# Patient Record
Sex: MALE | Race: WHITE | NOT HISPANIC OR LATINO | Employment: OTHER | URBAN - METROPOLITAN AREA
[De-identification: names, ages, dates, MRNs, and addresses within clinical notes are randomized per-mention and may not be internally consistent; named-entity substitution may affect disease eponyms.]

---

## 2017-06-07 ENCOUNTER — OFFICE VISIT (OUTPATIENT)
Dept: INTERNAL MEDICINE | Facility: IMAGING CENTER | Age: 77
End: 2017-06-07
Payer: MEDICARE

## 2017-06-07 ENCOUNTER — HOSPITAL ENCOUNTER (OUTPATIENT)
Dept: RADIOLOGY | Facility: MEDICAL CENTER | Age: 77
End: 2017-06-07
Attending: INTERNAL MEDICINE
Payer: MEDICARE

## 2017-06-07 VITALS
TEMPERATURE: 99.7 F | HEART RATE: 87 BPM | RESPIRATION RATE: 16 BRPM | SYSTOLIC BLOOD PRESSURE: 128 MMHG | WEIGHT: 218 LBS | DIASTOLIC BLOOD PRESSURE: 70 MMHG | HEIGHT: 71 IN | BODY MASS INDEX: 30.52 KG/M2 | OXYGEN SATURATION: 99 %

## 2017-06-07 DIAGNOSIS — Z12.5 SCREENING FOR PROSTATE CANCER: ICD-10-CM

## 2017-06-07 DIAGNOSIS — M79.605 PAIN OF LEFT LOWER EXTREMITY: ICD-10-CM

## 2017-06-07 DIAGNOSIS — N28.9 RENAL INSUFFICIENCY: ICD-10-CM

## 2017-06-07 DIAGNOSIS — R53.83 FATIGUE, UNSPECIFIED TYPE: ICD-10-CM

## 2017-06-07 DIAGNOSIS — G47.33 OBSTRUCTIVE SLEEP APNEA: ICD-10-CM

## 2017-06-07 DIAGNOSIS — E78.00 PURE HYPERCHOLESTEROLEMIA: ICD-10-CM

## 2017-06-07 DIAGNOSIS — I10 ESSENTIAL HYPERTENSION: ICD-10-CM

## 2017-06-07 PROCEDURE — 1101F PT FALLS ASSESS-DOCD LE1/YR: CPT | Mod: 8P | Performed by: INTERNAL MEDICINE

## 2017-06-07 PROCEDURE — 4040F PNEUMOC VAC/ADMIN/RCVD: CPT | Performed by: INTERNAL MEDICINE

## 2017-06-07 PROCEDURE — 1036F TOBACCO NON-USER: CPT | Performed by: INTERNAL MEDICINE

## 2017-06-07 PROCEDURE — 99214 OFFICE O/P EST MOD 30 MIN: CPT | Performed by: INTERNAL MEDICINE

## 2017-06-07 PROCEDURE — G8419 CALC BMI OUT NRM PARAM NOF/U: HCPCS | Performed by: INTERNAL MEDICINE

## 2017-06-07 PROCEDURE — 93971 EXTREMITY STUDY: CPT | Mod: LT

## 2017-06-07 PROCEDURE — G8432 DEP SCR NOT DOC, RNG: HCPCS | Performed by: INTERNAL MEDICINE

## 2017-06-07 NOTE — PROGRESS NOTES
Chief Complaint   Patient presents with   • Leg Pain       HISTORY OF THE PRESENT ILLNESS: Patient is a 77 y.o. male. Patient comes in with complaint of left medial, upper thigh pain. Symptoms for 3 days. Pain is achy and deep. Pain is moderate to severe. Pain is primarily when he sits or lies down. Pain seems better when he walks. No swelling in the legs. No recent immobilization or history of DVT. 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    He also reports fever of 101 yesterday. No cough, chills, dyspnea, dysuria. He did experience mild diarrhea but this resolved.    He complains of decreased stamina. Symptoms for 6 months. He has also noticed that he falls asleep more easily. He has a history of sleep apnea and is on CPAP. His device and saturations have not been checked in many years.    He has a history of hypertension. Blood pressure stable on current medications.    He is a history of renal insufficiency. This was seen on his last labs with a creatinine of 1.2. It was presumably due to decreased volume. There is been no follow-up.       Allergies: Morphine    Current Outpatient Prescriptions Ordered in [facility]   Medication Sig Dispense Refill   • cholestyramine (QUESTRAN) 4 G packet Take 4 g by mouth every day. Dissolve in 8 oz water or juice 7 Each 0   • triamterene/hctz (MAXZIDE-25/DYAZIDE) 37.5-25 MG Cap TAKE 1 CAPSULE BY MOUTH EACH MORNING **GENERIC FOR DYAZIDE 90 Cap 2   • losartan (COZAAR) 100 MG Tab TAKE 1 TABLET BY MOUTH DAILY ** GENERICFOR COZAAR 30 Tab 5   • allopurinol (ZYLOPRIM) 100 MG Tab TAKE ONE TABLET BY MOUTH TWICE DAILY **GENERIC FOR ZYLOPRIM 60 Tab 5   • ASPIRIN 81 MG PO TABS        No current Epic-ordered facility-administered medications on file.       Past medical history, social history and family history were reviewed from chart today    Review of systems: Per HPI.   All others negative.     Exam: Blood pressure 128/70, pulse 87, temperature 37.6 °C (99.7  "°F), resp. rate 16, height 1.803 m (5' 11\"), weight 98.884 kg (218 lb), SpO2 99 %.  General: Well-appearing. Well-developed. No signs of distress.  HEENT: Grossly normal. Oral cavity is pink and moist.  Neck: Supple without JVD or bruit.  Pulmonary: Clear with good breath sounds. Normal effort.  Cardiovascular: Regular. Carotid and radial pulses are intact.  Abdomen: Soft, nontender, nondistended. Spleen and liver are not enlarged.  Neurologic: Cranial nerves II through XII are grossly normal, alert and oriented x3  Extremities: Lower extremities are symmetric. The pain in the left leg is not reproducible with palpation, internal or external rotation. Mild pain with lifting leg.      Assessment/Plan  1. Pain of left lower extremity  US-EXTREMITY VENOUS UNILATERAL-LOWER LEFT    CBC WITH DIFFERENTIAL   2. Fatigue, unspecified type  TSH    COMP METABOLIC PANEL   3. Essential hypertension  COMP METABOLIC PANEL    URINALYSIS,CULTURE IF INDICATED    MICROALBUMIN CREAT RATIO URINE   4. Pure hypercholesterolemia  LIPID PROFILE    COMP METABOLIC PANEL   5. Renal insufficiency  COMP METABOLIC PANEL   6. Obstructive sleep apnea  CBC WITH DIFFERENTIAL   7. Screening for prostate cancer  PROSTATE SPECIFIC AG SCREENING       Patient with pain in the proximal left lower extremity. I suspect this is muscular however cannot exclude thrombophlebitis or other infection. He had recent fever. I also cannot rule out DVT but I think this is less likely with lack of swelling, symptoms only when sitting or lying in no increased risks.    The cause of his fatigue is unclear. I recommended laboratory workup. He has sleep apnea and his device has not been evaluated in multiple years. He feels that he gets 7 or more hours of sleep nightly with good results.    Blood pressure stable on current medications.    History of elevated serum creatinine. No follow-up. This was originally presumably due to decreased volume.    Due for routine " laboratories including screening PSA

## 2017-06-07 NOTE — MR AVS SNAPSHOT
"        Chuck Ortiz   2017 2:30 PM   Office Visit   MRN: [id number]    Department:  [facility]   Dept Phone:  [phone]    Description:  Male : 1940   Provider:  Reilly Jc M.D.           Reason for Visit     Leg Pain           Allergies as of 2017     Allergen Noted Reactions    Morphine 2014       Bradycardia      You were diagnosed with     Pain of left lower extremity   [5053185]       Fatigue, unspecified type   [0882435]       Essential hypertension   [4256611]       Pure hypercholesterolemia   [272.0.ICD-9-CM]       Renal insufficiency   [136034]       Obstructive sleep apnea   [925994]       Screening for prostate cancer   [677110]         Vital Signs     Blood Pressure Pulse Temperature Respirations Height Weight    128/70 mmHg 87 37.6 °C (99.7 °F) 16 1.803 m (5' 11\") 98.884 kg (218 lb)    Body Mass Index Oxygen Saturation Smoking Status             30.42 kg/m2 99% Never Smoker          Basic Information     Date Of Birth Sex Race Ethnicity Preferred Language    1940 Male White Non- English      Your appointments     2017  5:30 PM   US EXTREMITY (30) A with VISTA US 2   IMAGING VISTA ([place])    [address] [id number] [phone]              Problem List              ICD-10-CM Priority Class Noted - Resolved    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   12/10/2009 - Present    ED (erectile dysfunction) N52.9   Unknown - Present    Cardiac murmur R01.1   3/1/2012 - Present    Sleep apnea G47.30   12/10/2012 - Present    Osteoarthrosis, 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   2012 - Present    Renal cysts, acquired, 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   2014 - Present    Diverticulitis K57.92   2014 - Present    Essential hypertension I10   2015 - Present      Health Maintenance        Date Due Completion Dates    IMM DTaP/Tdap/Td Vaccine (1 - Tdap) 3/18/1958 ---    IMM ZOSTER VACCINE 3/18/1999 ---    COLONOSCOPY 10/1/2024 10/1/2014 (Done)    Override on " 10/1/2014: Done            Current Immunizations     13-VALENT PCV PREVNAR 11/12/2014    Influenza Vaccine Adult HD 10/24/2016, 11/11/2015    Pneumococcal polysaccharide vaccine (PPSV-23) 12/2/2009      Below and/or attached are the medications your provider expects you to take. Review all of your home medications and newly ordered medications with your provider and/or pharmacist. Follow medication instructions as directed by your provider and/or pharmacist. Please keep your medication list with you and share with your provider. Update the information when medications are discontinued, doses are changed, or new medications (including over-the-counter products) are added; and carry medication information at all times in the event of emergency situations     Allergies:  MORPHINE - (reactions not documented)               Medications  Valid as of: June 07, 2017 -  4:49 PM    Generic Name Brand Name Tablet Size Instructions for use    Allopurinol (Tab) ZYLOPRIM 100 MG TAKE ONE TABLET BY MOUTH TWICE DAILY **GENERIC FOR ZYLOPRIM        Aspirin (Tab) aspirin 81 MG         Cholestyramine (Pack) QUESTRAN 4 G Take 4 g by mouth every day. Dissolve in 8 oz water or juice        Losartan Potassium (Tab) COZAAR 100 MG TAKE 1 TABLET BY MOUTH DAILY ** GENERICFOR COZAAR        Triamterene-HCTZ (Cap) MAXZIDE-25/DYAZIDE 37.5-25 MG TAKE 1 CAPSULE BY MOUTH EACH MORNING **GENERIC FOR DYAZIDE        .                 Medicines prescribed today were sent to:     [facility] [place] - [address] [place] [address]    Phone: [phone] Fax: [phone]    Open 24 Hours?: No      Medication refill instructions:       If your prescription bottle indicates you have medication refills left, it is not necessary to call your provider’s office. Please contact your pharmacy and they will refill your medication.    If your prescription bottle indicates you do not have any refills left, you may request  refills at any time through one of the following ways: The online [employer] system (except Urgent Care), by calling your provider’s office, or by asking your pharmacy to contact your provider’s office with a refill request. Medication refills are processed only during regular business hours and may not be available until the next business day. Your provider may request additional information or to have a follow-up visit with you prior to refilling your medication.   *Please Note: Medication refills are assigned a new Rx number when refilled electronically. Your pharmacy may indicate that no refills were authorized even though a new prescription for the same medication is available at the pharmacy. Please request the medicine by name with the pharmacy before contacting your provider for a refill.        Your To Do List     Future Labs/Procedures Complete By Expires    CBC WITH DIFFERENTIAL  As directed 12/8/2017    COMP METABOLIC PANEL  As directed 12/8/2017    LIPID PROFILE  As directed 12/7/2017    MICROALBUMIN CREAT RATIO URINE  As directed 12/5/2017    PROSTATE SPECIFIC AG SCREENING  As directed 12/7/2017    TSH  As directed 12/8/2017    URINALYSIS,CULTURE IF INDICATED  As directed 6/7/2018    US-EXTREMITY VENOUS UNILATERAL-LOWER LEFT  As directed 6/7/2018      Other Notes About Your Plan     Colonoscopy 10/1/2014 repeat in 5 yrs Dexa  PSA  6/18/14  1.11   [facility]    No problems identified through [facility]             [employer] Access Code: Activation code not generated  Current [employer] Status: Active

## 2017-06-08 ENCOUNTER — NON-PROVIDER VISIT (OUTPATIENT)
Dept: INTERNAL MEDICINE | Facility: IMAGING CENTER | Age: 77
End: 2017-06-08
Payer: MEDICARE

## 2017-06-08 ENCOUNTER — HOSPITAL ENCOUNTER (OUTPATIENT)
Facility: MEDICAL CENTER | Age: 77
End: 2017-06-08
Attending: INTERNAL MEDICINE
Payer: MEDICARE

## 2017-06-08 DIAGNOSIS — R53.83 FATIGUE, UNSPECIFIED TYPE: ICD-10-CM

## 2017-06-08 DIAGNOSIS — Z12.5 SCREENING FOR PROSTATE CANCER: ICD-10-CM

## 2017-06-08 DIAGNOSIS — E78.00 PURE HYPERCHOLESTEROLEMIA: ICD-10-CM

## 2017-06-08 DIAGNOSIS — R50.9 INTERMITTENT FUO: ICD-10-CM

## 2017-06-08 DIAGNOSIS — M79.605 PAIN OF LEFT LOWER EXTREMITY: ICD-10-CM

## 2017-06-08 DIAGNOSIS — G47.33 OBSTRUCTIVE SLEEP APNEA: ICD-10-CM

## 2017-06-08 DIAGNOSIS — I10 ESSENTIAL HYPERTENSION: ICD-10-CM

## 2017-06-08 DIAGNOSIS — N28.9 RENAL INSUFFICIENCY: ICD-10-CM

## 2017-06-08 LAB
ALBUMIN SERPL BCP-MCNC: 3.3 G/DL (ref 3.2–4.9)
ALBUMIN/GLOB SERPL: 1.2 G/DL
ALP SERPL-CCNC: 65 U/L (ref 30–99)
ALT SERPL-CCNC: 22 U/L (ref 2–50)
ANION GAP SERPL CALC-SCNC: 6 MMOL/L (ref 0–11.9)
APPEARANCE UR: CLEAR
AST SERPL-CCNC: 24 U/L (ref 12–45)
BASOPHILS # BLD AUTO: 0.4 % (ref 0–1.8)
BASOPHILS # BLD: 0.04 K/UL (ref 0–0.12)
BILIRUB SERPL-MCNC: 0.8 MG/DL (ref 0.1–1.5)
BILIRUB UR QL STRIP.AUTO: NEGATIVE
BUN SERPL-MCNC: 20 MG/DL (ref 8–22)
CALCIUM SERPL-MCNC: 8.8 MG/DL (ref 8.5–10.5)
CHLORIDE SERPL-SCNC: 102 MMOL/L (ref 96–112)
CHOLEST SERPL-MCNC: 126 MG/DL (ref 100–199)
CO2 SERPL-SCNC: 27 MMOL/L (ref 20–33)
COLOR UR: YELLOW
CREAT SERPL-MCNC: 1.07 MG/DL (ref 0.5–1.4)
CREAT UR-MCNC: 230.7 MG/DL
CRP SERPL HS-MCNC: 84.7 MG/L (ref 0–7.5)
CULTURE IF INDICATED INDCX: NO UA CULTURE
EOSINOPHIL # BLD AUTO: 0.05 K/UL (ref 0–0.51)
EOSINOPHIL NFR BLD: 0.5 % (ref 0–6.9)
EPI CELLS #/AREA URNS HPF: ABNORMAL /HPF
ERYTHROCYTE [DISTWIDTH] IN BLOOD BY AUTOMATED COUNT: 50.8 FL (ref 35.9–50)
GFR SERPL CREATININE-BSD FRML MDRD: >60 ML/MIN/1.73 M 2
GLOBULIN SER CALC-MCNC: 2.7 G/DL (ref 1.9–3.5)
GLUCOSE SERPL-MCNC: 92 MG/DL (ref 65–99)
GLUCOSE UR STRIP.AUTO-MCNC: NEGATIVE MG/DL
HCT VFR BLD AUTO: 47.9 % (ref 42–52)
HDLC SERPL-MCNC: 44 MG/DL
HGB BLD-MCNC: 15.6 G/DL (ref 14–18)
HYALINE CASTS #/AREA URNS LPF: ABNORMAL /LPF
IMM GRANULOCYTES # BLD AUTO: 0.03 K/UL (ref 0–0.11)
IMM GRANULOCYTES NFR BLD AUTO: 0.3 % (ref 0–0.9)
KETONES UR STRIP.AUTO-MCNC: NEGATIVE MG/DL
LDLC SERPL CALC-MCNC: 72 MG/DL
LEUKOCYTE ESTERASE UR QL STRIP.AUTO: NEGATIVE
LYMPHOCYTES # BLD AUTO: 1.15 K/UL (ref 1–4.8)
LYMPHOCYTES NFR BLD: 11.9 % (ref 22–41)
MCH RBC QN AUTO: 32.2 PG (ref 27–33)
MCHC RBC AUTO-ENTMCNC: 32.6 G/DL (ref 33.7–35.3)
MCV RBC AUTO: 98.8 FL (ref 81.4–97.8)
MICRO URNS: ABNORMAL
MICROALBUMIN UR-MCNC: 3.6 MG/DL
MICROALBUMIN/CREAT UR: 16 MG/G (ref 0–30)
MONOCYTES # BLD AUTO: 1.4 K/UL (ref 0–0.85)
MONOCYTES NFR BLD AUTO: 14.5 % (ref 0–13.4)
MUCOUS THREADS #/AREA URNS HPF: ABNORMAL /HPF
NEUTROPHILS # BLD AUTO: 6.97 K/UL (ref 1.82–7.42)
NEUTROPHILS NFR BLD: 72.4 % (ref 44–72)
NITRITE UR QL STRIP.AUTO: NEGATIVE
NRBC # BLD AUTO: 0 K/UL
NRBC BLD AUTO-RTO: 0 /100 WBC
PH UR STRIP.AUTO: 6 [PH]
PLATELET # BLD AUTO: 134 K/UL (ref 164–446)
PMV BLD AUTO: 11.9 FL (ref 9–12.9)
POTASSIUM SERPL-SCNC: 4 MMOL/L (ref 3.6–5.5)
PROT SERPL-MCNC: 6 G/DL (ref 6–8.2)
PROT UR QL STRIP: 30 MG/DL
PSA SERPL-MCNC: 0.93 NG/ML (ref 0–4)
RBC # BLD AUTO: 4.85 M/UL (ref 4.7–6.1)
RBC # URNS HPF: ABNORMAL /HPF
RBC UR QL AUTO: NEGATIVE
SODIUM SERPL-SCNC: 135 MMOL/L (ref 135–145)
SP GR UR STRIP.AUTO: 1.02
TRIGL SERPL-MCNC: 52 MG/DL (ref 0–149)
TSH SERPL DL<=0.005 MIU/L-ACNC: 3 UIU/ML (ref 0.3–3.7)
WBC # BLD AUTO: 9.6 K/UL (ref 4.8–10.8)
WBC #/AREA URNS HPF: ABNORMAL /HPF

## 2017-06-08 PROCEDURE — 81001 URINALYSIS AUTO W/SCOPE: CPT

## 2017-06-08 PROCEDURE — 80061 LIPID PANEL: CPT

## 2017-06-08 PROCEDURE — 86141 C-REACTIVE PROTEIN HS: CPT

## 2017-06-08 PROCEDURE — 82043 UR ALBUMIN QUANTITATIVE: CPT

## 2017-06-08 PROCEDURE — 80053 COMPREHEN METABOLIC PANEL: CPT

## 2017-06-08 PROCEDURE — 84153 ASSAY OF PSA TOTAL: CPT

## 2017-06-08 PROCEDURE — 82570 ASSAY OF URINE CREATININE: CPT

## 2017-06-08 PROCEDURE — 84443 ASSAY THYROID STIM HORMONE: CPT

## 2017-06-08 PROCEDURE — 87040 BLOOD CULTURE FOR BACTERIA: CPT

## 2017-06-08 PROCEDURE — 85025 COMPLETE CBC W/AUTO DIFF WBC: CPT

## 2017-06-08 RX ORDER — LEVOFLOXACIN 500 MG/1
500 TABLET, FILM COATED ORAL DAILY
Qty: 7 TAB | Refills: 0 | Status: SHIPPED | OUTPATIENT
Start: 2017-06-08 | End: 2017-07-25

## 2017-06-10 ENCOUNTER — TELEPHONE (OUTPATIENT)
Dept: INTERNAL MEDICINE | Facility: IMAGING CENTER | Age: 77
End: 2017-06-10

## 2017-06-13 LAB
BACTERIA BLD CULT: NORMAL
SIGNIFICANT IND 70042: NORMAL
SITE SITE: NORMAL
SOURCE SOURCE: NORMAL

## 2017-06-13 NOTE — TELEPHONE ENCOUNTER
I spoke with patient regarding his laboratory results. His labs were essentially unremarkable. He had a slight left shift but no leukocytosis however his CRP was significantly elevated. He is feeling much better. His leg pain has completely resolved. No further fevers. Encouraged him to finish his antibiotics. He is due for a HRA and we will schedule to have this done within the next month

## 2017-07-17 DIAGNOSIS — I10 ESSENTIAL HYPERTENSION: ICD-10-CM

## 2017-07-17 RX ORDER — TRIAMTERENE AND HYDROCHLOROTHIAZIDE 37.5; 25 MG/1; MG/1
CAPSULE ORAL
Qty: 90 CAP | Refills: 3 | Status: SHIPPED | OUTPATIENT
Start: 2017-07-17 | End: 2018-10-27 | Stop reason: SDUPTHER

## 2017-07-25 ENCOUNTER — OFFICE VISIT (OUTPATIENT)
Dept: INTERNAL MEDICINE | Facility: IMAGING CENTER | Age: 77
End: 2017-07-25
Payer: MEDICARE

## 2017-07-25 VITALS
SYSTOLIC BLOOD PRESSURE: 126 MMHG | BODY MASS INDEX: 30.24 KG/M2 | HEIGHT: 71 IN | OXYGEN SATURATION: 95 % | DIASTOLIC BLOOD PRESSURE: 72 MMHG | WEIGHT: 216 LBS | TEMPERATURE: 98.4 F | HEART RATE: 71 BPM | RESPIRATION RATE: 12 BRPM

## 2017-07-25 DIAGNOSIS — Z00.00 MEDICARE ANNUAL WELLNESS VISIT, SUBSEQUENT: ICD-10-CM

## 2017-07-25 DIAGNOSIS — E66.09 NON MORBID OBESITY DUE TO EXCESS CALORIES: ICD-10-CM

## 2017-07-25 DIAGNOSIS — Z12.11 SCREENING FOR COLON CANCER: ICD-10-CM

## 2017-07-25 DIAGNOSIS — I10 ESSENTIAL HYPERTENSION: ICD-10-CM

## 2017-07-25 DIAGNOSIS — N28.1 RENAL CYSTS, ACQUIRED, BILATERAL: ICD-10-CM

## 2017-07-25 DIAGNOSIS — G47.33 OBSTRUCTIVE SLEEP APNEA SYNDROME: ICD-10-CM

## 2017-07-25 DIAGNOSIS — N20.0 URIC ACID NEPHROLITHIASIS: ICD-10-CM

## 2017-07-25 DIAGNOSIS — M19.042 PRIMARY OSTEOARTHRITIS OF LEFT HAND: ICD-10-CM

## 2017-07-25 PROCEDURE — G0439 PPPS, SUBSEQ VISIT: HCPCS | Performed by: INTERNAL MEDICINE

## 2017-07-25 ASSESSMENT — PATIENT HEALTH QUESTIONNAIRE - PHQ9: CLINICAL INTERPRETATION OF PHQ2 SCORE: 0

## 2017-07-25 NOTE — PROGRESS NOTES
77 y.o. male presents for the followin. Medicare annual wellness visit, subsequent  Patient has been in good health. He is not exercising because of the excess heat but generally he is very active. His diet is good. Minimal red meat. High in fruits and vegetables. He does not smoke. Alcohol moderation. He is up-to-date on colonoscopy. He is due for TDAP and shingles vaccination    2. Essential hypertension  Blood pressure stable. He takes half tablet losartan and Dyazide.    3. Renal cysts, acquired, bilateral  History of bilateral renal cysts. Last evaluated in  however they were both benign, simple cyst.    4. Primary osteoarthritis of left hand  Ongoing, daily symptoms. Symptoms are mild to moderate. He currently takes no medications.    5. Obstructive sleep apnea syndrome  Stable on CPAP. He is compliant with device nightly.    6. Uric acid nephrolithiasis  Stable on allopurinol. No recurrence. 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 at SURGERY SAME DAY [facility] ORS   • Cataract phaco with iol  2014     Performed by Joni Duarte M.D. at SURGERY SURGICAL [facility] ORS       Family history: relating to possible risk factors for your patient  Family History   Problem Relation Age of Onset    • Heart Disease Maternal Grandmother    • Diabetes Paternal Grandfather    • Cancer Neg Hx    • Stroke Sister    • Lung Disease Father      black lung        Current Providers (including home care/DME’s):   Colonoscopy 10/1/2014 repeat in 5 yrs Dexa  PSA  6/8/17   [facility]      Patient Care Team:  Reilly Jc M.D. as PCP - General (Internal Medicine)  Ana Stearns R.N. as Registered Nurse      Medications:   Current Outpatient Prescriptions Ordered in [facility]   Medication Sig Dispense Refill   • triamterene/hctz (MAXZIDE-25/DYAZIDE) 37.5-25 MG Cap TAKE 1 CAPSULE BY MOUTH EACH MORNING **GENERIC FOR DYAZIDE 90 Cap 3   • losartan (COZAAR) 100 MG Tab TAKE 1 TABLET BY MOUTH DAILY ** GENERICFOR COZAAR (Patient taking differently: take 1/2 tab daily) 30 Tab 5   • allopurinol (ZYLOPRIM) 100 MG Tab TAKE ONE TABLET BY MOUTH TWICE DAILY **GENERIC FOR ZYLOPRIM 60 Tab 5   • ASPIRIN 81 MG PO TABS        No current Epic-ordered facility-administered medications on file.       Supplements (calcium/vitamins): if not lisited in medications    Depression Screening    Little interest or pleasure in doing things?  0 - not at all  Feeling down, depressed , or hopeless? 0 - not at all  Trouble falling or staying asleep, or sleeping too much?     Feeling tired or having little energy?     Poor appetite or overeating?     Feeling bad about yourself - or that you are a failure or have let yourself or your family down?    Trouble concentrating on things, such as reading the newspaper or watching television?    Moving or speaking so slowly that other people could have noticed.  Or the opposite - being so fidgety or restless that you have been moving around a lot more than usual?     Thoughts that you would be better off dead, or of hurting yourself?     Patient Health Questionnaire Score:      If depressive symptoms identified deferred to follow up visit unless specifically addressed in assessment and plan.    Interpretation of  PHQ-9 Total Score   Score Severity   1-4 No Depression   5-9 Mild Depression   10-14 Moderate Depression   15-19 Moderately Severe Depression   20-27 Severe Depression      Screening for Cognitive Impairment    Three Minute Recall (apple, watch, jean)  2/3    Draw clock face with all 12 numbers set to the hand to show 10 minutes past 11 o'clock  1    Cognitive concerns identified deferred for follow up unless specifically addressed in assessment and plan.    Fall Risk Assessment    Has the patient had two or more falls in the last year or any fall with injury in the last year?  No    Safety Assessment    Throw rugs on floor.  Yes  Handrails on all stairs.  Yes  Good lighting in all hallways.  Yes  Difficulty hearing.  No  Patient counseled about all safety risks that were identified.    Functional Assessment ADLs    Are there any barriers preventing you from cooking for yourself or meeting nutritional needs?  No.    Are there any barriers preventing you from driving safely or obtaining transportation?  No.    Are there any barriers preventing you from using a telephone or calling for help?  No.    Are there any barriers preventing you from shopping?  No.    Are there any barriers preventing you from taking care of your own finances?  No.    Are there any barriers preventing you from managing your medications?  No.    Are currently engaging any exercise or physical activity?  No.       Health Maintenance Summary                IMM DTaP/Tdap/Td Vaccine Overdue 3/18/1958     IMM ZOSTER VACCINE Overdue 3/18/1999     Annual Wellness Visit Overdue 12/2/2016      Done 12/2/2015 Visit Dx: Medicare annual wellness visit, subsequent     Patient has more history with this topic...    IMM INFLUENZA Next Due 9/1/2017      Done 10/24/2016 Imm Admin: Influenza Vaccine Adult HD     Patient has more history with this topic...    COLONOSCOPY Next Due 10/1/2024      Done 10/1/2014           Patient Care Team:  Reilly Jc M.D. as  PCP - General (Internal Medicine)  Ana Stearns R.N. as Registered Nurse      Risk Factors:  Depression screening: using standardized screening tools: No depression  Depression Screening    Little interest or pleasure in doing things?  0 - not at all  Feeling down, depressed , or hopeless? 0 - not at all  Trouble falling or staying asleep, or sleeping too much?     Feeling tired or having little energy?     Poor appetite or overeating?     Feeling bad about yourself - or that you are a failure or have let yourself or your family down?    Trouble concentrating on things, such as reading the newspaper or watching television?    Moving or speaking so slowly that other people could have noticed.  Or the opposite - being so fidgety or restless that you have been moving around a lot more than usual?     Thoughts that you would be better off dead, or of hurting yourself?     Patient Health Questionnaire Score:        If depressive symptoms identified deferred to follow up visit unless specifically addressed in assesment and plan.    Interpretation of PHQ-9 Total Score   Score Severity   1-4 No Depression   5-9 Mild Depression   10-14 Moderate Depression   15-19 Moderately Severe Depression   20-27 Severe Depression      Ability to perform ADL’s: Able to perform all activities of daily living.  Hearing impairment: No hearing impairment.  Fall risks: No increased risk of falls.  Safety Management: Normal safety precautions including seatbelts and smoke detectors.  Cognitive function: using standardized screening tools: Normal cognitive function.  Urinary and bowel function. No incontinence, nocturia or frequency. Regular bowels. No diarrhea or rectal bleeding/melena.      Written screening schedule 5-10 years out:  Colonoscopy 10/1/2014 repeat in 5 yrs Dexa  PSA  6/8/17   [facility]    Due for fecal immunoassay    Vital measurements:  Blood pressure 126/72, pulse 71, temperature 36.9 °C (98.4 °F), resp. rate 12,  "height 1.803 m (5' 11\"), weight 97.977 kg (216 lb), SpO2 95 %.  Body mass index is 30.14 kg/(m^2). (Goal BM I>18 <25)      Exam: *Note disease specific examination*  General: Mildly overweight.  No distress.  Normal appearing.  HEENT: Pupils are equal.  Conjunctiva is normal.  Head is normal appearing.  Ears, canals and tympanic membranes are normal.  Oral cavity is pink and moist without lesion.  Neck: Supple without JVD or bruit.  Thyroid is not enlarged.  Pulmonary: Clear with good breath sounds.  Cardiovascular regular rate and rhythm.  No murmur auscultated.  Carotid, radial and pedal pulses are intact.  Abdomen: Soft, nontender, nondistended.  Normal bowel sounds.  Organs are not enlarged.  Neurologic: Cranial nerves intact.  Strength and sensation are normal.  Normal patellar reflex.  Skin: No obvious lesions  Lymph: No cervical, supraclavicular, axillary, abdominal or inguinal adenopathy noted.  Genitourinary: Penis, scrotum and testicles are unremarkable.  No hernia.  Rectal tone is normal.  Stool is heme-negative.  Prostate is not enlarged, soft and without nodule.    Assessment/Plan: *Note for HRA please use the highest specificity diagnosis codes to describe patient’s chronic conditions*    1. Medicare annual wellness visit, subsequent     2. Essential hypertension     3. Renal cysts, acquired, bilateral     4. Primary osteoarthritis of left hand     5. Obstructive sleep apnea syndrome     6. Uric acid nephrolithiasis     7. Non morbid obesity due to excess calories         Overall patient is in good health. He would benefit from additional weight loss. I recommended fecal immunoassay, TDAP and shingles vaccination.    Blood pressure stable on current medications. No change in treatment.    Patient history renal cysts which were both identified as simple, benign cyst. No further follow-up at this time.    Patient has osteoarthritis in the left thumb. We discussed topical agents or possible " anti-inflammatories as needed. At this time he feels that he can live with.    Sleep apnea stable on CPAP.    No sign of recurrence of nephrolithiasis.      Tx options for risk factors:    Referrals out: as appropriate   Weight loss: Body mass index is 30.14 kg/(m^2). Discussed weight loss goal. Recommended portion control and exercise. Briefly discussed Weight Watchers and other weight loss programs.     Physical activity: Good physical activity level.   Smoking cessation: Nonsmoker   Fall prevention: No increased his fall   Nutrition: Good overall nutrition. Balanced lean meat, fruits, vegetables and complex carbohydrates.      Annual Wellness Visit/HRA  (initial, one time only)                                                   (subsequent)

## 2017-07-25 NOTE — MR AVS SNAPSHOT
"        Chuck Ortiz   2017 9:00 AM   Office Visit   MRN: [id number]    Department:  [facility]   Dept Phone:  [phone]    Description:  Male : 1940   Provider:  Reilly Jc M.D.           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   12/10/2009 - Present    ED (erectile dysfunction) N52.9   Unknown - Present    Cardiac murmur R01.1   3/1/2012 - Present    Sleep apnea G47.30   12/10/2012 - Present    Osteoarthrosis, 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   2012 - Present    Renal cysts, acquired, 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   2014 - Present    Diverticulitis K57.92   2014 - Present    Essential hypertension I10   2015 - Present      Health Maintenance        Date Due Completion Dates    IMM DTaP/Tdap/Td Vaccine (1 - Tdap) 3/18/1958 ---    IMM ZOSTER VACCINE 3/18/1999 ---    IMM INFLUENZA (1) 2017 10/24/2016, 2015    COLONOSCOPY 10/1/2024 10/1/2014 (Done)    Override on 10/1/2014: Done            Current Immunizations     13-VALENT PCV PREVNAR 2014    Influenza Vaccine Adult HD 10/24/2016, 2015    Pneumococcal polysaccharide vaccine (PPSV-23) 2009      Below and/or attached are the medications your provider expects you to take. Review all of your home medications and newly ordered medications with your provider and/or pharmacist. Follow medication instructions as directed by your provider and/or pharmacist. Please keep your medication list with " you and share with your provider. Update the information when medications are discontinued, doses are changed, or new medications (including over-the-counter products) are added; and carry medication information at all times in the event of emergency situations     Allergies:  MORPHINE - (reactions not documented)               Medications  Valid as of: July 25, 2017 -  9:55 AM    Generic Name Brand Name Tablet Size Instructions for use    Allopurinol (Tab) ZYLOPRIM 100 MG TAKE ONE TABLET BY MOUTH TWICE DAILY **GENERIC FOR ZYLOPRIM        Aspirin (Tab) aspirin 81 MG         Losartan Potassium (Tab) COZAAR 100 MG TAKE 1 TABLET BY MOUTH DAILY ** GENERICFOR COZAAR        Triamterene-HCTZ (Cap) MAXZIDE-25/DYAZIDE 37.5-25 MG TAKE 1 CAPSULE BY MOUTH EACH MORNING **GENERIC FOR DYAZIDE        .                 Medicines prescribed today were sent to:     [id number] - [place], [place] - [id number] [facility] [place] [address]    Phone: [phone] Fax: [phone]    Open 24 Hours?: No      Medication refill instructions:       If your prescription bottle indicates you have medication refills left, it is not necessary to call your provider’s office. Please contact your pharmacy and they will refill your medication.    If your prescription bottle indicates you do not have any refills left, you may request refills at any time through one of the following ways: The online [employer] system (except Urgent Care), by calling your provider’s office, or by asking your pharmacy to contact your provider’s office with a refill request. Medication refills are processed only during regular business hours and may not be available until the next business day. Your provider may request additional information or to have a follow-up visit with you prior to refilling your medication.   *Please Note: Medication refills are assigned a new Rx number when refilled electronically. Your pharmacy may indicate that no refills  were authorized even though a new prescription for the same medication is available at the pharmacy. Please request the medicine by name with the pharmacy before contacting your provider for a refill.        Other Notes About Your Plan     Colonoscopy 10/1/2014 repeat in 5 yrs Dexa  PSA  6/8/17   [facility]           MyChart Access Code: Activation code not generated  Current MyChart Status: Active

## 2017-07-30 ENCOUNTER — HOSPITAL ENCOUNTER (OUTPATIENT)
Facility: MEDICAL CENTER | Age: 77
End: 2017-07-30
Attending: INTERNAL MEDICINE
Payer: MEDICARE

## 2017-07-30 PROCEDURE — 82274 ASSAY TEST FOR BLOOD FECAL: CPT

## 2017-08-01 DIAGNOSIS — Z12.11 SCREENING FOR COLON CANCER: ICD-10-CM

## 2017-08-02 LAB — HEMOCCULT STL QL IA: NEGATIVE

## 2017-08-07 ENCOUNTER — OFFICE VISIT (OUTPATIENT)
Dept: INTERNAL MEDICINE | Facility: IMAGING CENTER | Age: 77
End: 2017-08-07
Payer: MEDICARE

## 2017-08-07 VITALS
SYSTOLIC BLOOD PRESSURE: 130 MMHG | OXYGEN SATURATION: 93 % | HEART RATE: 76 BPM | TEMPERATURE: 98.6 F | WEIGHT: 219 LBS | HEIGHT: 71 IN | DIASTOLIC BLOOD PRESSURE: 82 MMHG | BODY MASS INDEX: 30.66 KG/M2 | RESPIRATION RATE: 16 BRPM

## 2017-08-07 DIAGNOSIS — L03.113 CELLULITIS OF RIGHT UPPER EXTREMITY: ICD-10-CM

## 2017-08-07 PROCEDURE — 99214 OFFICE O/P EST MOD 30 MIN: CPT | Performed by: INTERNAL MEDICINE

## 2017-08-07 RX ORDER — CEPHALEXIN 250 MG/1
500 CAPSULE ORAL 2 TIMES DAILY
Qty: 28 CAP | Refills: 0 | Status: SHIPPED | OUTPATIENT
Start: 2017-08-07 | End: 2017-11-13

## 2017-08-07 NOTE — MR AVS SNAPSHOT
"        Chuck Ortiz   2017 2:30 PM   Office Visit   MRN: [id number]    Department:  [facility]   Dept Phone:  [phone]    Description:  Male : 1940   Provider:  Reilly Jc M.D.           Reason for Visit     Arm Pain right       Allergies as of 2017     Allergen Noted Reactions    Morphine 2014       Bradycardia      You were diagnosed with     Cellulitis of right upper extremity   [[id number]]         Vital Signs     Blood Pressure Pulse Temperature Respirations Height Weight    130/82 mmHg 76 37 °C (98.6 °F) 16 1.803 m (5' 11\") 99.338 kg (219 lb)    Body Mass Index Oxygen Saturation Smoking Status             30.56 kg/m2 93% Never Smoker          Basic Information     Date Of Birth Sex Race Ethnicity Preferred Language    1940 Male White Non- English      Problem List              ICD-10-CM Priority Class Noted - Resolved    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   12/10/2009 - Present    ED (erectile dysfunction) N52.9   Unknown - Present    Cardiac murmur R01.1   3/1/2012 - Present    Osteoarthrosis, 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   2012 - Present    Renal cysts, acquired, 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   2014 - Present    Diverticulitis K57.92   2014 - Present    Essential hypertension I10   2015 - Present    Obstructive sleep apnea syndrome G47.33   2017 - Present      Health Maintenance        Date Due Completion Dates    IMM DTaP/Tdap/Td Vaccine (1 - Tdap) 3/18/1958 ---    IMM ZOSTER VACCINE 3/18/1999 ---    IMM INFLUENZA (1) 2017 10/24/2016, 2015    COLONOSCOPY 10/1/2024 10/1/2014 (Done)    Override on 10/1/2014: Done            Current Immunizations     13-VALENT PCV PREVNAR 2014    Influenza Vaccine Adult HD 10/24/2016, 2015    Pneumococcal polysaccharide vaccine (PPSV-23) 2009      Below and/or attached are the medications your provider expects you to take. Review all of your home medications and newly ordered medications with your " provider and/or pharmacist. Follow medication instructions as directed by your provider and/or pharmacist. Please keep your medication list with you and share with your provider. Update the information when medications are discontinued, doses are changed, or new medications (including over-the-counter products) are added; and carry medication information at all times in the event of emergency situations     Allergies:  MORPHINE - (reactions not documented)               Medications  Valid as of: August 07, 2017 -  4:36 PM    Generic Name Brand Name Tablet Size Instructions for use    Allopurinol (Tab) ZYLOPRIM 100 MG TAKE ONE TABLET BY MOUTH TWICE DAILY **GENERIC FOR ZYLOPRIM        Aspirin (Tab) aspirin 81 MG         Cephalexin (Cap) KEFLEX 250 MG Take 2 Caps by mouth 2 times a day.        Losartan Potassium (Tab) COZAAR 100 MG TAKE 1 TABLET BY MOUTH DAILY ** GENERICFOR COZAAR        Triamterene-HCTZ (Cap) MAXZIDE-25/DYAZIDE 37.5-25 MG TAKE 1 CAPSULE BY MOUTH EACH MORNING **GENERIC FOR DYAZIDE        .                 Medicines prescribed today were sent to:     [facility] [address] [place], [place]  [facility] [address] [address]    Phone: [phone] Fax: [phone]    Open 24 Hours?: No      Medication refill instructions:       If your prescription bottle indicates you have medication refills left, it is not necessary to call your provider’s office. Please contact your pharmacy and they will refill your medication.    If your prescription bottle indicates you do not have any refills left, you may request refills at any time through one of the following ways: The online [employer] system (except Urgent Care), by calling your provider’s office, or by asking your pharmacy to contact your provider’s office with a refill request. Medication refills are processed only during regular business hours and may not be available until the next business day. Your provider may request additional  information or to have a follow-up visit with you prior to refilling your medication.   *Please Note: Medication refills are assigned a new Rx number when refilled electronically. Your pharmacy may indicate that no refills were authorized even though a new prescription for the same medication is available at the pharmacy. Please request the medicine by name with the pharmacy before contacting your provider for a refill.        Other Notes About Your Plan     Colonoscopy 10/1/2014 repeat in 5 yrs, 7/30/17 FIT NEG Dexa  PSA  6/8/17   [facility]           MyChart Access Code: Activation code not generated  Current MyChart Status: Active

## 2017-08-08 NOTE — PROGRESS NOTES
"Chief Complaint   Patient presents with   • Arm Pain     right        HISTORY OF THE PRESENT ILLNESS: Patient is a 77 y.o. male. Patient comes in with complaint of arm swelling, pain and redness. The pain in the arm is mild. She describes it more of a discomfort. He is also noticed redness on the forearm. No trauma. No fever or chills. He has noticed a swelling in the mid forearm.       Allergies: Morphine    Current Outpatient Prescriptions Ordered in [facility]   Medication Sig Dispense Refill   • cephALEXin (KEFLEX) 250 MG Cap Take 2 Caps by mouth 2 times a day. 28 Cap 0   • triamterene/hctz (MAXZIDE-25/DYAZIDE) 37.5-25 MG Cap TAKE 1 CAPSULE BY MOUTH EACH MORNING **GENERIC FOR DYAZIDE 90 Cap 3   • losartan (COZAAR) 100 MG Tab TAKE 1 TABLET BY MOUTH DAILY ** GENERICFOR COZAAR (Patient taking differently: take 1/2 tab daily) 30 Tab 5   • allopurinol (ZYLOPRIM) 100 MG Tab TAKE ONE TABLET BY MOUTH TWICE DAILY **GENERIC FOR ZYLOPRIM 60 Tab 5   • ASPIRIN 81 MG PO TABS        No current Epic-ordered facility-administered medications on file.       Past medical history, social history and family history were reviewed from chart today    Review of systems: Per HPI. All others negative.     Exam: Blood pressure 130/82, pulse 76, temperature 37 °C (98.6 °F), resp. rate 16, height 1.803 m (5' 11\"), weight 99.338 kg (219 lb), SpO2 93 %.  General: Well-nourished, well-developed. No change in appearance. No distress.  HEENT: Normocephalic.  Pulmonary: Clear. Normal effort.  Cardiovascular: Regular   Abdomen: Normal appearing. Soft, nontender, nondistended.   Neurologic: Cranial nerves II through XII are grossly intact, alert and oriented x3  Extremities: The right forearm on the dorsal side has been extended area of erythema and warmth. No fluctuance. No significant tenderness with palpation. Radial pulses are intact.  is normal.      Assessment/Plan  1. Cellulitis of right upper extremity  cephALEXin (KEFLEX) 250 MG Cap "       Patient appears to have some inflammatory condition in the forearm. I suspect a cellulitis from the elbow but cannot exclude thrombophlebitis. It could be an atypical rash but it is not pruritic. I recommended antibiotics and warm compresses as above. We discussed worrisome signs of progression including red streaks, spiking fevers or increased pain in the forearm. He should call or present to the emergency department symptoms should change.

## 2017-10-14 ENCOUNTER — NON-PROVIDER VISIT (OUTPATIENT)
Dept: INTERNAL MEDICINE | Facility: IMAGING CENTER | Age: 77
End: 2017-10-14
Payer: MEDICARE

## 2017-10-14 DIAGNOSIS — Z23 NEED FOR INFLUENZA VACCINATION: ICD-10-CM

## 2017-10-14 PROCEDURE — 90662 IIV NO PRSV INCREASED AG IM: CPT | Performed by: INTERNAL MEDICINE

## 2017-10-14 PROCEDURE — 90471 IMMUNIZATION ADMIN: CPT | Performed by: INTERNAL MEDICINE

## 2017-11-13 ENCOUNTER — OFFICE VISIT (OUTPATIENT)
Dept: INTERNAL MEDICINE | Facility: IMAGING CENTER | Age: 77
End: 2017-11-13
Payer: MEDICARE

## 2017-11-13 VITALS
TEMPERATURE: 97.8 F | SYSTOLIC BLOOD PRESSURE: 138 MMHG | HEIGHT: 71 IN | RESPIRATION RATE: 16 BRPM | DIASTOLIC BLOOD PRESSURE: 80 MMHG | HEART RATE: 83 BPM | OXYGEN SATURATION: 93 % | BODY MASS INDEX: 30.8 KG/M2 | WEIGHT: 220 LBS

## 2017-11-13 DIAGNOSIS — R13.12 OROPHARYNGEAL DYSPHAGIA: ICD-10-CM

## 2017-11-13 PROCEDURE — 99214 OFFICE O/P EST MOD 30 MIN: CPT | Performed by: INTERNAL MEDICINE

## 2017-11-13 NOTE — PROGRESS NOTES
"Chief Complaint   Patient presents with   • Other     Problems swallowing        HISTORY OF THE PRESENT ILLNESS: Patient is a 77 y.o. male.Patient comes in with complaint of problems swallowing. He has the sensation of food getting stuck in his lower throat/neck. Symptoms for one month. Symptoms seem to be more frequent. Symptoms are not constant but he does experience them daily. No issues with liquids. Denies reflux. No pulmonary symptoms including cough, wheezing or dyspnea. No change in medications. No change in diet. Minimal alcohol use. No anti-inflammatories.       Allergies: Morphine    Current Outpatient Prescriptions Ordered in [facility]   Medication Sig Dispense Refill   • triamterene/hctz (MAXZIDE-25/DYAZIDE) 37.5-25 MG Cap TAKE 1 CAPSULE BY MOUTH EACH MORNING **GENERIC FOR DYAZIDE 90 Cap 3   • losartan (COZAAR) 100 MG Tab TAKE 1 TABLET BY MOUTH DAILY ** GENERICFOR COZAAR (Patient taking differently: take 1/2 tab daily) 30 Tab 5   • allopurinol (ZYLOPRIM) 100 MG Tab TAKE ONE TABLET BY MOUTH TWICE DAILY **GENERIC FOR ZYLOPRIM 60 Tab 5   • ASPIRIN 81 MG PO TABS        No current Epic-ordered facility-administered medications on file.        Past medical history, social history and family history were reviewed from chart today    Review of systems: Per HPI.   All others negative.     Exam: Blood pressure 138/80, pulse 83, temperature 36.6 °C (97.8 °F), resp. rate 16, height 1.803 m (5' 11\"), weight 99.8 kg (220 lb), SpO2 93 %.  General: Well-appearing. Well-developed. No signs of distress.  HEENT: Grossly normal. Oral cavity is pink and moist.  Neck: Supple without JVD or bruit.No adenopathy. Thyroid is not enlarged.  Pulmonary: Clear with good breath sounds. Normal effort.  Cardiovascular: Regular. Carotid and radial pulses are intact.  Abdomen: Soft, nontender, nondistended. Spleen and liver are not enlarged.  Neurologic: Cranial nerves II through XII are grossly normal, alert and oriented " x3      Assessment/Plan  1. Oropharyngeal dysphagia         Trial of Nexium. Samples given  If symptoms do not improve over the next week then I will order upper GI. Follow-up with ENT versus GI based on results.

## 2017-11-29 ENCOUNTER — OFFICE VISIT (OUTPATIENT)
Dept: INTERNAL MEDICINE | Facility: IMAGING CENTER | Age: 77
End: 2017-11-29
Payer: MEDICARE

## 2017-11-29 VITALS
HEART RATE: 81 BPM | OXYGEN SATURATION: 90 % | HEIGHT: 71 IN | DIASTOLIC BLOOD PRESSURE: 80 MMHG | RESPIRATION RATE: 12 BRPM | SYSTOLIC BLOOD PRESSURE: 128 MMHG | BODY MASS INDEX: 30.66 KG/M2 | WEIGHT: 219 LBS | TEMPERATURE: 98.4 F

## 2017-11-29 DIAGNOSIS — R68.89 FLU-LIKE SYMPTOMS: ICD-10-CM

## 2017-11-29 DIAGNOSIS — R55 SYNCOPE, UNSPECIFIED SYNCOPE TYPE: ICD-10-CM

## 2017-11-29 DIAGNOSIS — J10.1 INFLUENZA A: ICD-10-CM

## 2017-11-29 LAB
FLUAV+FLUBV AG SPEC QL IA: POSITIVE
INT CON NEG: NEGATIVE
INT CON POS: POSITIVE

## 2017-11-29 PROCEDURE — 99214 OFFICE O/P EST MOD 30 MIN: CPT | Performed by: INTERNAL MEDICINE

## 2017-11-29 PROCEDURE — 93000 ELECTROCARDIOGRAM COMPLETE: CPT | Performed by: INTERNAL MEDICINE

## 2017-11-29 PROCEDURE — 87804 INFLUENZA ASSAY W/OPTIC: CPT | Performed by: INTERNAL MEDICINE

## 2017-11-29 RX ORDER — OSELTAMIVIR PHOSPHATE 75 MG/1
75 CAPSULE ORAL 2 TIMES DAILY
Qty: 10 CAP | Refills: 0 | Status: SHIPPED | OUTPATIENT
Start: 2017-11-29 | End: 2018-02-24

## 2017-11-29 RX ORDER — OMEPRAZOLE 40 MG/1
40 CAPSULE, DELAYED RELEASE ORAL DAILY
Qty: 30 CAP | Refills: 3 | Status: SHIPPED | OUTPATIENT
Start: 2017-11-29 | End: 2018-08-24

## 2017-11-29 RX ORDER — PROMETHAZINE HYDROCHLORIDE AND CODEINE PHOSPHATE 6.25; 1 MG/5ML; MG/5ML
5 SYRUP ORAL 4 TIMES DAILY PRN
Qty: 120 ML | Refills: 0 | Status: SHIPPED
Start: 2017-11-29 | End: 2018-02-24

## 2017-11-29 RX ORDER — ALBUTEROL SULFATE 90 UG/1
2 AEROSOL, METERED RESPIRATORY (INHALATION) EVERY 6 HOURS PRN
Qty: 8.5 G | Refills: 0 | Status: SHIPPED | OUTPATIENT
Start: 2017-11-29 | End: 2018-02-24

## 2017-11-29 ASSESSMENT — PATIENT HEALTH QUESTIONNAIRE - PHQ9: CLINICAL INTERPRETATION OF PHQ2 SCORE: 0

## 2017-11-29 NOTE — PROGRESS NOTES
Chief complaint: Upper respiratory infection    HISTORY OF THE PRESENT ILLNESS: Patient is a 77 y.o. male. Patient comes in for evaluation of upper respiratory symptoms. Starting 4 days ago he developed chest congestion, rhinitis, fever and chills. The following day he began to cough up sputum. He describes it as all of colored. He had continued fever and chills through yesterday. No myalgia. No headache. The sputum was also tinged with blood. He has noticed some wheezing with expiration.    Last night. He had a coughing spell. When he stood up he had a syncopal episode. It was unwitnessed but he fell across the couch and ended up on the floor. No trauma to the body or head. He is uncertain of how long he was unconscious.    We also discussed his dysphagia and reflux symptoms from last visit. He feels that the dysphagia has mostly resolved. He still experiences some reflux. There was issues with filling his prescription so he switched to over-the-counter Nexium which she has found less effective than samples.    EKG:  Interpreted by myself.  Normal sinus rhythm. First-degree AV block. No acute or chronic ischemic changes.    Influenza testing  Influenza a-positive  Influenza B-negative         Allergies: Morphine    Current Outpatient Prescriptions Ordered in [facility]   Medication Sig Dispense Refill   • oseltamivir (TAMIFLU) 75 MG Cap Take 1 Cap by mouth 2 times a day. 10 Cap 0   • albuterol 108 (90 Base) MCG/ACT Aero Soln inhalation aerosol Inhale 2 Puffs by mouth every 6 hours as needed for Shortness of Breath. 8.5 g 0   • promethazine-codeine (PHENERGAN-CODEINE) 6.25-10 MG/5ML Syrup Take 5 mL by mouth 4 times a day as needed for Cough. 120 mL 0   • esomeprazole (NEXIUM 24HR) 20 MG capsule Take 1 Cap by mouth every morning before breakfast. 90 Cap 0   • triamterene/hctz (MAXZIDE-25/DYAZIDE) 37.5-25 MG Cap TAKE 1 CAPSULE BY MOUTH EACH MORNING **GENERIC FOR DYAZIDE 90 Cap 3   • losartan (COZAAR) 100 MG Tab TAKE 1  "TABLET BY MOUTH DAILY ** GENERICFOR COZAAR (Patient taking differently: take 1/2 tab daily) 30 Tab 5   • allopurinol (ZYLOPRIM) 100 MG Tab TAKE ONE TABLET BY MOUTH TWICE DAILY **GENERIC FOR ZYLOPRIM 60 Tab 5   • ASPIRIN 81 MG PO TABS        No current Epic-ordered facility-administered medications on file.        Past medical history, social history and family history were reviewed from chart today    Review of systems: Per HPI.   All others negative.     Exam: Blood pressure 128/80, pulse 81, temperature 36.9 °C (98.4 °F), resp. rate 12, height 1.803 m (5' 11\"), weight 99.3 kg (219 lb), SpO2 90 %.  General:Ill appearing but no distress.  HEENT: Grossly normal. Oral cavity is pink and moist.  Neck: Supple without JVD or bruit.  Pulmonary: Patient has difficulty taking deep breath because of cough. He has rhonchi in the right base. He has wheezing in the left lung field..  Cardiovascular: Regular. Carotid and radial pulses are intact.  Abdomen: Soft, nontender, nondistended. Spleen and liver are not enlarged.  Neurologic: Cranial nerves II through XII are grossly normal, alert and oriented x3      Assessment/Plan  1. Influenza A     2. Flu-like symptoms  POCT Influenza A/B   3. Syncope, unspecified syncope type           Patient has findings consistent with pneumonia. He is positive for influenza A.  Recommended:  Tamiflu  Albuterol as needed for wheezing  Phenergan with codeine as needed for cough  Ibuprofen or Tylenol as needed for fever, aches and pains.    Suspect syncope was vagal.  Further workup if he has any further symptoms.    Discussed that if his condition should worsen he should go to the emergency room    "

## 2017-12-08 ENCOUNTER — HOSPITAL ENCOUNTER (OUTPATIENT)
Dept: RADIOLOGY | Facility: MEDICAL CENTER | Age: 77
End: 2017-12-08
Attending: INTERNAL MEDICINE
Payer: MEDICARE

## 2017-12-08 ENCOUNTER — OFFICE VISIT (OUTPATIENT)
Dept: INTERNAL MEDICINE | Facility: IMAGING CENTER | Age: 77
End: 2017-12-08
Payer: MEDICARE

## 2017-12-08 VITALS
OXYGEN SATURATION: 93 % | BODY MASS INDEX: 30.1 KG/M2 | WEIGHT: 215 LBS | HEART RATE: 72 BPM | DIASTOLIC BLOOD PRESSURE: 84 MMHG | SYSTOLIC BLOOD PRESSURE: 138 MMHG | HEIGHT: 71 IN | RESPIRATION RATE: 16 BRPM | TEMPERATURE: 97.6 F

## 2017-12-08 DIAGNOSIS — R13.10 DYSPHAGIA, UNSPECIFIED TYPE: ICD-10-CM

## 2017-12-08 DIAGNOSIS — J11.1 INFLUENZA: ICD-10-CM

## 2017-12-08 PROCEDURE — 71020 DX-CHEST-2 VIEWS: CPT

## 2017-12-08 PROCEDURE — 99214 OFFICE O/P EST MOD 30 MIN: CPT | Performed by: INTERNAL MEDICINE

## 2017-12-08 RX ORDER — SUCRALFATE 1 G/1
1 TABLET ORAL
Qty: 120 TAB | Refills: 3 | Status: SHIPPED | OUTPATIENT
Start: 2017-12-08 | End: 2018-02-24

## 2017-12-08 NOTE — PROGRESS NOTES
Chief complaint: Abdominal pain and dysphagia.    HISTORY OF THE PRESENT ILLNESS: Patient is a 77 y.o. male. Patient comes in with complaint of chest pain. Symptoms are associated when he eats. He has a sensation of his food getting stuck. We recently tried him on Nexium. His benefit was minimal. He was also recently treated for influenza. He states that he is feeling better but his cough has been ongoing. He feels that his abdominal symptoms are related to the cough. No further fever or chills. No emesis.       Allergies: Morphine    Current Outpatient Prescriptions Ordered in [facility]   Medication Sig Dispense Refill   • sucralfate (CARAFATE) 1 GM Tab Take 1 Tab by mouth 4 Times a Day,Before Meals and at Bedtime. 120 Tab 3   • oseltamivir (TAMIFLU) 75 MG Cap Take 1 Cap by mouth 2 times a day. 10 Cap 0   • albuterol 108 (90 Base) MCG/ACT Aero Soln inhalation aerosol Inhale 2 Puffs by mouth every 6 hours as needed for Shortness of Breath. 8.5 g 0   • promethazine-codeine (PHENERGAN-CODEINE) 6.25-10 MG/5ML Syrup Take 5 mL by mouth 4 times a day as needed for Cough. 120 mL 0   • omeprazole (PRILOSEC) 40 MG delayed-release capsule Take 1 Cap by mouth every day. 30 Cap 3   • esomeprazole (NEXIUM 24HR) 20 MG capsule Take 1 Cap by mouth every morning before breakfast. 90 Cap 0   • triamterene/hctz (MAXZIDE-25/DYAZIDE) 37.5-25 MG Cap TAKE 1 CAPSULE BY MOUTH EACH MORNING **GENERIC FOR DYAZIDE 90 Cap 3   • losartan (COZAAR) 100 MG Tab TAKE 1 TABLET BY MOUTH DAILY ** GENERICFOR COZAAR (Patient taking differently: take 1/2 tab daily) 30 Tab 5   • allopurinol (ZYLOPRIM) 100 MG Tab TAKE ONE TABLET BY MOUTH TWICE DAILY **GENERIC FOR ZYLOPRIM 60 Tab 5   • ASPIRIN 81 MG PO TABS        No current Epic-ordered facility-administered medications on file.        Past medical history, social history and family history were reviewed from chart today    Review of systems: Per HPI.   All others negative.     Exam: Blood pressure 138/84, pulse  "72, temperature 36.4 °C (97.6 °F), resp. rate 16, height 1.803 m (5' 11\"), weight 97.5 kg (215 lb), SpO2 93 %.  General: Well-appearing. Well-developed. No signs of distress.  HEENT: Grossly normal. Oral cavity is pink and moist.  Neck: Supple without JVD or bruit.  Pulmonary: Clear with good breath sounds. Normal effort.  Cardiovascular: Regular. Carotid and radial pulses are intact.  Abdomen: Soft, nontender, nondistended. Spleen and liver are not enlarged.  Neurologic: Cranial nerves II through XII are grossly normal, alert and oriented x3      Assessment/Plan  1. Dysphagia, unspecified type  sucralfate (CARAFATE) 1 GM Tab    DX-UPPER GI-AIR WITH SBFT    DX-UPPER GI-AIR CONTRAST       Patient with esophageal dysphagia. Failed proton pump inhibitor.   Trial of Carafate.  Upper GI.  Refer to GI.  "

## 2017-12-19 ENCOUNTER — HOSPITAL ENCOUNTER (OUTPATIENT)
Dept: RADIOLOGY | Facility: MEDICAL CENTER | Age: 77
End: 2017-12-19
Attending: INTERNAL MEDICINE
Payer: MEDICARE

## 2017-12-19 DIAGNOSIS — R13.10 DYSPHAGIA, UNSPECIFIED TYPE: ICD-10-CM

## 2017-12-19 PROCEDURE — 700112 HCHG RX REV CODE 229: Performed by: INTERNAL MEDICINE

## 2017-12-19 PROCEDURE — A9270 NON-COVERED ITEM OR SERVICE: HCPCS | Performed by: INTERNAL MEDICINE

## 2017-12-19 PROCEDURE — 74247 DX-UPPER GI-AIR CONTRAST: CPT

## 2017-12-19 RX ADMIN — ANTACID/ANTIFLATULENT 1 PACKET: 380; 550; 10; 10 GRANULE, EFFERVESCENT ORAL at 09:12

## 2017-12-21 DIAGNOSIS — K22.5 ZENKER DIVERTICULUM: ICD-10-CM

## 2018-01-22 DIAGNOSIS — I10 ESSENTIAL HYPERTENSION: ICD-10-CM

## 2018-01-22 DIAGNOSIS — M1A.00X0 IDIOPATHIC CHRONIC GOUT WITHOUT TOPHUS, UNSPECIFIED SITE: ICD-10-CM

## 2018-01-22 RX ORDER — LOSARTAN POTASSIUM 100 MG/1
TABLET ORAL
Qty: 90 TAB | Refills: 3 | Status: SHIPPED | OUTPATIENT
Start: 2018-01-22 | End: 2018-08-24

## 2018-01-22 RX ORDER — ALLOPURINOL 100 MG/1
TABLET ORAL
Qty: 180 TAB | Refills: 3 | Status: SHIPPED | OUTPATIENT
Start: 2018-01-22 | End: 2018-02-24

## 2018-02-20 ENCOUNTER — OFFICE VISIT (OUTPATIENT)
Dept: INTERNAL MEDICINE | Facility: IMAGING CENTER | Age: 78
End: 2018-02-20
Payer: MEDICARE

## 2018-02-20 VITALS
WEIGHT: 221 LBS | DIASTOLIC BLOOD PRESSURE: 80 MMHG | SYSTOLIC BLOOD PRESSURE: 138 MMHG | OXYGEN SATURATION: 94 % | RESPIRATION RATE: 12 BRPM | TEMPERATURE: 98.4 F | HEIGHT: 71 IN | BODY MASS INDEX: 30.94 KG/M2 | HEART RATE: 74 BPM

## 2018-02-20 DIAGNOSIS — R31.0 GROSS HEMATURIA: ICD-10-CM

## 2018-02-20 DIAGNOSIS — N23 KIDNEY PAIN: ICD-10-CM

## 2018-02-20 LAB
APPEARANCE UR: CLEAR
BILIRUB UR STRIP-MCNC: NORMAL MG/DL
COLOR UR AUTO: NORMAL
GLUCOSE UR STRIP.AUTO-MCNC: NORMAL MG/DL
KETONES UR STRIP.AUTO-MCNC: NORMAL MG/DL
LEUKOCYTE ESTERASE UR QL STRIP.AUTO: NORMAL
NITRITE UR QL STRIP.AUTO: NORMAL
PH UR STRIP.AUTO: 6 [PH] (ref 5–8)
PROT UR QL STRIP: NORMAL MG/DL
RBC UR QL AUTO: NORMAL
SP GR UR STRIP.AUTO: 1
UROBILINOGEN UR STRIP-MCNC: NORMAL MG/DL

## 2018-02-20 PROCEDURE — 99214 OFFICE O/P EST MOD 30 MIN: CPT | Performed by: INTERNAL MEDICINE

## 2018-02-20 PROCEDURE — 81002 URINALYSIS NONAUTO W/O SCOPE: CPT | Performed by: INTERNAL MEDICINE

## 2018-02-20 RX ORDER — OXYCODONE HYDROCHLORIDE AND ACETAMINOPHEN 5; 325 MG/1; MG/1
1-2 TABLET ORAL EVERY 4 HOURS PRN
Qty: 20 TAB | Refills: 0 | Status: SHIPPED | OUTPATIENT
Start: 2018-02-20 | End: 2018-02-24

## 2018-02-20 NOTE — PROGRESS NOTES
Chief Complaint   Patient presents with   • Back Pain       HISTORY OF THE PRESENT ILLNESS: Patient is a 77 y.o. male. Patient comes in for evaluation of left flank pain. Symptoms for 3 days. Symptoms began on Saturday with pain rated as a 5-7. Pain was sharp and colicky. Pain lasted for 3 hours. Its intensity dropped to 1 but has waxed and waned over the last 2 days. He has had at least one episode of gross hematuria. He has a history of kidney stones. He believes his previous stones were on the right and were uric acid. He is on chronic allopurinol. His water intake is poor. He took Aleve with some improvement in the pain.       Allergies: Morphine    Current Outpatient Prescriptions Ordered in [facility]   Medication Sig Dispense Refill   • oxyCODONE-acetaminophen (PERCOCET) 5-325 MG Tab Take 1-2 Tabs by mouth every four hours as needed for up to 5 days. 20 Tab 0   • allopurinol (ZYLOPRIM) 100 MG Tab TAKE ONE TABLET BY MOUTH TWICE DAILY **GENERIC FOR ZYLOPRIM 180 Tab 3   • losartan (COZAAR) 100 MG Tab TAKE 1 TABLET BY MOUTH DAILY ** GENERICFOR COZAAR 90 Tab 3   • sucralfate (CARAFATE) 1 GM Tab Take 1 Tab by mouth 4 Times a Day,Before Meals and at Bedtime. 120 Tab 3   • oseltamivir (TAMIFLU) 75 MG Cap Take 1 Cap by mouth 2 times a day. 10 Cap 0   • albuterol 108 (90 Base) MCG/ACT Aero Soln inhalation aerosol Inhale 2 Puffs by mouth every 6 hours as needed for Shortness of Breath. 8.5 g 0   • promethazine-codeine (PHENERGAN-CODEINE) 6.25-10 MG/5ML Syrup Take 5 mL by mouth 4 times a day as needed for Cough. 120 mL 0   • omeprazole (PRILOSEC) 40 MG delayed-release capsule Take 1 Cap by mouth every day. 30 Cap 3   • esomeprazole (NEXIUM 24HR) 20 MG capsule Take 1 Cap by mouth every morning before breakfast. 90 Cap 0   • triamterene/hctz (MAXZIDE-25/DYAZIDE) 37.5-25 MG Cap TAKE 1 CAPSULE BY MOUTH EACH MORNING **GENERIC FOR DYAZIDE 90 Cap 3   • ASPIRIN 81 MG PO TABS        No current Epic-ordered facility-administered  "medications on file.        Past medical history, social history and family history were reviewed from chart today    Review of systems: Per HPI.   All others negative.     Exam: Blood pressure 138/80, pulse 74, temperature 36.9 °C (98.4 °F), resp. rate 12, height 1.803 m (5' 11\"), weight 100.2 kg (221 lb), SpO2 94 %.      General: Well-appearing. Well-developed. No signs of distress.  HEENT: Grossly normal. Oral cavity is pink and moist.  Neck: Supple without JVD or bruit.  Pulmonary: Clear with good breath sounds. Normal effort.  Cardiovascular: Regular. Carotid and radial pulses are intact.  Abdomen: Soft, nontender, nondistended. Spleen and liver are not enlarged. Mild left flank tenderness.  Neurologic: Cranial nerves II through XII are grossly normal, alert and oriented x3      Assessment/Plan  1. Kidney pain  POCT Urinalysis    oxyCODONE-acetaminophen (PERCOCET) 5-325 MG Tab    CONSENT FOR OPIATE PRESCRIPTION    CT-RENAL COLIC EVALUATION(A/P W/O)   2. Gross hematuria  POCT Urinalysis    oxyCODONE-acetaminophen (PERCOCET) 5-325 MG Tab    CONSENT FOR OPIATE PRESCRIPTION    CT-RENAL COLIC EVALUATION(A/P W/O)         Patient with left flank pain, hematuria and history of kidney stones. I suspect he has another stone. He was given oxycodone that he can take as needed for pain control. He should also continue to use Advil or Aleve. Encouraged him to increase his fluid intake to 64 ounces or more daily.     Recommended renal CT to assess size of stone    ER if symptoms should become acutely worse  "

## 2018-02-21 ENCOUNTER — HOSPITAL ENCOUNTER (OUTPATIENT)
Dept: RADIOLOGY | Facility: MEDICAL CENTER | Age: 78
End: 2018-02-21
Attending: INTERNAL MEDICINE
Payer: MEDICARE

## 2018-02-21 DIAGNOSIS — R31.0 GROSS HEMATURIA: ICD-10-CM

## 2018-02-21 DIAGNOSIS — N23 KIDNEY PAIN: ICD-10-CM

## 2018-02-21 PROCEDURE — 74176 CT ABD & PELVIS W/O CONTRAST: CPT

## 2018-02-24 ENCOUNTER — HOSPITAL ENCOUNTER (EMERGENCY)
Facility: MEDICAL CENTER | Age: 78
End: 2018-02-24
Attending: EMERGENCY MEDICINE
Payer: MEDICARE

## 2018-02-24 ENCOUNTER — APPOINTMENT (OUTPATIENT)
Dept: RADIOLOGY | Facility: MEDICAL CENTER | Age: 78
End: 2018-02-24
Attending: EMERGENCY MEDICINE
Payer: MEDICARE

## 2018-02-24 VITALS
OXYGEN SATURATION: 96 % | DIASTOLIC BLOOD PRESSURE: 89 MMHG | HEART RATE: 64 BPM | SYSTOLIC BLOOD PRESSURE: 146 MMHG | RESPIRATION RATE: 16 BRPM | BODY MASS INDEX: 27.98 KG/M2 | WEIGHT: 200.62 LBS | TEMPERATURE: 96.9 F

## 2018-02-24 DIAGNOSIS — R11.2 NON-INTRACTABLE VOMITING WITH NAUSEA, UNSPECIFIED VOMITING TYPE: ICD-10-CM

## 2018-02-24 DIAGNOSIS — E87.6 HYPOKALEMIA: ICD-10-CM

## 2018-02-24 DIAGNOSIS — E83.51 HYPOCALCEMIA: ICD-10-CM

## 2018-02-24 DIAGNOSIS — N23 RENAL COLIC: ICD-10-CM

## 2018-02-24 LAB
ALBUMIN SERPL BCP-MCNC: 2.5 G/DL (ref 3.2–4.9)
ALBUMIN/GLOB SERPL: 1.2 G/DL
ALP SERPL-CCNC: 50 U/L (ref 30–99)
ALT SERPL-CCNC: 57 U/L (ref 2–50)
ANION GAP SERPL CALC-SCNC: 7 MMOL/L (ref 0–11.9)
AST SERPL-CCNC: 57 U/L (ref 12–45)
BASOPHILS # BLD AUTO: 0.3 % (ref 0–1.8)
BASOPHILS # BLD: 0.03 K/UL (ref 0–0.12)
BILIRUB SERPL-MCNC: 0.7 MG/DL (ref 0.1–1.5)
BUN SERPL-MCNC: 27 MG/DL (ref 8–22)
CA-I SERPL-SCNC: 1.05 MMOL/L (ref 1.1–1.3)
CALCIUM SERPL-MCNC: 6.9 MG/DL (ref 8.4–10.2)
CHLORIDE SERPL-SCNC: 106 MMOL/L (ref 96–112)
CO2 SERPL-SCNC: 22 MMOL/L (ref 20–33)
CREAT SERPL-MCNC: 1.26 MG/DL (ref 0.5–1.4)
EOSINOPHIL # BLD AUTO: 0.02 K/UL (ref 0–0.51)
EOSINOPHIL NFR BLD: 0.2 % (ref 0–6.9)
ERYTHROCYTE [DISTWIDTH] IN BLOOD BY AUTOMATED COUNT: 48.6 FL (ref 35.9–50)
GLOBULIN SER CALC-MCNC: 2.1 G/DL (ref 1.9–3.5)
GLUCOSE SERPL-MCNC: 108 MG/DL (ref 65–99)
HCT VFR BLD AUTO: 48.8 % (ref 42–52)
HGB BLD-MCNC: 16.2 G/DL (ref 14–18)
IMM GRANULOCYTES # BLD AUTO: 0.03 K/UL (ref 0–0.11)
IMM GRANULOCYTES NFR BLD AUTO: 0.3 % (ref 0–0.9)
LIPASE SERPL-CCNC: <10 U/L (ref 7–58)
LYMPHOCYTES # BLD AUTO: 1.09 K/UL (ref 1–4.8)
LYMPHOCYTES NFR BLD: 11.3 % (ref 22–41)
MCH RBC QN AUTO: 31.6 PG (ref 27–33)
MCHC RBC AUTO-ENTMCNC: 33.2 G/DL (ref 33.7–35.3)
MCV RBC AUTO: 95.3 FL (ref 81.4–97.8)
MONOCYTES # BLD AUTO: 1.51 K/UL (ref 0–0.85)
MONOCYTES NFR BLD AUTO: 15.7 % (ref 0–13.4)
NEUTROPHILS # BLD AUTO: 6.95 K/UL (ref 1.82–7.42)
NEUTROPHILS NFR BLD: 72.2 % (ref 44–72)
NRBC # BLD AUTO: 0 K/UL
NRBC BLD-RTO: 0 /100 WBC
PLATELET # BLD AUTO: 103 K/UL (ref 164–446)
PMV BLD AUTO: 11.5 FL (ref 9–12.9)
POTASSIUM SERPL-SCNC: 3.1 MMOL/L (ref 3.6–5.5)
PROT SERPL-MCNC: 4.6 G/DL (ref 6–8.2)
RBC # BLD AUTO: 5.12 M/UL (ref 4.7–6.1)
SODIUM SERPL-SCNC: 135 MMOL/L (ref 135–145)
WBC # BLD AUTO: 9.6 K/UL (ref 4.8–10.8)

## 2018-02-24 PROCEDURE — 94760 N-INVAS EAR/PLS OXIMETRY 1: CPT

## 2018-02-24 PROCEDURE — 700101 HCHG RX REV CODE 250: Performed by: EMERGENCY MEDICINE

## 2018-02-24 PROCEDURE — 700111 HCHG RX REV CODE 636 W/ 250 OVERRIDE (IP): Performed by: EMERGENCY MEDICINE

## 2018-02-24 PROCEDURE — A9270 NON-COVERED ITEM OR SERVICE: HCPCS | Performed by: EMERGENCY MEDICINE

## 2018-02-24 PROCEDURE — 700102 HCHG RX REV CODE 250 W/ 637 OVERRIDE(OP): Performed by: EMERGENCY MEDICINE

## 2018-02-24 PROCEDURE — 96367 TX/PROPH/DG ADDL SEQ IV INF: CPT

## 2018-02-24 PROCEDURE — 36415 COLL VENOUS BLD VENIPUNCTURE: CPT

## 2018-02-24 PROCEDURE — 85025 COMPLETE CBC W/AUTO DIFF WBC: CPT

## 2018-02-24 PROCEDURE — 82330 ASSAY OF CALCIUM: CPT

## 2018-02-24 PROCEDURE — 99285 EMERGENCY DEPT VISIT HI MDM: CPT

## 2018-02-24 PROCEDURE — 304561 HCHG STAT O2

## 2018-02-24 PROCEDURE — 96375 TX/PRO/DX INJ NEW DRUG ADDON: CPT

## 2018-02-24 PROCEDURE — 700105 HCHG RX REV CODE 258: Performed by: EMERGENCY MEDICINE

## 2018-02-24 PROCEDURE — 80053 COMPREHEN METABOLIC PANEL: CPT

## 2018-02-24 PROCEDURE — 83690 ASSAY OF LIPASE: CPT

## 2018-02-24 PROCEDURE — 74018 RADEX ABDOMEN 1 VIEW: CPT

## 2018-02-24 PROCEDURE — 96365 THER/PROPH/DIAG IV INF INIT: CPT

## 2018-02-24 PROCEDURE — 96361 HYDRATE IV INFUSION ADD-ON: CPT

## 2018-02-24 RX ORDER — KETOROLAC TROMETHAMINE 30 MG/ML
15 INJECTION, SOLUTION INTRAMUSCULAR; INTRAVENOUS ONCE
Status: COMPLETED | OUTPATIENT
Start: 2018-02-24 | End: 2018-02-24

## 2018-02-24 RX ORDER — TAMSULOSIN HYDROCHLORIDE 0.4 MG/1
0.4 CAPSULE ORAL DAILY
Qty: 10 CAP | Refills: 0 | Status: SHIPPED | OUTPATIENT
Start: 2018-02-24 | End: 2018-08-24

## 2018-02-24 RX ORDER — POTASSIUM CHLORIDE 7.45 MG/ML
10 INJECTION INTRAVENOUS ONCE
Status: DISCONTINUED | OUTPATIENT
Start: 2018-02-24 | End: 2018-02-24

## 2018-02-24 RX ORDER — POTASSIUM CHLORIDE 20 MEQ/1
40 TABLET, EXTENDED RELEASE ORAL ONCE
Status: COMPLETED | OUTPATIENT
Start: 2018-02-24 | End: 2018-02-24

## 2018-02-24 RX ORDER — ONDANSETRON 2 MG/ML
4 INJECTION INTRAMUSCULAR; INTRAVENOUS ONCE
Status: COMPLETED | OUTPATIENT
Start: 2018-02-24 | End: 2018-02-24

## 2018-02-24 RX ORDER — SODIUM CHLORIDE 9 MG/ML
INJECTION, SOLUTION INTRAVENOUS ONCE
Status: COMPLETED | OUTPATIENT
Start: 2018-02-24 | End: 2018-02-24

## 2018-02-24 RX ORDER — OXYCODONE HYDROCHLORIDE AND ACETAMINOPHEN 5; 325 MG/1; MG/1
1-2 TABLET ORAL EVERY 6 HOURS PRN
Qty: 20 TAB | Refills: 0 | Status: SHIPPED | OUTPATIENT
Start: 2018-02-24 | End: 2018-02-27

## 2018-02-24 RX ORDER — SODIUM CHLORIDE 9 MG/ML
1000 INJECTION, SOLUTION INTRAVENOUS ONCE
Status: COMPLETED | OUTPATIENT
Start: 2018-02-24 | End: 2018-02-24

## 2018-02-24 RX ORDER — ONDANSETRON 4 MG/1
4 TABLET, FILM COATED ORAL EVERY 8 HOURS PRN
Qty: 10 EACH | Refills: 1 | Status: SHIPPED | OUTPATIENT
Start: 2018-02-24 | End: 2018-08-24

## 2018-02-24 RX ADMIN — ONDANSETRON 4 MG: 2 INJECTION INTRAMUSCULAR; INTRAVENOUS at 14:31

## 2018-02-24 RX ADMIN — POTASSIUM CHLORIDE 40 MEQ: 1500 TABLET, EXTENDED RELEASE ORAL at 16:54

## 2018-02-24 RX ADMIN — SODIUM CHLORIDE 1000 ML: 9 INJECTION, SOLUTION INTRAVENOUS at 14:30

## 2018-02-24 RX ADMIN — LIDOCAINE HYDROCHLORIDE 136 MG: 20 INJECTION, SOLUTION INFILTRATION; PERINEURAL at 14:55

## 2018-02-24 RX ADMIN — SODIUM CHLORIDE: 9 INJECTION, SOLUTION INTRAVENOUS at 15:52

## 2018-02-24 RX ADMIN — CALCIUM GLUCONATE 1000 MG: 94 INJECTION, SOLUTION INTRAVENOUS at 16:55

## 2018-02-24 RX ADMIN — KETOROLAC TROMETHAMINE 15 MG: 30 INJECTION, SOLUTION INTRAMUSCULAR at 14:30

## 2018-02-24 ASSESSMENT — PAIN SCALES - GENERAL
PAINLEVEL_OUTOF10: 1
PAINLEVEL_OUTOF10: 0

## 2018-02-24 NOTE — ED NOTES
Med Rec completed per patient  Allergies reviewed  No ORAL antibiotics in last 30 days    Patient hasn't had his medications since 2-20-18

## 2018-02-24 NOTE — ED PROVIDER NOTES
ED Provider Note    CHIEF COMPLAINT  Chief Complaint   Patient presents with   • Nausea/Vomiting/Diarrhea       HPI  Chuck Bassam Ortiz is a 77 y.o. male with a history of hypertension, kidney stones, sleep apnea who presents with complaints of nausea, vomiting, left lower abdominal pain, and inability to keep any food or fluids down. The patient has had kidney stones 4 times in the past, says he developed pain to his left flank and back about 4 days ago. He saw his primary care physician who ordered a CAT scan which showed a 3.7 mm stone just above the left UVJ. The patient was told today over-the-counter anti-inflammatories along with a prescription for Percocet, but says over the last 3 days he has been vomiting and unable to keep any of the medication down. He says he is feeling lightheaded, dizzy, and dehydrated. He says the pain is now down into his left lower quadrant. He is uncertain whether there was any blood in his emesis. He has had no fever, chills, sore throat, cough and congestion, any difficulty breathing.    REVIEW OF SYSTEMS  See HPI for further details. All other systems are negative.     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   • Hypertension    • Kidney stones     mult uric acid kidney stones   • Sleep apnea     uses CPAP       FAMILY HISTORY  Family History   Problem Relation Age of Onset   • Heart Disease Maternal Grandmother    • Diabetes Paternal Grandfather    • Cancer Neg Hx    • Stroke Sister    • Lung Disease Father      black lung        SOCIAL HISTORY  Social History     Social History   • Marital status:      Spouse name: N/A   • Number of children: N/A   • Years of education: N/A     Social History Main Topics   • Smoking status: Never Smoker   • Smokeless tobacco: Never  Used   • Alcohol use Yes      Comment: 4-5 glasses of wine per week   • Drug use: No   • Sexual activity: Not on file      Comment: , retired [employer]      Other Topics Concern   • Not on file     Social History Narrative   • No narrative on file       SURGICAL HISTORY  Past Surgical History:   Procedure Laterality Date   • CATARACT PHACO WITH IOL  1/21/2014    Performed by Joni Duarte M.D. at SURGERY SURGICAL [facility] ORS   • FINGER ARTHROPLASTY  12/17/2012    Performed by Adam Christopher M.D. at SURGERY SAME DAY [facility] ORS   • INGUINAL HERNIA REPAIR  2/19/2009    Performed by ALEX ALATORRE at SURGERY SAME DAY [facility] ORS   • COLONOSCOPY  2004    neg   • LAMINOTOMY  1996    lumbar laminectomy, cyst x3       CURRENT MEDICATIONS  Home Medications    **Home medications have not yet been reviewed for this encounter**         ALLERGIES  Allergies   Allergen Reactions   • Morphine      Bradycardia       PHYSICAL EXAM  VITAL SIGNS: BP (!) 170/87   Pulse 62   Temp 36.1 °C (96.9 °F)   Resp 18   Wt 91 kg (200 lb 9.9 oz)   SpO2 93%   BMI 27.98 kg/m²   Constitutional: Awake, alert, in no acute distress, Non-toxic appearance.   HENT: Atraumatic. Bilateral external ears normal, mucous membranes very dry, throat nonerythematous without exudates, nose is normal.  Eyes: PERRL, EOMI, conjunctiva moist, noninjected.  Neck: Nontender, Normal range of motion, No nuchal rigidity, No stridor.   Lymphatic: No lymphadenopathy noted.   Cardiovascular: Regular rate and rhythm, no murmurs, rubs, gallops.  Thorax & Lungs:  Good breath sounds bilaterally, no wheezes, rales, or retractions.  No chest tenderness.  Abdomen: Bowel sounds normal, Soft, nontender, nondistended, no rebound, guarding, masses.  Back: No CVA or spinal tenderness.  Extremities: Intact distal pulses, No edema, No tenderness.   Skin: Warm, Dry, No rashes.   Musculoskeletal: No joint swelling or tenderness.  Neurologic: Alert & oriented x 3,  sensory and motor function normal. No focal deficits.   Psychiatric: Affect normal, Judgment normal, Mood normal.         RADIOLOGY/PROCEDURES  [id number] VIEW   Final Result      No evidence of bowel obstruction.            COURSE & MEDICAL DECISION MAKING  Pertinent Labs & Imaging studies reviewed. (See chart for details)  The patient presents with above complaints. His old chart and CT scans were reviewed which does show a 3.7 mm stone just above the left UVJ. I suspect the stone has now migrated as he no longer has any flank pain, and his pain is down in his left lower quadrant near his groin. IV was placed, he is given a bolus of normal saline, Zofran, Toradol, and IV lidocaine.  CBC shows a white count 9600, hemogl 16.2, 72% polys, chemistries shows a potassium 3.1, CO2 22, anion gap 7, BUN 27, creatinine 1.26, glucose 108, calcium 6.9, albumin 2.5, total protein 4.6, lipase less than 10, ionized calcium 1.05, urine dip shows trace protein, large blood, otherwise negative. On reexamination, the patient's pain was completely resolved. He was able tolerate oral fluids. Abdominal x-ray shows phleboliths, but the stone observed on CT scan was not visualized. The patient was given 40 mEq of potassium chloride orally for his hypokalemia. He was given 1 g of calcium gluconate for his hypocalcemia. An ionized calcium  was just borderline low.  Findings were discussed with the patient. At this time he feels much improved. He will be placed on Flomax, Zofran, and Percocet for pain.  He is low risk on the opiate risk stool. [place] shows only 1 previous narcotic prescription. The patient is referred to Dr. Mora of urology and is to call the office on Monday. He is to strain all urine, collect any stones and take to the urologist. He is to return to the ER for any worsening pain, fever, vomiting, or any other problems.    In prescribing controlled substances to this patient, I certify that I have obtained and reviewed  the medical history of Chuck Ortiz. I have also made a good florentino effort to obtain applicable records from other providers who have treated the patient and records did not demonstrate any increased risk of substance abuse that would prevent me from prescribing controlled substances.     I have conducted a physical exam and documented it. I have reviewed Mr. Ortiz’s prescription history as maintained by the [place] Prescription Monitoring Program.     I have assessed the patient’s risk for abuse, dependency, and addiction using the validated Opioid Risk Tool available at https://www.mdcalc.com/dfsmbs-fbsb-xzip-ort-narcotic-abuse.     Given the above, I believe the benefits of controlled substance therapy outweigh the risks. The reasons for prescribing controlled substances include non-narcotic, oral analgesic alternatives have been inadequate for pain control. Accordingly, I have discussed the risk and benefits, treatment plan, and alternative therapies with the patient.       FINAL IMPRESSION  1. Renal colic  2. Nausea, vomiting  3. Dehydration  4. Hypokalemia  5. Hypocalcemia      Electronically signed by: Magnus Paez, 2/24/2018 2:28 PM

## 2018-02-24 NOTE — ED NOTES
1512 Pt PO dropped to 84 %  Face dusky Pt reports he has a growth in his throat and requires CPAP at rest or while sleeping  O 2 applied

## 2018-02-24 NOTE — ED NOTES
1355 Medic NSL patent Blood to lab POC discussed Awaiting ERP eval Pt pain free Pt relates S/S to pain meds he is taking

## 2018-02-25 NOTE — ED NOTES
D/C instn reviewed Pt pain and nausea free Instn given to 24 hr pharmacy To F/U urology and  PMD  Medications given with instn Copy of narcotics precautions D/C ambul Stable improved condn

## 2018-02-25 NOTE — DISCHARGE INSTRUCTIONS
Kidney Stones    Strain all urine, collect any stones, and take to the urologist.  Kidney stones (urolithiasis) are deposits that form inside your kidneys. The intense pain is caused by the stone moving through the urinary tract. When the stone moves, the ureter goes into spasm around the stone. The stone is usually passed in the urine.   CAUSES   · A disorder that makes certain neck glands produce too much parathyroid hormone (primary hyperparathyroidism).  · A buildup of uric acid crystals, similar to gout in your joints.  · Narrowing (stricture) of the ureter.  · A kidney obstruction present at birth (congenital obstruction).  · Previous surgery on the kidney or ureters.  · Numerous kidney infections.  SYMPTOMS   · Feeling sick to your stomach (nauseous).  · Throwing up (vomiting).  · Blood in the urine (hematuria).  · Pain that usually spreads (radiates) to the groin.  · Frequency or urgency of urination.  DIAGNOSIS   · Taking a history and physical exam.  · Blood or urine tests.  · CT scan.  · Occasionally, an examination of the inside of the urinary bladder (cystoscopy) is performed.  TREATMENT   · Observation.  · Increasing your fluid intake.  · Extracorporeal shock wave lithotripsy--This is a noninvasive procedure that uses shock waves to break up kidney stones.  · Surgery may be needed if you have severe pain or persistent obstruction. There are various surgical procedures. Most of the procedures are performed with the use of small instruments. Only small incisions are needed to accommodate these instruments, so recovery time is minimized.  The size, location, and chemical composition are all important variables that will determine the proper choice of action for you. Talk to your health care provider to better understand your situation so that you will minimize the risk of injury to yourself and your kidney.   HOME CARE INSTRUCTIONS   · Drink enough water and fluids to keep your urine clear or pale yellow.  This will help you to pass the stone or stone fragments.  · Strain all urine through the provided strainer. Keep all particulate matter and stones for your health care provider to see. The stone causing the pain may be as small as a grain of salt. It is very important to use the strainer each and every time you pass your urine. The collection of your stone will allow your health care provider to analyze it and verify that a stone has actually passed. The stone analysis will often identify what you can do to reduce the incidence of recurrences.  · Only take over-the-counter or prescription medicines for pain, discomfort, or fever as directed by your health care provider.  · Make a follow-up appointment with your health care provider as directed.  · Get follow-up X-rays if required. The absence of pain does not always mean that the stone has passed. It may have only stopped moving. If the urine remains completely obstructed, it can cause loss of kidney function or even complete destruction of the kidney. It is your responsibility to make sure X-rays and follow-ups are completed. Ultrasounds of the kidney can show blockages and the status of the kidney. Ultrasounds are not associated with any radiation and can be performed easily in a matter of minutes.  SEEK MEDICAL CARE IF:  · You experience pain that is progressive and unresponsive to any pain medicine you have been prescribed.  SEEK IMMEDIATE MEDICAL CARE IF:   · Pain cannot be controlled with the prescribed medicine.  · You have a fever or shaking chills.  · The severity or intensity of pain increases over 18 hours and is not relieved by pain medicine.  · You develop a new onset of abdominal pain.  · You feel faint or pass out.  · You are unable to urinate.  MAKE SURE YOU:   · Understand these instructions.  · Will watch your condition.  · Will get help right away if you are not doing well or get worse.     This information is not intended to replace advice given  to you by your health care provider. Make sure you discuss any questions you have with your health care provider.     Document Released: 12/18/2006 Document Revised: 01/08/2016 Document Reviewed: 05/21/2014  Elsevier Interactive Patient Education ©2016 [employer]

## 2018-03-07 ENCOUNTER — HOSPITAL ENCOUNTER (OUTPATIENT)
Facility: MEDICAL CENTER | Age: 78
End: 2018-03-07
Attending: INTERNAL MEDICINE
Payer: MEDICARE

## 2018-03-07 ENCOUNTER — OFFICE VISIT (OUTPATIENT)
Dept: INTERNAL MEDICINE | Facility: IMAGING CENTER | Age: 78
End: 2018-03-07
Payer: MEDICARE

## 2018-03-07 VITALS
SYSTOLIC BLOOD PRESSURE: 124 MMHG | TEMPERATURE: 97.8 F | RESPIRATION RATE: 12 BRPM | BODY MASS INDEX: 30.1 KG/M2 | HEART RATE: 75 BPM | HEIGHT: 71 IN | DIASTOLIC BLOOD PRESSURE: 80 MMHG | OXYGEN SATURATION: 95 % | WEIGHT: 215 LBS

## 2018-03-07 DIAGNOSIS — K22.5 ZENKER DIVERTICULUM: ICD-10-CM

## 2018-03-07 DIAGNOSIS — N20.0 NEPHROLITHIASIS: ICD-10-CM

## 2018-03-07 DIAGNOSIS — M1A.00X0 IDIOPATHIC CHRONIC GOUT WITHOUT TOPHUS, UNSPECIFIED SITE: ICD-10-CM

## 2018-03-07 DIAGNOSIS — E83.51 HYPOCALCEMIA: ICD-10-CM

## 2018-03-07 DIAGNOSIS — I10 ESSENTIAL HYPERTENSION: ICD-10-CM

## 2018-03-07 DIAGNOSIS — N20.0 URIC ACID NEPHROLITHIASIS: ICD-10-CM

## 2018-03-07 DIAGNOSIS — E87.6 HYPOKALEMIA: ICD-10-CM

## 2018-03-07 LAB
CA-I SERPL-SCNC: 1.2 MMOL/L (ref 1.1–1.3)
PTH-INTACT SERPL-MCNC: 47.9 PG/ML (ref 14–72)

## 2018-03-07 PROCEDURE — 82365 CALCULUS SPECTROSCOPY: CPT

## 2018-03-07 PROCEDURE — 82330 ASSAY OF CALCIUM: CPT

## 2018-03-07 PROCEDURE — 99214 OFFICE O/P EST MOD 30 MIN: CPT | Performed by: INTERNAL MEDICINE

## 2018-03-07 PROCEDURE — 83970 ASSAY OF PARATHORMONE: CPT

## 2018-03-07 RX ORDER — ALLOPURINOL 100 MG/1
TABLET ORAL
Qty: 180 TAB | Refills: 3
Start: 2018-03-07 | End: 2018-03-10

## 2018-03-07 NOTE — PROGRESS NOTES
Chief Complaint   Patient presents with   • Other     Kidney stone        HISTORY OF THE PRESENT ILLNESS: Patient is a 77 y.o. male. Patient comes in for follow-up after recent kidney stone. He experienced flank pain with nausea and vomiting. We reviewed the CAT scan from February 21 from the emergency department:    Impression         1.  Minor left hydronephrosis and left ureteral dilatation secondary to single distal left ureteral stone measuring 3.7 mm located just above the left UVJ.    2.  Single residual stone in the lower pole the left kidney measuring 2.9 mm.    3.  No right hydronephrosis or nephrolithiasis. No right ureterolithiasis.    4.  No evidence of bowel obstruction or inflammation.     He was also noted to have 2 kidney cysts which he reports they have been present previously.    She passed the stone spontaneously. He was on oxycodone for pain control.    In emergency department he had abnormal labs including hypokalemia, mild elevation in creatinine and hypocalcemia.      he is back to his baseline. No further hematuria. Flank pain has resolved. He was able to collect the stone but forgot to bring it in so that we can send it for analysis.           Allergies: Morphine    Current Outpatient Prescriptions Ordered in [facility]   Medication Sig Dispense Refill   • allopurinol (ZYLOPRIM) 100 MG Tab TAKE ONE TABLET BY MOUTH TWICE DAILY **GENERIC FOR ZYLOPRIM 180 Tab 3   • ondansetron (ZOFRAN) 4 MG Tab tablet Take 1 Tab by mouth every 8 hours as needed for Nausea/Vomiting. 10 Each 1   • tamsulosin (FLOMAX) 0.4 MG capsule Take 1 Cap by mouth every day. 10 Cap 0   • losartan (COZAAR) 100 MG Tab TAKE 1 TABLET BY MOUTH DAILY ** GENERICFOR COZAAR 90 Tab 3   • omeprazole (PRILOSEC) 40 MG delayed-release capsule Take 1 Cap by mouth every day. 30 Cap 3   • triamterene/hctz (MAXZIDE-25/DYAZIDE) 37.5-25 MG Cap TAKE 1 CAPSULE BY MOUTH EACH MORNING **GENERIC FOR DYAZIDE 90 Cap 3   • ASPIRIN 81 MG PO TABS Take 81 mg by  "mouth every day.       No current [facility]-ordered facility-administered medications on file.        Past medical history, social history and family history were reviewed from chart today    Review of systems: Per HPI. No fever, chills, chest pain, dyspnea, dyspepsia, abdominal pain, flank pain, urinary frequency or urgency. All others negative.     Exam: Blood pressure 124/80, pulse 75, temperature 36.6 °C (97.8 °F), resp. rate 12, height 1.803 m (5' 11\"), weight 97.5 kg (215 lb), SpO2 95 %.  General: Well-appearing. Well-developed. No signs of distress.  HEENT: Grossly normal. Oral cavity is pink and moist.  Neck: Supple without JVD or bruit.  Pulmonary: Clear with good breath sounds. Normal effort.  Cardiovascular: Regular. Carotid and radial pulses are intact.  Abdomen: Soft, nontender, nondistended. Spleen and liver are not enlarged. No flank pain.  Neurologic: Cranial nerves II through XII are grossly normal, alert and oriented x3      Assessment/Plan  1. 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 Hypocalcemia  CALCIUM IONIZED    BASIC METABOLIC PANEL   4. Hypokalemia     5. Zenker diverticulum           Discussed importance of drinking fluids to reduce risk of recurrence of kidney stone.  He has ongoing residual stone in the left kidney. I recommended no further workup.    Labs as above for assessment of abnormality seen in the emergency department as well as further workup regarding the kidney stones.    We also discussed his Zenker's diverticulum. He is scheduled to see ENT.  "

## 2018-03-09 ENCOUNTER — NON-PROVIDER VISIT (OUTPATIENT)
Dept: INTERNAL MEDICINE | Facility: IMAGING CENTER | Age: 78
End: 2018-03-09
Payer: MEDICARE

## 2018-03-09 ENCOUNTER — HOSPITAL ENCOUNTER (OUTPATIENT)
Facility: MEDICAL CENTER | Age: 78
End: 2018-03-09
Attending: INTERNAL MEDICINE
Payer: MEDICARE

## 2018-03-09 DIAGNOSIS — N20.0 URIC ACID NEPHROLITHIASIS: ICD-10-CM

## 2018-03-09 DIAGNOSIS — I10 ESSENTIAL HYPERTENSION: ICD-10-CM

## 2018-03-09 LAB
ANION GAP SERPL CALC-SCNC: 8 MMOL/L (ref 0–11.9)
BUN SERPL-MCNC: 25 MG/DL (ref 8–22)
CALCIUM SERPL-MCNC: 9.2 MG/DL (ref 8.5–10.5)
CHLORIDE SERPL-SCNC: 105 MMOL/L (ref 96–112)
CO2 SERPL-SCNC: 27 MMOL/L (ref 20–33)
CREAT SERPL-MCNC: 1.1 MG/DL (ref 0.5–1.4)
GLUCOSE SERPL-MCNC: 110 MG/DL (ref 65–99)
POTASSIUM SERPL-SCNC: 3.9 MMOL/L (ref 3.6–5.5)
SODIUM SERPL-SCNC: 140 MMOL/L (ref 135–145)
URATE SERPL-MCNC: 6.9 MG/DL (ref 2.5–8.3)

## 2018-03-09 PROCEDURE — 84550 ASSAY OF BLOOD/URIC ACID: CPT

## 2018-03-09 PROCEDURE — 80048 BASIC METABOLIC PNL TOTAL CA: CPT

## 2018-03-10 DIAGNOSIS — E79.0 HYPERURICEMIA: ICD-10-CM

## 2018-03-10 RX ORDER — ALLOPURINOL 300 MG/1
300 TABLET ORAL DAILY
Qty: 90 TAB | Refills: 3 | Status: SHIPPED | OUTPATIENT
Start: 2018-03-10 | End: 2018-08-24

## 2018-03-11 LAB
APPEARANCE STONE: NORMAL
COMPN STONE: NORMAL
NUMBER STONE: 1
SIZE STONE: NORMAL MM
SPECIMEN WT: 68 MG

## 2018-08-08 ENCOUNTER — OFFICE VISIT (OUTPATIENT)
Dept: INTERNAL MEDICINE | Facility: IMAGING CENTER | Age: 78
End: 2018-08-08
Payer: MEDICARE

## 2018-08-08 VITALS
HEIGHT: 71 IN | SYSTOLIC BLOOD PRESSURE: 130 MMHG | WEIGHT: 209 LBS | HEART RATE: 75 BPM | DIASTOLIC BLOOD PRESSURE: 78 MMHG | TEMPERATURE: 98.5 F | OXYGEN SATURATION: 93 % | BODY MASS INDEX: 29.26 KG/M2 | RESPIRATION RATE: 16 BRPM

## 2018-08-08 DIAGNOSIS — I49.3 PVC (PREMATURE VENTRICULAR CONTRACTION): ICD-10-CM

## 2018-08-08 DIAGNOSIS — G47.33 OBSTRUCTIVE SLEEP APNEA SYNDROME: ICD-10-CM

## 2018-08-08 DIAGNOSIS — R07.89 CHEST DISCOMFORT: ICD-10-CM

## 2018-08-08 DIAGNOSIS — G45.4 TRANSIENT GLOBAL AMNESIA: ICD-10-CM

## 2018-08-08 DIAGNOSIS — R25.1 TREMOR: ICD-10-CM

## 2018-08-08 DIAGNOSIS — I10 ESSENTIAL HYPERTENSION: ICD-10-CM

## 2018-08-08 PROCEDURE — 99214 OFFICE O/P EST MOD 30 MIN: CPT | Performed by: INTERNAL MEDICINE

## 2018-08-08 NOTE — PROGRESS NOTES
Chief Complaint   Patient presents with   • Memory Loss   • Hospital Follow-up       HISTORY OF THE PRESENT ILLNESS: Patient is a 78 y.o. male.     Patient comes in for evaluation of neurologic change.  Yesterday he and his friends traveled approximately 4 hours from [place] to [facility] to [facility].  While they were sitting up He had a 15 minute episode of complete loss of memory.  He was there with his friends.  They eventually asked if he was okay.  He reports that they noticed his right arm was tremulous.  No other neurologic changes.  He states that he felt abnormal for approximately 5 or 10 minutes then return to his normal baseline.  He was seen in the emergency department in [place].  He had a noncontrast CT of the head that was unremarkable and labs that showed minor renal insufficiency with a creatinine of 1.2 (baseline 1.1) but otherwise his labs were unremarkable.  He had an EKG that showed sinus rhythm with multiple PVCs.  He was discharged from the hospital without further observation or workup.      Allergies: Morphine    Current Outpatient Prescriptions Ordered in [facility]   Medication Sig Dispense Refill   • allopurinol (ZYLOPRIM) 300 MG Tab Take 1 Tab by mouth every day. 90 Tab 3   • ondansetron (ZOFRAN) 4 MG Tab tablet Take 1 Tab by mouth every 8 hours as needed for Nausea/Vomiting. 10 Each 1   • tamsulosin (FLOMAX) 0.4 MG capsule Take 1 Cap by mouth every day. 10 Cap 0   • losartan (COZAAR) 100 MG Tab TAKE 1 TABLET BY MOUTH DAILY ** GENERICFOR COZAAR 90 Tab 3   • omeprazole (PRILOSEC) 40 MG delayed-release capsule Take 1 Cap by mouth every day. 30 Cap 3   • triamterene/hctz (MAXZIDE-25/DYAZIDE) 37.5-25 MG Cap TAKE 1 CAPSULE BY MOUTH EACH MORNING **GENERIC FOR DYAZIDE 90 Cap 3   • ASPIRIN 81 MG PO TABS Take 81 mg by mouth every day.       No current [facility]-ordered facility-administered medications on file.        Past medical history, social history and family history were reviewed from chart  "today    Review of systems: Per HPI.    Denies headache, chest pain, fever, chills, diarrhea, constipation, abdominal pain, palpitations, depression   All others negative.     Exam: Blood pressure 130/78, pulse 75, temperature 36.9 °C (98.5 °F), resp. rate 16, height 1.803 m (5' 11\"), weight 94.8 kg (209 lb), SpO2 93 %.  General: Well-appearing. Well-developed. No signs of distress.  HEENT: Grossly normal. Oral cavity is pink and moist.  Neck: Supple without JVD or bruit.  Pulmonary: Clear with good breath sounds. Normal effort.  Cardiovascular: Regular. Carotid and radial pulses are intact.  Abdomen: Soft, nontender, nondistended. Spleen and liver are not enlarged.  Neurologic: Cranial nerves II through XII are grossly normal, alert and oriented x3.  Gait is normal.  Rapid alternating movements are normal.  Chronic left facial droop.  Strength is normal in upper and lower extremities.  Negative Romberg.      Assessment/Plan  1. Transient global amnesia  MR-BRAIN-WITH & W/O    US-CAROTID DOPPLER    REFERRAL TO NEUROLOGY    REFERRAL TO CARDIOLOGY   2. Tremor  REFERRAL TO NEUROLOGY    REFERRAL TO CARDIOLOGY   3. Essential hypertension     4. Obstructive sleep apnea syndrome     5. PVC (premature ventricular contraction)  REFERRAL TO CARDIOLOGY   6. Chest discomfort  REFERRAL TO CARDIOLOGY         In the office today he is back to his usual state of health.  He still has no memory of the event.  He did not discuss the episode in much detail with his friends, especially when he was physically there but mentally absent.  No further tremor.  No history of neurologic issues other than chronic left facial palsy.  He had an MRI of the brain in 2012 which was essentially unremarkable.  This was done due to occipital headache after spinal surgery.      Patient with episode of complete memory loss consistent with transient global amnesia.  He also had neurologic findings including right arm tremor and possible postictal period. "  We discussed the possibilities including TIA versus seizure versus medication or other.  I recommended the following:    MRI brain with and without contrast  Carotid ultrasound  Evaluation with cardiology.  He may need a Holter or other monitor due to PVCs and neurologic change.  Rule out A. fib or other arrhythmia  Evaluation with neurology.  He may have had a seizure and EEG may be appropriate.  Increase aspirin to 325 mg daily  No change in medications otherwise.

## 2018-08-14 ENCOUNTER — HOSPITAL ENCOUNTER (OUTPATIENT)
Dept: RADIOLOGY | Facility: MEDICAL CENTER | Age: 78
End: 2018-08-14
Attending: INTERNAL MEDICINE
Payer: MEDICARE

## 2018-08-14 ENCOUNTER — HOSPITAL ENCOUNTER (OUTPATIENT)
Dept: RADIOLOGY | Facility: MEDICAL CENTER | Age: 78
End: 2018-08-14

## 2018-08-14 DIAGNOSIS — G45.4 TRANSIENT GLOBAL AMNESIA: ICD-10-CM

## 2018-08-14 PROCEDURE — 700117 HCHG RX CONTRAST REV CODE 255: Performed by: INTERNAL MEDICINE

## 2018-08-14 PROCEDURE — 93880 EXTRACRANIAL BILAT STUDY: CPT

## 2018-08-14 PROCEDURE — A9585 GADOBUTROL INJECTION: HCPCS | Performed by: INTERNAL MEDICINE

## 2018-08-14 PROCEDURE — 70553 MRI BRAIN STEM W/O & W/DYE: CPT

## 2018-08-14 RX ORDER — GADOBUTROL 604.72 MG/ML
10 INJECTION INTRAVENOUS ONCE
Status: COMPLETED | OUTPATIENT
Start: 2018-08-14 | End: 2018-08-14

## 2018-08-14 RX ADMIN — GADOBUTROL 10 ML: 604.72 INJECTION INTRAVENOUS at 15:23

## 2018-08-17 ENCOUNTER — NON-PROVIDER VISIT (OUTPATIENT)
Dept: INTERNAL MEDICINE | Facility: IMAGING CENTER | Age: 78
End: 2018-08-17
Payer: MEDICARE

## 2018-08-17 ENCOUNTER — HOSPITAL ENCOUNTER (OUTPATIENT)
Dept: LAB | Facility: MEDICAL CENTER | Age: 78
End: 2018-08-17
Attending: SPECIALIST
Payer: MEDICARE

## 2018-08-17 LAB
CHOLEST SERPL-MCNC: 153 MG/DL (ref 100–199)
ERYTHROCYTE [SEDIMENTATION RATE] IN BLOOD BY WESTERGREN METHOD: 1 MM/HOUR (ref 0–20)
HDLC SERPL-MCNC: 42 MG/DL
LDLC SERPL CALC-MCNC: 95 MG/DL
TRIGL SERPL-MCNC: 78 MG/DL (ref 0–149)

## 2018-08-17 PROCEDURE — 80061 LIPID PANEL: CPT

## 2018-08-17 PROCEDURE — 85652 RBC SED RATE AUTOMATED: CPT

## 2018-08-23 ENCOUNTER — OFFICE VISIT (OUTPATIENT)
Dept: CARDIOLOGY | Facility: MEDICAL CENTER | Age: 78
End: 2018-08-23
Payer: MEDICARE

## 2018-08-23 ENCOUNTER — TELEPHONE (OUTPATIENT)
Dept: CARDIOLOGY | Facility: MEDICAL CENTER | Age: 78
End: 2018-08-23

## 2018-08-23 VITALS
BODY MASS INDEX: 29.82 KG/M2 | OXYGEN SATURATION: 91 % | HEART RATE: 66 BPM | HEIGHT: 71 IN | RESPIRATION RATE: 14 BRPM | WEIGHT: 213 LBS | SYSTOLIC BLOOD PRESSURE: 136 MMHG | DIASTOLIC BLOOD PRESSURE: 72 MMHG

## 2018-08-23 DIAGNOSIS — I44.4 LEFT ANTERIOR FASCICULAR HEMIBLOCK: ICD-10-CM

## 2018-08-23 DIAGNOSIS — I10 ESSENTIAL HYPERTENSION: ICD-10-CM

## 2018-08-23 DIAGNOSIS — R01.1 MURMUR: ICD-10-CM

## 2018-08-23 DIAGNOSIS — Z86.73 HISTORY OF ARTERIAL ISCHEMIC STROKE: ICD-10-CM

## 2018-08-23 DIAGNOSIS — I49.3 VENTRICULAR ECTOPY: ICD-10-CM

## 2018-08-23 DIAGNOSIS — R94.31 ABNORMAL EKG: ICD-10-CM

## 2018-08-23 LAB — EKG IMPRESSION: NORMAL

## 2018-08-23 PROCEDURE — 99204 OFFICE O/P NEW MOD 45 MIN: CPT | Mod: 25 | Performed by: INTERNAL MEDICINE

## 2018-08-23 PROCEDURE — 93000 ELECTROCARDIOGRAM COMPLETE: CPT | Performed by: INTERNAL MEDICINE

## 2018-08-23 RX ORDER — CLOPIDOGREL BISULFATE 75 MG/1
75 TABLET ORAL DAILY
COMMUNITY
Start: 2018-08-16 | End: 2018-11-15 | Stop reason: SDUPTHER

## 2018-08-23 RX ORDER — ATORVASTATIN CALCIUM 40 MG/1
40 TABLET, FILM COATED ORAL
COMMUNITY
Start: 2018-08-16 | End: 2018-11-15 | Stop reason: SDUPTHER

## 2018-08-23 RX ORDER — ALLOPURINOL 100 MG/1
100 TABLET ORAL DAILY
COMMUNITY
Start: 2018-07-10 | End: 2019-02-25 | Stop reason: SDUPTHER

## 2018-08-23 RX ORDER — HYDROCHLOROTHIAZIDE 25 MG/1
25 TABLET ORAL DAILY
COMMUNITY
End: 2018-08-24

## 2018-08-23 RX ORDER — LEVETIRACETAM 500 MG/1
500 TABLET ORAL 2 TIMES DAILY
COMMUNITY
Start: 2018-08-16 | End: 2018-09-25

## 2018-08-23 RX ORDER — LOSARTAN POTASSIUM 50 MG/1
50 TABLET ORAL DAILY
COMMUNITY
End: 2018-11-15 | Stop reason: SDUPTHER

## 2018-08-23 RX ORDER — LEVETIRACETAM 100 MG/ML
SOLUTION ORAL
COMMUNITY
Start: 2018-08-20 | End: 2018-08-24

## 2018-08-23 ASSESSMENT — ENCOUNTER SYMPTOMS
PND: 0
SHORTNESS OF BREATH: 0
BLURRED VISION: 0
FEVER: 0
LOSS OF CONSCIOUSNESS: 0
PALPITATIONS: 0
CHILLS: 0
ORTHOPNEA: 0
MYALGIAS: 0
DIZZINESS: 0
ABDOMINAL PAIN: 0
INSOMNIA: 0

## 2018-08-23 NOTE — TELEPHONE ENCOUNTER
RUSS on patient's answering machine to call back to schedule a loop insert with Dr. Gonzalez. 8-28-18 @ 1:00

## 2018-08-23 NOTE — PROGRESS NOTES
Chief Complaint   Patient presents with   •  Stroke       Subjective:   Chuck Ortiz is a 78 y.o. male who presents today referred by Dr. Mane Jc for a cardiology consultation for evaluation of possible cardiac embolic stroke.    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    On 8/7/2018 while on a fishing trip at [place] the patient had a sudden onset of loss of short-term memory for about 10 minutes.  The patient was seen in [place] ER and was diagnosed with a TIA and discharged to follow-up with his physicians in [place].    Patient subsequently saw Dr. Hannah Nova, neurologist.  Brain MRI on 8/14/2018 showed 2 small acute and subacute infarcts in the left posterior frontal lobe.  Carotid ultrasounds were negative for any obstructive disease  The patient was switched from aspirin which he had been on empirically for 15 years to Plavix.    The patient denies any prior history of cardiac arrhythmia including atrial fibrillation.  The patient denies any symptoms of palpitations, chest discomfort or exertional shortness of breath.    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 at SURGERY SURGICAL [facility] ORS   • FINGER ARTHROPLASTY  12/17/2012    Performed by Adam Christopher M.D. at SURGERY SAME DAY [facility] ORS   • INGUINAL HERNIA REPAIR  2/19/2009    Performed by ALEX ALATORRE at SURGERY SAME DAY [facility] ORS   • COLONOSCOPY  2004     neg   • LAMINOTOMY  1996    lumbar laminectomy, cyst x3     Family History   Problem Relation Age of Onset   • Heart Disease Maternal Grandmother    • Diabetes Paternal Grandfather    • Stroke Sister    • Lung Disease Father         black lung    • Cancer Neg Hx      Social History     Social History   • Marital status:      Spouse name: N/A   • Number of children: N/A   • Years of education: N/A     Occupational History   • Not on file.     Social History Main Topics   • Smoking status: Never Smoker   • Smokeless tobacco: Never Used   • Alcohol use Yes      Comment: 4-5 glasses of wine per week   • Drug use: No   • Sexual activity: Not on file      Comment: , retired [employer]      Other Topics Concern   • Not on file     Social History Narrative   • No narrative on file     Allergies   Allergen Reactions   • Morphine      Bradycardia     Outpatient Encounter Prescriptions as of 8/23/2018   Medication Sig Dispense Refill   • atorvastatin (LIPITOR) 40 MG Tab Take 40 mg by mouth every bedtime.     • clopidogrel (PLAVIX) 75 MG Tab Take 75 mg by mouth every day.     • levETIRAcetam (KEPPRA) 500 MG Tab Take 500 mg by mouth 2 Times a Day.     • losartan (COZAAR) 50 MG Tab Take 50 mg by mouth every day.     • hydroCHLOROthiazide (HYDRODIURIL) 25 MG Tab Take 25 mg by mouth every day.     • allopurinol (ZYLOPRIM) 100 MG Tab Take 100 mg by mouth every day.     • levETIRAcetam (KEPPRA) 100 MG/ML Solution      • allopurinol (ZYLOPRIM) 300 MG Tab Take 1 Tab by mouth every day. (Patient not taking: Reported on 8/23/2018) 90 Tab 3   • ondansetron (ZOFRAN) 4 MG Tab tablet Take 1 Tab by mouth every 8 hours as needed for Nausea/Vomiting. (Patient not taking: Reported on 8/23/2018) 10 Each 1   • tamsulosin (FLOMAX) 0.4 MG capsule Take 1 Cap by mouth every day. (Patient not taking: Reported on 8/23/2018) 10 Cap 0   • losartan (COZAAR) 100 MG Tab TAKE 1 TABLET BY MOUTH DAILY ** GENERICFOR COZAAR (Patient not taking:  "Reported on 8/23/2018) 90 Tab 3   • omeprazole (PRILOSEC) 40 MG delayed-release capsule Take 1 Cap by mouth every day. (Patient not taking: Reported on 8/23/2018) 30 Cap 3   • triamterene/hctz (MAXZIDE-25/DYAZIDE) 37.5-25 MG Cap TAKE 1 CAPSULE BY MOUTH EACH MORNING **GENERIC FOR DYAZIDE (Patient not taking: Reported on 8/23/2018) 90 Cap 3   • ASPIRIN 81 MG PO TABS Take 81 mg by mouth every day.       No facility-administered encounter medications on file as of 8/23/2018.      Review of Systems   Constitutional: Negative for chills and fever.   HENT: Negative for congestion.    Eyes: Negative for blurred vision.   Respiratory: Negative for shortness of breath.    Cardiovascular: Negative for chest pain, palpitations, orthopnea, leg swelling and PND.   Gastrointestinal: Negative for abdominal pain.   Genitourinary: Negative for dysuria.   Musculoskeletal: Negative for joint pain and myalgias.   Skin: Negative for rash.   Neurological: Negative for dizziness and loss of consciousness.   Psychiatric/Behavioral: The patient does not have insomnia.         Objective:   /72   Pulse 66   Resp 14   Ht 1.803 m (5' 11\")   Wt 96.6 kg (213 lb)   SpO2 91%   BMI 29.71 kg/m²     Physical Exam   Constitutional: He is oriented to person, place, and time. He appears well-developed and well-nourished.   HENT:   Head: Normocephalic and atraumatic.   Eyes: Pupils are equal, round, and reactive to light. EOM are normal. No scleral icterus.   Neck: Neck supple. No JVD present. No thyromegaly present.   Cardiovascular: Normal rate, regular rhythm and intact distal pulses.  Exam reveals no gallop and no friction rub.    Murmur heard.  Pulmonary/Chest: Effort normal and breath sounds normal. No respiratory distress. He has no wheezes. He has no rales.   Abdominal: Soft. Bowel sounds are normal. He exhibits no mass. There is no tenderness.   Musculoskeletal: Normal range of motion. He exhibits no edema or deformity. "   Lymphadenopathy:     He has no cervical adenopathy.   Neurological: He is alert and oriented to person, place, and time. No cranial nerve deficit. He exhibits normal muscle tone.   Left peripheral facial palsy.   Skin: Skin is warm and dry.   Psychiatric: He has a normal mood and affect. His behavior is normal.     08/14/2018 BRAIN MRI  1.  Acute to subacute small sized infarcts in the left posterior frontal lobe.  2.  Mild diffuse cerebral substance loss.  3.  Mild microangiopathic ischemic change versus demyelination or gliosis.  4.  Right maxillary sinus mucous retention cyst or polyp.    08/23/2018 EKG: Normal sinus rhythm, rate 68.  First-degree AV block.  Ventricular ectopy.  Reviewed by myself.    Recent 2018 laboratory tests reviewed.    Assessment:     1. History of arterial ischemic stroke     2. Essential hypertension  EKG   3. Murmur  Echocardiogram Comp w/o Cont   4. Abnormal EKG  NM-CARDIAC STRESS TEST    Echocardiogram Comp w/o Cont   5. Ventricular ectopy  NM-CARDIAC STRESS TEST    Echocardiogram Comp w/o Cont   6. Left anterior fascicular hemiblock  NM-CARDIAC STRESS TEST       Medical Decision Making:  Today's Assessment / Status / Plan:     Recommendation Discussion:  1.  To evaluate and rule out cardioembolic etiology for his CVA related to paroxysmal atrial fibrillation will proceed with a implantable loop recorder.  2.  To evaluate the patient's heart murmur will obtain an echocardiogram.  3.  For further evaluation of his abnormal EKG and ventricular ectopy will proceed with myocardial perfusion stress test.  4.  I will make further recommendations based on the above evaluation.    Thank you for allow me see this gentleman in consultation.

## 2018-08-23 NOTE — TELEPHONE ENCOUNTER
Patient scheduled for loop insert on 8-28-18 at [facility] with Dr. Gonzalez at 1:00. FYI To Dr. Gonzalez.

## 2018-08-23 NOTE — LETTER
[facility]   [address] [place] [address] [address]  [place] [address]  Phone: [phone]  Fax: [phone]              Chuck Ortiz  1940    Encounter Date: 8/23/2018    Yair Gonzalez M.D.          PROGRESS NOTE:  Chief Complaint   Patient presents with   •  Low blood pressure       Subjective:   Chuck Ortiz is a 78 y.o. 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 at SURGERY SURGICAL [facility] ORS   • FINGER ARTHROPLASTY  12/17/2012    Performed by Adam Christopher M.D. at SURGERY SAME DAY [facility] ORS   • INGUINAL HERNIA REPAIR  2/19/2009    Performed by ALEX ALATORRE at SURGERY SAME DAY [facility] ORS   • COLONOSCOPY  2004    neg   • LAMINOTOMY  1996    lumbar laminectomy, cyst x3     Family History   Problem Relation Age of Onset   • Heart Disease Maternal Grandmother    • Diabetes Paternal Grandfather    • Stroke Sister    • Lung Disease Father         black lung    • Cancer Neg Hx      Social History     Social History   • Marital status:      Spouse name: N/A   • Number of children: N/A   • Years of education: N/A     Occupational History   • Not on file.     Social History Main Topics   • Smoking status: Never Smoker   • Smokeless tobacco: Never Used   • Alcohol use Yes      Comment: 4-5 glasses of wine per week   • Drug use: No   • Sexual activity: Not on file      Comment: , retired [employer]      Other Topics Concern   • Not on file     Social History Narrative   • No narrative on file      "    Allergies   Allergen Reactions   • Morphine      Bradycardia     Outpatient Encounter Prescriptions as of 8/23/2018   Medication Sig Dispense Refill   • atorvastatin (LIPITOR) 40 MG Tab Take 40 mg by mouth every bedtime.     • clopidogrel (PLAVIX) 75 MG Tab Take 75 mg by mouth every day.     • levETIRAcetam (KEPPRA) 500 MG Tab Take 500 mg by mouth 2 Times a Day.     • losartan (COZAAR) 50 MG Tab Take 50 mg by mouth every day.     • hydroCHLOROthiazide (HYDRODIURIL) 25 MG Tab Take 25 mg by mouth every day.     • allopurinol (ZYLOPRIM) 100 MG Tab Take 100 mg by mouth every day.     • levETIRAcetam (KEPPRA) 100 MG/ML Solution      • allopurinol (ZYLOPRIM) 300 MG Tab Take 1 Tab by mouth every day. (Patient not taking: Reported on 8/23/2018) 90 Tab 3   • ondansetron (ZOFRAN) 4 MG Tab tablet Take 1 Tab by mouth every 8 hours as needed for Nausea/Vomiting. (Patient not taking: Reported on 8/23/2018) 10 Each 1   • tamsulosin (FLOMAX) 0.4 MG capsule Take 1 Cap by mouth every day. (Patient not taking: Reported on 8/23/2018) 10 Cap 0   • losartan (COZAAR) 100 MG Tab TAKE 1 TABLET BY MOUTH DAILY ** GENERICFOR COZAAR (Patient not taking: Reported on 8/23/2018) 90 Tab 3   • omeprazole (PRILOSEC) 40 MG delayed-release capsule Take 1 Cap by mouth every day. (Patient not taking: Reported on 8/23/2018) 30 Cap 3   • triamterene/hctz (MAXZIDE-25/DYAZIDE) 37.5-25 MG Cap TAKE 1 CAPSULE BY MOUTH EACH MORNING **GENERIC FOR DYAZIDE (Patient not taking: Reported on 8/23/2018) 90 Cap 3   • ASPIRIN 81 MG PO TABS Take 81 mg by mouth every day.       No facility-administered encounter medications on file as of 8/23/2018.      ROS     Objective:   /72   Pulse 66   Resp 14   Ht 1.803 m (5' 11\")   Wt 96.6 kg (213 lb)   SpO2 91%   BMI 29.71 kg/m²      Physical Exam      Assessment:     1. Essential hypertension  EKG   2. Murmur  Echocardiogram Comp w/o Cont   3. History of arterial ischemic stroke     4. Abnormal EKG  NM-CARDIAC " STRESS TEST    Echocardiogram Comp w/o Cont   5. Ventricular ectopy  NM-CARDIAC STRESS TEST    Echocardiogram Comp w/o Cont   6. Left anterior fascicular hemiblock  NM-CARDIAC STRESS TEST       Medical Decision Making:  Today's Assessment / Status / Plan:            Reilly Jc M.D.  [address] [facility]  [address]  [place] [address]  VIA [place]     G Hannah Henderson M.D.  [address] [facility] [address]  [place] [address]  VIA Facsimile: [phone]

## 2018-08-24 ENCOUNTER — APPOINTMENT (OUTPATIENT)
Dept: ADMISSIONS | Facility: MEDICAL CENTER | Age: 78
End: 2018-08-24
Attending: INTERNAL MEDICINE
Payer: MEDICARE

## 2018-08-28 ENCOUNTER — HOSPITAL ENCOUNTER (OUTPATIENT)
Facility: MEDICAL CENTER | Age: 78
End: 2018-08-28
Attending: INTERNAL MEDICINE | Admitting: INTERNAL MEDICINE
Payer: MEDICARE

## 2018-08-28 VITALS
RESPIRATION RATE: 17 BRPM | HEART RATE: 62 BPM | HEIGHT: 72 IN | OXYGEN SATURATION: 94 % | TEMPERATURE: 98 F | SYSTOLIC BLOOD PRESSURE: 124 MMHG | WEIGHT: 212.96 LBS | BODY MASS INDEX: 28.85 KG/M2 | DIASTOLIC BLOOD PRESSURE: 70 MMHG

## 2018-08-28 PROCEDURE — 302736 HCHG ADHESIVE DERMABOND

## 2018-08-28 PROCEDURE — 33282 HCHG CARDIAC LR INSERTION: CPT

## 2018-08-28 PROCEDURE — C1764 EVENT RECORDER, CARDIAC: HCPCS

## 2018-08-28 PROCEDURE — 700101 HCHG RX REV CODE 250

## 2018-08-28 PROCEDURE — 700101 HCHG RX REV CODE 250: Performed by: INTERNAL MEDICINE

## 2018-08-28 PROCEDURE — 160002 HCHG RECOVERY MINUTES (STAT)

## 2018-08-28 RX ORDER — LIDOCAINE HYDROCHLORIDE 20 MG/ML
20 INJECTION, SOLUTION INFILTRATION; PERINEURAL ONCE
Status: COMPLETED | OUTPATIENT
Start: 2018-08-28 | End: 2018-08-28

## 2018-08-28 RX ORDER — LIDOCAINE HYDROCHLORIDE AND EPINEPHRINE BITARTRATE 20; .01 MG/ML; MG/ML
INJECTION, SOLUTION SUBCUTANEOUS
Status: COMPLETED
Start: 2018-08-28 | End: 2018-08-28

## 2018-08-28 RX ADMIN — LIDOCAINE HYDROCHLORIDE 20 ML: 20 INJECTION, SOLUTION INFILTRATION; PERINEURAL at 12:15

## 2018-08-28 ASSESSMENT — PAIN SCALES - GENERAL
PAINLEVEL_OUTOF10: 0

## 2018-08-28 NOTE — OR NURSING
1242  PATIENT RECEIVED FROM CATH LAB,  S/P LOOP RECORDER INSERTION WITHOUT SEDATION.  PATIENT IS A/A/OX4.  LOOP RECORDER BOX GIVEN TO PATIENT.    1300   DC INSTRUCTIONS GIVEN TO PATIENT AND WIFE.  VERBALIZED UNDERSTANDING OF ALL.  NO HL.  PATIENT DC TO HOME,  AMBULATORY,  ESCORTED OUT BY FAMILY,  REFUSED STAFF ESCORT.

## 2018-08-28 NOTE — PROCEDURES
PROCEDURE: Insertion of implantable loop recorder.    INDICATION: Cryptogenic stroke.    PROCEDURE: After informed consent was obtained the patient was prepped and draped in a usual manner.  Using lidocaine local anesthesia was administered at the left parasternal fourth intercostal space.  The loop recorder device Model # [id number] [facility] [id number] was inserted subcutaneously without difficulty.  Dermabond and Steri-Strips were placed for hemostasis.  The patient tolerated procedure well.

## 2018-08-28 NOTE — DISCHARGE INSTRUCTIONS
ACTIVITY: Rest and take it easy for the first 24 hours.  A responsible adult is recommended to remain with you during that time.  It is normal to feel sleepy.  We encourage you to not do anything that requires balance, judgment or coordination.    MILD FLU-LIKE SYMPTOMS ARE NORMAL. YOU MAY EXPERIENCE GENERALIZED MUSCLE ACHES, THROAT IRRITATION, HEADACHE AND/OR SOME NAUSEA.    FOR 24 HOURS DO NOT:  Drive, operate machinery or run household appliances.  Drink beer or alcoholic beverages.   Make important decisions or sign legal documents.    SPECIAL INSTRUCTIONS: *KEEP INCISION DRY FOR 5 DAYS**    DIET: To avoid nausea, slowly advance diet as tolerated, avoiding spicy or greasy foods for the first day.  Add more substantial food to your diet according to your physician's instructions.  Babies can be fed formula or breast milk as soon as they are hungry.  INCREASE FLUIDS AND FIBER TO AVOID CONSTIPATION.    SURGICAL DRESSING/BATHING: *MAY SHOWER BUT NEED TO COVER INCISION WITH SARAN WRAP.**    FOLLOW-UP APPOINTMENT:  A follow-up appointment should be arranged with your doctor in **DR K[facility]F[place]    [phone]*; call to schedule.    You should CALL YOUR PHYSICIAN if you develop:  Fever greater than 101 degrees F.  Pain not relieved by medication, or persistent nausea or vomiting.  Excessive bleeding (blood soaking through dressing) or unexpected drainage from the wound.  Extreme redness or swelling around the incision site, drainage of pus or foul smelling drainage.  Inability to urinate or empty your bladder within 8 hours.  Problems with breathing or chest pain.    You should call 911 if you develop problems with breathing or chest pain.  If you are unable to contact your doctor or surgical center, you should go to the nearest emergency room or urgent care center.  Physician's telephone #: **[phone]*    If any questions arise, call your doctor.  If your doctor is not available, please feel free to call the Surgical  Center at [phone].  The Center is open Monday through Friday from 7AM to 7PM.  You can also call the HEALTH HOTLINE open 24 hours/day, 7 days/week and speak to a nurse at [phone], or toll free at [phone].    A registered nurse may call you a few days after your surgery to see how you are doing after your procedure.    MEDICATIONS: Resume taking daily medication.  Take prescribed pain medication with food.  If no medication is prescribed, you may take non-aspirin pain medication if needed.  PAIN MEDICATION CAN BE VERY CONSTIPATING.  Take a stool softener or laxative such as senokot, pericolace, or milk of magnesia if needed.      If your physician has prescribed pain medication that includes Acetaminophen (Tylenol), do not take additional Acetaminophen (Tylenol) while taking the prescribed medication.    Depression / Suicide Risk    As you are discharged from this [facility] facility, it is important to learn how to keep safe from harming yourself.    Recognize the warning signs:  · Abrupt changes in personality, positive or negative- including increase in energy   · Giving away possessions  · Change in eating patterns- significant weight changes-  positive or negative  · Change in sleeping patterns- unable to sleep or sleeping all the time   · Unwillingness or inability to communicate  · Depression  · Unusual sadness, discouragement and loneliness  · Talk of wanting to die  · Neglect of personal appearance   · Rebelliousness- reckless behavior  · Withdrawal from people/activities they love  · Confusion- inability to concentrate     If you or a loved one observes any of these behaviors or has concerns about self-harm, here's what you can do:  · Talk about it- your feelings and reasons for harming yourself  · Remove any means that you might use to hurt yourself (examples: pills, rope, extension cords, firearm)  · Get professional help from the community (Mental Health, Substance Abuse,  psychological counseling)  · Do not be alone:Call your Safe Contact- someone whom you trust who will be there for you.  · Call your local CRISIS HOTLINE 371-7627 or 977-561-7610  · Call your local Children's Mobile Crisis Response Team [place] (469) 131-0121 or www.Refer.com  · Call the toll free National Suicide Prevention Hotlines   · National Suicide Prevention Lifeline 061-843-YAPS (6865)  · National Hope Line Network 800-SUICIDE (261-0841)

## 2018-09-06 ENCOUNTER — HOSPITAL ENCOUNTER (OUTPATIENT)
Dept: CARDIOLOGY | Facility: MEDICAL CENTER | Age: 78
End: 2018-09-06
Attending: INTERNAL MEDICINE
Payer: MEDICARE

## 2018-09-06 ENCOUNTER — HOSPITAL ENCOUNTER (OUTPATIENT)
Dept: RADIOLOGY | Facility: MEDICAL CENTER | Age: 78
End: 2018-09-06
Attending: INTERNAL MEDICINE
Payer: MEDICARE

## 2018-09-06 DIAGNOSIS — I49.3 VENTRICULAR ECTOPY: ICD-10-CM

## 2018-09-06 DIAGNOSIS — I44.4 LEFT ANTERIOR FASCICULAR HEMIBLOCK: ICD-10-CM

## 2018-09-06 DIAGNOSIS — R01.1 MURMUR: ICD-10-CM

## 2018-09-06 DIAGNOSIS — R94.31 ABNORMAL EKG: ICD-10-CM

## 2018-09-06 PROCEDURE — 93306 TTE W/DOPPLER COMPLETE: CPT | Mod: 26 | Performed by: INTERNAL MEDICINE

## 2018-09-06 PROCEDURE — 93306 TTE W/DOPPLER COMPLETE: CPT

## 2018-09-06 PROCEDURE — A9502 TC99M TETROFOSMIN: HCPCS

## 2018-09-07 ENCOUNTER — TELEPHONE (OUTPATIENT)
Dept: CARDIOLOGY | Facility: MEDICAL CENTER | Age: 78
End: 2018-09-07

## 2018-09-07 LAB
LV EJECT FRACT  99904: 60
LV EJECT FRACT MOD 2C 99903: 58.23
LV EJECT FRACT MOD 4C 99902: 51.29
LV EJECT FRACT MOD BP 99901: 54.72

## 2018-09-07 NOTE — TELEPHONE ENCOUNTER
To SW to advise on what to report to patient for patient's Echo, no FV scheduled at this time.    CONCLUSIONS  Normal left ventricular systolic function.  Left ventricular ejection fraction is visually estimated to be 60%.  Mild aortic stenosis.  Mild posterior mitral valve leaflet prolapse.  Mild mitral regurgitation.  Mitral annular calcification.  Unable to estimate pulmonary artery pressure due to an inadequate   tricuspid regurgitant jet.  No prior study is available for comparison.

## 2018-09-08 NOTE — TELEPHONE ENCOUNTER
Myocardial Perfusion   Report   NUCLEAR IMAGING INTERPRETATION   Abnormal nuclear stress test.   There is a predominently fixed basal to mid inferior septal perfusion defect.   There is a distal inferior wall defect, predominantly reversible, moderate    size, mild intensity.   Sum difference score is low, 3.   Findings could possible be artifact.   Normal left ventricular wall motion.  LV ejection fraction = 71%.   TID absent.   Low risk scan.    _____________    To SW to review/advise    (Had TF review report in absence of SW - ok to wait for SW to review)

## 2018-09-18 ENCOUNTER — OFFICE VISIT (OUTPATIENT)
Dept: CARDIOLOGY | Facility: MEDICAL CENTER | Age: 78
End: 2018-09-18
Payer: MEDICARE

## 2018-09-18 ENCOUNTER — TELEPHONE (OUTPATIENT)
Dept: CARDIOLOGY | Facility: MEDICAL CENTER | Age: 78
End: 2018-09-18

## 2018-09-18 VITALS
HEART RATE: 68 BPM | BODY MASS INDEX: 30.24 KG/M2 | OXYGEN SATURATION: 91 % | SYSTOLIC BLOOD PRESSURE: 132 MMHG | HEIGHT: 71 IN | WEIGHT: 216 LBS | RESPIRATION RATE: 14 BRPM | DIASTOLIC BLOOD PRESSURE: 72 MMHG

## 2018-09-18 DIAGNOSIS — R93.1 ABNORMAL NUCLEAR CARDIAC IMAGING TEST: ICD-10-CM

## 2018-09-18 DIAGNOSIS — I49.3 VENTRICULAR ECTOPY: ICD-10-CM

## 2018-09-18 PROCEDURE — 99215 OFFICE O/P EST HI 40 MIN: CPT | Mod: 24 | Performed by: INTERNAL MEDICINE

## 2018-09-18 RX ORDER — LEVETIRACETAM 100 MG/ML
SOLUTION ORAL
COMMUNITY
Start: 2018-09-13 | End: 2019-01-03

## 2018-09-18 ASSESSMENT — ENCOUNTER SYMPTOMS
DIZZINESS: 0
ABDOMINAL PAIN: 0
SHORTNESS OF BREATH: 0
FEVER: 0
LOSS OF CONSCIOUSNESS: 0
INSOMNIA: 0
CHILLS: 0
ORTHOPNEA: 0
BLURRED VISION: 0
PALPITATIONS: 0
PND: 0
MYALGIAS: 0
COUGH: 0

## 2018-09-18 NOTE — PROGRESS NOTES
"Chief Complaint   Patient presents with   • Results       Subjective:   Chuck Ortiz is a 78 y.o. male who presents today for follow-up evaluation of rhythm monitoring from implantable loop recorder for evaluation of cryptogenic stroke.    Last seen on 8/28/2018 at the time of his implantable loop recorder implantation procedure.  In the interim the patient had an MPI which was abnormal showing inferior perfusion abnormalities.  He has had no symptoms of palpitations, angina pectoris or heart failure symptoms.  He has been compliant with his medications.     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     On 8/7/2018 while on a fishing trip at [place] the patient had a sudden onset of loss of short-term memory for about 10 minutes.  The patient was seen in [place] ER and was diagnosed with a TIA and discharged to follow-up with his physicians in [place].     Patient subsequently saw Dr. Hannah Henderson, neurologist.  Brain MRI on 8/14/2018 showed 2 small acute and subacute infarcts in the left posterior frontal lobe.  Carotid ultrasounds were negative for any obstructive disease  The patient was switched from aspirin which he had been on empirically for 15 years to Plavix.     The patient denies any prior history of cardiac arrhythmia including atrial fibrillation.  The patient denies any symptoms of palpitations, chest discomfort or exertional shortness of breath.    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 at SURGERY [facility] ORS   • FINGER ARTHROPLASTY  12/17/2012    Performed by Adam Christopher M.D. at SURGERY SAME DAY [facility] ORS   • INGUINAL HERNIA REPAIR  2/19/2009    Performed by ALEX ALATORRE at SURGERY SAME DAY [facility] ORS   • COLONOSCOPY  2004    neg   • LAMINOTOMY  1996    lumbar laminectomy, cyst x3   • UVULOPHARYNGOPALATOPLASTY  1985     Family History   Problem Relation Age of Onset   • Heart Disease Maternal Grandmother    • Diabetes Paternal Grandfather    • Stroke Sister    • Lung Disease Father         black lung    • Cancer Neg Hx      Social History     Social History   • Marital status:      Spouse name: N/A   • Number of children: N/A   • Years of education: N/A     Occupational History   • Not on file.     Social History Main Topics   • Smoking status: Never Smoker   • Smokeless tobacco: Never Used   • Alcohol use Yes      Comment: 2 drinks per week    • Drug use: No   • Sexual activity: Not on file      Comment: , retired [employer]      Other Topics Concern   • Not on file     Social History Narrative   • No narrative on file     Allergies   Allergen Reactions   • Morphine      Bradycardia     Outpatient Encounter Prescriptions as of 9/18/2018   Medication Sig Dispense Refill   • levETIRAcetam (KEPPRA) 100 MG/ML Solution Take 5ml by mouth twice a day     • atorvastatin (LIPITOR) 40 MG Tab Take 40 mg by mouth every bedtime.     • clopidogrel (PLAVIX) 75 MG Tab Take 75 mg by mouth every day.     • losartan (COZAAR) 50 MG Tab Take 50 mg by mouth every day.     • allopurinol (ZYLOPRIM) 100 MG  "Tab Take 100 mg by mouth every day.     • triamterene/hctz (MAXZIDE-25/DYAZIDE) 37.5-25 MG Cap TAKE 1 CAPSULE BY MOUTH EACH MORNING **GENERIC FOR DYAZIDE 90 Cap 3   • levETIRAcetam (KEPPRA) 500 MG Tab Take 500 mg by mouth 2 Times a Day.       No facility-administered encounter medications on file as of 9/18/2018.      Review of Systems   Constitutional: Negative for chills and fever.   HENT: Negative for congestion.    Eyes: Negative for blurred vision.   Respiratory: Negative for cough and shortness of breath.    Cardiovascular: Negative for chest pain, palpitations, orthopnea, leg swelling and PND.   Gastrointestinal: Negative for abdominal pain.   Genitourinary: Negative for dysuria.   Musculoskeletal: Negative for joint pain and myalgias.   Skin: Negative for rash.   Neurological: Negative for dizziness and loss of consciousness.   Psychiatric/Behavioral: The patient does not have insomnia.         Objective:   /72   Pulse 68   Resp 14   Ht 1.803 m (5' 11\")   Wt 98 kg (216 lb)   SpO2 91%   BMI 30.13 kg/m²     Physical Exam   Constitutional: He is oriented to person, place, and time. He appears well-developed and well-nourished.   Neck: No JVD present.   Cardiovascular: Normal rate, regular rhythm, normal heart sounds and intact distal pulses.    Pulmonary/Chest: Effort normal and breath sounds normal. No respiratory distress. He has no wheezes. He has no rales.   Musculoskeletal: He exhibits no edema.   Neurological: He is alert and oriented to person, place, and time.   Skin: Skin is warm and dry.   Psychiatric: He has a normal mood and affect. His behavior is normal.     08/14/2018 BRAIN MRI  1.  Acute to subacute small sized infarcts in the left posterior frontal lobe.  2.  Mild diffuse cerebral substance loss.  3.  Mild microangiopathic ischemic change versus demyelination or gliosis.  4.  Right maxillary sinus mucous retention cyst or polyp.     08/23/2018 EKG: Normal sinus rhythm, rate 68.  " First-degree AV block.  Ventricular ectopy.  Reviewed by myself.     Recent 2018 laboratory tests reviewed.    ECHOCARDIOGRAM 09/06/2018.  Normal left ventricular systolic function.  Left ventricular ejection fraction is visually estimated to be 60%.  Mild aortic stenosis.  Mild posterior mitral valve leaflet prolapse.  Mild mitral regurgitation.  Mitral annular calcification.  Unable to estimate pulmonary artery pressure due to an inadequate   tricuspid regurgitant jet.  No prior study is available for comparison.      MPI 09/06/2018.  There is a predominently fixed basal to mid inferior septal perfusion defect.   There is a distal inferior wall defect, predominantly reversible, moderate    size, mild intensity.   Stress Image LV EF:        71     %      Assessment:     1. Abnormal nuclear cardiac imaging test     2. Ventricular ectopy         Medical Decision Making:  Today's Assessment / Status / Plan:     Recommendation Discussion:  1.    First I reviewed the results of his implantable loop recorder which is not shown no evidence of atrial fibrillation.  2.  I reviewed in detail the results of his recent MPI with the patient and his wife explaining the concern regarding the presence of significant coronary artery disease.  3.  After further detailed discussion was like to proceed with a diagnostic cardiac catheterization.  I have discussed the risks and benefits of cardiac catheterization as well as the procedure itself, rationale and appropriateness in detail with the patient today. Complications including but not limited to death, stroke, MI, urgent bypass surgery, contrast nephropathy, vascular complications, bleeding and infection were explained to the patient. The potential outcomes associated with the procedure (possible PCI, possible CABG, possible medical Rx only) were also discussed at length. The patient agrees to proceed.

## 2018-09-18 NOTE — TELEPHONE ENCOUNTER
Patient is scheduled on 9-28-18 for a C w/poss with Dr. Gonzalez. No meds to stop. Patient was told to check in at 7:00am for a 9:00 procedure. H&P was done on 9-18-18 by Dr. Gonzalez. Pre admit is scheduled on 9-25-18 at 12:00.

## 2018-09-25 ENCOUNTER — HOSPITAL ENCOUNTER (OUTPATIENT)
Dept: RADIOLOGY | Facility: MEDICAL CENTER | Age: 78
End: 2018-09-25
Attending: INTERNAL MEDICINE | Admitting: INTERNAL MEDICINE
Payer: MEDICARE

## 2018-09-25 DIAGNOSIS — Z01.811 PRE-OPERATIVE RESPIRATORY EXAMINATION: ICD-10-CM

## 2018-09-25 DIAGNOSIS — Z01.810 PRE-OPERATIVE CARDIOVASCULAR EXAMINATION: ICD-10-CM

## 2018-09-25 DIAGNOSIS — Z01.812 PRE-PROCEDURAL LABORATORY EXAMINATION: ICD-10-CM

## 2018-09-25 LAB
ALBUMIN SERPL BCP-MCNC: 4 G/DL (ref 3.2–4.9)
ALBUMIN/GLOB SERPL: 1.3 G/DL
ALP SERPL-CCNC: 99 U/L (ref 30–99)
ALT SERPL-CCNC: 24 U/L (ref 2–50)
ANION GAP SERPL CALC-SCNC: 7 MMOL/L (ref 0–11.9)
APTT PPP: 28.1 SEC (ref 24.7–36)
AST SERPL-CCNC: 20 U/L (ref 12–45)
BILIRUB SERPL-MCNC: 0.6 MG/DL (ref 0.1–1.5)
BUN SERPL-MCNC: 22 MG/DL (ref 8–22)
CALCIUM SERPL-MCNC: 10 MG/DL (ref 8.5–10.5)
CHLORIDE SERPL-SCNC: 105 MMOL/L (ref 96–112)
CO2 SERPL-SCNC: 27 MMOL/L (ref 20–33)
CREAT SERPL-MCNC: 1.21 MG/DL (ref 0.5–1.4)
EKG IMPRESSION: NORMAL
ERYTHROCYTE [DISTWIDTH] IN BLOOD BY AUTOMATED COUNT: 49.1 FL (ref 35.9–50)
GLOBULIN SER CALC-MCNC: 3 G/DL (ref 1.9–3.5)
GLUCOSE SERPL-MCNC: 95 MG/DL (ref 65–99)
HCT VFR BLD AUTO: 53.5 % (ref 42–52)
HGB BLD-MCNC: 17.7 G/DL (ref 14–18)
INR PPP: 1.03 (ref 0.87–1.13)
MCH RBC QN AUTO: 31.6 PG (ref 27–33)
MCHC RBC AUTO-ENTMCNC: 33.1 G/DL (ref 33.7–35.3)
MCV RBC AUTO: 95.4 FL (ref 81.4–97.8)
PLATELET # BLD AUTO: 143 K/UL (ref 164–446)
PMV BLD AUTO: 11.4 FL (ref 9–12.9)
POTASSIUM SERPL-SCNC: 4.1 MMOL/L (ref 3.6–5.5)
PROT SERPL-MCNC: 7 G/DL (ref 6–8.2)
PROTHROMBIN TIME: 13.6 SEC (ref 12–14.6)
RBC # BLD AUTO: 5.61 M/UL (ref 4.7–6.1)
SODIUM SERPL-SCNC: 139 MMOL/L (ref 135–145)
WBC # BLD AUTO: 7.3 K/UL (ref 4.8–10.8)

## 2018-09-25 PROCEDURE — 93005 ELECTROCARDIOGRAM TRACING: CPT

## 2018-09-25 PROCEDURE — 85730 THROMBOPLASTIN TIME PARTIAL: CPT

## 2018-09-25 PROCEDURE — 80053 COMPREHEN METABOLIC PANEL: CPT

## 2018-09-25 PROCEDURE — 85610 PROTHROMBIN TIME: CPT

## 2018-09-25 PROCEDURE — 36415 COLL VENOUS BLD VENIPUNCTURE: CPT

## 2018-09-25 PROCEDURE — 85027 COMPLETE CBC AUTOMATED: CPT

## 2018-09-25 PROCEDURE — 71046 X-RAY EXAM CHEST 2 VIEWS: CPT

## 2018-09-25 PROCEDURE — 93010 ELECTROCARDIOGRAM REPORT: CPT | Performed by: INTERNAL MEDICINE

## 2018-09-28 ENCOUNTER — HOSPITAL ENCOUNTER (OUTPATIENT)
Facility: MEDICAL CENTER | Age: 78
End: 2018-09-28
Attending: INTERNAL MEDICINE | Admitting: INTERNAL MEDICINE
Payer: MEDICARE

## 2018-09-28 VITALS
HEIGHT: 72 IN | HEART RATE: 58 BPM | OXYGEN SATURATION: 93 % | RESPIRATION RATE: 16 BRPM | BODY MASS INDEX: 28.85 KG/M2 | TEMPERATURE: 98 F | WEIGHT: 212.96 LBS | SYSTOLIC BLOOD PRESSURE: 144 MMHG | DIASTOLIC BLOOD PRESSURE: 75 MMHG

## 2018-09-28 PROCEDURE — 307093 HCHG TR BAND RADIAL

## 2018-09-28 PROCEDURE — 700101 HCHG RX REV CODE 250

## 2018-09-28 PROCEDURE — 93458 L HRT ARTERY/VENTRICLE ANGIO: CPT

## 2018-09-28 PROCEDURE — 360979 HCHG DIAGNOSTIC CATH

## 2018-09-28 PROCEDURE — 93458 L HRT ARTERY/VENTRICLE ANGIO: CPT | Mod: 26 | Performed by: INTERNAL MEDICINE

## 2018-09-28 PROCEDURE — C1894 INTRO/SHEATH, NON-LASER: HCPCS

## 2018-09-28 PROCEDURE — 700111 HCHG RX REV CODE 636 W/ 250 OVERRIDE (IP)

## 2018-09-28 PROCEDURE — 76937 US GUIDE VASCULAR ACCESS: CPT | Mod: 26 | Performed by: INTERNAL MEDICINE

## 2018-09-28 PROCEDURE — 99152 MOD SED SAME PHYS/QHP 5/>YRS: CPT | Performed by: INTERNAL MEDICINE

## 2018-09-28 PROCEDURE — 700117 HCHG RX CONTRAST REV CODE 255: Performed by: INTERNAL MEDICINE

## 2018-09-28 PROCEDURE — C1769 GUIDE WIRE: HCPCS

## 2018-09-28 PROCEDURE — 99153 MOD SED SAME PHYS/QHP EA: CPT

## 2018-09-28 PROCEDURE — 99152 MOD SED SAME PHYS/QHP 5/>YRS: CPT

## 2018-09-28 PROCEDURE — 160002 HCHG RECOVERY MINUTES (STAT)

## 2018-09-28 RX ORDER — SODIUM CHLORIDE 9 MG/ML
INJECTION, SOLUTION INTRAVENOUS CONTINUOUS
Status: DISCONTINUED | OUTPATIENT
Start: 2018-09-28 | End: 2018-09-28 | Stop reason: HOSPADM

## 2018-09-28 RX ORDER — HEPARIN SODIUM,PORCINE 1000/ML
VIAL (ML) INJECTION
Status: COMPLETED
Start: 2018-09-28 | End: 2018-09-28

## 2018-09-28 RX ORDER — LIDOCAINE HYDROCHLORIDE 10 MG/ML
INJECTION, SOLUTION INFILTRATION; PERINEURAL
Status: COMPLETED
Start: 2018-09-28 | End: 2018-09-28

## 2018-09-28 RX ORDER — SODIUM CHLORIDE 9 MG/ML
1000 INJECTION, SOLUTION INTRAVENOUS
Status: ACTIVE | OUTPATIENT
Start: 2018-09-28 | End: 2018-09-28

## 2018-09-28 RX ORDER — MIDAZOLAM HYDROCHLORIDE 1 MG/ML
INJECTION INTRAMUSCULAR; INTRAVENOUS
Status: COMPLETED
Start: 2018-09-28 | End: 2018-09-28

## 2018-09-28 RX ORDER — VERAPAMIL HYDROCHLORIDE 2.5 MG/ML
INJECTION, SOLUTION INTRAVENOUS
Status: COMPLETED
Start: 2018-09-28 | End: 2018-09-28

## 2018-09-28 RX ADMIN — MIDAZOLAM HYDROCHLORIDE 2 MG: 1 INJECTION, SOLUTION INTRAMUSCULAR; INTRAVENOUS at 08:58

## 2018-09-28 RX ADMIN — MIDAZOLAM HYDROCHLORIDE 1 MG: 1 INJECTION, SOLUTION INTRAMUSCULAR; INTRAVENOUS at 08:58

## 2018-09-28 RX ADMIN — HEPARIN SODIUM: 1000 INJECTION, SOLUTION INTRAVENOUS; SUBCUTANEOUS at 08:57

## 2018-09-28 RX ADMIN — LIDOCAINE HYDROCHLORIDE: 10 INJECTION, SOLUTION INFILTRATION; PERINEURAL at 08:57

## 2018-09-28 RX ADMIN — VERAPAMIL HYDROCHLORIDE 2.5 MG: 2.5 INJECTION, SOLUTION INTRAVENOUS at 08:57

## 2018-09-28 RX ADMIN — FENTANYL CITRATE 75 MCG: 50 INJECTION, SOLUTION INTRAMUSCULAR; INTRAVENOUS at 08:58

## 2018-09-28 RX ADMIN — IOHEXOL 78 ML: 350 INJECTION, SOLUTION INTRAVENOUS at 08:59

## 2018-09-28 RX ADMIN — NITROGLYCERIN 10 ML: 20 INJECTION INTRAVENOUS at 08:57

## 2018-09-28 RX ADMIN — HEPARIN SODIUM 2000 UNITS: 200 INJECTION, SOLUTION INTRAVENOUS at 08:57

## 2018-09-28 RX ADMIN — SODIUM CHLORIDE 1000 ML: 9 INJECTION, SOLUTION INTRAVENOUS at 08:08

## 2018-09-28 ASSESSMENT — PAIN SCALES - GENERAL
PAINLEVEL_OUTOF10: 0

## 2018-09-28 NOTE — PROCEDURES
DATE OF SERVICE:  09/28/2018    PROCEDURES PERFORMED:  Cardiac catheterization.  A. Left heart catheterization.  B. Left ventriculography.  C. Selective coronary angiography.  D. Right radial artery approach.    PREPROCEDURE DIAGNOSES:  1.  Abnormal myocardial perfusion scan.  2.  Mild aortic stenosis.  3.  Ventricular ectopy.  4.  Cryptogenic stroke.  5.  Hyperlipidemia.    POSTPROCEDURE DIAGNOSES:  1.  Coronary artery disease, moderate predominantly involving the mid left   anterior descending artery.  2.  Left ventricular ejection fraction 77% post PVC with basal inferior wall   akinesis.    PHYSICIAN:  Yair Gonzalez MD    COMPLICATIONS:  None.    MEDICATIONS:  1.  Versed IV.  2.  Fentanyl IV.  3.  Heparin 3000 units IA.  4.  Nitroglycerin 100 mcg IA.  5.  Verapamil 2.5 mg IA.    INDICATIONS:  The patient is a 78-year-old male with recent diagnosis of   cryptogenic stroke, status post implantable loop recorder for evaluation of   paroxysmal atrial fibrillation and recent diagnosis of hyperlipidemia,   currently on Plavix and atorvastatin, who had undergone a myocardial perfusion   stress test on 09/06/2018, which showed predominantly a fixed basal mid   inferior septal perfusion defect with a distal inferior wall defect   predominantly reversible moderate in size and mild in intensity with a left ventricular   ejection fraction of 71%.  An echocardiogram on 09/06/2018 with a left   ventricular ejection fraction of 60%, mild aortic stenosis, mild posterior   mitral valve leaflet prolapse with mild mitral regurgitation, and an abnormal   EKG on 08/23/2018 showed normal sinus rhythm, rate 68 , first-degree AV block   with ventricular ectopy.  The patient is undergoing a diagnostic cardiac   catheterization to include significant obstructive coronary artery disease.    Notably, the patient is asymptomatic for any angina or anginal equivalent   symptoms.    PROCEDURE:  After an informed consent was obtained,  the patient brought to   cardiac catheterization laboratory where he was prepped, draped and   anesthetized in the usual manner.    After establishing adequate collateral circulation to the right hand with a   pressure and oximetry-guided Jos's test, the volar surface of the right   wrist was prepped, draped and anesthetized in the usual manner.    Using ultrasound guidance and modified Seldinger technique, a 6-Citizen of [place] x10 cm   introducer sheath was inserted in the right radial artery.  Heparin,   verapamil, and nitroglycerin were given via the side port.    Next, a 4-Citizen of [place] JL 3.5 left coronary catheter was inserted in the ostium of   the left coronary artery and left coronary angiograms were obtained in various   projections.    Next, a 4-Citizen [place] JR 4.0 right coronary catheter was inserted in the ostium of the   right coronary and right coronary angiograms were obtained in various   projections.    Next, a 4-Citizen of [place] pigtail catheter was advanced in the left ventricle under   fluoroscopic guidance.  A single plane PAULSON left ventricular angiogram was   performed.  Pre and post angiogram LVEDP, LV and aortic pressures were   obtained.    At the end of procedure, catheters were removed.  Hemostasis was achieved with   a wrist band device.  Patient tolerated the procedure well.    CONSCIOUS SEDATION: I supervised and monitored the administration of   intravenous conscious sedation from 8:38 AM until 9:00 AM.    FINDINGS:  HEMODYNAMICS:  Left heart pressures:  1.  LVEDP.  2.  Left ventricular systolic pressure.  3.  Central aortic pressure.    LEFT VENTRICULOGRAPHY:  Left ventricular chamber size is normal with basal   inferior wall diverticulum with hyperdynamic global contractility.  Calculated   ejection fraction 77% post-PVC.    CORONARY ARTERIOGRAPHY:  1.  Left main artery:  Left main vessel is moderate size angiographically   appears normal and trifurcates into a left anterior descending artery, ramus   intermedius  vessel, and circumflex artery.  2.  Left anterior descending artery:  The left anterior descending artery   gives rise to 2 major diagonal branches, normal complement of septal branches   and extends to supply the apex.  The proximal left anterior descending artery   has diffuse, smooth, luminal atheromatous disease with mid vessel 40% focal   stenosis at the level of first diagonal branch.  The remainder of the mid and distal   vessel has diffuse smooth, non-critical intimal atheroma.  3.  Ramus intermedius:  The ramus is a moderate size vessel, long and   bifurcates into 2 major branches.  The ramus intermedius is widely patent with   mild diffuse luminal irregularities.  4.  Circumflex artery:  The circumflex artery gives rise to 3 small caliber   marginal branches.  The circumflex artery is patent with diffuse smooth   atheromatous disease.  5.  Right coronary artery:  The right coronary artery is a moderate size,   dominant vessel, gives rise to a right ventricular branch, posterior   descending artery and bifurcating posterolateral branch.  The right coronary   has diffuse focal, smooth, nonobstructive atheromatous disease.    PLAN:  1.  Maximize optimal medical therapy to the patient with coronary artery   disease.  2.  Continue evaluation for occult paroxysmal atrial fibrillation.  3.  Continue antiplatelet therapy, currently on Plavix and lipid-lowering therapy.       ____________________________________     MD MOHAN SHARIF / LAKHWINDER    DD:  09/28/2018 09:19:59  DT:  09/28/2018 10:24:17    D#:  [id number]  Job#:  [id number]

## 2018-09-28 NOTE — DISCHARGE INSTRUCTIONS
ACTIVITY: Rest and take it easy for the first 24 hours.  A responsible adult is recommended to remain with you during that time.  It is normal to feel sleepy.  We encourage you to not do anything that requires balance, judgment or coordination.    MILD FLU-LIKE SYMPTOMS ARE NORMAL. YOU MAY EXPERIENCE GENERALIZED MUSCLE ACHES, THROAT IRRITATION, HEADACHE AND/OR SOME NAUSEA.    FOR 24 HOURS DO NOT:  Drive, operate machinery or run household appliances.  Drink beer or alcoholic beverages.   Make important decisions or sign legal documents.      DIET: To avoid nausea, slowly advance diet as tolerated, avoiding spicy or greasy foods for the first day.  Add more substantial food to your diet according to your physician's instructions.  Babies can be fed formula or breast milk as soon as they are hungry.  INCREASE FLUIDS AND FIBER TO AVOID CONSTIPATION.    SURGICAL DRESSING/BATHING:    Keep the dressing clean and dry for 24 hours.  May remove dressing tomorrow  and can shower.  Do not submerge site under water for 1 week.  No heavy lifting and limit movement for 2 days.      FOLLOW-UP APPOINTMENT:  A follow-up appointment should be arranged with your doctor  call to schedule.    You should CALL YOUR PHYSICIAN if you develop:  Fever greater than 101 degrees F.  Pain not relieved by medication, or persistent nausea or vomiting.  Excessive bleeding (blood soaking through dressing) or unexpected drainage from the wound.  Extreme redness or swelling around the incision site, drainage of pus or foul smelling drainage.  Inability to urinate or empty your bladder within 8 hours.  Problems with breathing or chest pain.    You should call 911 if you develop problems with breathing or chest pain.  If you are unable to contact your doctor or surgical center, you should go to the nearest emergency room or urgent care center.      Physician's telephone #: [phone]    If any questions arise, call your doctor.  If your doctor is not  available, please feel free to call the Surgical Center at [phone].  The Center is open Monday through Friday from 7AM to 7PM.  You can also call the HEALTH HOTLINE open 24 hours/day, 7 days/week and speak to a nurse at [phone], or toll free at [phone].    A registered nurse may call you a few days after your surgery to see how you are doing after your procedure.    MEDICATIONS: Resume taking daily medication.  Take prescribed pain medication with food.  If no medication is prescribed, you may take non-aspirin pain medication if needed.  PAIN MEDICATION CAN BE VERY CONSTIPATING.  Take a stool softener or laxative such as senokot, pericolace, or milk of magnesia if needed.      If your physician has prescribed pain medication that includes Acetaminophen (Tylenol), do not take additional Acetaminophen (Tylenol) while taking the prescribed medication.    Depression / Suicide Risk    As you are discharged from this [facility] facility, it is important to learn how to keep safe from harming yourself.    Recognize the warning signs:  · Abrupt changes in personality, positive or negative- including increase in energy   · Giving away possessions  · Change in eating patterns- significant weight changes-  positive or negative  · Change in sleeping patterns- unable to sleep or sleeping all the time   · Unwillingness or inability to communicate  · Depression  · Unusual sadness, discouragement and loneliness  · Talk of wanting to die  · Neglect of personal appearance   · Rebelliousness- reckless behavior  · Withdrawal from people/activities they love  · Confusion- inability to concentrate     If you or a loved one observes any of these behaviors or has concerns about self-harm, here's what you can do:  · Talk about it- your feelings and reasons for harming yourself  · Remove any means that you might use to hurt yourself (examples: pills, rope, extension cords, firearm)  · Get professional help from the  community (Mental Health, Substance Abuse, psychological counseling)  · Do not be alone:Call your Safe Contact- someone whom you trust who will be there for you.  · Call your local CRISIS HOTLINE 655-2990 or 198-121-4467  · Call your local Children's Mobile Crisis Response Team [place] (624) 460-7416 or www.[employer]  · Call the toll free National Suicide Prevention Hotlines   · National Suicide Prevention Lifeline 285-963-COAU (0213)  · [facility] 800-SUICIDE (330-1036)                  Radial Catherization Discharge Instructions      · Do not subject hand/arm to any forceful movements for 24 hours    i.e. supporting weight when rising from the chair or bed.   · Do not drive a car for 24 hours  · You may remove the dressing tomorrow  · You may shower on the day following your procedure.  Do not take a tub bath or submerge the puncture site in water for 3 days following the procedure.  · No Lifting more than 3-5 pounds with affected wrist for 5 days  · Follow up with in 2-4 weeks.  · Increase fluids for 2 days post procedure.  · Continue all previous medications unless otherwise instructed.    If bleeding should occur following discharge:  · Sit down and apply firm pressure to site with your fingers for 10 minutes  · If the bleeding stops, continue to sit quietly, keeping your wrist straight for 2 hours.  Notify physician as soon as possible ( [phone])  · If bleeding does not stop after 10 minutes, or if there is a large amount of bleeding or spurting, call 911 immediately.  Do not drive yourself to the hospital.

## 2018-09-28 NOTE — OR NURSING
0922: [facility]. Patient is awake and on bedrest. VSS. RUE CMS intact. R radial TR band intact. Vascular access care management per MD order (see assessment). No c/o pain or nausea at this time. Will monitor closely. Vital signs per MD order.   0940: VSS. Family at bedside.   1030: Air completely off hemoband per MD order. Gauze and tegaderm dressing to R radial site.  RUE circulation, movement and sensation intact.   1045: DC instructions given. Questions answered. Family at bedside. R radial site soft,CDI. No c/o pain or nausea. Patient wide awake. VSS. Patient met criteria for discharge.

## 2018-10-09 ENCOUNTER — TELEPHONE (OUTPATIENT)
Dept: CARDIOLOGY | Facility: MEDICAL CENTER | Age: 78
End: 2018-10-09

## 2018-10-09 NOTE — TELEPHONE ENCOUNTER
"Attempted to call pt to follow up. No answer, l/m for him to please call back.     Per BORIS progress note from Angio:  \"PLAN:  1.  Maximize optimal medical therapy to the patient with coronary artery   disease.  2.  Continue evaluation for occult paroxysmal atrial fibrillation.  3.  Continue antiplatelet therapy, currently on Plavix and lipid-lowering therapy.\"    To BORIS, recommendations for pt based on cath results? Thx    ----- Message from Irene Trotter sent at 10/8/2018  4:20 PM PDT -----  Regarding: calling for results of angio  BORIS/Marisa  Patient is calling for results of angio he had done on 09/28. He can be reached at [phone].    "

## 2018-10-11 NOTE — TELEPHONE ENCOUNTER
Patient called, transferred by .  Discussed angio results/plan.  Patient agreeable.  Verbalizes understanding.  Confirmed 11/15 FV.  Denies any questions.

## 2018-10-22 DIAGNOSIS — G47.30 SLEEP APNEA, UNSPECIFIED TYPE: ICD-10-CM

## 2018-10-24 ENCOUNTER — NON-PROVIDER VISIT (OUTPATIENT)
Dept: INTERNAL MEDICINE | Facility: IMAGING CENTER | Age: 78
End: 2018-10-24
Payer: MEDICARE

## 2018-10-24 DIAGNOSIS — Z23 NEED FOR INFLUENZA VACCINATION: ICD-10-CM

## 2018-10-24 PROCEDURE — 90662 IIV NO PRSV INCREASED AG IM: CPT | Performed by: INTERNAL MEDICINE

## 2018-10-24 PROCEDURE — G0008 ADMIN INFLUENZA VIRUS VAC: HCPCS | Performed by: INTERNAL MEDICINE

## 2018-10-25 ENCOUNTER — SLEEP CENTER VISIT (OUTPATIENT)
Dept: SLEEP MEDICINE | Facility: MEDICAL CENTER | Age: 78
End: 2018-10-25
Payer: MEDICARE

## 2018-10-25 VITALS
HEIGHT: 72 IN | HEART RATE: 66 BPM | BODY MASS INDEX: 28.85 KG/M2 | SYSTOLIC BLOOD PRESSURE: 118 MMHG | RESPIRATION RATE: 15 BRPM | WEIGHT: 213 LBS | DIASTOLIC BLOOD PRESSURE: 72 MMHG | OXYGEN SATURATION: 92 %

## 2018-10-25 DIAGNOSIS — I10 ESSENTIAL HYPERTENSION: ICD-10-CM

## 2018-10-25 DIAGNOSIS — G47.33 OBSTRUCTIVE SLEEP APNEA SYNDROME: ICD-10-CM

## 2018-10-25 PROCEDURE — 99203 OFFICE O/P NEW LOW 30 MIN: CPT | Performed by: FAMILY MEDICINE

## 2018-10-25 NOTE — PROGRESS NOTES
"     [facility]  Consult Note     Date: 10/25/2018 / Time: 10:58 AM    Patient ID:   Name:             Chuck Ortiz   YOB: 1940  Age:                 78 y.o.  male   MRN:               [id number]      Thank you for requesting a sleep medicine consultation on Chuck Ortiz at the sleep center. She presents today with the chief complaints of LOVE and to establish as a new pt. He was previously seen by PMA. Pt was diagnosed with LOVE on 3/28/11 with split night study. He had sever LOVE with AHI of 42/hr and best tolerated pressure was CPAP 11 cm. He has been on CPAP 11 cm since then. However his CPAP broke don a week ago. The initial presenting symptoms were snoring and witnessed apnea. He is referred by Dr. Jc for evaluation and treatment of sleep disorder breathing.   He is S/P UPPP in 1980's.    HISTORY OF PRESENT ILLNESS:       At night,  Chuck Ortiz goes to bed around 9:30-10 pm on weekdays and  on weekends. He gets out of bed at 6:30 am on weekdays and  on weekends.  He  averages 7 hrs of sleep on a good night. Pt has bad are rare, He falls asleep in less than 5 minutes.     He is not aware of snoring/witnessed apneas/gasping or choking in sleep since he has been on CPAP.  He  denies any symptoms of restless legs syndrome such as an \"urge to move\"  He  legs in the evening or bedtime. He  denies any symptoms of narcolepsy such as sleep paralysis or cataplexy, or any symptoms to suggest parasomnias such as sleep walking or acting out of dreams. He  has not used any medications for her sleep problem.    Overall,  He does  finds his sleep refreshing. When He  wakes up in the morning, He  Does not feel tired. He does take regular naps. The naps are usually few min long.      He had a recent TIA on 08/07/18. The work up has been negative   REVIEW OF SYSTEMS:       Constitutional: Denies fevers, Denies weight changes  Eyes: Denies changes in vision, no eye " pain  Ears/Nose/Throat/Mouth: Denies nasal congestion or sore throat   Cardiovascular: Denies chest pain or palpitations   Respiratory: Denies shortness of breath , Denies cough  Gastrointestinal/Hepatic: Denies abdominal pain, nausea, vomiting, diarrhea, constipation or GI bleeding   Genitourinary: Denies bladder dysfunction, dysuria or frequency  Musculoskeletal/Rheum: + back pain   Skin/Breast: Denies rash, denies breast lumps or discharge  Neurological: Denies headache, confusion, memory loss or focal weakness/parasthesias  Psychiatric: denies mood disorder     Comprehensive review of systems form is reviewed with the patient and is attached in the EMR.     PMH:  has a past medical history of Arrhythmia; Arthritis; Cardiac murmur; CATARACT; Dental disorder; ED (erectile dysfunction); Essential hypertension (11/12/2015); Facial droop; Hemorrhagic disorder (HCC); High cholesterol; Hypertension; Kidney stones; Seizure (HCC); Sleep apnea; Snoring; Stroke (HCC) (08/07/2018); and Zenker diverticula.  MEDS:   Current Outpatient Prescriptions:   •  levETIRAcetam (KEPPRA) 100 MG/ML Solution, Take 5ml by mouth twice a day, Disp: , Rfl:   •  atorvastatin (LIPITOR) 40 MG Tab, Take 40 mg by mouth every bedtime., Disp: , Rfl:   •  clopidogrel (PLAVIX) 75 MG Tab, Take 75 mg by mouth every day., Disp: , Rfl:   •  losartan (COZAAR) 50 MG Tab, Take 50 mg by mouth every day., Disp: , Rfl:   •  allopurinol (ZYLOPRIM) 100 MG Tab, Take 100 mg by mouth every day., Disp: , Rfl:   •  triamterene/hctz (MAXZIDE-25/DYAZIDE) 37.5-25 MG Cap, TAKE 1 CAPSULE BY MOUTH EACH MORNING **GENERIC FOR DYAZIDE, Disp: 90 Cap, Rfl: 3  ALLERGIES:   Allergies   Allergen Reactions   • Morphine      Bradycardia     SURGHX:   Past Surgical History:   Procedure Laterality Date   • CATARACT PHACO WITH IOL  1/21/2014    Performed by Joni Duarte M.D. at [facility]   • FINGER ARTHROPLASTY  12/17/2012    Performed by Adam Christopher M.D. at  [facility]ERY [facility]S   • INGUINAL HERNIA REPAIR  2/19/2009    Performed by ALEX ALATORRE at SURGERY SAME DAY [facility] ORS   • COLONOSCOPY  2004    neg   • LAMINOTOMY  1996    lumbar laminectomy, cyst x3   • UVULOPHARYNGOPALATOPLASTY  1985     SOCHX:  reports that he has never smoked. He has never used smokeless tobacco. He reports that he drinks alcohol. He reports that he does not use drugs..   FH:   Family History   Problem Relation Age of Onset   • Heart Disease Maternal Grandmother    • Diabetes Paternal Grandfather    • Stroke Sister    • Lung Disease Father         black lung    • Cancer Neg Hx            Physical Exam:  Vitals/ General Appearance:   Weight/BMI: Body mass index is 28.89 kg/m².  Blood pressure 118/72, pulse 66, resp. rate 15, height 1.829 m (6'), weight 96.6 kg (213 lb), SpO2 92 %.  Vitals:    10/25/18 1051   BP: 118/72   BP Location: Right arm   Patient Position: Sitting   BP Cuff Size: Adult   Pulse: 66   Resp: 15   SpO2: 92%   Weight: 96.6 kg (213 lb)   Height: 1.829 m (6')       Pt. is alert and oriented to time, place and person. Cooperative and in no apparent distress.       1. Head: Atraumatic, normocephalic.   2. Ears: Normal tympanic membrane and no discharge  3. Nose: No inferior turbinate hypertophy, no septal deviation, no polyp.   4. Throat: Oropharynx appears crowde in that the palate is overhanging 5. Neck: Supple. No thyromegaly  6. Chest: Trachea central  7. Lungs auscultation: B/L good air entry, vesicular breath sounds, no adventitious sounds  8. Heart auscultation: 1st and 2nd heart sounds normal, regular rhythm. No appreciable murmur.  9. Abdomen: Soft, non tender, no organomegaly. Bowel sounds present  10. Extremities:  no clubbing, no pedal edema.   Peripheral pulses felt.  11. Skin: No rash  12. NEUROLOGICAL EXAMINATION: On neurological exam, the patient was alert and oriented x3. speech was clear and fluent without dysarthria.    INVESTIGATIONS:        ASSESSMENT AND PLAN     1. He  has symptoms of Obstructive Sleep Apnea (LOVE). He  has excessive daytime sleepiness that interferes with activites of daily living. He  risk factors for LOVE include crowded oropharynx.     The pathophysiology of LOVE and the increased risk of cardiovascular morbidity from untreated LOVE is discussed in detail with the patient. He  also has HTN, TIA which can be worsened by her LOVE.     We have discussed diagnostic options including in-laboratory, attended polysomnography and home sleep testing. We have also discussed treatment options including airway pressurization, reconstructive otolaryngologic surgery, dental appliances and weight management.       Plan   - Continue CPAP at 11 cm with nasal mask    - New CPAP machine is ordered today with same pressure    - F/u in 8 weeks to assess the efficiacy of recommended pressure    - compliance was reinforced

## 2018-10-27 DIAGNOSIS — I10 ESSENTIAL HYPERTENSION: ICD-10-CM

## 2018-10-29 RX ORDER — TRIAMTERENE AND HYDROCHLOROTHIAZIDE 37.5; 25 MG/1; MG/1
CAPSULE ORAL
Qty: 90 CAP | Refills: 3 | Status: SHIPPED | OUTPATIENT
Start: 2018-10-29 | End: 2019-10-30 | Stop reason: SDUPTHER

## 2018-11-08 ENCOUNTER — TELEPHONE (OUTPATIENT)
Dept: CARDIOLOGY | Facility: MEDICAL CENTER | Age: 78
End: 2018-11-08

## 2018-11-08 NOTE — TELEPHONE ENCOUNTER
Contacted patient, he states he feels fine today.  Explained the data received.     He did feel at some point his BP increasing/increase HR.  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  Patient would like to know the time stamp of this trasmission.     He feels fine today.      He states he can always resolve high HR if he lays down.     Patient has FV on 11/15, FV confirmed with patient.     Lonnie Jorge - can you give me the timestamp of the data

## 2018-11-08 NOTE — TELEPHONE ENCOUNTER
Patient's loop recorder transmitted via home monitor-- had tachy episode lasting 7 seconds with rate of 158 bpm--to be scanned for review.

## 2018-11-09 NOTE — TELEPHONE ENCOUNTER
Contacted patient, informed patient of the date/time. He is unable to confirm correlation of data with his neurological issue.  He denies any issues to report today.  Advised to contact clinic with any concerns or questions. Patient verbalizes understanding.

## 2018-11-13 ENCOUNTER — APPOINTMENT (RX ONLY)
Dept: URBAN - METROPOLITAN AREA CLINIC 35 | Facility: CLINIC | Age: 78
Setting detail: DERMATOLOGY
End: 2018-11-13

## 2018-11-13 DIAGNOSIS — L82.1 OTHER SEBORRHEIC KERATOSIS: ICD-10-CM

## 2018-11-13 DIAGNOSIS — D18.0 HEMANGIOMA: ICD-10-CM

## 2018-11-13 DIAGNOSIS — L57.0 ACTINIC KERATOSIS: ICD-10-CM

## 2018-11-13 DIAGNOSIS — Z85.828 PERSONAL HISTORY OF OTHER MALIGNANT NEOPLASM OF SKIN: ICD-10-CM

## 2018-11-13 DIAGNOSIS — B35.1 TINEA UNGUIUM: ICD-10-CM

## 2018-11-13 DIAGNOSIS — D22 MELANOCYTIC NEVI: ICD-10-CM

## 2018-11-13 DIAGNOSIS — L81.4 OTHER MELANIN HYPERPIGMENTATION: ICD-10-CM

## 2018-11-13 PROBLEM — D18.01 HEMANGIOMA OF SKIN AND SUBCUTANEOUS TISSUE: Status: ACTIVE | Noted: 2018-11-13

## 2018-11-13 PROBLEM — D22.5 MELANOCYTIC NEVI OF TRUNK: Status: ACTIVE | Noted: 2018-11-13

## 2018-11-13 PROCEDURE — ? PRESCRIPTION

## 2018-11-13 PROCEDURE — ? COUNSELING

## 2018-11-13 PROCEDURE — ? LIQUID NITROGEN

## 2018-11-13 PROCEDURE — 99213 OFFICE O/P EST LOW 20 MIN: CPT | Mod: 25

## 2018-11-13 PROCEDURE — 17000 DESTRUCT PREMALG LESION: CPT

## 2018-11-13 PROCEDURE — 17003 DESTRUCT PREMALG LES 2-14: CPT

## 2018-11-13 RX ORDER — KETOCONAZOLE 20 MG/G
THIN LAYER CREAM TOPICAL TWICE DAILY
Qty: 1 | Refills: 11 | Status: ERX

## 2018-11-13 ASSESSMENT — LOCATION DETAILED DESCRIPTION DERM
LOCATION DETAILED: LEFT SUPERIOR HELIX
LOCATION DETAILED: RIGHT POSTERIOR SHOULDER
LOCATION DETAILED: RIGHT DISTAL PLANTAR GREAT TOE
LOCATION DETAILED: LEFT MEDIAL INFERIOR CHEST
LOCATION DETAILED: RIGHT SUPERIOR MEDIAL MIDBACK
LOCATION DETAILED: RIGHT NASAL SIDEWALL
LOCATION DETAILED: RIGHT SUPERIOR HELIX
LOCATION DETAILED: LEFT GREAT TOENAIL
LOCATION DETAILED: LEFT MEDIAL ZYGOMA
LOCATION DETAILED: RIGHT MEDIAL ZYGOMA
LOCATION DETAILED: LEFT INFERIOR CENTRAL MALAR CHEEK
LOCATION DETAILED: RIGHT 3RD TOENAIL
LOCATION DETAILED: INFERIOR MID FOREHEAD
LOCATION DETAILED: RIGHT MEDIAL UPPER BACK
LOCATION DETAILED: LEFT 3RD TOENAIL

## 2018-11-13 ASSESSMENT — LOCATION SIMPLE DESCRIPTION DERM
LOCATION SIMPLE: RIGHT UPPER BACK
LOCATION SIMPLE: RIGHT EAR
LOCATION SIMPLE: INFERIOR FOREHEAD
LOCATION SIMPLE: RIGHT LOWER BACK
LOCATION SIMPLE: LEFT ZYGOMA
LOCATION SIMPLE: LEFT 3RD TOE
LOCATION SIMPLE: LEFT EAR
LOCATION SIMPLE: RIGHT 3RD TOE
LOCATION SIMPLE: RIGHT GREAT TOE
LOCATION SIMPLE: CHEST
LOCATION SIMPLE: RIGHT ZYGOMA
LOCATION SIMPLE: LEFT GREAT TOE
LOCATION SIMPLE: RIGHT SHOULDER
LOCATION SIMPLE: RIGHT NOSE
LOCATION SIMPLE: LEFT CHEEK

## 2018-11-13 ASSESSMENT — LOCATION ZONE DERM
LOCATION ZONE: ARM
LOCATION ZONE: TOENAIL
LOCATION ZONE: TRUNK
LOCATION ZONE: TOE
LOCATION ZONE: FACE
LOCATION ZONE: NOSE
LOCATION ZONE: EAR

## 2018-11-13 NOTE — PROCEDURE: COUNSELING
Detail Level: Generalized
Detail Level: Zone
Detail Level: Simple
Patient Specific Counseling (Will Not Stick From Patient To Patient): Will use topical ketoconazole cream to treat red scaly spots he gets on the feet.  These are likely to be tinea pedis given his onychomycosis.  We discussed that the cream will not likely clear his toenails but get red  of the red itchy spots he gets on his skin.

## 2018-11-13 NOTE — PROCEDURE: LIQUID NITROGEN
Consent: The patient's consent was obtained including but not limited to risks of crusting, scabbing, blistering, scarring, darker or lighter pigmentary change, recurrence, incomplete removal and infection.
Duration Of Freeze Thaw-Cycle (Seconds): 10
Render Post-Care Instructions In Note?: no
Post-Care Instructions: I reviewed with the patient in detail post-care instructions. Patient is to wear sunprotection, and avoid picking at any of the treated lesions. Pt may apply Vaseline to crusted or scabbing areas.
Detail Level: Detailed

## 2018-11-15 ENCOUNTER — OFFICE VISIT (OUTPATIENT)
Dept: CARDIOLOGY | Facility: MEDICAL CENTER | Age: 78
End: 2018-11-15
Payer: MEDICARE

## 2018-11-15 VITALS
HEIGHT: 72 IN | OXYGEN SATURATION: 94 % | DIASTOLIC BLOOD PRESSURE: 68 MMHG | SYSTOLIC BLOOD PRESSURE: 122 MMHG | WEIGHT: 219 LBS | HEART RATE: 68 BPM | BODY MASS INDEX: 29.66 KG/M2

## 2018-11-15 DIAGNOSIS — I25.10 CORONARY ARTERY DISEASE INVOLVING NATIVE CORONARY ARTERY OF NATIVE HEART WITHOUT ANGINA PECTORIS: ICD-10-CM

## 2018-11-15 DIAGNOSIS — E78.2 HYPERLIPEMIA, MIXED: ICD-10-CM

## 2018-11-15 DIAGNOSIS — I10 ESSENTIAL HYPERTENSION: ICD-10-CM

## 2018-11-15 DIAGNOSIS — Z95.818 STATUS POST PLACEMENT OF IMPLANTABLE LOOP RECORDER: ICD-10-CM

## 2018-11-15 DIAGNOSIS — Z86.73 HISTORY OF ARTERIAL ISCHEMIC STROKE: Primary | ICD-10-CM

## 2018-11-15 PROBLEM — K22.5 ZENKER DIVERTICULUM: Status: ACTIVE | Noted: 2018-11-15

## 2018-11-15 PROCEDURE — 99214 OFFICE O/P EST MOD 30 MIN: CPT | Performed by: NURSE PRACTITIONER

## 2018-11-15 RX ORDER — ATORVASTATIN CALCIUM 40 MG/1
40 TABLET, FILM COATED ORAL EVERY EVENING
Qty: 90 TAB | Refills: 3 | Status: SHIPPED | OUTPATIENT
Start: 2018-11-15 | End: 2019-03-19 | Stop reason: SDUPTHER

## 2018-11-15 RX ORDER — CLOPIDOGREL BISULFATE 75 MG/1
75 TABLET ORAL DAILY
Qty: 90 TAB | Refills: 3 | Status: SHIPPED | OUTPATIENT
Start: 2018-11-15 | End: 2020-07-09

## 2018-11-15 RX ORDER — LOSARTAN POTASSIUM 50 MG/1
50 TABLET ORAL DAILY
Qty: 90 TAB | Refills: 3 | Status: SHIPPED | OUTPATIENT
Start: 2018-11-15 | End: 2019-05-09 | Stop reason: SDUPTHER

## 2018-11-15 ASSESSMENT — ENCOUNTER SYMPTOMS
MYALGIAS: 0
ABDOMINAL PAIN: 0
CLAUDICATION: 0
BACK PAIN: 1
PALPITATIONS: 0
ORTHOPNEA: 0
COUGH: 0
PND: 0
SHORTNESS OF BREATH: 0
DIZZINESS: 0
WEAKNESS: 0

## 2018-11-15 NOTE — PROGRESS NOTES
"Chief Complaint   Patient presents with   • HTN (Controlled)   • Evaluation Of Medication Change   • Results     angiogram-outpatient procedure       Subjective:   Chuck \"Curtis\"  Bassam Ortiz is a 78 y.o. male who presents todaywith his wife, Kezia, to follow-up on stroke and loop recorder.    The patient had a cryptogenic stroke back in August.  It was felt that possibly this was due to paroxysmal atrial fibrillation therefore a loop recorder was implanted.  Fortunately the patient fully recovered from his stroke.    He states he feels generally well and denies any chest discomfort shortness of breath or palpitations.  He underwent cardiac catheterization to rule out coronary artery disease.  He had a 40% LAD lesion and otherwise he had normal coronaries.    He has questions about the loop recorder as he feels that he has not received information regarding any arrhythmias.  He also wants his primary care, Dr. Mane Jc to have information regarding the recorder.    Patient has a Zenker diverticulum and has difficulty swallowing pills as they get caught.  He is crushing his pills and taking them.  Therefore he wonders how long he needs to be on pills as it is troublesome for him to take them.    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   • CATARACT PHACO WITH IOL  1/21/2014    Performed by Joni Duarte M.D. at [facility]   • FINGER ARTHROPLASTY  12/17/2012    Performed by Adam Christopher M.D. at [facility]   • INGUINAL HERNIA REPAIR  2/19/2009    Performed by ALEX ALATORRE at [facility]ERY [facility] DAY [facility]S   • COLONOSCOPY  2004    neg   • LAMINOTOMY  1996    lumbar laminectomy, cyst x3   • UVULOPHARYNGOPALATOPLASTY  1985     Family History   Problem Relation Age of Onset   • Heart Disease Maternal Grandmother    • Diabetes Paternal Grandfather    • Stroke Sister    • Lung Disease Father         black lung    • Cancer Neg Hx      Social History     Social History   • Marital status:      Spouse name: N/A   • Number of children: N/A   • Years of education: N/A     Occupational History   • Not on file.     Social History Main Topics   • Smoking status: Never Smoker   • Smokeless tobacco: Never Used   • Alcohol use Yes      Comment: 2 drinks per week    • Drug use: No   • Sexual activity: Not on file      Comment: , retired [employer]      Other Topics Concern   • Not on file     Social History Narrative   • No narrative on file     Allergies   Allergen Reactions   • Morphine      Bradycardia     Outpatient Encounter Prescriptions as of 11/15/2018   Medication Sig Dispense Refill   • atorvastatin (LIPITOR) 40 MG Tab Take 1 Tab by mouth every evening. 90 Tab 3   • clopidogrel (PLAVIX) 75 MG Tab Take 1 Tab by mouth every day. 90 Tab 3   • losartan (COZAAR) 50 MG Tab Take 1 Tab by mouth every day. 90 Tab 3   • triamterene/hctz (MAXZIDE-25/DYAZIDE) 37.5-25 MG Cap TAKE ONE CAPSULE BY MOUTH EVERY MORNING 90 Cap 3   • levETIRAcetam (KEPPRA) 100 MG/ML Solution Take 5ml by mouth twice a day     • allopurinol (ZYLOPRIM) 100 MG Tab Take 100 mg by mouth every day.     • [DISCONTINUED] atorvastatin (LIPITOR) 40 MG Tab Take 40 mg by mouth every bedtime.     • [DISCONTINUED]  clopidogrel (PLAVIX) 75 MG Tab Take 75 mg by mouth every day.     • [DISCONTINUED] losartan (COZAAR) 50 MG Tab Take 50 mg by mouth every day.       No facility-administered encounter medications on file as of 11/15/2018.      Review of Systems   Constitutional: Negative for malaise/fatigue.   Respiratory: Negative for cough and shortness of breath.    Cardiovascular: Negative for chest pain, palpitations, orthopnea, claudication, leg swelling and PND.   Gastrointestinal: Negative for abdominal pain.   Musculoskeletal: Positive for back pain (chronic). Negative for joint pain and myalgias.   Neurological: Negative for dizziness and weakness.        Objective:   /68 (BP Location: Left arm, Patient Position: Sitting, BP Cuff Size: Adult)   Pulse 68   Ht 1.829 m (6')   Wt 99.3 kg (219 lb)   SpO2 94%   BMI 29.70 kg/m²     Physical Exam   Constitutional: He is oriented to person, place, and time. He appears well-developed and well-nourished.   HENT:   Head: Normocephalic.   Eyes: EOM are normal.   Neck: Normal range of motion. No JVD present.   Cardiovascular: Normal rate and regular rhythm.  Exam reveals no friction rub.    No murmur heard.  Pulmonary/Chest: Effort normal.   Abdominal: Soft. Bowel sounds are normal.   Musculoskeletal: He exhibits no edema.   Neurological: He is alert and oriented to person, place, and time.   Skin: Skin is warm and dry.   Psychiatric: He has a normal mood and affect.     September 6, 2018:Transthoracic Echo Report  Normal left ventricular systolic function.  Left ventricular ejection fraction is visually estimated to be 60%.  Mild aortic stenosis.  Mild posterior mitral valve leaflet prolapse.  Mild mitral regurgitation.  Mitral annular calcification.  Unable to estimate pulmonary artery pressure due to an inadequate   tricuspid regurgitant jet.  No prior study is available for comparison.         Assessment:     1. History of arterial ischemic stroke  clopidogrel (PLAVIX) 75  MG Tab   2. Status post placement of implantable loop recorder     3. Coronary artery disease involving native coronary artery of native heart without angina pectoris     4. Essential hypertension  losartan (COZAAR) 50 MG Tab   5. Hyperlipemia, mixed  LIPID PANEL    atorvastatin (LIPITOR) 40 MG Tab       Medical Decision Making:  Today's Assessment / Status / Plan:   History ischemic stroke: Cryptogenic.  Patient has a loop recorder but no atrial fibrillation discovered.  We will continue to monitor his recorder for arrhythmias.    Coronary artery disease: Mild as he only has a 40% LAD lesion.  We will treat him as though he has coronary artery disease with Plavix and atorvastatin.    Hypertension: Blood pressure is good in the office today.  Continue on losartan.    Hyperlipidemia: Since he has mild coronary artery disease his LDL goal is 70.  We will check lipids prior to his follow-up appointment.    He will follow-up as scheduled with Dr Gonzalez on March 20, 2019.  If he has problems he will contact us sooner.    Collaborating Provider: Dr. Arnold.

## 2018-11-15 NOTE — LETTER
"     [facility]   [address], [address]  [place] [address]  Phone: [phone]  Fax: [phone]              Chuck Ortiz  1940    Encounter Date: 11/15/2018    JANNET Brandt          PROGRESS NOTE:  Chief Complaint   Patient presents with   • HTN (Controlled)   • Evaluation Of Medication Change   • Results     angiogram-outpatient procedure       Subjective:   Chuck \"Curtis\"  Bassam Ortiz is a 78 y.o. male who presents todaywith his wife, Kezia, to follow-up on stroke and loop recorder.    The patient had a cryptogenic stroke back in August.  It was felt that possibly this was due to paroxysmal atrial fibrillation therefore a loop recorder was implanted.  Fortunately the patient fully recovered from his stroke.    He states he feels generally well and denies any chest discomfort shortness of breath or palpitations.  He underwent cardiac catheterization to rule out coronary artery disease.  He had a 40% LAD lesion and otherwise he had normal coronaries.    He has questions about the loop recorder as he feels that he has not received information regarding any arrhythmias.  He also wants his primary care, Dr. Mane Jc to have information regarding the recorder.    Patient has a Zenker diverticulum and has difficulty swallowing pills as they get caught.  He is crushing his pills and taking them.  Therefore he wonders how long he needs to be on pills as it is troublesome for him to take them.    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   • CATARACT PHACO WITH IOL  1/21/2014    Performed by Joni Duarte M.D. at [facility]   • FINGER ARTHROPLASTY  12/17/2012    Performed by Adam Christopher M.D. at [facility]ERY [facility] DAY [facility]S   • INGUINAL HERNIA REPAIR  2/19/2009    Performed by ALEX ALATORRE at SURGERY SAME DAY [facility]S   • COLONOSCOPY  2004    neg   • LAMINOTOMY  1996    lumbar laminectomy, cyst x3   • UVULOPHARYNGOPALATOPLASTY  1985     Family History   Problem Relation Age of Onset   • Heart Disease Maternal Grandmother    • Diabetes Paternal Grandfather    • Stroke Sister    • Lung Disease Father         black lung    • Cancer Neg Hx      Social History     Social History   • Marital status:      Spouse name: N/A   • Number of children: N/A   • Years of education: N/A     Occupational History   • Not on file.     Social History Main Topics   • Smoking status: Never Smoker   • Smokeless tobacco: Never Used   • Alcohol use Yes      Comment: 2 drinks per week    • Drug use: No   • Sexual activity: Not on file      Comment: , retired [employer]      Other Topics Concern   • Not on file     Social History Narrative   • No narrative on file     Allergies   Allergen Reactions   • Morphine      Bradycardia     Outpatient Encounter Prescriptions as of 11/15/2018   Medication Sig Dispense Refill   • atorvastatin (LIPITOR) 40 MG Tab Take 1 Tab by mouth every evening. 90 Tab 3   • clopidogrel (PLAVIX) 75 MG Tab Take 1 Tab by mouth every day. 90 Tab 3   • losartan (COZAAR) 50 MG Tab Take 1 Tab by mouth every day. 90 Tab 3   • triamterene/hctz (MAXZIDE-25/DYAZIDE) 37.5-25 MG Cap TAKE ONE CAPSULE BY MOUTH EVERY MORNING 90 Cap 3      • levETIRAcetam (KEPPRA) 100 MG/ML Solution Take 5ml by mouth twice a day     • allopurinol (ZYLOPRIM) 100 MG Tab Take 100 mg by mouth every day.     • [DISCONTINUED] atorvastatin (LIPITOR) 40 MG Tab Take 40 mg by mouth every bedtime.     • [DISCONTINUED] clopidogrel (PLAVIX) 75 MG Tab Take 75 mg by mouth every day.     • [DISCONTINUED] losartan (COZAAR) 50 MG Tab Take 50 mg by mouth every day.       No facility-administered encounter medications on file as of 11/15/2018.      Review of Systems   Constitutional: Negative for malaise/fatigue.   Respiratory: Negative for cough and shortness of breath.    Cardiovascular: Negative for chest pain, palpitations, orthopnea, claudication, leg swelling and PND.   Gastrointestinal: Negative for abdominal pain.   Musculoskeletal: Positive for back pain (chronic). Negative for joint pain and myalgias.   Neurological: Negative for dizziness and weakness.        Objective:   /68 (BP Location: Left arm, Patient Position: Sitting, BP Cuff Size: Adult)   Pulse 68   Ht 1.829 m (6')   Wt 99.3 kg (219 lb)   SpO2 94%   BMI 29.70 kg/m²      Physical Exam   Constitutional: He is oriented to person, place, and time. He appears well-developed and well-nourished.   HENT:   Head: Normocephalic.   Eyes: EOM are normal.   Neck: Normal range of motion. No JVD present.   Cardiovascular: Normal rate and regular rhythm.  Exam reveals no friction rub.    No murmur heard.  Pulmonary/Chest: Effort normal.   Abdominal: Soft. Bowel sounds are normal.   Musculoskeletal: He exhibits no edema.   Neurological: He is alert and oriented to person, place, and time.   Skin: Skin is warm and dry.   Psychiatric: He has a normal mood and affect.     September 6, 2018:Transthoracic Echo Report  Normal left ventricular systolic function.  Left ventricular ejection fraction is visually estimated to be 60%.  Mild aortic stenosis.  Mild posterior mitral valve leaflet prolapse.  Mild mitral  regurgitation.  Mitral annular calcification.  Unable to estimate pulmonary artery pressure due to an inadequate   tricuspid regurgitant jet.  No prior study is available for comparison.         Assessment:     1. History of arterial ischemic stroke  clopidogrel (PLAVIX) 75 MG Tab   2. Status post placement of implantable loop recorder     3. Coronary artery disease involving native coronary artery of native heart without angina pectoris     4. Essential hypertension  losartan (COZAAR) 50 MG Tab   5. Hyperlipemia, mixed  LIPID PANEL    atorvastatin (LIPITOR) 40 MG Tab       Medical Decision Making:  Today's Assessment / Status / Plan:   History ischemic stroke: Cryptogenic.  Patient has a loop recorder but no atrial fibrillation discovered.  We will continue to monitor his recorder for arrhythmias.    Coronary artery disease: Mild as he only has a 40% LAD lesion.  We will treat him as though he has coronary artery disease with Plavix and atorvastatin.    Hypertension: Blood pressure is good in the office today.  Continue on losartan.    Hyperlipidemia: Since he has mild coronary artery disease his LDL goal is 70.  We will check lipids prior to his follow-up appointment.    He will follow-up as scheduled with Dr Gonzalez on March 20, 2019.  If he has problems he will contact us sooner.    Collaborating Provider: Dr. Arnold.        No Recipients

## 2018-11-28 ENCOUNTER — TELEPHONE (OUTPATIENT)
Dept: CARDIOLOGY | Facility: MEDICAL CENTER | Age: 78
End: 2018-11-28

## 2018-11-29 ENCOUNTER — NON-PROVIDER VISIT (OUTPATIENT)
Dept: CARDIOLOGY | Facility: MEDICAL CENTER | Age: 78
End: 2018-11-29
Payer: MEDICARE

## 2018-11-29 DIAGNOSIS — Z95.818 STATUS POST PLACEMENT OF IMPLANTABLE LOOP RECORDER: ICD-10-CM

## 2018-11-29 PROCEDURE — 93298 REM INTERROG DEV EVAL SCRMS: CPT | Performed by: INTERNAL MEDICINE

## 2018-11-29 NOTE — NON-PROVIDER
30 Day Billing Report   Type of monitoring: Remote/ Carelink     : [employer]     Date of Transmission: 11/28/2018     Type of device: Loop Recorder     Episodes: 0.0 % lifetime  Summary Observations:  27-Oct-2018 00:05 to 26-Nov-2018 00:05  - Maximum Symptom Count Met, most recent: 26-Nov-2018    Counters Since 27-Oct-2018 00:05       Lifetime  Symptom    5  10  Tachy     1  1  Pause     0  0  [place]     0  0  AT     0  0  AF     0  0  % of Time in AT/AF   0.0%  0.0%

## 2018-12-14 ENCOUNTER — HOSPITAL ENCOUNTER (OUTPATIENT)
Dept: RADIOLOGY | Facility: MEDICAL CENTER | Age: 78
End: 2018-12-14
Attending: NEUROLOGICAL SURGERY
Payer: MEDICARE

## 2018-12-14 DIAGNOSIS — G96.198: ICD-10-CM

## 2018-12-14 PROCEDURE — 72110 X-RAY EXAM L-2 SPINE 4/>VWS: CPT

## 2018-12-21 ENCOUNTER — NON-PROVIDER VISIT (OUTPATIENT)
Dept: NEUROLOGY | Facility: MEDICAL CENTER | Age: 78
End: 2018-12-21
Payer: MEDICARE

## 2018-12-21 DIAGNOSIS — G40.901 STATUS EPILEPTICUS (HCC): ICD-10-CM

## 2018-12-21 DIAGNOSIS — G40.909 SEIZURE CEREBRAL (HCC): ICD-10-CM

## 2018-12-21 PROCEDURE — 95951 EEG: CPT | Mod: 52 | Performed by: PSYCHIATRY & NEUROLOGY

## 2018-12-21 NOTE — PROCEDURES
VIDEO ELECTROENCEPHALOGRAM REPORT      Referring provider: Dr. Henderson.     DOS: 12/21/2018 (total recording of 28 minutes).     INDICATION:  Chuck Ortiz 78 y.o. male presenting with episode of confusion. History of stroke.     CURRENT ANTIEPILEPTIC REGIMEN: Levetiracetam 500 mg bid.     TECHNIQUE: 30 channel video electroencephalogram (EEG) was performed in accordance with the international 10-20 system. The study was reviewed in bipolar and referential montages. The recording examined the patient during wakeful and drowsy/sleep state(s).     DESCRIPTION OF THE RECORD:  During the wakefulness, the background showed a symmetrical 12 Hz alpha activity posteriorly with amplitude of 70 mV.  There was reactivity to eye closure/opening.  A normal anterior-posterior gradient was noted with faster beta frequencies seen anteriorly.  During drowsiness, theta/delta frequencies were seen.    During the sleep state, background shows diffuse high-amplitude 4-5 Hz delta activity.  Symmetrical high-amplitude sleep spindles and vertex sharps were seen in the leads over the central regions.     ACTIVATION PROCEDURES:   Intermittent Photic stimulation was performed in a stepwise fashion from 1 to 30 Hz and elicited a normal response (photic driving), most noticeable in the posterior leads.      ICTAL AND/OR INTERICTAL FINDINGS:   No focal or generalized epileptiform activity noted. No regional slowing was seen during this routine study.  No clinical events or seizures were reported or recorded during the study.     EKG: sampling of the EKG recording demonstrated sinus rhythm with rare PVCs.     EVENTS:  None.     INTERPRETATION:  This is a normal video EEG recording in the awake, drowsy, and sleep state(s).  Clinical correlation is recommended.    Note: A normal EEG does not rule out epilepsy.  If the clinical suspicion remains high for seizures, a prolonged recording to capture clinical or subclinical events may be  helpful.    Rare PVCs noted on EKG lead.     Elton Daily MD   Epilepsy and Neurodiagnostics.   Clinical  of Neurology [facility].   Diplomate in Neurology, Epilepsy, and Electrodiagnostic Medicine.   Office: [phone]  Fax: [phone]

## 2018-12-28 ENCOUNTER — HOSPITAL ENCOUNTER (OUTPATIENT)
Facility: MEDICAL CENTER | Age: 78
End: 2018-12-28
Attending: INTERNAL MEDICINE
Payer: MEDICARE

## 2018-12-28 ENCOUNTER — NON-PROVIDER VISIT (OUTPATIENT)
Dept: INTERNAL MEDICINE | Facility: IMAGING CENTER | Age: 78
End: 2018-12-28
Payer: MEDICARE

## 2018-12-28 DIAGNOSIS — I10 ESSENTIAL HYPERTENSION: ICD-10-CM

## 2018-12-28 PROCEDURE — 82565 ASSAY OF CREATININE: CPT

## 2018-12-28 PROCEDURE — 84520 ASSAY OF UREA NITROGEN: CPT

## 2018-12-29 LAB
BUN SERPL-MCNC: 22 MG/DL (ref 8–22)
CREAT SERPL-MCNC: 1.34 MG/DL (ref 0.5–1.4)

## 2019-01-02 ENCOUNTER — NON-PROVIDER VISIT (OUTPATIENT)
Dept: CARDIOLOGY | Facility: MEDICAL CENTER | Age: 79
End: 2019-01-02
Payer: MEDICARE

## 2019-01-02 DIAGNOSIS — Z95.818 STATUS POST PLACEMENT OF IMPLANTABLE LOOP RECORDER: ICD-10-CM

## 2019-01-02 PROCEDURE — 93298 REM INTERROG DEV EVAL SCRMS: CPT | Performed by: INTERNAL MEDICINE

## 2019-01-03 ENCOUNTER — HOSPITAL ENCOUNTER (OUTPATIENT)
Dept: RADIOLOGY | Facility: MEDICAL CENTER | Age: 79
End: 2019-01-03
Attending: NEUROLOGICAL SURGERY
Payer: MEDICARE

## 2019-01-03 DIAGNOSIS — G96.198: ICD-10-CM

## 2019-01-03 PROCEDURE — A9585 GADOBUTROL INJECTION: HCPCS | Performed by: NEUROLOGICAL SURGERY

## 2019-01-03 PROCEDURE — 72158 MRI LUMBAR SPINE W/O & W/DYE: CPT

## 2019-01-03 PROCEDURE — 700117 HCHG RX CONTRAST REV CODE 255: Performed by: NEUROLOGICAL SURGERY

## 2019-01-03 RX ORDER — GADOBUTROL 604.72 MG/ML
9 INJECTION INTRAVENOUS ONCE
Status: COMPLETED | OUTPATIENT
Start: 2019-01-03 | End: 2019-01-03

## 2019-01-03 RX ADMIN — GADOBUTROL 9 ML: 604.72 INJECTION INTRAVENOUS at 14:50

## 2019-01-09 ENCOUNTER — OFFICE VISIT (OUTPATIENT)
Dept: INTERNAL MEDICINE | Facility: IMAGING CENTER | Age: 79
End: 2019-01-09
Payer: MEDICARE

## 2019-01-09 ENCOUNTER — HOSPITAL ENCOUNTER (OUTPATIENT)
Dept: RADIOLOGY | Facility: MEDICAL CENTER | Age: 79
End: 2019-01-09
Attending: FAMILY MEDICINE
Payer: MEDICARE

## 2019-01-09 VITALS
WEIGHT: 219 LBS | OXYGEN SATURATION: 95 % | BODY MASS INDEX: 30.66 KG/M2 | DIASTOLIC BLOOD PRESSURE: 74 MMHG | TEMPERATURE: 98.1 F | HEIGHT: 71 IN | RESPIRATION RATE: 14 BRPM | HEART RATE: 90 BPM | SYSTOLIC BLOOD PRESSURE: 132 MMHG

## 2019-01-09 DIAGNOSIS — M79.671 RIGHT FOOT PAIN: ICD-10-CM

## 2019-01-09 PROCEDURE — 73630 X-RAY EXAM OF FOOT: CPT | Mod: RT

## 2019-01-09 PROCEDURE — 99213 OFFICE O/P EST LOW 20 MIN: CPT | Performed by: FAMILY MEDICINE

## 2019-01-09 ASSESSMENT — PATIENT HEALTH QUESTIONNAIRE - PHQ9: CLINICAL INTERPRETATION OF PHQ2 SCORE: 0

## 2019-01-09 NOTE — PROGRESS NOTES
Chief Complaint   Patient presents with   • Foot Pain     right ---across the top       HISTORY OF PRESENT ILLNESS: Patient is a 78 y.o. male established patient who presents today complaining of pain across the dorsum of his right foot that started 3 days ago.  He states he wore a different pair of dress shoes on Sunday.  Later that day he started having pain redness and swelling across the dorsum of his right foot.  Mostly in the area of metatarsals 2 and 3.  He denies any other trauma.  No history of osteoporosis that he knows of.  Pain seems to be little worse with weightbearing.  He is back in his tennis shoes which has helped.  He has been icing it and that seems to help.  It is less painful today.  He denies any history of gout although he is on allopurinol for uric acid nephrolithiasis.      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   • Obstructive sleep apnea syndrome 07/25/2017   • Essential hypertension 11/12/2015   • Renal cysts, acquired, bilateral 09/19/2014   • Diverticulitis 09/19/2014   • Osteoarthrosis, hand 12/17/2012   • ED (erectile dysfunction)    • Uric acid nephrolithiasis 12/10/2009       Current Outpatient Prescriptions:   •  LevETIRAcetam (KEPPRA PO), Take  by mouth., Disp: , Rfl:   •  atorvastatin (LIPITOR) 40 MG Tab, Take 1 Tab by mouth every evening., Disp: 90 Tab, Rfl: 3  •  clopidogrel (PLAVIX) 75 MG Tab, Take 1 Tab by mouth every day., Disp: 90 Tab, Rfl: 3  •  losartan (COZAAR) 50 MG Tab, Take 1 Tab by mouth every day., Disp: 90 Tab, Rfl: 3  •  triamterene/hctz (MAXZIDE-25/DYAZIDE) 37.5-25 MG Cap,  "TAKE ONE CAPSULE BY MOUTH EVERY MORNING, Disp: 90 Cap, Rfl: 3  •  allopurinol (ZYLOPRIM) 100 MG Tab, Take 100 mg by mouth every day., Disp: , Rfl:       Past medical, surgical, family, and social history is reviewed and updated in Epic chart by me today.   Medications and allergies reviewed and updated in Epic chart by me today.     REVIEW OF SYSTEMS:  GENERAL: No fatigue, no weight loss.  CV:  No chest pain,dyspnea,palpitations or edema.  RESP:  No sob,cough,wheezing or hemoptysis.  GI: No dysphagia, heartburn,abdominal pain, nausea, vomiting, diarrhea or constipation.       No melena, jaundice, bleeding, incontinence or change in bowel habits.  :  No dysuria, polyuria, hematuria, incontinence, or nocturia.  MS: As above  NEURO:  No seizures, syncope, paralysis, tremor, or weakness.  SKIN: No new or concerning skin lesions or changes.   PSYCH: Mood fine.    Vitals:    01/09/19 1400   BP: 132/74   Pulse: 90   Resp: 14   Temp: 36.7 °C (98.1 °F)   TempSrc: Temporal   SpO2: 95%   Weight: 99.3 kg (219 lb)   Height: 1.803 m (5' 10.98\")     Physical Exam:  Gen: Well developed, well nourished. No acute distress.  Neck:  Supple, no adenopathy or thyromegaly.  Heart:  Regular rate and rhythm.  Normal S1, S2. No murmur, gallop or rub.  Lungs:  Clear, No wheezes,rales or rhonchi.  Extremities:  No edema.  Right foot with no swelling.  There is mild erythema across the dorsum of his foot, primarily across his second metatarsal.  There is tenderness there.  No crepitus.  He has decreased extension of his great toe which he says is chronic and from his back.  Pulses are 1+.  Skin is intact.  There is no warmth.  Psych: Mood and affect are appropriate.        Assessment/Plan:  1. Right foot pain.  I am concerned about the possibility of the stress fracture.  We will send him for an x-ray.  He is to wear his supportive tennis shoes.  He may continue icing and may use Advil sparingly at 400 mg 2-3 times daily with food.  He is on " Plavix.  If not improving within 1 week or if his symptoms change encouraged him to call. DX-FOOT-COMPLETE 3+ RIGHT

## 2019-01-14 ENCOUNTER — TELEPHONE (OUTPATIENT)
Dept: CARDIOLOGY | Facility: MEDICAL CENTER | Age: 79
End: 2019-01-14

## 2019-01-14 NOTE — TELEPHONE ENCOUNTER
FYI: remote transmission today:   Question of AF ? on 1/13/19 at 8:36 pm  >2 min  Hx of Cryptogenic Stroke  Most recent report to be scanned for review.

## 2019-01-16 ENCOUNTER — HOSPITAL ENCOUNTER (EMERGENCY)
Facility: MEDICAL CENTER | Age: 79
End: 2019-01-16
Attending: EMERGENCY MEDICINE
Payer: MEDICARE

## 2019-01-16 VITALS
HEIGHT: 72 IN | RESPIRATION RATE: 18 BRPM | TEMPERATURE: 97.8 F | HEART RATE: 64 BPM | SYSTOLIC BLOOD PRESSURE: 160 MMHG | OXYGEN SATURATION: 94 % | DIASTOLIC BLOOD PRESSURE: 93 MMHG | BODY MASS INDEX: 29.7 KG/M2

## 2019-01-16 DIAGNOSIS — S39.012A STRAIN OF LUMBAR REGION, INITIAL ENCOUNTER: ICD-10-CM

## 2019-01-16 PROCEDURE — 99284 EMERGENCY DEPT VISIT MOD MDM: CPT

## 2019-01-16 PROCEDURE — A9270 NON-COVERED ITEM OR SERVICE: HCPCS | Performed by: EMERGENCY MEDICINE

## 2019-01-16 PROCEDURE — 700102 HCHG RX REV CODE 250 W/ 637 OVERRIDE(OP): Performed by: EMERGENCY MEDICINE

## 2019-01-16 RX ORDER — PREDNISONE 20 MG/1
60 TABLET ORAL DAILY
Status: DISCONTINUED | OUTPATIENT
Start: 2019-01-17 | End: 2019-01-17 | Stop reason: HOSPADM

## 2019-01-16 RX ORDER — PREDNISONE 20 MG/1
60 TABLET ORAL DAILY
Qty: 9 TAB | Refills: 0 | Status: SHIPPED | OUTPATIENT
Start: 2019-01-16 | End: 2019-01-19

## 2019-01-16 RX ORDER — OXYCODONE HYDROCHLORIDE AND ACETAMINOPHEN 5; 325 MG/1; MG/1
1 TABLET ORAL ONCE
Status: COMPLETED | OUTPATIENT
Start: 2019-01-16 | End: 2019-01-16

## 2019-01-16 RX ADMIN — OXYCODONE AND ACETAMINOPHEN 1 TABLET: 5; 325 TABLET ORAL at 22:38

## 2019-01-16 ASSESSMENT — PAIN SCALES - GENERAL
PAINLEVEL_OUTOF10: 8
PAINLEVEL_OUTOF10: 8

## 2019-01-16 ASSESSMENT — LIFESTYLE VARIABLES: DO YOU DRINK ALCOHOL: NO

## 2019-01-17 NOTE — ED NOTES
Chief Complaint   Patient presents with   • Back Pain     Pt states he had a laminectomy in august, and has had worsening back pain ever since.  Pain has been worsening X 2-3 days, and today the pain became intolerable.  Pt states he has spasms of pain that go from his lower back to his head.     • Head Pain     Pt states 8/10 head pain.  Pt denies a traumatic injury     Agree with triage note pt ambulatory to yellow 57 with a standby assist.

## 2019-01-17 NOTE — ED NOTES
Patient discharged with instructions for lumbar strain with prescriptions x1 signs and symptoms explained to patient for reasons to return to emergency department, Patient is understanding and has no further questions. Pt in wheelchair to lobby, wife providing ride for pt home.

## 2019-01-17 NOTE — ED PROVIDER NOTES
"ED Provider Note    CHIEF COMPLAINT  Chief Complaint   Patient presents with   • Back Pain     Pt states he had a laminectomy in august, and has had worsening back pain ever since.  Pain has been worsening X 2-3 days, and today the pain became intolerable.  Pt states he has spasms of pain that go from his lower back to his head.     • Head Pain     Pt states 8/10 head pain.  Pt denies a traumatic injury       HPI  Chuck Ortiz is a 78 y.o. male who presents with back pain.  The patient states he had a laminectomy in August.  He has a long-standing history of chronic back pain after several surgeries in the 90s.  The patient recently had an MRI that did not show any obvious herniation with impingement on the nerve and as recommended by his neurosurgeon for physical therapy.  He states he started physical therapy however he states his pain is worse.  He states with any flexion at the spine is severe midline as well as paraspinal muscle discomfort in the lumbar region.  The pain is now progressed to go up the paraspinal muscles of the thoracic and cervical spine and into the occipital region.  The patient does not have any pain medication at home.  He has not had any associated fevers.  He has not had any acute trauma.  The patient does not take any anti-inflammatories.  He states the pain is definitely worse with flexion at the back.  He is unaware of any relieving factors.    REVIEW OF SYSTEMS  See HPI for further details. All other systems are negative.     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   • Hemorrhagic disorder (HCC)     on Plavix    • High " cholesterol    • Hypertension    • Kidney stones     mult uric acid kidney stones   • Seizure (HCC)     seizure x1 8/7/18   • Sleep apnea     uses CPAP   • Snoring    • Zenker diverticula        FAMILY HISTORY  [unfilled]    SOCIAL HISTORY  Social History     Social History   • Marital status:      Spouse name: N/A   • Number of children: N/A   • Years of education: N/A     Social History Main Topics   • Smoking status: Never Smoker   • Smokeless tobacco: Never Used   • Alcohol use Yes      Comment: 2 drinks per week    • Drug use: No   • Sexual activity: Not on file      Comment: , retired [employer]      Other Topics Concern   • Not on file     Social History Narrative   • No narrative on file       SURGICAL HISTORY  Past Surgical History:   Procedure Laterality Date   • CARDIAC CATH  09/28/2018    40% LAD other arteries no disease.   • CATARACT PHACO WITH IOL  1/21/2014    Performed by Joni Duarte M.D. at [facility]   • FINGER ARTHROPLASTY  12/17/2012    Performed by Adam Christopher M.D. at [facility]   • INGUINAL HERNIA REPAIR  2/19/2009    Performed by ALEX ALATORRE at [facility]   • COLONOSCOPY  2004    neg   • LAMINOTOMY  1996    lumbar laminectomy, cyst x3   • UVULOPHARYNGOPALATOPLASTY  1985       CURRENT MEDICATIONS  Home Medications     Reviewed by Christophe Moreno RANGIE (Registered Nurse) on 01/16/19 at 2027  Med List Status: <None>   Medication Last Dose Status   allopurinol (ZYLOPRIM) 100 MG Tab  Active   atorvastatin (LIPITOR) 40 MG Tab  Active   clopidogrel (PLAVIX) 75 MG Tab  Active   LevETIRAcetam (KEPPRA PO)  Active   losartan (COZAAR) 50 MG Tab  Active   triamterene/hctz (MAXZIDE-25/DYAZIDE) 37.5-25 MG Cap  Active                ALLERGIES  Allergies   Allergen Reactions   • Morphine      Bradycardia       PHYSICAL EXAM  VITAL SIGNS: /93   Pulse 74   Temp 36.6 °C (97.9 °F) (Temporal)   Resp 18   Ht 1.829 m (6')    SpO2 96%   BMI 29.70 kg/m²  Room air O2: 96    Constitutional: Well developed, Well nourished, No acute distress, Non-toxic appearance.   HENT: Reproducible occipital tenderness, Bilateral external ears normal, Oropharynx moist, No oral exudates, Nose normal.   Eyes: PERRLA, EOMI, Conjunctiva normal, No discharge.   Neck: Normal range of motion, No tenderness, Supple, No stridor.   Lymphatic: No lymphadenopathy noted.   Cardiovascular: Normal heart rate, Normal rhythm, No murmurs, No rubs, No gallops.   Thorax & Lungs: Normal breath sounds, No respiratory distress, No wheezing, No chest tenderness.   Abdomen: Bowel sounds normal, Soft, No tenderness, No masses, No pulsatile masses.   Skin: Warm, Dry, No erythema, No rash.   Back: Paraspinal muscle discomfort in the thoracic and lumbar spine.   Extremities: Intact distal pulses, No edema, No tenderness, No cyanosis, No clubbing.   Musculoskeletal: Good range of motion in all major joints. No tenderness to palpation or major deformities noted.   Neurologic: Alert & oriented x 3, Normal motor function, Normal sensory function, No focal deficits noted.   Psychiatric: Affect normal, Judgment normal, Mood normal.     COURSE & MEDICAL DECISION MAKING  Pertinent Labs & Imaging studies reviewed. (See chart for details)  This a 78-year-old male who presents with back pain.  My suspicion is the patient has muscular spasm he does have reproducible pain.  He also sounds like he is developed a tension headache.  I do not see any evidence of an infectious process.  I did review his recent MRI there is no obvious large disc herniation with pressure on the nerve.  Therefore the patient will receive a one-time dose of Percocet for pain control tonight and will place the patient on 3-day course of steroids for inflammation.  He will follow-up with Dr. Arambula and is also instructed to ask for pain  to help control his pain as a seem to be more of a chronic issue.   The patient will return to emerge department for increased pain, fever, or weakness to his lower extremities.    FINAL IMPRESSION  1.  Low back pain  2.  Tension headache    Disposition  The patient will be discharged in stable condition         Electronically signed by: Homero Alcaraz, 1/16/2019 10:22 PM

## 2019-01-17 NOTE — ED TRIAGE NOTES
Chuck Ortiz  78 y.o. male  Chief Complaint   Patient presents with   • Back Pain     Pt states he had a laminectomy in august, and has had worsening back pain ever since.  Pain has been worsening X 2-3 days, and today the pain became intolerable.  Pt states he has spasms of pain that go from his lower back to his head.     • Head Pain     Pt states 8/10 head pain.  Pt denies a traumatic injury       Pt to triage via wc.  Pt state he is unable to sit in a wc, and needs to lay flat to be comfortable.  He also states he is unable to swallow effectively, and must have all oral medications crushed.  Pt appears very uncomfortable  Pt is alert and oriented, speaking in full sentences, follows commands and responds appropriately to questions.  Resp are even and unlabored.  Pt placed in lobby. Pt educated on triage process. Pt encouraged to alert staff for any changes.  /93   Pulse 74   Temp 36.6 °C (97.9 °F) (Temporal)   Resp 18   Ht 1.829 m (6')   SpO2 96%   BMI 29.70 kg/m²

## 2019-01-18 ENCOUNTER — HOSPITAL ENCOUNTER (OUTPATIENT)
Dept: RADIOLOGY | Facility: MEDICAL CENTER | Age: 79
End: 2019-01-18
Attending: SPECIALIST
Payer: MEDICARE

## 2019-01-18 DIAGNOSIS — G40.909 CEREBRAL SEIZURE (HCC): ICD-10-CM

## 2019-01-18 PROCEDURE — 70551 MRI BRAIN STEM W/O DYE: CPT

## 2019-02-04 ENCOUNTER — NON-PROVIDER VISIT (OUTPATIENT)
Dept: CARDIOLOGY | Facility: MEDICAL CENTER | Age: 79
End: 2019-02-04
Payer: MEDICARE

## 2019-02-04 ENCOUNTER — TELEPHONE (OUTPATIENT)
Dept: CARDIOLOGY | Facility: MEDICAL CENTER | Age: 79
End: 2019-02-04

## 2019-02-04 DIAGNOSIS — Z95.818 STATUS POST PLACEMENT OF IMPLANTABLE LOOP RECORDER: ICD-10-CM

## 2019-02-04 PROCEDURE — 93298 REM INTERROG DEV EVAL SCRMS: CPT | Performed by: INTERNAL MEDICINE

## 2019-02-25 RX ORDER — ALLOPURINOL 100 MG/1
100 TABLET ORAL 2 TIMES DAILY
Qty: 180 TAB | Refills: 0 | Status: SHIPPED | OUTPATIENT
Start: 2019-02-25 | End: 2019-05-08 | Stop reason: SDUPTHER

## 2019-03-01 ENCOUNTER — TELEPHONE (OUTPATIENT)
Dept: CARDIOLOGY | Facility: MEDICAL CENTER | Age: 79
End: 2019-03-01

## 2019-03-01 NOTE — TELEPHONE ENCOUNTER
FYI: remote transmission today:    ? of AF episode on 2/28/2019 at 8:14pm lasting 2 min.   No OAC on chart / upcoming appt with Dr. Gonzalez on 3/20     Most recent report to be scanned for review.

## 2019-03-02 NOTE — TELEPHONE ENCOUNTER
"Contacted patient, patient states he was just getting home from an event.  He \"might have been even getting ready for bed at that time\".  He states he slept well. He denies feeling any symptoms.    Confirmed 3/20 appointment with BORIS    Patient is not on an OAC - hx of cryptogenic stroke - switched from asa to plavix last year    See media for transmission report    To SW  To   "

## 2019-03-04 ENCOUNTER — NON-PROVIDER VISIT (OUTPATIENT)
Dept: CARDIOLOGY | Facility: MEDICAL CENTER | Age: 79
End: 2019-03-04
Payer: MEDICARE

## 2019-03-04 DIAGNOSIS — Z95.818 STATUS POST PLACEMENT OF IMPLANTABLE LOOP RECORDER: ICD-10-CM

## 2019-03-04 PROCEDURE — 93298 REM INTERROG DEV EVAL SCRMS: CPT | Performed by: INTERNAL MEDICINE

## 2019-03-05 NOTE — TELEPHONE ENCOUNTER
BUNNY Brandt.   You 20 hours ago (4:59 PM)     Marisa, the rhythm strip shows sinus rhythm with PVCs.  He does not appear to be atrial fibrillation.  Patient can follow-up as scheduled without any change in medications. (Routing comment)

## 2019-03-11 ENCOUNTER — OFFICE VISIT (OUTPATIENT)
Dept: INTERNAL MEDICINE | Facility: IMAGING CENTER | Age: 79
End: 2019-03-11
Payer: MEDICARE

## 2019-03-11 VITALS
WEIGHT: 219 LBS | RESPIRATION RATE: 16 BRPM | HEIGHT: 72 IN | DIASTOLIC BLOOD PRESSURE: 84 MMHG | HEART RATE: 80 BPM | BODY MASS INDEX: 29.66 KG/M2 | SYSTOLIC BLOOD PRESSURE: 122 MMHG | OXYGEN SATURATION: 92 % | TEMPERATURE: 98 F

## 2019-03-11 DIAGNOSIS — K22.5 ZENKER DIVERTICULUM: ICD-10-CM

## 2019-03-11 DIAGNOSIS — J40 BRONCHITIS: ICD-10-CM

## 2019-03-11 DIAGNOSIS — R05.9 COUGH: ICD-10-CM

## 2019-03-11 PROCEDURE — 99214 OFFICE O/P EST MOD 30 MIN: CPT | Performed by: INTERNAL MEDICINE

## 2019-03-11 RX ORDER — PROMETHAZINE HYDROCHLORIDE AND CODEINE PHOSPHATE 6.25; 1 MG/5ML; MG/5ML
5 SYRUP ORAL 4 TIMES DAILY PRN
Qty: 240 ML | Refills: 0 | Status: SHIPPED
Start: 2019-03-11 | End: 2019-03-23

## 2019-03-11 RX ORDER — DOXYCYCLINE HYCLATE 100 MG
100 TABLET ORAL 2 TIMES DAILY
Qty: 20 TAB | Refills: 0 | Status: SHIPPED | OUTPATIENT
Start: 2019-03-11 | End: 2019-09-17

## 2019-03-11 NOTE — PROGRESS NOTES
Chief Complaint   Patient presents with   • URI   • Cough       HISTORY OF THE PRESENT ILLNESS: Patient is a 78 y.o. male.     Patient comes in complaining of upper respiratory symptoms.  Symptoms began 3 days prior.  His wife was recently diagnosed with bronchitis.  His symptoms include cough with green sputum.  Nasal congestion with clear or yellow discharge.  Subjective fever but no chills.  No wheezing or dyspnea.  He also complains with a cough that he is bringing up more food particles.  He has a history of Zenker diverticulum.  He was told that his oral cavity was too small for standard repair.    Allergies: Morphine    Current Outpatient Prescriptions Ordered in [facility]   Medication Sig Dispense Refill   • doxycycline (VIBRAMYCIN) 100 MG Tab Take 1 Tab by mouth 2 times a day. 20 Tab 0   • promethazine-codeine (PHENERGAN-CODEINE) 6.25-10 MG/5ML Syrup Take 5 mL by mouth 4 times a day as needed for Cough for up to 12 days. 240 mL 0   • allopurinol (ZYLOPRIM) 100 MG Tab Take 1 Tab by mouth 2 times a day. 180 Tab 0   • LevETIRAcetam (KEPPRA PO) Take  by mouth.     • atorvastatin (LIPITOR) 40 MG Tab Take 1 Tab by mouth every evening. 90 Tab 3   • clopidogrel (PLAVIX) 75 MG Tab Take 1 Tab by mouth every day. 90 Tab 3   • losartan (COZAAR) 50 MG Tab Take 1 Tab by mouth every day. 90 Tab 3   • triamterene/hctz (MAXZIDE-25/DYAZIDE) 37.5-25 MG Cap TAKE ONE CAPSULE BY MOUTH EVERY MORNING 90 Cap 3     No current Epic-ordered facility-administered medications on file.        Past medical history, social history and family history were reviewed from chart today    Review of systems: Per HPI.    Denies headache, chest pain, fever, chills, diarrhea, constipation, abdominal pain, palpitations, depression   All others negative.     Exam: Blood pressure 122/84, pulse 80, temperature 36.7 °C (98 °F), temperature source Temporal, resp. rate 16, height 1.829 m (6'), weight 99.3 kg (219 lb), SpO2 92 %.  General: Mildly ill but no  distress.  HEENT: Grossly normal. Oral cavity is pink and moist.  Nasal cavities are edematous.  There is near obstruction of the right nasal cavity with thick, yellow discharge.  Neck: Supple without JVD or bruit.  Pulmonary: Diffuse bronchial breath sounds.  No rhonchi or wheezing.  No consolidation.  Cardiovascular: Regular. Carotid and radial pulses are intact.  Abdomen: Soft, nontender, nondistended. Spleen and liver are not enlarged.  Neurologic: Cranial nerves II through XII are grossly normal, alert and oriented x3      Diagnosis:  1. Bronchitis  doxycycline (VIBRAMYCIN) 100 MG Tab    promethazine-codeine (PHENERGAN-CODEINE) 6.25-10 MG/5ML Syrup   2. Cough  doxycycline (VIBRAMYCIN) 100 MG Tab    promethazine-codeine (PHENERGAN-CODEINE) 6.25-10 MG/5ML Syrup   3. Zenker diverticulum         Orders as above.  Refer to tertiary facility for consideration of surgery.  He was told that there is no surgeon locally who can complete the necessary procedure.

## 2019-03-14 ENCOUNTER — TELEPHONE (OUTPATIENT)
Dept: CARDIOLOGY | Facility: MEDICAL CENTER | Age: 79
End: 2019-03-14

## 2019-03-19 DIAGNOSIS — E78.2 HYPERLIPEMIA, MIXED: ICD-10-CM

## 2019-03-19 RX ORDER — ATORVASTATIN CALCIUM 40 MG/1
40 TABLET, FILM COATED ORAL EVERY EVENING
Qty: 100 TAB | Refills: 3 | Status: SHIPPED | OUTPATIENT
Start: 2019-03-19 | End: 2020-01-20

## 2019-03-20 ENCOUNTER — OFFICE VISIT (OUTPATIENT)
Dept: CARDIOLOGY | Facility: MEDICAL CENTER | Age: 79
End: 2019-03-20
Payer: MEDICARE

## 2019-03-20 VITALS
DIASTOLIC BLOOD PRESSURE: 80 MMHG | HEIGHT: 72 IN | WEIGHT: 216.2 LBS | OXYGEN SATURATION: 92 % | HEART RATE: 70 BPM | SYSTOLIC BLOOD PRESSURE: 126 MMHG | BODY MASS INDEX: 29.28 KG/M2

## 2019-03-20 DIAGNOSIS — Z95.818 STATUS POST PLACEMENT OF IMPLANTABLE LOOP RECORDER: ICD-10-CM

## 2019-03-20 DIAGNOSIS — I25.10 CORONARY ARTERY DISEASE INVOLVING NATIVE CORONARY ARTERY OF NATIVE HEART WITHOUT ANGINA PECTORIS: ICD-10-CM

## 2019-03-20 DIAGNOSIS — Z86.73 HISTORY OF ARTERIAL ISCHEMIC STROKE: ICD-10-CM

## 2019-03-20 DIAGNOSIS — I10 ESSENTIAL HYPERTENSION: ICD-10-CM

## 2019-03-20 DIAGNOSIS — E78.2 HYPERLIPEMIA, MIXED: ICD-10-CM

## 2019-03-20 PROCEDURE — 99214 OFFICE O/P EST MOD 30 MIN: CPT | Performed by: INTERNAL MEDICINE

## 2019-03-20 ASSESSMENT — ENCOUNTER SYMPTOMS
BLURRED VISION: 0
LOSS OF CONSCIOUSNESS: 0
PALPITATIONS: 0
COUGH: 0
SHORTNESS OF BREATH: 0
CHILLS: 0
ORTHOPNEA: 0
MYALGIAS: 0
INSOMNIA: 0
DIZZINESS: 0
PND: 0
FEVER: 0

## 2019-03-20 NOTE — PROGRESS NOTES
"Chief Complaint   Patient presents with   • Coronary Artery Disease   • HTN (Controlled)   • Hyperlipidemia       Subjective:   Chuck Ortiz is a 79 y.o. male who presents today for CAD, hypertension, hyperlipidemia, cryptogenic stroke and implantable loop recorder monitoring.      Last seen on 11/15/2018 by APN.    Since 11/15/2018 patient said no cardiac symptoms.  No palpitations.  No neurological events.    Last seen on 8/28/2018 at the time of his implantable loop recorder implantation procedure.  In the interim the patient had an MPI which was abnormal showing inferior perfusion abnormalities.  He has had no symptoms of palpitations, angina pectoris or heart failure symptoms.  He has been compliant with his medications.     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     On 8/7/2018 while on a fishing trip at [place] the patient had a sudden onset of loss of short-term memory for about 10 minutes.  The patient was seen in [place] ER and was diagnosed with a TIA and discharged to follow-up with his physicians in [place].     Patient subsequently saw Dr. Hannah Henderson, neurologist.  Brain MRI on 8/14/2018 showed 2 small acute and subacute infarcts in the left posterior frontal lobe.  Carotid ultrasounds were negative for any obstructive disease  The patient was switched from aspirin which he had been on empirically for 15 years to Plavix.     The patient denies any prior history of cardiac arrhythmia including atrial fibrillation.  The patient denies any symptoms of palpitations, chest discomfort or exertional shortness of breath.    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   • CATARACT PHACO WITH IOL  1/21/2014    Performed by Joni Duarte M.D. [facility]   • FINGER ARTHROPLASTY  12/17/2012    Performed by Adam Christopher M.D. at [facility]   • INGUINAL HERNIA REPAIR  2/19/2009    Performed by ALEX ALATORRE at [facility]ERY [facility] DAY [facility]   • COLONOSCOPY  2004    neg   • LAMINOTOMY  1996    lumbar laminectomy, cyst x3   • UVULOPHARYNGOPALATOPLASTY  1985     Family History   Problem Relation Age of Onset   • Heart Disease Maternal Grandmother    • Diabetes Paternal Grandfather    • Stroke Sister    • Lung Disease Father         black lung    • Cancer Neg Hx      Social History     Social History   • Marital status:      Spouse name: N/A   • Number of children: N/A   • Years of education: N/A     Occupational History   • Not on file.     Social History Main Topics   • Smoking status: Never Smoker   • Smokeless tobacco: Never Used   • Alcohol use Yes      Comment: 2 drinks per week    • Drug use: No   • Sexual activity: Not on file      Comment: , retired [employer]      Other Topics Concern   • Not on file     Social History Narrative   • No narrative on file     Allergies   Allergen Reactions   • Morphine      Bradycardia     Outpatient Encounter Prescriptions as of 3/20/2019   Medication Sig Dispense Refill   • atorvastatin (LIPITOR) 40 MG Tab Take 1 Tab by mouth every evening.  100 Tab 3   • promethazine-codeine (PHENERGAN-CODEINE) 6.25-10 MG/5ML Syrup Take 5 mL by mouth 4 times a day as needed for Cough for up to 12 days. 240 mL 0   • allopurinol (ZYLOPRIM) 100 MG Tab Take 1 Tab by mouth 2 times a day. 180 Tab 0   • clopidogrel (PLAVIX) 75 MG Tab Take 1 Tab by mouth every day. 90 Tab 3   • losartan (COZAAR) 50 MG Tab Take 1 Tab by mouth every day. 90 Tab 3   • triamterene/hctz (MAXZIDE-25/DYAZIDE) 37.5-25 MG Cap TAKE ONE CAPSULE BY MOUTH EVERY MORNING 90 Cap 3   • doxycycline (VIBRAMYCIN) 100 MG Tab Take 1 Tab by mouth 2 times a day. 20 Tab 0   • LevETIRAcetam (KEPPRA PO) Take  by mouth.       No facility-administered encounter medications on file as of 3/20/2019.      Review of Systems   Constitutional: Negative for chills and fever.   HENT: Negative for congestion.    Eyes: Negative for blurred vision.   Respiratory: Negative for cough and shortness of breath.    Cardiovascular: Negative for chest pain, palpitations, orthopnea, leg swelling and PND.   Musculoskeletal: Negative for myalgias.   Skin: Negative for rash.   Neurological: Negative for dizziness and loss of consciousness.   Psychiatric/Behavioral: The patient does not have insomnia.         Objective:   /80 (BP Location: Left arm, Patient Position: Sitting, BP Cuff Size: Adult)   Pulse 70   Ht 1.829 m (6')   Wt 98.1 kg (216 lb 3.2 oz)   SpO2 92%   BMI 29.32 kg/m²     Physical Exam   Constitutional: He is oriented to person, place, and time. He appears well-developed and well-nourished.   Eyes: Pupils are equal, round, and reactive to light. Conjunctivae are normal.   Neck: Normal range of motion. Neck supple. No JVD present.   Cardiovascular: Normal rate, regular rhythm, normal heart sounds and intact distal pulses.    Pulmonary/Chest: Effort normal and breath sounds normal. No accessory muscle usage. No respiratory distress. He has no wheezes. He has no rales.   Musculoskeletal: He exhibits no edema.    Neurological: He is alert and oriented to person, place, and time.   Skin: Skin is warm, dry and intact. No rash noted. No cyanosis. Nails show no clubbing.   Psychiatric: He has a normal mood and affect. His behavior is normal.     08/14/2018 BRAIN MRI  1.  Acute to subacute small sized infarcts in the left posterior frontal lobe.  2.  Mild diffuse cerebral substance loss.  3.  Mild microangiopathic ischemic change versus demyelination or gliosis.  4.  Right maxillary sinus mucous retention cyst or polyp.     08/23/2018 EKG: Normal sinus rhythm, rate 68.  First-degree AV block.  Ventricular ectopy.  Reviewed by myself.     Recent 2018 laboratory tests reviewed.    ECHOCARDIOGRAM 09/06/2018.  Normal left ventricular systolic function.  Left ventricular ejection fraction is visually estimated to be 60%.  Mild aortic stenosis.  Mild posterior mitral valve leaflet prolapse.  Mild mitral regurgitation.  Mitral annular calcification.  Unable to estimate pulmonary artery pressure due to an inadequate   tricuspid regurgitant jet.  No prior study is available for comparison.      MPI 09/06/2018.  There is a predominently fixed basal to mid inferior septal perfusion defect.   There is a distal inferior wall defect, predominantly reversible, moderate    size, mild intensity.   Stress Image LV EF: 71     %    CARDIAC CATHETERIZATION  09/28/2018     PROCEDURES PERFORMED:  Cardiac catheterization.  A. Left heart catheterization.  B. Left ventriculography.  C. Selective coronary angiography.  D. Right radial artery approach.  PREPROCEDURE DIAGNOSES:  1.  Abnormal myocardial perfusion scan.  2.  Mild aortic stenosis.  3.  Ventricular ectopy.  4.  Cryptogenic stroke.  5.  Hyperlipidemia.   POSTPROCEDURE DIAGNOSES:  1.  Coronary artery disease, moderate predominantly involving the mid left   anterior descending artery.  2.  Left ventricular ejection fraction 77% post PVC with basal inferior wall   akinesis.    Assessment:     1.  Coronary artery disease involving native coronary artery of native heart without angina pectoris     2. Status post placement of implantable loop recorder     3. History of arterial ischemic stroke     4. Hyperlipemia, mixed     5. Essential hypertension         Medical Decision Making:  Today's Assessment / Status / Plan:     1.  CAD, noncritical.  2.  Hypertension, controlled.  3.  Hyperlipidemia, on statin therapy.  4.  CVA, left posterior frontal.  08/14/2018  5.  Implantable loop recorder 8/28/2018.  6.  Nonsustained ventricular tachycardia 8/31/2018.    Recommendation Discussion  1.  Reviewed loop recorder rhythm strips which show isolated PVCs which reports atrial fibrillation however I am not convinced of that.  2.  We will review with EP.  3.  Otherwise the patient is stable from a cardiovascular standpoint.  4.  Continue current cardiac therapy.

## 2019-04-08 ENCOUNTER — NON-PROVIDER VISIT (OUTPATIENT)
Dept: CARDIOLOGY | Facility: MEDICAL CENTER | Age: 79
End: 2019-04-08
Payer: MEDICARE

## 2019-04-08 DIAGNOSIS — Z95.818 STATUS POST PLACEMENT OF IMPLANTABLE LOOP RECORDER: ICD-10-CM

## 2019-04-08 PROCEDURE — 93298 REM INTERROG DEV EVAL SCRMS: CPT | Performed by: INTERNAL MEDICINE

## 2019-05-08 RX ORDER — ALLOPURINOL 100 MG/1
100 TABLET ORAL 2 TIMES DAILY
Qty: 180 TAB | Refills: 1 | Status: SHIPPED | OUTPATIENT
Start: 2019-05-08 | End: 2019-12-30

## 2019-05-09 ENCOUNTER — NON-PROVIDER VISIT (OUTPATIENT)
Dept: CARDIOLOGY | Facility: MEDICAL CENTER | Age: 79
End: 2019-05-09
Payer: MEDICARE

## 2019-05-09 DIAGNOSIS — Z95.818 STATUS POST PLACEMENT OF IMPLANTABLE LOOP RECORDER: ICD-10-CM

## 2019-05-09 DIAGNOSIS — I10 ESSENTIAL HYPERTENSION: ICD-10-CM

## 2019-05-09 PROCEDURE — 93298 REM INTERROG DEV EVAL SCRMS: CPT | Performed by: INTERNAL MEDICINE

## 2019-05-14 RX ORDER — LOSARTAN POTASSIUM 50 MG/1
50 TABLET ORAL DAILY
Qty: 100 TAB | Refills: 2 | Status: SHIPPED | OUTPATIENT
Start: 2019-05-14 | End: 2020-06-15

## 2019-05-28 ENCOUNTER — TELEPHONE (OUTPATIENT)
Dept: CARDIOLOGY | Facility: MEDICAL CENTER | Age: 79
End: 2019-05-28

## 2019-05-28 NOTE — TELEPHONE ENCOUNTER
FYI: remote transmission today:   ILR : ? Of AF event on 5/25/2019 at 5:44 am >2 min  No OAC noted on chart. thx     Most recent report scanned for review.

## 2019-06-10 ENCOUNTER — NON-PROVIDER VISIT (OUTPATIENT)
Dept: CARDIOLOGY | Facility: MEDICAL CENTER | Age: 79
End: 2019-06-10
Payer: MEDICARE

## 2019-06-10 DIAGNOSIS — Z95.818 STATUS POST PLACEMENT OF IMPLANTABLE LOOP RECORDER: ICD-10-CM

## 2019-06-10 DIAGNOSIS — I63.9 CRYPTOGENIC STROKE (HCC): ICD-10-CM

## 2019-06-10 PROCEDURE — 93298 REM INTERROG DEV EVAL SCRMS: CPT | Performed by: INTERNAL MEDICINE

## 2019-07-08 ENCOUNTER — OFFICE VISIT (OUTPATIENT)
Dept: INTERNAL MEDICINE | Facility: IMAGING CENTER | Age: 79
End: 2019-07-08
Payer: MEDICARE

## 2019-07-08 VITALS
DIASTOLIC BLOOD PRESSURE: 70 MMHG | BODY MASS INDEX: 29.12 KG/M2 | HEIGHT: 72 IN | WEIGHT: 215 LBS | RESPIRATION RATE: 16 BRPM | SYSTOLIC BLOOD PRESSURE: 128 MMHG | HEART RATE: 86 BPM | TEMPERATURE: 98.5 F | OXYGEN SATURATION: 92 %

## 2019-07-08 DIAGNOSIS — H10.33 ACUTE CONJUNCTIVITIS OF BOTH EYES, UNSPECIFIED ACUTE CONJUNCTIVITIS TYPE: ICD-10-CM

## 2019-07-08 PROCEDURE — 99213 OFFICE O/P EST LOW 20 MIN: CPT | Performed by: INTERNAL MEDICINE

## 2019-07-08 RX ORDER — CIPROFLOXACIN HYDROCHLORIDE 3.5 MG/ML
2 SOLUTION/ DROPS TOPICAL 4 TIMES DAILY
Qty: 1 BOTTLE | Refills: 0 | Status: SHIPPED | OUTPATIENT
Start: 2019-07-08 | End: 2019-07-15

## 2019-07-08 NOTE — PROGRESS NOTES
Chief Complaint   Patient presents with   • Eye Drainage       HISTORY OF THE PRESENT ILLNESS: Patient is a 79 y.o. male.     Patient comes in with complaint of irritation in the right eye with clear discharge.  Symptoms for 5 days.  No visual change.  The discharge is clear.  No eye pain.  He also has some symptoms in the left eye.  He has a chronic drooping left eye from facial palsy    Allergies: Morphine    Current Outpatient Prescriptions Ordered in [facility]   Medication Sig Dispense Refill   • ciprofloxacin (CILOXIN) 0.3 % Solution Place 2 Drops in both eyes 4 times a day for 7 days. 1 Bottle 0   • losartan (COZAAR) 50 MG Tab Take 1 Tab by mouth every day. 100 Tab 2   • allopurinol (ZYLOPRIM) 100 MG Tab Take 1 Tab by mouth 2 times a day. 180 Tab 1   • atorvastatin (LIPITOR) 40 MG Tab Take 1 Tab by mouth every evening. 100 Tab 3   • doxycycline (VIBRAMYCIN) 100 MG Tab Take 1 Tab by mouth 2 times a day. 20 Tab 0   • LevETIRAcetam (KEPPRA PO) Take  by mouth.     • clopidogrel (PLAVIX) 75 MG Tab Take 1 Tab by mouth every day. 90 Tab 3   • triamterene/hctz (MAXZIDE-25/DYAZIDE) 37.5-25 MG Cap TAKE ONE CAPSULE BY MOUTH EVERY MORNING 90 Cap 3     No current Epic-ordered facility-administered medications on file.        Past medical history, social history and family history were reviewed from chart today    Review of systems: Per HPI.    Denies headache, chest pain, fever, chills, diarrhea, constipation, abdominal pain, palpitations, depression   All others negative.     Exam: /70 (BP Location: Left arm, Patient Position: Sitting, BP Cuff Size: Adult)   Pulse 86   Temp 36.9 °C (98.5 °F) (Temporal)   Resp 16   Ht 1.829 m (6')   Wt 97.5 kg (215 lb)   SpO2 92%   General: Well-appearing. Well-developed. No signs of distress.  HEENT: Grossly normal. Oral cavity is pink and moist.  Conjunctiva in the left eye pink/red.  Sclerae normal.  The right eye has red conjunctiva with normal-appearing sclera.  PERRLA  Neck:  Supple without JVD or bruit.  Pulmonary: Clear with good breath sounds. Normal effort.  Cardiovascular: Regular. Carotid and radial pulses are intact.  Abdomen: Soft, nontender, nondistended. Spleen and liver are not enlarged.  Neurologic: Cranial nerves II through XII are grossly normal, alert and oriented x3      Diagnosis:  1. Acute conjunctivitis of both eyes, unspecified acute conjunctivitis type  ciprofloxacin (CILOXIN) 0.3 % Solution       Suspect acute conjunctivitis.  Cannot exclude bacterial or possibly atopic disease but I think this is less likely.  If he does not improve consider adding steroid or refer to ophthalmology

## 2019-09-03 ENCOUNTER — OFFICE VISIT (OUTPATIENT)
Dept: CARDIOLOGY | Facility: MEDICAL CENTER | Age: 79
End: 2019-09-03
Payer: MEDICARE

## 2019-09-03 VITALS
HEIGHT: 72 IN | BODY MASS INDEX: 29.39 KG/M2 | OXYGEN SATURATION: 92 % | HEART RATE: 74 BPM | DIASTOLIC BLOOD PRESSURE: 66 MMHG | WEIGHT: 217 LBS | SYSTOLIC BLOOD PRESSURE: 124 MMHG

## 2019-09-03 DIAGNOSIS — I10 ESSENTIAL HYPERTENSION: ICD-10-CM

## 2019-09-03 DIAGNOSIS — I49.3 VENTRICULAR ECTOPY: ICD-10-CM

## 2019-09-03 DIAGNOSIS — Z95.818 STATUS POST PLACEMENT OF IMPLANTABLE LOOP RECORDER: ICD-10-CM

## 2019-09-03 DIAGNOSIS — E78.2 HYPERLIPEMIA, MIXED: ICD-10-CM

## 2019-09-03 DIAGNOSIS — Z86.73 HISTORY OF ARTERIAL ISCHEMIC STROKE: ICD-10-CM

## 2019-09-03 DIAGNOSIS — I25.10 CORONARY ARTERY DISEASE INVOLVING NATIVE CORONARY ARTERY OF NATIVE HEART WITHOUT ANGINA PECTORIS: ICD-10-CM

## 2019-09-03 PROCEDURE — 99214 OFFICE O/P EST MOD 30 MIN: CPT | Performed by: INTERNAL MEDICINE

## 2019-09-03 RX ORDER — LOTEPREDNOL ETABONATE AND TOBRAMYCIN 5; 3 MG/ML; MG/ML
SUSPENSION/ DROPS OPHTHALMIC
Refills: 3 | COMMUNITY
Start: 2019-07-18 | End: 2020-07-12

## 2019-09-03 ASSESSMENT — ENCOUNTER SYMPTOMS
COUGH: 0
PALPITATIONS: 0
MYALGIAS: 0
LOSS OF CONSCIOUSNESS: 0
SHORTNESS OF BREATH: 0
DIZZINESS: 0

## 2019-09-03 NOTE — PROGRESS NOTES
Chief Complaint   Patient presents with   • Coronary Artery Disease       Subjective:   Chuck Ortiz is a 79 y.o. male who presents today for CAD, hypertension, hyperlipidemia, cryptogenic stroke and implantable loop recorder monitoring.      Last seen on 3/20/2019.    Since 3/20/2019 appointment the patient said no cardiac problems or symptoms.  No palpitations.  Remains active gardening and fishing.  Continues to have problems with a Zenker's diverticulum with anatomical limitations at surgical correction could not be done locally by Dr. Uriostegui and [place].    Since 11/15/2018 patient said no cardiac symptoms.  No palpitations.  No neurological events.    Last seen on 8/28/2018 at the time of his implantable loop recorder implantation procedure.  In the interim the patient had an MPI which was abnormal showing inferior perfusion abnormalities.  He has had no symptoms of palpitations, angina pectoris or heart failure symptoms.  He has been compliant with his medications.     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     On 8/7/2018 while on a fishing trip at [place] the patient had a sudden onset of loss of short-term memory for about 10 minutes.  The patient was seen in [place] ER and was diagnosed with a TIA and discharged to follow-up with his physicians in [place].     Patient subsequently saw Dr. Hannah Henderson, neurologist.  Brain MRI on 8/14/2018 showed 2 small acute and subacute infarcts in the left posterior frontal lobe.  Carotid ultrasounds were negative for any obstructive disease  The patient was switched from aspirin which he had been on empirically for 15 years to Plavix.     The patient denies any prior history of cardiac arrhythmia including atrial fibrillation.  The patient denies  "any symptoms of palpitations, chest discomfort or exertional shortness of breath.    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   • CATARACT PHACO WITH IOL  1/21/2014    Performed by Joni Duarte M.D. at [facility]   • FINGER ARTHROPLASTY  12/17/2012    Performed by Adam Christopher M.D. at [facility]   • INGUINAL HERNIA REPAIR  2/19/2009    Performed by ALEX ALATORRE at [facility]   • COLONOSCOPY  2004    neg   • LAMINOTOMY  1996    lumbar laminectomy, cyst x3   • UVULOPHARYNGOPALATOPLASTY  1985     Family History   Problem Relation Age of Onset   • Heart Disease Maternal Grandmother    • Diabetes Paternal Grandfather    • Stroke Sister    • Lung Disease Father         black lung    • Cancer Neg Hx      Social History     Socioeconomic History   • Marital status:      Spouse name: Not on file   • Number of children: Not on file   • Years of education: Not on file   • Highest education level: Not on file   Occupational History   • Not on file   Social Needs   • Financial resource strain: Not on file   • Food insecurity:     Worry: Not on file     Inability: Not on file   • Transportation needs:     Medical: Not on file     Non-medical: Not on file "   Tobacco Use   • Smoking status: Never Smoker   • Smokeless tobacco: Never Used   Substance and Sexual Activity   • Alcohol use: Yes     Comment: 2 drinks per week    • Drug use: No   • Sexual activity: Not on file     Comment: , retired [employer]    Lifestyle   • Physical activity:     Days per week: Not on file     Minutes per session: Not on file   • Stress: Not on file   Relationships   • Social connections:     Talks on phone: Not on file     Gets together: Not on file     Attends Hindu service: Not on file     Active member of club or organization: Not on file     Attends meetings of clubs or organizations: Not on file     Relationship status: Not on file   • Intimate partner violence:     Fear of current or ex partner: Not on file     Emotionally abused: Not on file     Physically abused: Not on file     Forced sexual activity: Not on file   Other Topics Concern   • Not on file   Social History Narrative   • Not on file     Allergies   Allergen Reactions   • Morphine      Bradycardia     Outpatient Encounter Medications as of 9/3/2019   Medication Sig Dispense Refill   • ZYLET 0.5-0.3 % Suspension INSTILL 1 DROP INTO LEFT EYE 4 TIMES A DAY AS DIRECTED  3   • losartan (COZAAR) 50 MG Tab Take 1 Tab by mouth every day. 100 Tab 2   • allopurinol (ZYLOPRIM) 100 MG Tab Take 1 Tab by mouth 2 times a day. 180 Tab 1   • atorvastatin (LIPITOR) 40 MG Tab Take 1 Tab by mouth every evening. 100 Tab 3   • clopidogrel (PLAVIX) 75 MG Tab Take 1 Tab by mouth every day. 90 Tab 3   • triamterene/hctz (MAXZIDE-25/DYAZIDE) 37.5-25 MG Cap TAKE ONE CAPSULE BY MOUTH EVERY MORNING 90 Cap 3   • doxycycline (VIBRAMYCIN) 100 MG Tab Take 1 Tab by mouth 2 times a day. (Patient not taking: Reported on 9/3/2019) 20 Tab 0   • LevETIRAcetam (KEPPRA PO) Take  by mouth.       No facility-administered encounter medications on file as of 9/3/2019.      Review of Systems   Respiratory: Negative for cough and shortness of breath.     Cardiovascular: Negative for chest pain and palpitations.   Musculoskeletal: Negative for myalgias.   Neurological: Negative for dizziness and loss of consciousness.        Objective:   /66 (BP Location: Left arm, Patient Position: Sitting, BP Cuff Size: Adult)   Pulse 74   Ht 1.829 m (6')   Wt 98.4 kg (217 lb)   SpO2 92%   BMI 29.43 kg/m²     Physical Exam   Constitutional: He is oriented to person, place, and time. He appears well-developed and well-nourished.   Eyes: Pupils are equal, round, and reactive to light. Conjunctivae are normal.   Neck: Normal range of motion. Neck supple. No JVD present.   Cardiovascular: Normal rate, normal heart sounds and intact distal pulses. A regularly irregular rhythm present.   Pulmonary/Chest: Effort normal and breath sounds normal. No accessory muscle usage. No respiratory distress. He has no wheezes. He has no rales.   Musculoskeletal: He exhibits no edema.   Neurological: He is alert and oriented to person, place, and time.   Skin: Skin is warm, dry and intact. No rash noted. No cyanosis. Nails show no clubbing.   Psychiatric: He has a normal mood and affect. His behavior is normal.     08/14/2018 BRAIN MRI  1.  Acute to subacute small sized infarcts in the left posterior frontal lobe.  2.  Mild diffuse cerebral substance loss.  3.  Mild microangiopathic ischemic change versus demyelination or gliosis.  4.  Right maxillary sinus mucous retention cyst or polyp.     08/23/2018 EKG: Normal sinus rhythm, rate 68.  First-degree AV block.  Ventricular ectopy.  Reviewed by myself.     Recent 2018 laboratory tests reviewed.    ECHOCARDIOGRAM 09/06/2018.  Normal left ventricular systolic function.  Left ventricular ejection fraction is visually estimated to be 60%.  Mild aortic stenosis.  Mild posterior mitral valve leaflet prolapse.  Mild mitral regurgitation.  Mitral annular calcification.  Unable to estimate pulmonary artery pressure due to an inadequate   tricuspid  regurgitant jet.  No prior study is available for comparison.      MPI 09/06/2018.  There is a predominently fixed basal to mid inferior septal perfusion defect.   There is a distal inferior wall defect, predominantly reversible, moderate    size, mild intensity.   Stress Image LV EF: 71     %    CARDIAC CATHETERIZATION  09/28/2018     PROCEDURES PERFORMED:  Cardiac catheterization.  A. Left heart catheterization.  B. Left ventriculography.  C. Selective coronary angiography.  D. Right radial artery approach.  PREPROCEDURE DIAGNOSES:  1.  Abnormal myocardial perfusion scan.  2.  Mild aortic stenosis.  3.  Ventricular ectopy.  4.  Cryptogenic stroke.  5.  Hyperlipidemia.   POSTPROCEDURE DIAGNOSES:  1.  Coronary artery disease, moderate predominantly involving the mid left   anterior descending artery.  2.  Left ventricular ejection fraction 77% post PVC with basal inferior wall   akinesis.    Assessment:     1. Coronary artery disease involving native coronary artery of native heart without angina pectoris     2. Essential hypertension     3. Hyperlipemia, mixed  LIPID PANEL    Comp Metabolic Panel   4. History of arterial ischemic stroke     5. Status post placement of implantable loop recorder     6. Ventricular ectopy         Medical Decision Making:  Today's Assessment / Status / Plan:     1.  CAD, noncritical.  2.  Hypertension, controlled.  3.  Hyperlipidemia, on statin therapy.  4.  CVA, left posterior frontal.  08/14/2018  5.  Implantable loop recorder 8/28/2018.  6.  Nonsustained ventricular tachycardia 8/31/2018.  7.  Ventricular ectopy.  8.  Zenker's diverticulum.    Recommendation Discussion  1.  Reviewed loop recorder rhythm strips which show isolated PVCs but no atrial fibrillation.  2.  Recheck lipid panel and explained to patient about achieving an LDL of less than 70.  3.  Otherwise the patient is stable from a cardiovascular standpoint.  4.  Continue current cardiac therapy.  5.  Follow-up 6  months.

## 2019-09-11 ENCOUNTER — HOSPITAL ENCOUNTER (OUTPATIENT)
Facility: MEDICAL CENTER | Age: 79
End: 2019-09-11
Attending: INTERNAL MEDICINE
Payer: MEDICARE

## 2019-09-11 ENCOUNTER — NON-PROVIDER VISIT (OUTPATIENT)
Dept: INTERNAL MEDICINE | Facility: IMAGING CENTER | Age: 79
End: 2019-09-11
Payer: MEDICARE

## 2019-09-11 DIAGNOSIS — I10 ESSENTIAL HYPERTENSION: ICD-10-CM

## 2019-09-11 DIAGNOSIS — Z12.5 SCREENING PSA (PROSTATE SPECIFIC ANTIGEN): ICD-10-CM

## 2019-09-11 DIAGNOSIS — E79.0 HYPERURICEMIA: ICD-10-CM

## 2019-09-11 DIAGNOSIS — G47.30 SLEEP APNEA, UNSPECIFIED TYPE: ICD-10-CM

## 2019-09-11 DIAGNOSIS — E78.2 HYPERLIPEMIA, MIXED: ICD-10-CM

## 2019-09-11 PROCEDURE — 80053 COMPREHEN METABOLIC PANEL: CPT

## 2019-09-11 PROCEDURE — 84153 ASSAY OF PSA TOTAL: CPT

## 2019-09-11 PROCEDURE — 80061 LIPID PANEL: CPT

## 2019-09-11 PROCEDURE — 84550 ASSAY OF BLOOD/URIC ACID: CPT

## 2019-09-11 PROCEDURE — 85025 COMPLETE CBC W/AUTO DIFF WBC: CPT

## 2019-09-12 LAB
ALBUMIN SERPL BCP-MCNC: 3.9 G/DL (ref 3.2–4.9)
ALBUMIN/GLOB SERPL: 1.6 G/DL
ALP SERPL-CCNC: 76 U/L (ref 30–99)
ALT SERPL-CCNC: 26 U/L (ref 2–50)
ANION GAP SERPL CALC-SCNC: 8 MMOL/L (ref 0–11.9)
AST SERPL-CCNC: 26 U/L (ref 12–45)
BASOPHILS # BLD AUTO: 0.6 % (ref 0–1.8)
BASOPHILS # BLD: 0.04 K/UL (ref 0–0.12)
BILIRUB SERPL-MCNC: 0.8 MG/DL (ref 0.1–1.5)
BUN SERPL-MCNC: 22 MG/DL (ref 8–22)
CALCIUM SERPL-MCNC: 9.3 MG/DL (ref 8.5–10.5)
CHLORIDE SERPL-SCNC: 105 MMOL/L (ref 96–112)
CHOLEST SERPL-MCNC: 102 MG/DL (ref 100–199)
CO2 SERPL-SCNC: 28 MMOL/L (ref 20–33)
CREAT SERPL-MCNC: 1.04 MG/DL (ref 0.5–1.4)
EOSINOPHIL # BLD AUTO: 0.16 K/UL (ref 0–0.51)
EOSINOPHIL NFR BLD: 2.5 % (ref 0–6.9)
ERYTHROCYTE [DISTWIDTH] IN BLOOD BY AUTOMATED COUNT: 52.3 FL (ref 35.9–50)
GLOBULIN SER CALC-MCNC: 2.5 G/DL (ref 1.9–3.5)
GLUCOSE SERPL-MCNC: 89 MG/DL (ref 65–99)
HCT VFR BLD AUTO: 55.1 % (ref 42–52)
HDLC SERPL-MCNC: 43 MG/DL
HGB BLD-MCNC: 17 G/DL (ref 14–18)
IMM GRANULOCYTES # BLD AUTO: 0.02 K/UL (ref 0–0.11)
IMM GRANULOCYTES NFR BLD AUTO: 0.3 % (ref 0–0.9)
LDLC SERPL CALC-MCNC: 48 MG/DL
LYMPHOCYTES # BLD AUTO: 1.73 K/UL (ref 1–4.8)
LYMPHOCYTES NFR BLD: 26.6 % (ref 22–41)
MCH RBC QN AUTO: 31.5 PG (ref 27–33)
MCHC RBC AUTO-ENTMCNC: 30.9 G/DL (ref 33.7–35.3)
MCV RBC AUTO: 102 FL (ref 81.4–97.8)
MONOCYTES # BLD AUTO: 0.63 K/UL (ref 0–0.85)
MONOCYTES NFR BLD AUTO: 9.7 % (ref 0–13.4)
NEUTROPHILS # BLD AUTO: 3.93 K/UL (ref 1.82–7.42)
NEUTROPHILS NFR BLD: 60.3 % (ref 44–72)
NRBC # BLD AUTO: 0 K/UL
NRBC BLD-RTO: 0 /100 WBC
PLATELET # BLD AUTO: 147 K/UL (ref 164–446)
PMV BLD AUTO: 12.7 FL (ref 9–12.9)
POTASSIUM SERPL-SCNC: 4.4 MMOL/L (ref 3.6–5.5)
PROT SERPL-MCNC: 6.4 G/DL (ref 6–8.2)
PSA SERPL-MCNC: 1.65 NG/ML (ref 0–4)
RBC # BLD AUTO: 5.4 M/UL (ref 4.7–6.1)
SODIUM SERPL-SCNC: 141 MMOL/L (ref 135–145)
TRIGL SERPL-MCNC: 56 MG/DL (ref 0–149)
URATE SERPL-MCNC: 5 MG/DL (ref 2.5–8.3)
WBC # BLD AUTO: 6.5 K/UL (ref 4.8–10.8)

## 2019-09-17 ENCOUNTER — OFFICE VISIT (OUTPATIENT)
Dept: INTERNAL MEDICINE | Facility: IMAGING CENTER | Age: 79
End: 2019-09-17
Payer: MEDICARE

## 2019-09-17 ENCOUNTER — HOSPITAL ENCOUNTER (OUTPATIENT)
Facility: MEDICAL CENTER | Age: 79
End: 2019-09-17
Attending: INTERNAL MEDICINE
Payer: MEDICARE

## 2019-09-17 VITALS
HEIGHT: 72 IN | HEART RATE: 74 BPM | DIASTOLIC BLOOD PRESSURE: 80 MMHG | OXYGEN SATURATION: 96 % | BODY MASS INDEX: 29.53 KG/M2 | WEIGHT: 218 LBS | TEMPERATURE: 98.4 F | SYSTOLIC BLOOD PRESSURE: 128 MMHG | RESPIRATION RATE: 12 BRPM

## 2019-09-17 DIAGNOSIS — N28.1 RENAL CYSTS, ACQUIRED, BILATERAL: ICD-10-CM

## 2019-09-17 DIAGNOSIS — G47.33 OBSTRUCTIVE SLEEP APNEA SYNDROME: ICD-10-CM

## 2019-09-17 DIAGNOSIS — I25.10 CORONARY ARTERY DISEASE INVOLVING NATIVE CORONARY ARTERY OF NATIVE HEART WITHOUT ANGINA PECTORIS: ICD-10-CM

## 2019-09-17 DIAGNOSIS — I10 ESSENTIAL HYPERTENSION: ICD-10-CM

## 2019-09-17 DIAGNOSIS — Z00.00 MEDICARE ANNUAL WELLNESS VISIT, SUBSEQUENT: ICD-10-CM

## 2019-09-17 DIAGNOSIS — E79.0 HYPERURICEMIA: ICD-10-CM

## 2019-09-17 DIAGNOSIS — G47.30 SLEEP APNEA, UNSPECIFIED TYPE: ICD-10-CM

## 2019-09-17 DIAGNOSIS — I63.9 CRYPTOGENIC STROKE (HCC): ICD-10-CM

## 2019-09-17 DIAGNOSIS — K22.5 ZENKER DIVERTICULUM: ICD-10-CM

## 2019-09-17 DIAGNOSIS — R13.12 OROPHARYNGEAL DYSPHAGIA: ICD-10-CM

## 2019-09-17 DIAGNOSIS — Z12.11 SCREEN FOR COLON CANCER: ICD-10-CM

## 2019-09-17 DIAGNOSIS — E78.5 HYPERLIPIDEMIA LDL GOAL <70: ICD-10-CM

## 2019-09-17 LAB
APPEARANCE UR: CLEAR
BILIRUB UR QL STRIP.AUTO: NEGATIVE
COLOR UR: YELLOW
CREAT UR-MCNC: 159.6 MG/DL
GLUCOSE UR STRIP.AUTO-MCNC: NEGATIVE MG/DL
KETONES UR STRIP.AUTO-MCNC: NEGATIVE MG/DL
LEUKOCYTE ESTERASE UR QL STRIP.AUTO: NEGATIVE
MICRO URNS: NORMAL
MICROALBUMIN UR-MCNC: 0.9 MG/DL
MICROALBUMIN/CREAT UR: 6 MG/G (ref 0–30)
NITRITE UR QL STRIP.AUTO: NEGATIVE
PH UR STRIP.AUTO: 5.5 [PH] (ref 5–8)
PROT UR QL STRIP: NEGATIVE MG/DL
RBC UR QL AUTO: NEGATIVE
SP GR UR STRIP.AUTO: 1.02
UROBILINOGEN UR STRIP.AUTO-MCNC: 0.2 MG/DL

## 2019-09-17 PROCEDURE — 81003 URINALYSIS AUTO W/O SCOPE: CPT

## 2019-09-17 PROCEDURE — 82043 UR ALBUMIN QUANTITATIVE: CPT

## 2019-09-17 PROCEDURE — G0439 PPPS, SUBSEQ VISIT: HCPCS | Performed by: INTERNAL MEDICINE

## 2019-09-17 PROCEDURE — 82570 ASSAY OF URINE CREATININE: CPT

## 2019-09-17 ASSESSMENT — PATIENT HEALTH QUESTIONNAIRE - PHQ9: CLINICAL INTERPRETATION OF PHQ2 SCORE: 0

## 2019-09-17 ASSESSMENT — ENCOUNTER SYMPTOMS: GENERAL WELL-BEING: GOOD

## 2019-09-17 ASSESSMENT — ACTIVITIES OF DAILY LIVING (ADL): BATHING_REQUIRES_ASSISTANCE: 0

## 2019-09-17 NOTE — PROGRESS NOTES
"79 y.o. male presents for the following:    Patient comes in for annual health risk assessment:    Coronary artery disease-patient is followed by Dr. Gonzalez. He was last seen in September 2019. Consider to have noncritical disease. Cholesterol is at goal, LDL is 48. Blood pressure stable on losartan and diuretic. He remains on atorvastatin 40 mg. Recorder was negative for atrial fibrillation or other arrhythmia. Go to    Essential hypertension-stable on losartan and diuretic as above.    Cryptogenic stroke-MRI August 2018 showed two small acute and subacute infarcts in the left posterior frontal below. Carotid ultrasound were negative for significant stenosis. He remains on statin as above.    Chronic left facial nerve palsy-stable.    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 Uriostegui.  Patient is interested in second opinion.    Esophageal dysphasia-new onset symptoms not related to Zinkers. Scheduled for EGD with gastroenterology.  Patient has noticed more problems.  He can cough and expel food from the mouth or occasionally nose.    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 Uric acid level is 5.0 mg/deciliter    Obstructive sleep apnea-followed by pulmonary. He is currently on CPAP at 11 cm. Initial AHI was 42. He is scheduled for repeat sleep study in November.    Renal cyst-last imaging was February 2018. This was done due to kidney stone. Two simple cysts in the right kidney measuring 6.4 and 1.9 cm were reported. This is slightly larger than 2014 where the maximal diameter was measured at 5 cm.    Colonoscopy-October 2014 five-year repeat recommended.  Due for influenza and she works. Patient is due for Tdap.    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   • CATARACT PHACO WITH IOL  1/21/2014    Performed by Joni Duarte M.D. at SURGERY SURGICAL [facility] ORS   • FINGER ARTHROPLASTY  12/17/2012    Performed by Adam Christopher M.D. at SURGERY SAME DAY [facility] ORS   • INGUINAL HERNIA REPAIR  2/19/2009    Performed by ALEX ALATORRE at SURGERY SAME DAY [facility] ORS   • COLONOSCOPY  2004    neg   • LAMINOTOMY  1996    lumbar laminectomy, cyst x3   • UVULOPHARYNGOPALATOPLASTY  1985       Family history: relating to possible risk factors for your patient  Family History   Problem Relation Age of Onset   • Heart Disease Maternal Grandmother    • Diabetes Paternal Grandfather    • Stroke Sister    • Lung Disease Father         black lung    • Cancer Neg Hx        Current Providers (including home care/DME’s):   Colonoscopy 10/1/2014 repeat in 5 yrs, 7/30/17 FIT NEG Dexa  PSA  6/8/17   [facility]      Patient Care Team:  Reilly Jc M.D. as PCP - General (Internal Medicine)  Ana Stearns R.N. as Registered Nurse      Medications:   Current Outpatient Medications Ordered in Epic   Medication Sig Dispense Refill   • losartan (COZAAR) 50 MG Tab Take 1 Tab by mouth every day. 100 Tab 2   • allopurinol (ZYLOPRIM) 100 MG Tab Take 1 Tab by mouth 2 times a day. 180 Tab 1   • atorvastatin (LIPITOR) 40 MG Tab Take 1 Tab by mouth every evening. 100 Tab 3   • clopidogrel (PLAVIX) 75  MG Tab Take 1 Tab by mouth every day. 90 Tab 3   • triamterene/hctz (MAXZIDE-25/DYAZIDE) 37.5-25 MG Cap TAKE ONE CAPSULE BY MOUTH EVERY MORNING 90 Cap 3   • ZYLET 0.5-0.3 % Suspension INSTILL 1 DROP INTO LEFT EYE 4 TIMES A DAY AS DIRECTED  3     No current Epic-ordered facility-administered medications on file.        Supplements (calcium/vitamins): if not lisited in medications    Chief Complaint   Patient presents with   • Annual Exam         HPI:  Chuck Ortiz is a 79 y.o. 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 100 Tab 2   • allopurinol (ZYLOPRIM) 100 MG Tab Take 1 Tab by mouth 2 times a day. 180 Tab 1   • atorvastatin (LIPITOR) 40 MG Tab Take 1 Tab by mouth every evening. 100 Tab 3   • clopidogrel (PLAVIX) 75 MG Tab Take 1 Tab by mouth every day. 90 Tab 3   • triamterene/hctz (MAXZIDE-25/DYAZIDE) 37.5-25 MG Cap TAKE ONE CAPSULE BY MOUTH EVERY MORNING 90 Cap 3   • ZYLET 0.5-0.3 % Suspension INSTILL 1 DROP INTO LEFT EYE 4 TIMES A DAY AS DIRECTED  3     No current facility-administered medications for  this visit.             Current supplements as per medication list.       Allergies: Morphine    Current social contact/activities:  Social with friends and family.    He  reports that he has never smoked. He has never used smokeless tobacco. He reports that he drinks alcohol. He reports that he does not use drugs.  Counseling given: Not Answered        DPA/Advanced Directive: On file      ROS:    Gait: Uses : None  Ostomy: No  Other tubes: no   Amputations: no   Chronic oxygen use: no   Last eye exam: 2 weeks ago  : Denies any urinary leakage during the last 6 months incontinence.       Screening:  Colonoscopy 10/1/2014 repeat in 5 yrs, 7/30/17 FIT NEG Dexa  PSA  6/8/17   [facility]      Depression Screening    Little interest or pleasure in doing things?  0 - not at all  Feeling down, depressed , or hopeless? 0 - not at all  Patient Health Questionnaire Score: 0     If depressive symptoms identified deferred to follow up visit unless specifically addressed in assessment and plan.    Interpretation of PHQ-9 Total Score   Score Severity   1-4 No Depression   5-9 Mild Depression   10-14 Moderate Depression   15-19 Moderately Severe Depression   20-27 Severe Depression    Screening for Cognitive Impairment    Three Minute Recall (village, kitchen, baby) 2/3    Wil clock face with all 12 numbers and set the hands to show 10 past 10.  Yes    Cognitive concerns identified deferred for follow up unless specifically addressed in assessment and plan.    Fall Risk Assessment    Has the patient had two or more falls in the last year or any fall with injury in the last year?  No    Safety Assessment    Throw rugs on floor.  Yes  Handrails on all stairs.  Yes  Good lighting in all hallways.  Yes  Difficulty hearing.  No  Patient counseled about all safety risks that were identified.    Functional Assessment ADLs    Are there any barriers preventing you from cooking for yourself or meeting nutritional needs?  No.     Are there any barriers preventing you from driving safely or obtaining transportation?  No.    Are there any barriers preventing you from using a telephone or calling for help?  No.    Are there any barriers preventing you from shopping?  No.    Are there any barriers preventing you from taking care of your own finances?  No.    Are there any barriers preventing you from managing your medications?  No.    Are there any barriers preventing you from showering, bathing or dressing yourself?  No.    Are you currently engaging in any exercise or physical activity?  No.     What is your perception of your health?  Good.      Health Maintenance Summary                IMM DTaP/Tdap/Td Vaccine Overdue 3/18/1958     IMM ZOSTER VACCINES Overdue 3/18/1989     Annual Wellness Visit Overdue 7/26/2018      Done 7/25/2017 Visit Dx: Medicare annual wellness visit, subsequent     Patient has more history with this topic...    IMM INFLUENZA Overdue 9/1/2019      Done 10/24/2018 Imm Admin: Influenza Vaccine Adult HD     Patient has more history with this topic...    COLONOSCOPY Next Due 10/1/2024      Done 10/1/2014           Patient Care Team:  Reilly Jc M.D. as PCP - General (Internal Medicine)  Ana Stearns R.N. as Registered Nurse        Social History     Tobacco Use   • Smoking status: Never Smoker   • Smokeless tobacco: Never Used   Substance Use Topics   • Alcohol use: Yes     Comment: 2 drinks per week    • Drug use: No     Family History   Problem Relation Age of Onset   • Heart Disease Maternal Grandmother    • Diabetes Paternal Grandfather    • Stroke Sister    • Lung Disease Father         black lung    • Cancer Neg Hx      He  has a past medical history of Arrhythmia, Arthritis, Cardiac murmur, CATARACT, Dental disorder, ED (erectile dysfunction), Essential hypertension (11/12/2015), Facial droop, Hemorrhagic disorder (HCC), High cholesterol, Hypertension, Kidney stones, Seizure (HCC), Sleep apnea, Snoring, Stroke  (HCC) (08/07/2018), and Zenker diverticula.   Past Surgical History:   Procedure Laterality Date   • [facility] CARDIAC CATH  09/28/2018    40% LAD other arteries no disease.   • CATARACT PHACO WITH IOL  1/21/2014    Performed by Joni Duarte M.D. at SURGERY SURGICAL [facility] ORS   • FINGER ARTHROPLASTY  12/17/2012    Performed by Adam Christopher M.D. at SURGERY SAME DAY [facility] ORS   • INGUINAL HERNIA REPAIR  2/19/2009    Performed by ALEX ALATORRE at SURGERY SAME DAY [facility] ORS   • COLONOSCOPY  2004    neg   • LAMINOTOMY  1996    lumbar laminectomy, cyst x3   • UVULOPHARYNGOPALATOPLASTY  1985       Exam:     /80 (BP Location: Left arm, Patient Position: Sitting, BP Cuff Size: Adult)   Pulse 74   Temp 36.9 °C (98.4 °F) (Temporal)   Resp 12   Ht 1.829 m (6')   Wt 98.9 kg (218 lb)   SpO2 96%  Body mass index is 29.57 kg/m².    Hearing good.    Dentition good  Alert, oriented in no acute distress.  Eye contact is good, speech goal directed, affect calm  General: Mildly overweight.  No distress.  Normal appearing.  HEENT: Pupils are equal.  Conjunctiva is normal.  Head is normal appearing.  Ears, canals and tympanic membranes are normal.  Oral cavity is pink and moist without lesion.  Neck: Supple without JVD or bruit.  Thyroid is not enlarged.  Pulmonary: Clear with good breath sounds.  Cardiovascular regular rate and rhythm.  No murmur auscultated.  Carotid, radial and pedal pulses are intact.  Abdomen: Soft, nontender, nondistended.  Normal bowel sounds.  Organs are not enlarged.  Neurologic: Chronic left facial nerve palsy.  Otherwise cranial nerves are grossly intact.  Strength and sensation are normal.  Normal patellar reflex.  Skin: No obvious lesions  Lymph: No cervical, supraclavicular, axillary, abdominal or inguinal adenopathy noted.  Genitourinary: Penis, scrotum and testicles are unremarkable.  No hernia.  Rectal tone is normal.    Prostate is not enlarged, soft and without  nodule.    Assessment and Plan. The following treatment and monitoring plan is recommended:    1. Medicare annual wellness visit, subsequent     2. Oropharyngeal dysphagia  DX-ESOPH. FLUORO (BARIUM SWALLOW)   3. Zenker diverticulum  DX-ESOPH. FLUORO (BARIUM SWALLOW)    REFERRAL TO ENT   4. Essential hypertension     5. Sleep apnea, unspecified type     6. Hyperuricemia     7. Cryptogenic stroke (HCC)     8. Screen for colon cancer  REFERRAL TO GASTROENTEROLOGY   9. Hyperlipidemia LDL goal <70     10. Obstructive sleep apnea syndrome     11. Renal cysts, acquired, bilateral     12. Coronary artery disease involving native coronary artery of native heart without angina pectoris         Overall patient is in good health.  Refer for barium swallow with speech.  Refer to [facility] for second opinion regarding zinger diverticulum.  Blood pressure is stable.  No change in treatment.  Cardiac issues are stable.  Asymptomatic.  Cholesterol is at goal.  Continue Plavix in place of aspirin due to recent stroke.  Uric acid is at goal.  No change in treatment.  Due for colon cancer screening.  He is also scheduled for EGD and we will see if we can have both scheduled at the same time.  Sleep apnea stable on CPAP.  Notes from pulmonary suggest repeat overnight oximetry or sleep study but patient is currently unaware.    Services suggested: No services required at this time  Health Care Screening: Age-appropriate preventive services Medicare covers discussed today and ordered if indicated.  Referrals offered: Community-based lifestyle interventions to reduce health risks and promote self-management and wellness, fall prevention, nutrition, physical activity, tobacco-use cessation, weight loss, and mental health services as per orders if indicated.    Discussion today about general wellness and lifestyle habits:    · Prevent falls and reduce trip hazards; Cautioned about securing or removing rugs.  · Have a working fire alarm and carbon  monoxide detector;   · Engage in regular physical activity and social activities       Follow-up: 6 months

## 2019-10-02 ENCOUNTER — TELEPHONE (OUTPATIENT)
Dept: CARDIOLOGY | Facility: MEDICAL CENTER | Age: 79
End: 2019-10-02

## 2019-10-02 ENCOUNTER — NON-PROVIDER VISIT (OUTPATIENT)
Dept: CARDIOLOGY | Facility: MEDICAL CENTER | Age: 79
End: 2019-10-02
Payer: MEDICARE

## 2019-10-02 DIAGNOSIS — I63.9 CRYPTOGENIC STROKE (HCC): ICD-10-CM

## 2019-10-02 DIAGNOSIS — Z95.818 STATUS POST PLACEMENT OF IMPLANTABLE LOOP RECORDER: ICD-10-CM

## 2019-10-02 PROCEDURE — 93298 REM INTERROG DEV EVAL SCRMS: CPT | Performed by: INTERNAL MEDICINE

## 2019-10-02 NOTE — TELEPHONE ENCOUNTER
Patient transmitted via Loop recorder symptom episode. Appears to be Sinus with PVC's. Please review to confirm.

## 2019-10-07 ENCOUNTER — HOSPITAL ENCOUNTER (OUTPATIENT)
Dept: RADIOLOGY | Facility: MEDICAL CENTER | Age: 79
End: 2019-10-07
Attending: INTERNAL MEDICINE
Payer: MEDICARE

## 2019-10-07 DIAGNOSIS — R13.12 OROPHARYNGEAL DYSPHAGIA: ICD-10-CM

## 2019-10-07 DIAGNOSIS — K22.5 ZENKER DIVERTICULUM: ICD-10-CM

## 2019-10-07 PROCEDURE — 74220 X-RAY XM ESOPHAGUS 1CNTRST: CPT

## 2019-10-07 PROCEDURE — A9270 NON-COVERED ITEM OR SERVICE: HCPCS | Performed by: INTERNAL MEDICINE

## 2019-10-07 PROCEDURE — 700112 HCHG RX REV CODE 229: Performed by: INTERNAL MEDICINE

## 2019-10-07 PROCEDURE — 700101 HCHG RX REV CODE 250: Performed by: INTERNAL MEDICINE

## 2019-10-07 RX ADMIN — ANTACID/ANTIFLATULENT 1 PACKET: 380; 550; 10; 10 GRANULE, EFFERVESCENT ORAL at 13:30

## 2019-10-07 RX ADMIN — BARIUM SULFATE 700 MG: 700 TABLET ORAL at 13:30

## 2019-10-07 NOTE — TELEPHONE ENCOUNTER
Attempted to contact patient on home number, no answer. LVM to call back.     Attempted to contact patient on mobile number, no answer. LVM to call back.

## 2019-10-07 NOTE — TELEPHONE ENCOUNTER
Contacted patient. He only recalls last Tuesday Oct 1st having elevated blood pressure.      Denies CP, SOB, edema, jaw/arm pain, feeling palpitations.

## 2019-10-11 ENCOUNTER — OFFICE VISIT (OUTPATIENT)
Dept: INTERNAL MEDICINE | Facility: IMAGING CENTER | Age: 79
End: 2019-10-11
Payer: MEDICARE

## 2019-10-11 VITALS
OXYGEN SATURATION: 96 % | RESPIRATION RATE: 12 BRPM | DIASTOLIC BLOOD PRESSURE: 78 MMHG | SYSTOLIC BLOOD PRESSURE: 122 MMHG | HEART RATE: 68 BPM | TEMPERATURE: 97.8 F

## 2019-10-11 DIAGNOSIS — Z23 NEED FOR INFLUENZA VACCINATION: ICD-10-CM

## 2019-10-11 DIAGNOSIS — K22.2 SCHATZKI'S RING: ICD-10-CM

## 2019-10-11 DIAGNOSIS — K22.5 ZENKER DIVERTICULUM: ICD-10-CM

## 2019-10-11 DIAGNOSIS — K21.9 GASTROESOPHAGEAL REFLUX DISEASE WITHOUT ESOPHAGITIS: ICD-10-CM

## 2019-10-11 DIAGNOSIS — K44.9 HIATAL HERNIA: ICD-10-CM

## 2019-10-11 PROCEDURE — 90662 IIV NO PRSV INCREASED AG IM: CPT | Performed by: INTERNAL MEDICINE

## 2019-10-11 PROCEDURE — 99213 OFFICE O/P EST LOW 20 MIN: CPT | Mod: 25 | Performed by: INTERNAL MEDICINE

## 2019-10-11 PROCEDURE — G0008 ADMIN INFLUENZA VIRUS VAC: HCPCS | Performed by: INTERNAL MEDICINE

## 2019-10-11 NOTE — PROGRESS NOTES
Chief Complaint   Patient presents with   • Results     Upper GI       HISTORY OF THE PRESENT ILLNESS: Patient is a 79 y.o. male.     Patient comes in to review recent upper GI.  It showed ongoing zinger diverticulum with retained food.  He had been previously diagnosed with a Schatzki ring and underwent dilation.  He reports that his dysphagia has essentially resolved since that time.  He has been contacted by [facility] for evaluation of the Zenker.  At this time he would like to defer since he is feeling better    Allergies: Morphine    Current Outpatient Medications Ordered in Epic   Medication Sig Dispense Refill   • ZYLET 0.5-0.3 % Suspension INSTILL 1 DROP INTO LEFT EYE 4 TIMES A DAY AS DIRECTED  3   • losartan (COZAAR) 50 MG Tab Take 1 Tab by mouth every day. 100 Tab 2   • allopurinol (ZYLOPRIM) 100 MG Tab Take 1 Tab by mouth 2 times a day. 180 Tab 1   • atorvastatin (LIPITOR) 40 MG Tab Take 1 Tab by mouth every evening. 100 Tab 3   • clopidogrel (PLAVIX) 75 MG Tab Take 1 Tab by mouth every day. 90 Tab 3   • triamterene/hctz (MAXZIDE-25/DYAZIDE) 37.5-25 MG Cap TAKE ONE CAPSULE BY MOUTH EVERY MORNING 90 Cap 3     No current Epic-ordered facility-administered medications on file.        Past medical history, social history and family history were reviewed from chart today    Review of systems: Per HPI.    Denies headache, chest pain, fever, chills, diarrhea, constipation, abdominal pain, palpitations, depression   All others negative.     Exam: /78 (BP Location: Left arm, Patient Position: Sitting, BP Cuff Size: Adult)   Pulse 68   Temp 36.6 °C (97.8 °F) (Temporal)   Resp 12   SpO2 96%   General: Well-appearing. Well-developed. No signs of distress.  HEENT: Grossly normal. Oral cavity is pink and moist.  Neck: Supple without JVD or bruit.  Pulmonary: Clear with good breath sounds. Normal effort.  Cardiovascular: Regular. Carotid and radial pulses are intact.  Abdomen: Soft, nontender, nondistended. Spleen  and liver are not enlarged.  Neurologic: Cranial nerves II through XII are grossly normal, alert and oriented x3      Diagnosis:  1. Need for influenza vaccination  INFLUENZA VACCINE, HIGH DOSE (65+ ONLY)   2. Zenker diverticulum     3. Schatzki's ring     4. Hiatal hernia     5. Gastroesophageal reflux disease without esophagitis         Continue proton pump inhibitor.  Follow-up as needed regarding the GI symptoms  Influenza vaccination given today

## 2019-10-28 ENCOUNTER — APPOINTMENT (OUTPATIENT)
Dept: RADIOLOGY | Facility: MEDICAL CENTER | Age: 79
End: 2019-10-28
Attending: EMERGENCY MEDICINE
Payer: MEDICARE

## 2019-10-28 ENCOUNTER — HOSPITAL ENCOUNTER (EMERGENCY)
Facility: MEDICAL CENTER | Age: 79
End: 2019-10-28
Attending: EMERGENCY MEDICINE
Payer: MEDICARE

## 2019-10-28 VITALS
OXYGEN SATURATION: 95 % | RESPIRATION RATE: 9 BRPM | HEART RATE: 46 BPM | TEMPERATURE: 96.7 F | HEIGHT: 72 IN | DIASTOLIC BLOOD PRESSURE: 85 MMHG | BODY MASS INDEX: 29.56 KG/M2 | SYSTOLIC BLOOD PRESSURE: 163 MMHG | WEIGHT: 218.26 LBS

## 2019-10-28 DIAGNOSIS — R10.9 FLANK PAIN: ICD-10-CM

## 2019-10-28 DIAGNOSIS — N20.1 URETERAL STONE: ICD-10-CM

## 2019-10-28 LAB
ALBUMIN SERPL BCP-MCNC: 4.1 G/DL (ref 3.2–4.9)
ALBUMIN/GLOB SERPL: 1.5 G/DL
ALP SERPL-CCNC: 101 U/L (ref 30–99)
ALT SERPL-CCNC: 21 U/L (ref 2–50)
ANION GAP SERPL CALC-SCNC: 16 MMOL/L (ref 0–11.9)
APPEARANCE UR: CLEAR
AST SERPL-CCNC: 15 U/L (ref 12–45)
BASOPHILS # BLD AUTO: 0.5 % (ref 0–1.8)
BASOPHILS # BLD: 0.05 K/UL (ref 0–0.12)
BILIRUB SERPL-MCNC: 0.8 MG/DL (ref 0.1–1.5)
BILIRUB UR QL STRIP.AUTO: NEGATIVE
BUN SERPL-MCNC: 24 MG/DL (ref 8–22)
CALCIUM SERPL-MCNC: 9.5 MG/DL (ref 8.4–10.2)
CHLORIDE SERPL-SCNC: 98 MMOL/L (ref 96–112)
CO2 SERPL-SCNC: 26 MMOL/L (ref 20–33)
COLOR UR: YELLOW
CREAT SERPL-MCNC: 1.27 MG/DL (ref 0.5–1.4)
EKG IMPRESSION: NORMAL
EOSINOPHIL # BLD AUTO: 0.14 K/UL (ref 0–0.51)
EOSINOPHIL NFR BLD: 1.5 % (ref 0–6.9)
ERYTHROCYTE [DISTWIDTH] IN BLOOD BY AUTOMATED COUNT: 49 FL (ref 35.9–50)
GLOBULIN SER CALC-MCNC: 2.8 G/DL (ref 1.9–3.5)
GLUCOSE SERPL-MCNC: 120 MG/DL (ref 65–99)
GLUCOSE UR STRIP.AUTO-MCNC: NEGATIVE MG/DL
HCT VFR BLD AUTO: 55.5 % (ref 42–52)
HGB BLD-MCNC: 18.2 G/DL (ref 14–18)
IMM GRANULOCYTES # BLD AUTO: 0.04 K/UL (ref 0–0.11)
IMM GRANULOCYTES NFR BLD AUTO: 0.4 % (ref 0–0.9)
KETONES UR STRIP.AUTO-MCNC: NEGATIVE MG/DL
LEUKOCYTE ESTERASE UR QL STRIP.AUTO: NEGATIVE
LYMPHOCYTES # BLD AUTO: 2.98 K/UL (ref 1–4.8)
LYMPHOCYTES NFR BLD: 31.5 % (ref 22–41)
MCH RBC QN AUTO: 31.6 PG (ref 27–33)
MCHC RBC AUTO-ENTMCNC: 32.8 G/DL (ref 33.7–35.3)
MCV RBC AUTO: 96.4 FL (ref 81.4–97.8)
MICRO URNS: ABNORMAL
MONOCYTES # BLD AUTO: 0.88 K/UL (ref 0–0.85)
MONOCYTES NFR BLD AUTO: 9.3 % (ref 0–13.4)
NEUTROPHILS # BLD AUTO: 5.36 K/UL (ref 1.82–7.42)
NEUTROPHILS NFR BLD: 56.8 % (ref 44–72)
NITRITE UR QL STRIP.AUTO: NEGATIVE
NRBC # BLD AUTO: 0 K/UL
NRBC BLD-RTO: 0 /100 WBC
PH UR STRIP.AUTO: 6.5 [PH] (ref 5–8)
PLATELET # BLD AUTO: 162 K/UL (ref 164–446)
PMV BLD AUTO: 11.6 FL (ref 9–12.9)
POTASSIUM SERPL-SCNC: 4.3 MMOL/L (ref 3.6–5.5)
PROT SERPL-MCNC: 6.9 G/DL (ref 6–8.2)
PROT UR QL STRIP: NEGATIVE MG/DL
RBC # BLD AUTO: 5.76 M/UL (ref 4.7–6.1)
RBC # URNS HPF: ABNORMAL /HPF
RBC UR QL AUTO: ABNORMAL
SODIUM SERPL-SCNC: 140 MMOL/L (ref 135–145)
SP GR UR STRIP.AUTO: 1.01
WBC # BLD AUTO: 9.5 K/UL (ref 4.8–10.8)
WBC #/AREA URNS HPF: ABNORMAL /HPF

## 2019-10-28 PROCEDURE — 85025 COMPLETE CBC W/AUTO DIFF WBC: CPT

## 2019-10-28 PROCEDURE — 700102 HCHG RX REV CODE 250 W/ 637 OVERRIDE(OP): Performed by: EMERGENCY MEDICINE

## 2019-10-28 PROCEDURE — 94760 N-INVAS EAR/PLS OXIMETRY 1: CPT

## 2019-10-28 PROCEDURE — 74176 CT ABD & PELVIS W/O CONTRAST: CPT

## 2019-10-28 PROCEDURE — 36415 COLL VENOUS BLD VENIPUNCTURE: CPT

## 2019-10-28 PROCEDURE — 80053 COMPREHEN METABOLIC PANEL: CPT

## 2019-10-28 PROCEDURE — 81001 URINALYSIS AUTO W/SCOPE: CPT

## 2019-10-28 PROCEDURE — 700111 HCHG RX REV CODE 636 W/ 250 OVERRIDE (IP)

## 2019-10-28 PROCEDURE — A9270 NON-COVERED ITEM OR SERVICE: HCPCS | Performed by: EMERGENCY MEDICINE

## 2019-10-28 PROCEDURE — 700105 HCHG RX REV CODE 258: Performed by: EMERGENCY MEDICINE

## 2019-10-28 PROCEDURE — 96374 THER/PROPH/DIAG INJ IV PUSH: CPT

## 2019-10-28 PROCEDURE — 93005 ELECTROCARDIOGRAM TRACING: CPT

## 2019-10-28 PROCEDURE — 96375 TX/PRO/DX INJ NEW DRUG ADDON: CPT

## 2019-10-28 PROCEDURE — 99285 EMERGENCY DEPT VISIT HI MDM: CPT

## 2019-10-28 RX ORDER — SODIUM CHLORIDE 9 MG/ML
1000 INJECTION, SOLUTION INTRAVENOUS ONCE
Status: COMPLETED | OUTPATIENT
Start: 2019-10-28 | End: 2019-10-28

## 2019-10-28 RX ORDER — OXYCODONE HYDROCHLORIDE AND ACETAMINOPHEN 5; 325 MG/1; MG/1
1 TABLET ORAL ONCE
Status: COMPLETED | OUTPATIENT
Start: 2019-10-28 | End: 2019-10-28

## 2019-10-28 RX ORDER — ONDANSETRON 2 MG/ML
INJECTION INTRAMUSCULAR; INTRAVENOUS
Status: COMPLETED
Start: 2019-10-28 | End: 2019-10-28

## 2019-10-28 RX ORDER — TAMSULOSIN HYDROCHLORIDE 0.4 MG/1
0.4 CAPSULE ORAL DAILY
Qty: 7 CAP | Refills: 0 | Status: SHIPPED | OUTPATIENT
Start: 2019-10-28 | End: 2022-02-14

## 2019-10-28 RX ORDER — ONDANSETRON 2 MG/ML
4 INJECTION INTRAMUSCULAR; INTRAVENOUS ONCE
Status: COMPLETED | OUTPATIENT
Start: 2019-10-28 | End: 2019-10-28

## 2019-10-28 RX ORDER — OXYCODONE HYDROCHLORIDE AND ACETAMINOPHEN 5; 325 MG/1; MG/1
1-2 TABLET ORAL EVERY 8 HOURS PRN
Qty: 15 TAB | Refills: 0 | Status: SHIPPED | OUTPATIENT
Start: 2019-10-28 | End: 2019-10-31

## 2019-10-28 RX ADMIN — FENTANYL CITRATE 100 MCG: 0.05 INJECTION, SOLUTION INTRAMUSCULAR; INTRAVENOUS at 06:15

## 2019-10-28 RX ADMIN — SODIUM CHLORIDE 1000 ML: 9 INJECTION, SOLUTION INTRAVENOUS at 06:21

## 2019-10-28 RX ADMIN — OXYCODONE HYDROCHLORIDE AND ACETAMINOPHEN 1 TABLET: 5; 325 TABLET ORAL at 09:24

## 2019-10-28 RX ADMIN — ONDANSETRON 4 MG: 2 INJECTION INTRAMUSCULAR; INTRAVENOUS at 06:15

## 2019-10-28 ASSESSMENT — ENCOUNTER SYMPTOMS
HEADACHES: 0
FALLS: 0
BACK PAIN: 1
FLANK PAIN: 1
FEVER: 0
CHILLS: 0
NAUSEA: 0
VOMITING: 0
COUGH: 0
SHORTNESS OF BREATH: 0
DIARRHEA: 0
CONSTIPATION: 0

## 2019-10-28 ASSESSMENT — PAIN DESCRIPTION - DESCRIPTORS
DESCRIPTORS: ACHING;DULL
DESCRIPTORS: DULL

## 2019-10-28 ASSESSMENT — PAIN SCALES - WONG BAKER: WONGBAKER_NUMERICALRESPONSE: HURTS A WHOLE LOT

## 2019-10-28 NOTE — ED PROVIDER NOTES
ED Provider Note    ED Provider Note    Primary care provider: Reilly Jc M.D.  Means of arrival: POV  History obtained from: patient  History limited by: NOne    CHIEF COMPLAINT  Chief Complaint   Patient presents with   • Abdominal Pain   • Low Back Pain   • Testicular Pain       HPI  Chuck Ortiz is a 79 y.o. male who presents to the Emergency Department with his wife, with a chief complaint of right-sided low back pain that radiates to his right lower quadrant.  No fever.  Patient does have a history of kidney stones as well as chronic back pain.  He states he has had multiple back surgeries.  He reports this feels more like prior kidney stones and is quite different from his chronic low back pain.  He was in his normal state of health upon going to sleep last night.  He woke up about 2 AM with pain, went to the bathroom and though he does not report his pain increased, he he began to have worsening, intolerable pain at that time, prompting him to ask his wife to bring him to the ED.  Riding in the car made it worse.  He denies any hematuria.  No fever.  No chest pain or shortness of breath.  Patient denies any recent trauma or falls.  No vomiting or diarrhea.  He does complain of nausea.    REVIEW OF SYSTEMS  Review of Systems   Constitutional: Negative for chills and fever.   HENT: Negative for congestion.    Respiratory: Negative for cough and shortness of breath.    Cardiovascular: Negative for chest pain.   Gastrointestinal: Negative for constipation, diarrhea, nausea and vomiting.   Genitourinary: Positive for flank pain. Negative for hematuria.   Musculoskeletal: Positive for back pain. Negative for falls.   Neurological: Negative for headaches.   All other systems reviewed and are negative.      PAST MEDICAL HISTORY   has a past medical history of Arrhythmia, Arthritis, Cardiac murmur, CATARACT, Dental disorder, ED (erectile dysfunction), Essential hypertension (11/12/2015), Facial droop,  Hemorrhagic disorder (HCC), High cholesterol, Hypertension, Kidney stones, Seizure (HCC), Sleep apnea, Snoring, Stroke (HCC) (08/07/2018), and Zenker diverticula.    SURGICAL HISTORY   has a past surgical history that includes colonoscopy (2004); inguinal hernia repair (2/19/2009); laminotomy (1996); finger arthroplasty (12/17/2012); cataract phaco with iol (1/21/2014); uvulopharyngopalatoplasty (1985); and zzz cardiac cath (09/28/2018).    SOCIAL HISTORY  Social History     Tobacco Use   • Smoking status: Never Smoker   • Smokeless tobacco: Never Used   Substance Use Topics   • Alcohol use: Yes     Comment: 2 drinks per week    • Drug use: No      Social History     Substance and Sexual Activity   Drug Use No       FAMILY HISTORY  Family History   Problem Relation Age of Onset   • Heart Disease Maternal Grandmother    • Diabetes Paternal Grandfather    • Stroke Sister    • Lung Disease Father         black lung    • Cancer Neg Hx        CURRENT MEDICATIONS  Home Medications    **Home medications have not yet been reviewed for this encounter**         ALLERGIES  Allergies   Allergen Reactions   • Morphine      Bradycardia       PHYSICAL EXAM  VITAL SIGNS: BP (!) 163/85   Pulse (!) 46   Temp 35.9 °C (96.7 °F) (Temporal)   Resp (!) 9   Ht 1.829 m (6')   Wt 99 kg (218 lb 4.1 oz)   SpO2 95%   BMI 29.60 kg/m²   Vitals reviewed.  Constitutional: Patient is oriented to person, place, and time. Appears well-developed and well-nourished. Moderate distress.    Head: Normocephalic and atraumatic.   Ears: Normal external ears bilaterally.   Mouth/Throat: Oropharynx is clear and dry, no exudates.   Eyes: Conjunctivae are normal. Pupils are equal, round, and reactive to light.   Neck: Normal range of motion. Neck supple.  Cardiovascular: Normal rate, regular rhythm and normal heart sounds. Normal peripheral pulses.  Pulmonary/Chest: Effort normal and breath sounds normal. No respiratory distress, no wheezes, rhonchi, or  rales.   Abdominal: Soft. Bowel sounds are normal. There is no tenderness. No rebound or guarding, or peritoneal signs.  Right CVA tenderness.  : Circumcised male.  No testicular pain.  No swelling or masses to the scrotum.  Musculoskeletal: No edema and no tenderness.   Neurological: No focal deficits, except left facial weakness, chronic.  Skin: Skin is warm and dry. No erythema. No pallor.   Psychiatric: Patient has a normal mood and affect.     LABS  Results for orders placed or performed during the hospital encounter of 10/28/19   URINALYSIS,CULTURE IF INDICATED   Result Value Ref Range    Color Yellow     Character Clear     Specific Gravity 1.015 <1.035    Ph 6.5 5.0 - 8.0    Glucose Negative Negative mg/dL    Ketones Negative Negative mg/dL    Protein Negative Negative mg/dL    Bilirubin Negative Negative    Nitrite Negative Negative    Leukocyte Esterase Negative Negative    Occult Blood Large (A) Negative    Micro Urine Req Microscopic    CBC WITH DIFFERENTIAL   Result Value Ref Range    WBC 9.5 4.8 - 10.8 K/uL    RBC 5.76 4.70 - 6.10 M/uL    Hemoglobin 18.2 (H) 14.0 - 18.0 g/dL    Hematocrit 55.5 (H) 42.0 - 52.0 %    MCV 96.4 81.4 - 97.8 fL    MCH 31.6 27.0 - 33.0 pg    MCHC 32.8 (L) 33.7 - 35.3 g/dL    RDW 49.0 35.9 - 50.0 fL    Platelet Count 162 (L) 164 - 446 K/uL    MPV 11.6 9.0 - 12.9 fL    Neutrophils-Polys 56.80 44.00 - 72.00 %    Lymphocytes 31.50 22.00 - 41.00 %    Monocytes 9.30 0.00 - 13.40 %    Eosinophils 1.50 0.00 - 6.90 %    Basophils 0.50 0.00 - 1.80 %    Immature Granulocytes 0.40 0.00 - 0.90 %    Nucleated RBC 0.00 /100 WBC    Neutrophils (Absolute) 5.36 1.82 - 7.42 K/uL    Lymphs (Absolute) 2.98 1.00 - 4.80 K/uL    Monos (Absolute) 0.88 (H) 0.00 - 0.85 K/uL    Eos (Absolute) 0.14 0.00 - 0.51 K/uL    Baso (Absolute) 0.05 0.00 - 0.12 K/uL    Immature Granulocytes (abs) 0.04 0.00 - 0.11 K/uL    NRBC (Absolute) 0.00 K/uL   COMP METABOLIC PANEL   Result Value Ref Range    Sodium 140 135 -  145 mmol/L    Potassium 4.3 3.6 - 5.5 mmol/L    Chloride 98 96 - 112 mmol/L    Co2 26 20 - 33 mmol/L    Anion Gap 16.0 (H) 0.0 - 11.9    Glucose 120 (H) 65 - 99 mg/dL    Bun 24 (H) 8 - 22 mg/dL    Creatinine 1.27 0.50 - 1.40 mg/dL    Calcium 9.5 8.4 - 10.2 mg/dL    AST(SGOT) 15 12 - 45 U/L    ALT(SGPT) 21 2 - 50 U/L    Alkaline Phosphatase 101 (H) 30 - 99 U/L    Total Bilirubin 0.8 0.1 - 1.5 mg/dL    Albumin 4.1 3.2 - 4.9 g/dL    Total Protein 6.9 6.0 - 8.2 g/dL    Globulin 2.8 1.9 - 3.5 g/dL    A-G Ratio 1.5 g/dL   ESTIMATED GFR   Result Value Ref Range    GFR If African American >60 >60 mL/min/1.73 m 2    GFR If Non African American 54 (A) >60 mL/min/1.73 m 2   URINE MICROSCOPIC (W/UA)   Result Value Ref Range    WBC 0-2 (A) /hpf    RBC 20-50 (A) /hpf   EKG   Result Value Ref Range    Report       [facility] Emergency Dept.    Test Date:  2019-10-28  Pt Name:    HALI TORRE                 Department: [facility]  MRN:        [id number]                      [facility]   Gender:     Male                         Technician: L  :        1940                   Requested By:ER TRIAGE PROTOCOL  Order #:    [id number]                    Reading MD:    Measurements  Intervals                                Axis  Rate:       68                           P:  TN:                                      QRS:        -35  QRSD:       108                          T:          21  QT:         460  QTc:        490    Interpretive Statements  ATRIAL FIBRILLATION  BORDERLINE IVCD WITH LAD  BORDERLINE PROLONGED QT INTERVAL  Compared to ECG 2018 12:43:22  Sinus bradycardia no longer present  First degree AV block no longer present  T-wave abnormality no longer present         All labs reviewed by me.    EKG Interpretation  Interpreted by me    Rhythm: normal sinus   Rate: 68  Axis: normal  Ectopy: none  Conduction: normal  ST Segments: no acute change  T Waves: no acute change  Q Waves:  none    Clinical Impression: No change when compared to prior EKG from September 25, 2018.  Sinus rhythm.  Normal rate.    RADIOLOGY  CT-RENAL COLIC EVALUATION(A/P W/O)   Final Result      1.  6 mm right UVJ stone, with moderate resultant hydronephrosis.   2.  No other acute findings.        The radiologist's interpretation of all radiological studies have been reviewed by me.    COURSE & MEDICAL DECISION MAKING  Pertinent Labs & Imaging studies reviewed. (See chart for details)    Obtained and reviewed past medical records.  Patient's last encounter was earlier this month with his primary care physician to review results of her recent upper GI.  Last ED visit was for back pain in January of this year.    5:52 AM - Patient seen and examined at bedside.  This is a previously healthy 7-year-old male who presents with rather sudden in onset, right low back pain that radiates to his right lower quadrant and groin.  On exam, it is more radicular pain and it is pain with palpation to the abdomen.  And IVs been started, labs drawn per nursing protocols.  Patient will be treated with pain medication and antiemetics.  I have additionally added CT scan to further evaluate his symptoms.      Differential diagnosis includes but not limited to: Kidney stone, appendicitis, aortic pathology, UTI.    7:03 AM patient's reevaluated at the bedside.  He is feeling better after fentanyl.  Vital signs are normal.  We discussed CT findings which indicate a right sided UVJ stone that is approximately 6 mm, with moderate hydronephrosis on the right.  Patient just gave a urine sample await those results.  Other labs include, a normal white blood cell count of 9.5.  H&H normal 18 and 55.  Normal platelet count no neutrophilic shift.  Chemistries significant for an elevated anion gap of 16, elevated glucose, 120.  LFTs are normal other than a slightly elevated alkaline phosphatase.    0802am patient re-evaluate at the bedside, pain is  controlled at this time.  Urine shows no evidence of infection.  He is afebrile.  We discussed size of the stone and positioning.  Will contact urology however, I anticipate, that patient will likely be discharged home with oral pain medication, Flomax with urology follow-up.  He reports this is how he is managed stones in the past.  Is been many years since he seen urology and his last stone, last year, he passed on his own although he thinks it was more like 3 or 4 mm.    9:02 AM discussed with Dr. De La Torre, urology on-call.  Regarding patient's presenting symptoms, CT findings, blood work.  Patient is afebrile.  Pains pretty well controlled here with fentanyl.  At this time, will plan for discharge to home with oral pain medication, Flomax.  Dr. De La Torre will get a hold of the patient for follow-up as an outpatient.    Patient is updated on plan of care.  Again he will be discharged home with oral pain medication, Flomax.  He is advised to my conversation with urology.  He is given Dr. De La Torre's information if he does not hear from him in the next day or 2 he should call to make a follow-up appointment.   checked.  No prescription filling pattern concerns.    Patient will be discharged home in stable condition.    FINAL IMPRESSION  1. Ureteral stone    2. Flank pain

## 2019-10-28 NOTE — ED TRIAGE NOTES
Ambulatory in ER for R lower abd pain that radiates to back and R testicle. Denies fall or injury. VSS RR even and nonlabored. Skin w/d. Heart attack last year. Can not swallow pills.

## 2019-10-28 NOTE — ED NOTES
All discharge instructions given to pt as well as Rx for percocet and narcotic consent obtained.  Pt advised not to drink or drive after taking narcotic medications.  Pt verbalized understanding of all discharge instructions. All lines removed prior to discharge. All questions answered.

## 2019-10-29 ENCOUNTER — TELEPHONE (OUTPATIENT)
Dept: INTERNAL MEDICINE | Facility: IMAGING CENTER | Age: 79
End: 2019-10-29

## 2019-10-29 ENCOUNTER — TELEPHONE (OUTPATIENT)
Dept: CARDIOLOGY | Facility: MEDICAL CENTER | Age: 79
End: 2019-10-29

## 2019-10-29 NOTE — TELEPHONE ENCOUNTER
Patient transmitted via loop recorder question of AF episode today for 10/28/19 lasting 2 minutes at 12am, appears to be noise with PVC's. However Patient was in ER yesterday morning. Please review to confirm, to be scanned into media.

## 2019-10-29 NOTE — TELEPHONE ENCOUNTER
Informed patient that Urology appointment made for 11/7/19 at 2:45pm with Dr Winn at [facility].  He states that he will need to reschedule as he is out of town.  He will contact [facility] [place] office

## 2019-10-30 DIAGNOSIS — I10 ESSENTIAL HYPERTENSION: ICD-10-CM

## 2019-10-30 RX ORDER — TRIAMTERENE AND HYDROCHLOROTHIAZIDE 37.5; 25 MG/1; MG/1
CAPSULE ORAL
Qty: 90 CAP | Refills: 3 | Status: SHIPPED | OUTPATIENT
Start: 2019-10-30 | End: 2020-07-12

## 2019-10-31 ENCOUNTER — TELEPHONE (OUTPATIENT)
Dept: INTERNAL MEDICINE | Facility: IMAGING CENTER | Age: 79
End: 2019-10-31

## 2019-10-31 ENCOUNTER — HOSPITAL ENCOUNTER (OUTPATIENT)
Facility: MEDICAL CENTER | Age: 79
End: 2019-10-31
Attending: INTERNAL MEDICINE
Payer: MEDICARE

## 2019-10-31 DIAGNOSIS — N20.0 RENAL STONE: ICD-10-CM

## 2019-10-31 PROCEDURE — 82365 CALCULUS SPECTROSCOPY: CPT

## 2019-11-04 ENCOUNTER — NON-PROVIDER VISIT (OUTPATIENT)
Dept: CARDIOLOGY | Facility: MEDICAL CENTER | Age: 79
End: 2019-11-04
Payer: MEDICARE

## 2019-11-04 DIAGNOSIS — Z95.818 STATUS POST PLACEMENT OF IMPLANTABLE LOOP RECORDER: ICD-10-CM

## 2019-11-04 DIAGNOSIS — I63.9 CRYPTOGENIC STROKE (HCC): ICD-10-CM

## 2019-11-04 LAB
APPEARANCE STONE: NORMAL
COMPN STONE: NORMAL
NUMBER STONE: 1
SIZE STONE: NORMAL MM
SPECIMEN WT: 59 MG

## 2019-11-04 PROCEDURE — 93298 REM INTERROG DEV EVAL SCRMS: CPT | Performed by: INTERNAL MEDICINE

## 2019-11-11 ENCOUNTER — TELEPHONE (OUTPATIENT)
Dept: CARDIOLOGY | Facility: MEDICAL CENTER | Age: 79
End: 2019-11-11

## 2019-11-11 NOTE — TELEPHONE ENCOUNTER
?? AF episode transmitted today from 11/7/19. Patient not OAC, ILR for Cryptogenic stroke. Please review to confirm. To be scanned into media for review.

## 2019-11-11 NOTE — TELEPHONE ENCOUNTER
Gave print out to SW to review.  SW doesn't feel this is AF but would like EP to review.     To EP MD

## 2019-11-14 ENCOUNTER — APPOINTMENT (RX ONLY)
Dept: URBAN - METROPOLITAN AREA CLINIC 35 | Facility: CLINIC | Age: 79
Setting detail: DERMATOLOGY
End: 2019-11-14

## 2019-11-14 DIAGNOSIS — D22 MELANOCYTIC NEVI: ICD-10-CM

## 2019-11-14 DIAGNOSIS — L91.8 OTHER HYPERTROPHIC DISORDERS OF THE SKIN: ICD-10-CM

## 2019-11-14 DIAGNOSIS — L82.1 OTHER SEBORRHEIC KERATOSIS: ICD-10-CM

## 2019-11-14 DIAGNOSIS — L57.0 ACTINIC KERATOSIS: ICD-10-CM

## 2019-11-14 DIAGNOSIS — Z71.89 OTHER SPECIFIED COUNSELING: ICD-10-CM

## 2019-11-14 DIAGNOSIS — Z85.828 PERSONAL HISTORY OF OTHER MALIGNANT NEOPLASM OF SKIN: ICD-10-CM

## 2019-11-14 DIAGNOSIS — L85.3 XEROSIS CUTIS: ICD-10-CM

## 2019-11-14 DIAGNOSIS — L81.4 OTHER MELANIN HYPERPIGMENTATION: ICD-10-CM

## 2019-11-14 PROBLEM — D22.5 MELANOCYTIC NEVI OF TRUNK: Status: ACTIVE | Noted: 2019-11-14

## 2019-11-14 PROCEDURE — ? ADDITIONAL NOTES

## 2019-11-14 PROCEDURE — ? LIQUID NITROGEN

## 2019-11-14 PROCEDURE — 99213 OFFICE O/P EST LOW 20 MIN: CPT | Mod: 25

## 2019-11-14 PROCEDURE — ? COUNSELING

## 2019-11-14 PROCEDURE — 17000 DESTRUCT PREMALG LESION: CPT

## 2019-11-14 ASSESSMENT — LOCATION DETAILED DESCRIPTION DERM
LOCATION DETAILED: LEFT PROXIMAL PRETIBIAL REGION
LOCATION DETAILED: SUPERIOR MID FOREHEAD
LOCATION DETAILED: RIGHT SUPERIOR LATERAL UPPER BACK
LOCATION DETAILED: INFERIOR THORACIC SPINE
LOCATION DETAILED: LEFT SUPERIOR MEDIAL MALAR CHEEK
LOCATION DETAILED: EPIGASTRIC SKIN
LOCATION DETAILED: RIGHT AXILLARY VAULT
LOCATION DETAILED: LEFT POSTERIOR SHOULDER
LOCATION DETAILED: RIGHT PROXIMAL PRETIBIAL REGION
LOCATION DETAILED: RIGHT SUBMANDIBULAR AREA

## 2019-11-14 ASSESSMENT — LOCATION SIMPLE DESCRIPTION DERM
LOCATION SIMPLE: RIGHT UPPER BACK
LOCATION SIMPLE: RIGHT AXILLARY VAULT
LOCATION SIMPLE: SUPERIOR FOREHEAD
LOCATION SIMPLE: LEFT SHOULDER
LOCATION SIMPLE: UPPER BACK
LOCATION SIMPLE: RIGHT PRETIBIAL REGION
LOCATION SIMPLE: LEFT PRETIBIAL REGION
LOCATION SIMPLE: ABDOMEN
LOCATION SIMPLE: LEFT CHEEK
LOCATION SIMPLE: RIGHT SUBMANDIBULAR AREA

## 2019-11-14 ASSESSMENT — LOCATION ZONE DERM
LOCATION ZONE: AXILLAE
LOCATION ZONE: FACE
LOCATION ZONE: ARM
LOCATION ZONE: LEG
LOCATION ZONE: TRUNK

## 2019-11-24 DIAGNOSIS — I49.3 VENTRICULAR ECTOPY: ICD-10-CM

## 2019-11-24 DIAGNOSIS — I63.9 CRYPTOGENIC STROKE (HCC): ICD-10-CM

## 2019-11-25 ENCOUNTER — PATIENT MESSAGE (OUTPATIENT)
Dept: CARDIOLOGY | Facility: MEDICAL CENTER | Age: 79
End: 2019-11-25

## 2019-11-26 ENCOUNTER — RX ONLY (OUTPATIENT)
Age: 79
Setting detail: RX ONLY
End: 2019-11-26

## 2019-12-12 ENCOUNTER — NON-PROVIDER VISIT (OUTPATIENT)
Dept: CARDIOLOGY | Facility: MEDICAL CENTER | Age: 79
End: 2019-12-12
Payer: MEDICARE

## 2019-12-12 DIAGNOSIS — Z95.818 STATUS POST PLACEMENT OF IMPLANTABLE LOOP RECORDER: ICD-10-CM

## 2019-12-12 DIAGNOSIS — I63.9 CRYPTOGENIC STROKE (HCC): ICD-10-CM

## 2019-12-12 PROCEDURE — 93298 REM INTERROG DEV EVAL SCRMS: CPT | Performed by: INTERNAL MEDICINE

## 2019-12-20 ENCOUNTER — OFFICE VISIT (OUTPATIENT)
Dept: INTERNAL MEDICINE | Facility: IMAGING CENTER | Age: 79
End: 2019-12-20
Payer: MEDICARE

## 2019-12-20 VITALS
HEIGHT: 72 IN | SYSTOLIC BLOOD PRESSURE: 121 MMHG | OXYGEN SATURATION: 92 % | BODY MASS INDEX: 29.56 KG/M2 | TEMPERATURE: 97.9 F | RESPIRATION RATE: 17 BRPM | WEIGHT: 218.26 LBS | HEART RATE: 62 BPM | DIASTOLIC BLOOD PRESSURE: 80 MMHG

## 2019-12-20 DIAGNOSIS — H61.22 IMPACTED CERUMEN OF LEFT EAR: ICD-10-CM

## 2019-12-20 DIAGNOSIS — R42 VERTIGO: ICD-10-CM

## 2019-12-20 PROBLEM — M54.16 LUMBAR RADICULOPATHY: Status: ACTIVE | Noted: 2019-03-26

## 2019-12-20 PROCEDURE — 99213 OFFICE O/P EST LOW 20 MIN: CPT | Mod: 25 | Performed by: INTERNAL MEDICINE

## 2019-12-20 PROCEDURE — 69210 REMOVE IMPACTED EAR WAX UNI: CPT | Performed by: INTERNAL MEDICINE

## 2019-12-20 RX ORDER — MECLIZINE HYDROCHLORIDE 25 MG/1
25 TABLET ORAL 3 TIMES DAILY PRN
Qty: 30 TAB | Refills: 0 | Status: SHIPPED | OUTPATIENT
Start: 2019-12-20 | End: 2020-07-12

## 2019-12-20 NOTE — PROGRESS NOTES
Chief Complaint   Patient presents with   • Dizziness       HISTORY OF THE PRESENT ILLNESS: Patient is a 79 y.o. male.     Patient comes in complaining of dizziness.  Symptoms started this morning.  Symptoms are worse with head movement or positional change.  He was fishing yesterday and had no issues.  He is also noticed muffled hearing and congestion/fullness in the left ear.  No discharge.  No fever chills.    Allergies: Morphine    Current Outpatient Medications Ordered in Epic   Medication Sig Dispense Refill   • triamterene/hctz (MAXZIDE-25/DYAZIDE) 37.5-25 MG Cap TAKE 1 CAPSULE ORALLY EVERY MORNING 90 Cap 3   • tamsulosin (FLOMAX) 0.4 MG capsule Take 1 Cap by mouth every day. 7 Cap 0   • ZYLET 0.5-0.3 % Suspension INSTILL 1 DROP INTO LEFT EYE 4 TIMES A DAY AS DIRECTED  3   • losartan (COZAAR) 50 MG Tab Take 1 Tab by mouth every day. 100 Tab 2   • allopurinol (ZYLOPRIM) 100 MG Tab Take 1 Tab by mouth 2 times a day. 180 Tab 1   • atorvastatin (LIPITOR) 40 MG Tab Take 1 Tab by mouth every evening. 100 Tab 3   • clopidogrel (PLAVIX) 75 MG Tab Take 1 Tab by mouth every day. 90 Tab 3     No current Epic-ordered facility-administered medications on file.        Past medical history, social history and family history were reviewed from chart today    Review of systems: Per HPI.    Denies headache, chest pain, fever, chills, diarrhea, constipation, abdominal pain, palpitations, depression   All others negative.     Exam: /80 (BP Location: Left arm, Patient Position: Sitting, BP Cuff Size: Adult)   Pulse 62   Temp 36.6 °C (97.9 °F) (Temporal)   Resp 17   Ht 1.829 m (6')   Wt 99 kg (218 lb 4.1 oz)   SpO2 92%   General: Well-appearing. Well-developed. No signs of distress.  HEENT: Grossly normal. Oral cavity is pink and moist.  The left ear canal is obstructed with yellow, soft cerumen.  After lavage the tympanic membrane was well visualized.  The canal was normal in appearance.  Neck: Supple without JVD or  bruit.  Pulmonary: Clear with good breath sounds. Normal effort.  Cardiovascular: Regular. Carotid and radial pulses are intact.  Abdomen: Soft, nontender, nondistended. Spleen and liver are not enlarged.  Neurologic: Cranial nerves II through XII are grossly normal, alert and oriented x3.  No nystagmus with head movement.  Testing for peripheral and central vertigo was negative.  His symptoms had improved after lavage.    It was discussed with the patient that they have a cerumen impaction. The risks and benefits of removal were discussed. Specifically we discussed the benefits of reduced risk of infection, improved visualization of the landmarks and to improve discomfort due to  impaction. Risk were discussed and included but not limited to infection, bleeding, pain and possibly tympanic membrane perforation. A large amount of yellow, waxy, soft cerumen was removed from the left ear canal.. The ear was lavaged with water and forceps were used to remove the plug. No complications from some impaction removal. Post cerumen impaction care was discussed. Encouraged regular cleaning but to avoid deep penetration with foreign objects such as a Q-tip.        Diagnosis:  1. Vertigo     2. Impacted cerumen of left ear         Vertigo, presumably due to impaction of ear.  Symptoms improved after removal of the wax however did not completely resolve.  I cannot exclude some component of benign positional vertigo however his testing was negative.    Recommended monitoring for resolution post cerumen extraction.  Meclizine if needed.

## 2019-12-30 RX ORDER — ALLOPURINOL 100 MG/1
TABLET ORAL
Qty: 180 TAB | Refills: 2 | Status: SHIPPED | OUTPATIENT
Start: 2019-12-30 | End: 2020-07-12

## 2020-01-08 ENCOUNTER — HOSPITAL ENCOUNTER (OUTPATIENT)
Dept: LAB | Facility: MEDICAL CENTER | Age: 80
End: 2020-01-08
Attending: UROLOGY
Payer: MEDICARE

## 2020-01-08 PROCEDURE — 82507 ASSAY OF CITRATE: CPT

## 2020-01-08 PROCEDURE — 82340 ASSAY OF CALCIUM IN URINE: CPT

## 2020-01-08 PROCEDURE — 83735 ASSAY OF MAGNESIUM: CPT

## 2020-01-08 PROCEDURE — 84560 ASSAY OF URINE/URIC ACID: CPT

## 2020-01-08 PROCEDURE — 84105 ASSAY OF URINE PHOSPHORUS: CPT

## 2020-01-08 PROCEDURE — 84133 ASSAY OF URINE POTASSIUM: CPT

## 2020-01-08 PROCEDURE — 82131 AMINO ACIDS SINGLE QUANT: CPT

## 2020-01-08 PROCEDURE — 83986 ASSAY PH BODY FLUID NOS: CPT

## 2020-01-08 PROCEDURE — 84392 ASSAY OF URINE SULFATE: CPT

## 2020-01-08 PROCEDURE — 82570 ASSAY OF URINE CREATININE: CPT

## 2020-01-08 PROCEDURE — 82436 ASSAY OF URINE CHLORIDE: CPT

## 2020-01-08 PROCEDURE — 83945 ASSAY OF OXALATE: CPT

## 2020-01-08 PROCEDURE — 84300 ASSAY OF URINE SODIUM: CPT

## 2020-01-11 LAB
COLLECT DURATION TIME SPEC: 24 HRS
CREAT 24H UR-MCNC: 127 MG/DL
CREAT 24H UR-MRATE: 1143 MG/D (ref 800–2100)
SODIUM 24H UR-SCNC: 119 MMOL/L
SODIUM 24H UR-SRATE: 107 MMOL/D (ref 51–286)
SPECIMEN VOL ?TM UR: 900 ML

## 2020-01-13 ENCOUNTER — NON-PROVIDER VISIT (OUTPATIENT)
Dept: CARDIOLOGY | Facility: MEDICAL CENTER | Age: 80
End: 2020-01-13
Payer: MEDICARE

## 2020-01-13 DIAGNOSIS — Z95.818 STATUS POST PLACEMENT OF IMPLANTABLE LOOP RECORDER: ICD-10-CM

## 2020-01-13 DIAGNOSIS — I63.9 CRYPTOGENIC STROKE (HCC): ICD-10-CM

## 2020-01-13 LAB
COLLECT DURATION TIME SPEC: 24 HRS
CREAT UR-MCNC: 135 MG/DL
CYSTINE 24H UR-MCNC: 1.04 MG/DL
CYSTINE 24H UR-MRATE: 9 MG/D
CYSTINE/CREAT 24H UR-RTO: 32 UMOL/G CRT
TOTAL VOLUME 1105: 900 ML

## 2020-01-13 PROCEDURE — 93298 REM INTERROG DEV EVAL SCRMS: CPT | Performed by: INTERNAL MEDICINE

## 2020-01-14 LAB
CA H2 PHOS DIHYD 24H UR-MRATE: 0.25
CALCIUM 24H UR-MCNC: 6.5 MG/DL
CALCIUM 24H UR-MRATE: 1.81 MG/(24.H)
CALCIUM 24H UR-MRATE: 58 MG/D
CAOX 24H UR-MRATE: 3.17
CHLORIDE 24H UR-SCNC: 115 MMOL/L
CHLORIDE 24H UR-SRATE: 104 MMOL/D (ref 140–250)
CITRATE 24H UR-MCNC: 450 MG/L
CITRATE 24H UR-MRATE: 405 MG/D (ref 320–1240)
EER SUPERSAT PROFILE UR Q2098: ABNORMAL
MAGNESIUM 24H UR-MCNC: 5.7 MG/DL
MAGNESIUM 24H UR-MRATE: 51 MG/D (ref 12–199)
OXALATE 24H UR-MCNC: 25 MG/L
OXALATE 24H UR-MRATE: 22 MG/D (ref 16–49)
PH UR: 5.27 [PH] (ref 5–7.5)
PHOSPHATE 24H UR-MCNC: 82 MG/DL
PHOSPHATE 24H UR-MRATE: 738 MG/D (ref 400–1300)
POTASSIUM 24H UR-SCNC: 65 MMOL/L
POTASSIUM 24H UR-SRATE: 58 MMOL/D (ref 25–125)
REF LAB TEST RESULTS: ABNORMAL
SULFATE 24H UR-SRATE: 22 MMOL/D (ref 6–30)
SULFATE UR-SCNC: 24 MMOL/L
URATE 24H UR-MCNC: 37.7 MG/DL
URATE 24H UR-MRATE: 339 MG/D (ref 250–750)

## 2020-01-17 ENCOUNTER — TELEPHONE (OUTPATIENT)
Dept: CARDIOLOGY | Facility: MEDICAL CENTER | Age: 80
End: 2020-01-17

## 2020-01-17 NOTE — TELEPHONE ENCOUNTER
Patient only on antiplatelet.     Will discuss further with SW after review in media.  Previous remote checks suspicious for afib reviewed by BORIS and EP and were found to not be afib.     Patient with hx of cryptogenic stroke      To BORIS

## 2020-01-17 NOTE — TELEPHONE ENCOUNTER
Remote Transmission 1/17/2020  ? AF episode on 1/16/2020 @ 10:46pm lasting 2 minutes ~ 80 bpm   Appears to be Sinus with PVC's  To be scanned into media for review.

## 2020-01-19 DIAGNOSIS — E78.2 HYPERLIPEMIA, MIXED: ICD-10-CM

## 2020-01-23 RX ORDER — ATORVASTATIN CALCIUM 40 MG/1
40 TABLET, FILM COATED ORAL DAILY
Qty: 100 TAB | Refills: 1 | Status: SHIPPED | OUTPATIENT
Start: 2020-01-23 | End: 2020-08-21

## 2020-02-03 ENCOUNTER — TELEPHONE (OUTPATIENT)
Dept: CARDIOLOGY | Facility: MEDICAL CENTER | Age: 80
End: 2020-02-03

## 2020-02-03 NOTE — TELEPHONE ENCOUNTER
Remote Transmission 2/3/2020  Symptom episode 1/31/2020 @ 10:18pm  Appears to be SR with Trigeminal PVC's   To be scanned into media for review.

## 2020-02-04 ENCOUNTER — OFFICE VISIT (OUTPATIENT)
Dept: INTERNAL MEDICINE | Facility: IMAGING CENTER | Age: 80
End: 2020-02-04
Payer: MEDICARE

## 2020-02-04 VITALS
RESPIRATION RATE: 12 BRPM | HEART RATE: 60 BPM | WEIGHT: 222 LBS | HEIGHT: 72 IN | BODY MASS INDEX: 30.07 KG/M2 | SYSTOLIC BLOOD PRESSURE: 138 MMHG | OXYGEN SATURATION: 96 % | DIASTOLIC BLOOD PRESSURE: 78 MMHG | TEMPERATURE: 98.3 F

## 2020-02-04 DIAGNOSIS — R29.898 WEAKNESS OF LEFT LOWER EXTREMITY: ICD-10-CM

## 2020-02-04 DIAGNOSIS — M25.552 LEFT HIP PAIN: ICD-10-CM

## 2020-02-04 PROCEDURE — 99213 OFFICE O/P EST LOW 20 MIN: CPT | Performed by: INTERNAL MEDICINE

## 2020-02-04 NOTE — PROGRESS NOTES
Chief Complaint   Patient presents with   • Hip Pain       HISTORY OF THE PRESENT ILLNESS: Patient is a 79 y.o. male.     Patient comes in with complaint of left hip pain.  Symptoms began approximately 3 weeks ago.  Symptoms are on the left lateral hip.  He has noticed discomfort when he first stands.  No symptoms when he is sitting or lying down.  He is also noticed weakness when trying to do leg lifts.  No previous imaging of the hip. He had a lumbar spine x-ray in December 2018 with diffuse changes, bilateral laminectomy at L4 and L5. Grade one spondylolisthesis at L5-S1     Laboratory-last laboratory was October 2019. His hemoglobin was elevated at 18.2 at that time. His creatinine was 1.27. These labs are done at the time of kidney stone.     Hypertension-stable on triamterene/hydrochlorothiazide and losartan.                                                                                                    Hyperlipidemia-stable on Lipitor. LDL cholesterol was 48 in September 2019.       Allergies: Morphine    Current Outpatient Medications Ordered in Epic   Medication Sig Dispense Refill   • atorvastatin (LIPITOR) 40 MG Tab Take 1 Tab by mouth every day. 100 Tab 1   • allopurinol (ZYLOPRIM) 100 MG Tab TAKE 1 TABLET BY MOUTH TWICE A  Tab 2   • meclizine (ANTIVERT) 25 MG Tab Take 1 Tab by mouth 3 times a day as needed for Vertigo. 30 Tab 0   • triamterene/hctz (MAXZIDE-25/DYAZIDE) 37.5-25 MG Cap TAKE 1 CAPSULE ORALLY EVERY MORNING 90 Cap 3   • tamsulosin (FLOMAX) 0.4 MG capsule Take 1 Cap by mouth every day. 7 Cap 0   • ZYLET 0.5-0.3 % Suspension INSTILL 1 DROP INTO LEFT EYE 4 TIMES A DAY AS DIRECTED  3   • losartan (COZAAR) 50 MG Tab Take 1 Tab by mouth every day. 100 Tab 2   • clopidogrel (PLAVIX) 75 MG Tab Take 1 Tab by mouth every day. 90 Tab 3     No current Epic-ordered facility-administered medications on file.        Past medical history, social history and family history were reviewed from chart  today    Review of systems: Per HPI.    All others negative.     Exam: /78 (BP Location: Left arm, Patient Position: Sitting, BP Cuff Size: Adult)   Pulse 60   Temp 36.8 °C (98.3 °F) (Temporal)   Resp 12   Ht 1.829 m (6')   Wt 100.7 kg (222 lb)   SpO2 96%   General: Well-appearing. Well-developed. No signs of distress.  HEENT: Grossly normal. Oral cavity is pink and moist.  Neck: Supple without JVD or bruit.  Pulmonary: Clear with good breath sounds. Normal effort.  Cardiovascular: Regular. Carotid and radial pulses are intact.  Abdomen: Soft, nontender, nondistended. Spleen and liver are not enlarged.  Neurologic: Cranial nerves II through XII are grossly normal, alert and oriented x3  Extremities: Weakness with leg lift to approximately 20 degrees.  Symptoms resolved when he is lifted above 45 degrees.  Normal internal and external rotation of the hip.  Tenderness over the left trochanteric bursa.      Diagnosis:  1. Left hip pain     2. Weakness of left lower extremity         Left leg/hip pain and weakness.  I suspect either spinal etiology or possible trochanteric bursitis.  I think bursitis is more likely based on exam.  I will refer him to orthopedics for evaluation and possible injection.  We discussed he may need physical therapy.  If the evaluation suggest spinal etiology refer to physiatry

## 2020-02-04 NOTE — TELEPHONE ENCOUNTER
History  Chief Complaint   Patient presents with    Chest Pain     Chest pain started yesterday  Pt states that it happens when she breaths in and feels like alot of pressure on the center chest       79year-old female, history of paroxysmal atrial fibrillation, history of diastolic heart failure, history of arterial disease, stroke, status post right nephrectomy for kidney cancer, presenting to the emergency department for evaluation of shortness of breath and chest pressure  Patient states that the shortness of breath seem to have started yesterday  The patient states that yesterday she had pleuritic midsternal chest discomfort  This morning the patient awoke with precordial chest pressure  No associated diaphoresis, nausea, vomiting, diarrhea  No urinary symptoms  No fever  No cough  Prior to Admission Medications   Prescriptions Last Dose Informant Patient Reported? Taking?    Calcium Citrate-Vitamin D (CALCIUM + D PO) 8/8/2018 at Unknown time Self Yes Yes   Sig: Take 1 tablet by mouth 2 (two) times a day   LEUCOVORIN CALCIUM PO Past Week at Unknown time Self Yes Yes   Sig: Take 10 mg by mouth once a week   Multiple Vitamin (MULTIVITAMINS PO) 8/8/2018 at Unknown time Self Yes Yes   Sig: Take 1 tablet by mouth daily   XARELTO 20 MG tablet 8/8/2018 at Unknown time Self Yes Yes   acetaminophen (TYLENOL) 325 mg tablet 8/9/2018 at Unknown time Self Yes Yes   Sig: Take 650 mg by mouth every 6 (six) hours as needed for mild pain   cephalexin (KEFLEX) 500 mg capsule 8/8/2018 at Unknown time  No Yes   Sig: Take 1 capsule (500 mg total) by mouth every 8 (eight) hours for 7 days   cyanocobalamin (VITAMIN B-12) 100 mcg tablet 8/8/2018 at Unknown time Self Yes Yes   Sig: Take by mouth   docusate sodium (COLACE) 100 mg capsule 8/8/2018 at Unknown time Self No Yes   Sig: Take 1 capsule (100 mg total) by mouth 2 (two) times a day for 60 doses   furosemide (LASIX) 20 mg tablet 8/9/2018 at Unknown time  No Noted   Yes   Sig: TAKE 1 TABLET EVERY DAY   gabapentin (NEURONTIN) 100 mg capsule 8/8/2018 at Unknown time Self No Yes   Sig: Take 1 capsule (100 mg total) by mouth 3 (three) times a day Take 1 cap in the am, 1 cap in the afternoon, and 3 cap at bedtime   hydroxychloroquine (PLAQUENIL) 200 mg tablet 8/8/2018 at Unknown time  Yes Yes   Sig: Take 200 mg by mouth 2 (two) times a day with meals   levothyroxine 75 mcg tablet 8/9/2018 at Unknown time Self Yes Yes   Sig: Take 75 mcg by mouth daily   losartan (COZAAR) 100 MG tablet 8/8/2018 at Unknown time Self Yes Yes   Sig: Take 100 mg by mouth daily   methotrexate 2 5 mg tablet Past Week at Unknown time Self Yes Yes   omeprazole (PriLOSEC) 20 mg delayed release capsule 8/9/2018 at Unknown time Self No Yes   Sig: TAKE 1 CAPSULE DAILY AS NEEDED FOR STOMACH ACID   pravastatin (PRAVACHOL) 80 mg tablet 8/8/2018 at Unknown time Self No Yes   Sig: TAKE 1 TABLET EVERY DAY   rOPINIRole (REQUIP) 0 5 mg tablet 8/9/2018 at Unknown time Self Yes Yes   Sig: Take 0 5 mg by mouth 2 (two) times a day        Facility-Administered Medications: None       Past Medical History:   Diagnosis Date    Anemia     Arthritis     rheumatoid    Benign essential hypertension     Cataract     Chronic cystitis     CPAP (continuous positive airway pressure) dependence     Diverticulitis of colon     Early satiety     GERD (gastroesophageal reflux disease)     Gout     Hearing aid worn     bilateral    Hearing loss     Hemorrhoids     Hiatal hernia     History of colonic polyps     Hyperlipidemia     Hypothyroidism     Left breast mass     Microhematuria     Migraine     occular    Neuropathy     lower and upper extermities    Overactive bladder     [facility]neum[facility] left lower [facility] due to [facility] organism [facility]     RA (rheumatoid arthritis) (HCC)     Sleep apnea     uses cpap    Stroke [facility] )     5/2017    Type 2 diabetes mellitus ([facility])     Urinary frequency     Urinary urgency     [facility]rothelial cancer [facility] ) 04/23/2018    Use of cane as ambulatory aid        Past Surgical History:   Procedure Laterality Date    APPENDECTOMY      ARTHROSCOPY WRIST Right     with release of transverse carpal ligament     BLADDER SURGERY      BLADDER SURGERY      BREAST SURGERY      left breast    BUNIONECTOMY Bilateral     CATARACT EXTRACTION Bilateral     CHOLECYSTECTOMY      COLONOSCOPY      CYSTOSCOPY      EGD AND COLONOSCOPY N/A 12/20/2017    Procedure: EGD AND COLONOSCOPY;  Surgeon: Raheel Reardon MD;  [facility]: [facility]; Service: Gastroenterology    ESOPHAGOGASTRODUODENOSCOPY      diagnostic    FOREARM SURGERY Right     fracture repair    HYSTERECTOMY      JOINT REPLACEMENT      KNEE ARTHROSCOPY Left     KNEE SURGERY Left     meniscus tear    NOSE SURGERY      DE CYSTO/URETERO W/LITHOTRIPSY &INDWELL STENT INSRT Right 2/28/2018    Procedure: CYSTOSCOPY RIGHT URETEROSCOPY, RIGHT RETROGRADE PYELOGRAM AND INSERTION  RIGHT STENT URETERAL, RIGHT RENAL PELVIC WASHING FOR CYTOLOGY;  Surgeon: Twyla Ortez MD;  [facility];  Service: Urology    DE NEPHRECTOMY, W/PART  URETECTOMY Right 4/23/2018    Procedure: ROBATIC ASSISTED NEPHRO-URETERECTOMY WITH BLADDER CUFF EXCISION;  Surgeon: Yasmeen Romero MD;  [facility];  Service: Urology    REPLACEMENT TOTAL KNEE BILATERAL Bilateral     URETEROSCOPY Right 3/20/2018    Procedure: CYSTOSCOPY, RETROGRADE PYELOGRAM, URETEROSCOPY, BIOPSY, BRUSH, FULGURATION, STENT PLACEMENT, BLADDER BIOPSY WITH FULGURATION;  Surgeon: Yasmeen Romero MD;  [facility];  Service: Urology       Family History   Problem Relation Age of Onset    Other Mother         epilepsy    Early death Mother     Glaucoma Father     Stroke Father         silent    Other Brother         cardiac disorder     I have reviewed and agree with the history as documented      Social History   Substance Use Topics    Smoking status: Former Smoker    Smokeless tobacco: Never Used      Comment: stopped smoking in the distant past, never smoker (as per [employer])     Alcohol use No      Comment: socially/ seldom        Review of Systems   Constitutional: Negative for diaphoresis and fever  HENT: Negative for congestion and drooling  Eyes: Negative for redness and visual disturbance  Respiratory: Positive for shortness of breath  Negative for cough  Cardiovascular: Positive for chest pain  Negative for leg swelling  Gastrointestinal: Negative for abdominal pain, diarrhea and vomiting  Genitourinary: Negative for difficulty urinating and dysuria  Musculoskeletal: Negative for neck pain and neck stiffness  Skin: Positive for rash  Negative for color change  Neurological: Negative for speech difficulty and headaches  All other systems reviewed and are negative  Physical Exam  Physical Exam   Constitutional: She is oriented to person, place, and time  She appears well-developed and well-nourished  HENT:   Head: Normocephalic and atraumatic  Eyes: Conjunctivae and EOM are normal  Pupils are equal, round, and reactive to light  Neck: Normal range of motion  Neck supple  Cardiovascular: Normal rate and regular rhythm  Pulmonary/Chest: Effort normal and breath sounds normal  No respiratory distress  She has no wheezes  She has no rales  Abdominal: Soft  Bowel sounds are normal  She exhibits no distension  There is no tenderness  Musculoskeletal: She exhibits edema  She exhibits no deformity  Neurological: She is alert and oriented to person, place, and time  She has normal strength  No cranial nerve deficit or sensory deficit     Skin:            Vital Signs  ED Triage Vitals   Temperature Pulse Respirations Blood Pressure SpO2   08/09/18 0912 08/09/18 0845 08/09/18 0845 08/09/18 0845 08/09/18 0845   98 2 °F (36 8 °C) 78 20 (!) 172/69 97 %      Temp Source Heart Rate Source Patient Position - Orthostatic VS BP Location FiO2 (%) 08/09/18 0912 08/09/18 0930 08/09/18 1012 08/09/18 1012 --   Oral Monitor Lying Right arm       Pain Score       08/09/18 0845       6           Vitals:    08/09/18 0845 08/09/18 0930 08/09/18 1012   BP: (!) 172/69 (!) 182/77 161/72   Pulse: 78 76 81   Patient Position - Orthostatic VS:   Lying       Visual Acuity      ED Medications  Medications   nitroGLYcerin (TRIDIL) 50 mg in 250 mL infusion (premix) (50 mcg/min Intravenous New Bag 8/9/18 1005)   furosemide (LASIX) injection 20 mg (20 mg Intravenous Given 8/9/18 1002)       Diagnostic Studies  Results Reviewed     Procedure Component Value Units Date/Time    BNP [[id number]]  (Abnormal) Collected:  08/09/18 0842    Lab Status:  Final result Specimen:  Blood from Arm, Left Updated:  08/09/18 1001     NT-proBNP 2,031 (H) pg/mL     Troponin I [[id number]]  (Normal) Collected:  08/09/18 0842    Lab Status:  Final result Specimen:  Blood from Arm, Left Updated:  08/09/18 0913     Troponin I <0 02 ng/mL     Comprehensive metabolic panel [[id number]]  (Abnormal) Collected:  08/09/18 0842    Lab Status:  Final result Specimen:  Blood from Arm, Left Updated:  08/09/18 0912     Sodium 136 mmol/L      Potassium 3 8 mmol/L      Chloride 104 mmol/L      CO2 27 mmol/L      Anion Gap 5 mmol/L      BUN 19 mg/dL      Creatinine 1 45 (H) mg/dL      Glucose 119 mg/dL      Calcium 8 9 mg/dL      AST 14 U/L      ALT 17 U/L      Alkaline Phosphatase 113 U/L      Total Protein 7 2 g/dL      Albumin 3 3 (L) g/dL      Total Bilirubin 0 76 mg/dL      eGFR 34 ml/min/1 73sq m     Narrative:         National Kidney Disease Education Program recommendations are as follows:  GFR calculation is accurate only with a steady state creatinine  Chronic Kidney disease less than 60 ml/min/1 73 sq  meters  Kidney failure less than 15 ml/min/1 73 sq  meters      CBC and differential [[id number]]  (Abnormal) Collected:  08/09/18 0842    Lab Status:  Final result Specimen:  Blood from Arm, Left Updated: 08/09/18 0857     WBC 7 54 Thousand/uL      RBC 2 64 (L) Million/uL      Hemoglobin 8 4 (L) g/dL      Hematocrit 25 8 (L) %      MCV 98 fL      MCH 31 8 pg      MCHC 32 6 g/dL      RDW 16 3 (H) %      MPV 9 1 fL      Platelets 155 Thousands/uL      nRBC 0 /100 WBCs      Neutrophils Relative 65 %      Immat GRANS % 2 %      Lymphocytes Relative 16 %      Monocytes Relative 13 (H) %      Eosinophils Relative 4 %      Basophils Relative 0 %      Neutrophils Absolute 4 97 Thousands/µL      Immature Grans Absolute 0 11 Thousand/uL      Lymphocytes Absolute 1 22 Thousands/µL      Monocytes Absolute 0 95 Thousand/µL      Eosinophils Absolute 0 28 Thousand/µL      Basophils Absolute 0 01 Thousands/µL                  X-ray chest 2 views   ED Interpretation by Gianluca Larson MD (01/63 [id number])   Mild chf                 Procedures  ECG 12 Lead Documentation  Date/Time: 8/9/2018 8:41 AM  Performed by: Coco Brown  Authorized by: Saroj CHAMORRO     ECG reviewed by me, the ED Provider: yes    Patient location:  ED  Previous ECG:     Previous ECG:  Compared to current    Similarity:  Changes noted  Interpretation:     Interpretation: abnormal    Rate:     ECG rate assessment: normal    Rhythm:     Rhythm: atrial fibrillation    Ectopy:     Ectopy: none    QRS:     QRS axis:  Normal  Conduction:     Conduction: normal    ST segments:     ST segments:  Normal  T waves:     T waves: normal             Phone Contacts  ED Phone Contact    ED Course         HEART Risk Score      Most Recent Value   History  1 Filed at: 08/09/2018 0838   ECG  0 Filed at: 08/09/2018 0838   Age  2 Filed at: 08/09/2018 0838   Risk Factors  2 Filed at: 08/09/2018 0838   Troponin  0 Filed at: 08/09/2018 0838   Heart Score Risk Calculator   History  1 Filed at: 08/09/2018 0838   ECG  0 Filed at: 08/09/2018 3835   Age  2 Filed at: 08/09/2018 1245   Risk Factors  2 Filed at: 08/09/2018 0838   Troponin  0 Filed at: 08/09/2018 1818   HEART Score 5 Filed at: 08/09/2018 7388   HEART Score  5 Filed at: 08/09/2018 0825        Identification of Seniors at Risk      Most Recent Value   (ISAR) Identification of Seniors at Risk   Before the illness or injury that brought you to the Emergency, did you need someone to help you on a regular basis? 0 Filed at: 08/09/2018 9968   In the last 24 hours, have you needed more help than usual?  0 Filed at: 08/09/2018 6151   Have you been hospitalized for one or more nights during the past 6 months? 0 Filed at: 08/09/2018 9506   In general, do you see well?  0 Filed at: 08/09/2018 0981   In general, do you have serious problems with your memory? 0 Filed at: 08/09/2018 2877   Do you take more than three different medications every day?  0 Filed at: 08/09/2018 1635   ISAR Score  0 Filed at: 08/09/2018 1749                          MDM  Number of Diagnoses or Management Options  Acute on chronic diastolic heart failure [facility]):   Chest pressure:   Diagnosis management comments:   54-year-old female, history of congestive heart failure presenting with shortness of breath, precordial chest pressure  Patient has a heart score of 5  Chest x-ray and lab work supports congestive heart failure  The patient started on nitro drip  Patient administered IV Lasix  The patient presented with a condition in which there was a high probability of imminent or life-threatening deterioration, and critical care services (excluding separately billable procedures) totalled 30-74 minutes (Initiating IV Lasix and IV nitroglycerin drip for congestive heart failure  Alina Loya )          Disposition  Final diagnoses:   Acute on chronic diastolic heart failure ([address] )   Chest pressure     Time reflects when diagnosis was documented in both MDM as applicable and the Disposition within this note     Time User Action Codes Description Comment    8/9/2018  9:54 AM Ector Osler Add [P60 00] Diastolic heart failure ([facility] )     8/9/2018  9:55 AM Ector Osler Remove [R76 81] Diastolic heart failure ([facility] )     8/9/2018  9:55 AM Beverley Chavira [I50 33] Acute on chronic diastolic heart failure ([facility] )     8/9/2018  9:55 AM Beverley Chavira [R07 89] Chest pressure       ED Disposition     ED Disposition Condition Comment    Admit  Case was discussed with Dr Funmilayo Cantu and the patient's admission status was agreed to be Inpatient to the service of Dr Funmilayo Cantu  Follow-up Information    None         Patient's Medications   Discharge Prescriptions    No medications on file     No discharge procedures on file      ED Provider  Electronically Signed by           Jayne Camacho MD  08/09/18 1037

## 2020-02-14 ENCOUNTER — NON-PROVIDER VISIT (OUTPATIENT)
Dept: CARDIOLOGY | Facility: MEDICAL CENTER | Age: 80
End: 2020-02-14
Payer: MEDICARE

## 2020-02-14 DIAGNOSIS — I63.9 CRYPTOGENIC STROKE (HCC): ICD-10-CM

## 2020-02-14 DIAGNOSIS — Z95.818 STATUS POST PLACEMENT OF IMPLANTABLE LOOP RECORDER: ICD-10-CM

## 2020-02-14 PROCEDURE — 93298 REM INTERROG DEV EVAL SCRMS: CPT | Performed by: INTERNAL MEDICINE

## 2020-02-18 ENCOUNTER — TELEPHONE (OUTPATIENT)
Dept: CARDIOLOGY | Facility: MEDICAL CENTER | Age: 80
End: 2020-02-18

## 2020-02-18 NOTE — TELEPHONE ENCOUNTER
Remote Transmission 2/18/2020;   ? AF episode 2/14/2020 @ 8:58 pm lasting 8 minutes.   Rhythm irregular, Appears to have frequent ectopy.   To be scanned into media for review.

## 2020-02-20 ENCOUNTER — NON-PROVIDER VISIT (OUTPATIENT)
Dept: CARDIOLOGY | Facility: MEDICAL CENTER | Age: 80
End: 2020-02-20
Payer: MEDICARE

## 2020-03-02 ENCOUNTER — OFFICE VISIT (OUTPATIENT)
Dept: CARDIOLOGY | Facility: MEDICAL CENTER | Age: 80
End: 2020-03-02
Payer: MEDICARE

## 2020-03-02 VITALS
HEIGHT: 72 IN | DIASTOLIC BLOOD PRESSURE: 68 MMHG | SYSTOLIC BLOOD PRESSURE: 124 MMHG | BODY MASS INDEX: 30.12 KG/M2 | OXYGEN SATURATION: 95 % | HEART RATE: 64 BPM | WEIGHT: 222.4 LBS

## 2020-03-02 DIAGNOSIS — R94.31 ABNORMAL EKG: ICD-10-CM

## 2020-03-02 DIAGNOSIS — I49.9 IRREGULAR HEART RHYTHM: ICD-10-CM

## 2020-03-02 DIAGNOSIS — Z95.818 STATUS POST PLACEMENT OF IMPLANTABLE LOOP RECORDER: ICD-10-CM

## 2020-03-02 DIAGNOSIS — E78.5 HYPERLIPIDEMIA LDL GOAL <70: ICD-10-CM

## 2020-03-02 DIAGNOSIS — I10 ESSENTIAL HYPERTENSION: ICD-10-CM

## 2020-03-02 DIAGNOSIS — I63.9 CRYPTOGENIC STROKE (HCC): ICD-10-CM

## 2020-03-02 DIAGNOSIS — I25.10 CORONARY ARTERY DISEASE INVOLVING NATIVE CORONARY ARTERY OF NATIVE HEART WITHOUT ANGINA PECTORIS: ICD-10-CM

## 2020-03-02 DIAGNOSIS — I49.3 VENTRICULAR ECTOPY: ICD-10-CM

## 2020-03-02 PROCEDURE — 99214 OFFICE O/P EST MOD 30 MIN: CPT | Performed by: INTERNAL MEDICINE

## 2020-03-02 PROCEDURE — 93000 ELECTROCARDIOGRAM COMPLETE: CPT | Performed by: INTERNAL MEDICINE

## 2020-03-02 ASSESSMENT — ENCOUNTER SYMPTOMS
PALPITATIONS: 0
COUGH: 0
SHORTNESS OF BREATH: 0
LOSS OF CONSCIOUSNESS: 0
DIZZINESS: 0
MYALGIAS: 0

## 2020-03-02 ASSESSMENT — FIBROSIS 4 INDEX: FIB4 SCORE: 1.62

## 2020-03-02 NOTE — PROGRESS NOTES
Chief Complaint   Patient presents with   • Coronary Artery Disease       Subjective:   Chuck Ortiz is a 79 y.o. male who presents today for CAD, hypertension, hyperlipidemia, cryptogenic stroke and implantable loop recorder monitoring.      Last seen on 9/3/2019.    Since 9/3/2019 the patient has had no cardiac symptoms including palpitations, chest pain or new neurological symptoms.    Since 3/20/2019 appointment the patient said no cardiac problems or symptoms.  No palpitations.  Remains active gardening and fishing.  Continues to have problems with a Zenker's diverticulum with anatomical limitations at surgical correction could not be done locally by Dr. Uriostegui and he [place].    Since 11/15/2018 patient said no cardiac symptoms.  No palpitations.  No neurological events.    Last seen on 8/28/2018 at the time of his implantable loop recorder implantation procedure.  In the interim the patient had an MPI which was abnormal showing inferior perfusion abnormalities.  He has had no symptoms of palpitations, angina pectoris or heart failure symptoms.  He has been compliant with his medications.     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     On 8/7/2018 while on a fishing trip at [place] the patient had a sudden onset of loss of short-term memory for about 10 minutes.  The patient was seen in [place] ER and was diagnosed with a TIA and discharged to follow-up with his physicians in [place].     Patient subsequently saw Dr. Hannah Henderson, neurologist.  Brain MRI on 8/14/2018 showed 2 small acute and subacute infarcts in the left posterior frontal lobe.  Carotid ultrasounds were negative for any obstructive disease  The patient was switched from aspirin which he had been on empirically for 15 years to  "Plavix.     The patient denies any prior history of cardiac arrhythmia including atrial fibrillation.  The patient denies any symptoms of palpitations, chest discomfort or exertional shortness of breath.    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   • CATARACT PHACO WITH IOL  1/21/2014    Performed by Joni Duarte M.D. at [facility]   • FINGER ARTHROPLASTY  12/17/2012    Performed by Adam Christopher M.D. at [facility]   • INGUINAL HERNIA REPAIR  2/19/2009    Performed by ALEX ALATORRE at [facility]   • COLONOSCOPY  2004    neg   • LAMINOTOMY  1996    lumbar laminectomy, cyst x3   • UVULOPHARYNGOPALATOPLASTY  1985     Family History   Problem Relation Age of Onset   • Heart Disease Maternal Grandmother    • Diabetes Paternal Grandfather    • Stroke Sister    • Lung Disease Father         black lung    • Cancer Neg Hx      Social History     Socioeconomic History   • Marital status:      Spouse name: Not on file   • Number of children: Not on file   • Years of education: Not on file   • Highest education level: Not on file   Occupational History   • Not on file   Social Needs   • Financial resource strain: Not on file   • Food insecurity     Worry: " Not on file     Inability: Not on file   • Transportation needs     Medical: Not on file     Non-medical: Not on file   Tobacco Use   • Smoking status: Never Smoker   • Smokeless tobacco: Never Used   Substance and Sexual Activity   • Alcohol use: Yes     Comment: 2 drinks per week    • Drug use: No   • Sexual activity: Not on file     Comment: , retired [employer]    Lifestyle   • Physical activity     Days per week: Not on file     Minutes per session: Not on file   • Stress: Not on file   Relationships   • Social connections     Talks on phone: Not on file     Gets together: Not on file     Attends Jehovah's witness service: Not on file     Active member of club or organization: Not on file     Attends meetings of clubs or organizations: Not on file     Relationship status: Not on file   • Intimate partner violence     Fear of current or ex partner: Not on file     Emotionally abused: Not on file     Physically abused: Not on file     Forced sexual activity: Not on file   Other Topics Concern   • Not on file   Social History Narrative   • Not on file     Allergies   Allergen Reactions   • Morphine      Bradycardia     Outpatient Encounter Medications as of 3/2/2020   Medication Sig Dispense Refill   • atorvastatin (LIPITOR) 40 MG Tab Take 1 Tab by mouth every day. 100 Tab 1   • allopurinol (ZYLOPRIM) 100 MG Tab TAKE 1 TABLET BY MOUTH TWICE A  Tab 2   • meclizine (ANTIVERT) 25 MG Tab Take 1 Tab by mouth 3 times a day as needed for Vertigo. 30 Tab 0   • triamterene/hctz (MAXZIDE-25/DYAZIDE) 37.5-25 MG Cap TAKE 1 CAPSULE ORALLY EVERY MORNING 90 Cap 3   • tamsulosin (FLOMAX) 0.4 MG capsule Take 1 Cap by mouth every day. 7 Cap 0   • ZYLET 0.5-0.3 % Suspension INSTILL 1 DROP INTO LEFT EYE 4 TIMES A DAY AS DIRECTED  3   • losartan (COZAAR) 50 MG Tab Take 1 Tab by mouth every day. 100 Tab 2   • clopidogrel (PLAVIX) 75 MG Tab Take 1 Tab by mouth every day. 90 Tab 3     No facility-administered encounter medications  on file as of 3/2/2020.      Review of Systems   Respiratory: Negative for cough and shortness of breath.    Cardiovascular: Negative for chest pain and palpitations.   Musculoskeletal: Negative for myalgias.   Neurological: Negative for dizziness and loss of consciousness.        Objective:   /68 (BP Location: Left arm, Patient Position: Sitting, BP Cuff Size: Adult)   Pulse 64   Ht 1.829 m (6')   Wt 100.9 kg (222 lb 6.4 oz)   SpO2 95%   BMI 30.16 kg/m²     Physical Exam   Constitutional: He is oriented to person, place, and time. He appears well-developed and well-nourished.   Eyes: Pupils are equal, round, and reactive to light. Conjunctivae are normal.   Neck: Normal range of motion. Neck supple. No JVD present.   Cardiovascular: Normal rate, normal heart sounds and intact distal pulses. A regularly irregular rhythm present.   Pulmonary/Chest: Effort normal and breath sounds normal. No accessory muscle usage. No respiratory distress. He has no wheezes. He has no rales.   Musculoskeletal:         General: No edema.   Neurological: He is alert and oriented to person, place, and time.   Skin: Skin is warm, dry and intact. No rash noted. No cyanosis. Nails show no clubbing.   Psychiatric: He has a normal mood and affect. His behavior is normal.     08/14/2018 BRAIN MRI  1.  Acute to subacute small sized infarcts in the left posterior frontal lobe.  2.  Mild diffuse cerebral substance loss.  3.  Mild microangiopathic ischemic change versus demyelination or gliosis.  4.  Right maxillary sinus mucous retention cyst or polyp.     08/23/2018 EKG: Normal sinus rhythm, rate 68.  First-degree AV block.  Ventricular ectopy.  Reviewed by myself.     Recent 2018 laboratory tests reviewed.    ECHOCARDIOGRAM 09/06/2018.  Normal left ventricular systolic function.  Left ventricular ejection fraction is visually estimated to be 60%.  Mild aortic stenosis.  Mild posterior mitral valve leaflet prolapse.  Mild mitral  regurgitation.  Mitral annular calcification.  Unable to estimate pulmonary artery pressure due to an inadequate   tricuspid regurgitant jet.  No prior study is available for comparison.      MPI 09/06/2018.  There is a predominently fixed basal to mid inferior septal perfusion defect.   There is a distal inferior wall defect, predominantly reversible, moderate    size, mild intensity.   Stress Image LV EF: 71     %    CARDIAC CATHETERIZATION  09/28/2018  A. Left heart catheterization.  B. Left ventriculography.  C. Selective coronary angiography.  D. Right radial artery approach.  PREPROCEDURE DIAGNOSES:  1.  Abnormal myocardial perfusion scan.  2.  Mild aortic stenosis.  3.  Ventricular ectopy.  4.  Cryptogenic stroke.  5.  Hyperlipidemia.   POSTPROCEDURE DIAGNOSES:  1.  Coronary artery disease, moderate predominantly involving the mid left anterior descending artery.  2.  Left ventricular ejection fraction 77% post PVC with basal inferior wall akinesis.    Assessment:     1. Ventricular ectopy  EKG   2. Abnormal EKG  EKG   3. Irregular heart rhythm  EKG   4. Coronary artery disease involving native coronary artery of native heart without angina pectoris     5. Essential hypertension     6. Hyperlipidemia LDL goal <70     7. Cryptogenic stroke (HCC)     8. Status post placement of implantable loop recorder         Medical Decision Making:  Today's Assessment / Status / Plan:     1.  CAD, noncritical.  2.  Hypertension, controlled.  3.  Hyperlipidemia, on statin therapy.  4.  CVA, left posterior frontal.  08/14/2018  5.  Implantable loop recorder 8/28/2018.  6.  Nonsustained ventricular tachycardia 8/31/2018.  7.  Ventricular ectopy.  8.  Zenker's diverticulum.    Recommendation Discussion  1.  Reviewed loop recorder rhythm strips with the patient and his wife which show isolated PVCs, couplets and triplets but no clear episodes of atrial fibrillation.  2.  EKG today shows sinus rhythm with ventricular ectopy.  3.   Otherwise the patient is stable from a cardiovascular standpoint.  4.  Continue current cardiac therapy.  5.  Follow-up 6 months.

## 2020-03-03 LAB — EKG IMPRESSION: NORMAL

## 2020-03-23 ENCOUNTER — NON-PROVIDER VISIT (OUTPATIENT)
Dept: CARDIOLOGY | Facility: MEDICAL CENTER | Age: 80
End: 2020-03-23
Payer: MEDICARE

## 2020-03-23 DIAGNOSIS — I63.9 CRYPTOGENIC STROKE (HCC): ICD-10-CM

## 2020-03-23 DIAGNOSIS — Z95.818 STATUS POST PLACEMENT OF IMPLANTABLE LOOP RECORDER: ICD-10-CM

## 2020-03-23 PROCEDURE — 93298 REM INTERROG DEV EVAL SCRMS: CPT | Performed by: INTERNAL MEDICINE

## 2020-04-20 ENCOUNTER — NON-PROVIDER VISIT (OUTPATIENT)
Dept: CARDIOLOGY | Facility: MEDICAL CENTER | Age: 80
End: 2020-04-20
Payer: MEDICARE

## 2020-04-20 DIAGNOSIS — Z95.818 STATUS POST PLACEMENT OF IMPLANTABLE LOOP RECORDER: ICD-10-CM

## 2020-04-20 PROCEDURE — 93298 REM INTERROG DEV EVAL SCRMS: CPT | Performed by: INTERNAL MEDICINE

## 2020-04-22 NOTE — NON-PROVIDER
Type of monitoring: Remote    : [employer]    Date of Transmission: 4-    Type of device: Implantable Loop Recorder    Battery status: Ok

## 2020-05-11 ENCOUNTER — TELEPHONE (OUTPATIENT)
Dept: CARDIOLOGY | Facility: MEDICAL CENTER | Age: 80
End: 2020-05-11

## 2020-05-11 NOTE — TELEPHONE ENCOUNTER
Remote Transmission 5/11/2020  ? AF episode on 5/10/2020 @ 1:46 am lasting 2 minutes.   To be scanned into media for review.

## 2020-05-11 NOTE — TELEPHONE ENCOUNTER
Patient on Plavix 75 mg  Previous transmission negative for Afib  To SW to review new transmission to confirm

## 2020-05-21 ENCOUNTER — NON-PROVIDER VISIT (OUTPATIENT)
Dept: CARDIOLOGY | Facility: MEDICAL CENTER | Age: 80
End: 2020-05-21
Payer: MEDICARE

## 2020-05-21 DIAGNOSIS — Z95.818 STATUS POST PLACEMENT OF IMPLANTABLE LOOP RECORDER: ICD-10-CM

## 2020-05-21 DIAGNOSIS — I63.9 CRYPTOGENIC STROKE (HCC): ICD-10-CM

## 2020-05-21 PROCEDURE — 93298 REM INTERROG DEV EVAL SCRMS: CPT | Performed by: INTERNAL MEDICINE

## 2020-06-02 ENCOUNTER — TELEPHONE (OUTPATIENT)
Dept: CARDIOLOGY | Facility: MEDICAL CENTER | Age: 80
End: 2020-06-02

## 2020-06-02 NOTE — TELEPHONE ENCOUNTER
Remote Transmission 6/1/2020:    1-symptom episode 6/1/2020 @ 7:00pm. Appears to be SR with PVC's and PAC's.     To be scanned into media for review.

## 2020-06-14 DIAGNOSIS — I10 ESSENTIAL HYPERTENSION: ICD-10-CM

## 2020-06-15 RX ORDER — LOSARTAN POTASSIUM 50 MG/1
TABLET ORAL
Qty: 100 TAB | Refills: 1 | Status: SHIPPED | OUTPATIENT
Start: 2020-06-15 | End: 2020-07-12

## 2020-06-23 ENCOUNTER — NON-PROVIDER VISIT (OUTPATIENT)
Dept: CARDIOLOGY | Facility: MEDICAL CENTER | Age: 80
End: 2020-06-23
Payer: MEDICARE

## 2020-06-23 DIAGNOSIS — I63.9 CRYPTOGENIC STROKE (HCC): ICD-10-CM

## 2020-06-23 DIAGNOSIS — Z95.818 STATUS POST PLACEMENT OF IMPLANTABLE LOOP RECORDER: ICD-10-CM

## 2020-06-23 PROCEDURE — 93298 REM INTERROG DEV EVAL SCRMS: CPT | Performed by: INTERNAL MEDICINE

## 2020-07-01 ENCOUNTER — TELEPHONE (OUTPATIENT)
Dept: CARDIOLOGY | Facility: MEDICAL CENTER | Age: 80
End: 2020-07-01

## 2020-07-01 DIAGNOSIS — I48.91 ATRIAL FIBRILLATION, UNSPECIFIED TYPE (HCC): ICD-10-CM

## 2020-07-01 NOTE — TELEPHONE ENCOUNTER
Remote Transmission 7/1/2020  ? Of AF vs AFL episode on 6/30/2020 @ 5:36pm lasting 6 minutes.   No OAC   To be scanned into media for review.

## 2020-07-09 ENCOUNTER — TELEPHONE (OUTPATIENT)
Dept: CARDIOLOGY | Facility: MEDICAL CENTER | Age: 80
End: 2020-07-09

## 2020-07-09 DIAGNOSIS — I48.91 ATRIAL FIBRILLATION, UNSPECIFIED TYPE (HCC): ICD-10-CM

## 2020-07-09 NOTE — TELEPHONE ENCOUNTER
ILR recording Atrial fibrillation 5/11/2020 and 7/1/2020  Start eliquis 5 MG bid  Discontinue plavix

## 2020-07-09 NOTE — TELEPHONE ENCOUNTER
Rx for Eliquis ordered. Plavix alexys'd. Attempted to reach patient. Left detailed message about change in medications and requested a return phone call at his earliest convenience.

## 2020-07-10 NOTE — TELEPHONE ENCOUNTER
Called patient to follow up. Spoke with both patient and spouse. Answered general questions about afib and anticoag vs antiplatelet.    Spouse verbalized understanding and was appreciative for the phone call.

## 2020-07-12 ENCOUNTER — APPOINTMENT (OUTPATIENT)
Dept: RADIOLOGY | Facility: MEDICAL CENTER | Age: 80
End: 2020-07-12
Attending: EMERGENCY MEDICINE
Payer: MEDICARE

## 2020-07-12 ENCOUNTER — HOSPITAL ENCOUNTER (EMERGENCY)
Facility: MEDICAL CENTER | Age: 80
End: 2020-07-12
Attending: EMERGENCY MEDICINE
Payer: MEDICARE

## 2020-07-12 VITALS
TEMPERATURE: 98 F | OXYGEN SATURATION: 93 % | SYSTOLIC BLOOD PRESSURE: 129 MMHG | HEART RATE: 62 BPM | HEIGHT: 72 IN | WEIGHT: 213.85 LBS | BODY MASS INDEX: 28.96 KG/M2 | DIASTOLIC BLOOD PRESSURE: 79 MMHG | RESPIRATION RATE: 18 BRPM

## 2020-07-12 DIAGNOSIS — M25.531 ACUTE PAIN OF RIGHT WRIST: ICD-10-CM

## 2020-07-12 PROCEDURE — 96372 THER/PROPH/DIAG INJ SC/IM: CPT

## 2020-07-12 PROCEDURE — 99284 EMERGENCY DEPT VISIT MOD MDM: CPT

## 2020-07-12 PROCEDURE — 700111 HCHG RX REV CODE 636 W/ 250 OVERRIDE (IP)

## 2020-07-12 PROCEDURE — 700111 HCHG RX REV CODE 636 W/ 250 OVERRIDE (IP): Performed by: EMERGENCY MEDICINE

## 2020-07-12 PROCEDURE — 73110 X-RAY EXAM OF WRIST: CPT | Mod: RT

## 2020-07-12 RX ORDER — HYDROMORPHONE HYDROCHLORIDE 1 MG/ML
1 INJECTION, SOLUTION INTRAMUSCULAR; INTRAVENOUS; SUBCUTANEOUS ONCE
Status: COMPLETED | OUTPATIENT
Start: 2020-07-12 | End: 2020-07-12

## 2020-07-12 RX ORDER — TRIAMTERENE AND HYDROCHLOROTHIAZIDE 37.5; 25 MG/1; MG/1
1 CAPSULE ORAL EVERY MORNING
Status: SHIPPED | COMMUNITY
End: 2020-10-06 | Stop reason: SDUPTHER

## 2020-07-12 RX ORDER — LOSARTAN POTASSIUM 50 MG/1
50 TABLET ORAL DAILY
Status: SHIPPED | COMMUNITY
End: 2020-08-05

## 2020-07-12 RX ORDER — METHYLPREDNISOLONE 4 MG/1
TABLET ORAL
Qty: 1 PACKAGE | Refills: 0 | Status: SHIPPED | OUTPATIENT
Start: 2020-07-12 | End: 2020-07-28

## 2020-07-12 RX ORDER — KETOROLAC TROMETHAMINE 30 MG/ML
60 INJECTION, SOLUTION INTRAMUSCULAR; INTRAVENOUS ONCE
Status: DISCONTINUED | OUTPATIENT
Start: 2020-07-12 | End: 2020-07-12

## 2020-07-12 RX ORDER — KETOROLAC TROMETHAMINE 30 MG/ML
INJECTION, SOLUTION INTRAMUSCULAR; INTRAVENOUS
Status: COMPLETED
Start: 2020-07-12 | End: 2020-07-12

## 2020-07-12 RX ORDER — ACETAMINOPHEN 325 MG/1
650 TABLET ORAL EVERY 4 HOURS PRN
Status: SHIPPED | COMMUNITY
End: 2023-12-21

## 2020-07-12 RX ORDER — OXYCODONE HYDROCHLORIDE AND ACETAMINOPHEN 5; 325 MG/1; MG/1
1 TABLET ORAL EVERY 4 HOURS PRN
Qty: 20 TAB | Refills: 0 | Status: SHIPPED | OUTPATIENT
Start: 2020-07-12 | End: 2020-07-17

## 2020-07-12 RX ORDER — ALLOPURINOL 100 MG/1
100 TABLET ORAL 2 TIMES DAILY
Status: SHIPPED | COMMUNITY
End: 2020-08-07 | Stop reason: SDUPTHER

## 2020-07-12 RX ORDER — OXYCODONE HYDROCHLORIDE AND ACETAMINOPHEN 5; 325 MG/1; MG/1
1 TABLET ORAL EVERY 4 HOURS PRN
Qty: 20 TAB | Refills: 0 | Status: SHIPPED | OUTPATIENT
Start: 2020-07-12 | End: 2020-07-12

## 2020-07-12 RX ORDER — ONDANSETRON 4 MG/1
4 TABLET, ORALLY DISINTEGRATING ORAL ONCE
Status: COMPLETED | OUTPATIENT
Start: 2020-07-12 | End: 2020-07-12

## 2020-07-12 RX ADMIN — KETOROLAC TROMETHAMINE 60 MG: 30 INJECTION, SOLUTION INTRAMUSCULAR; INTRAVENOUS at 16:15

## 2020-07-12 RX ADMIN — HYDROMORPHONE HYDROCHLORIDE 1 MG: 1 INJECTION, SOLUTION INTRAMUSCULAR; INTRAVENOUS; SUBCUTANEOUS at 16:16

## 2020-07-12 RX ADMIN — ONDANSETRON 4 MG: 4 TABLET, ORALLY DISINTEGRATING ORAL at 16:16

## 2020-07-12 RX ADMIN — KETOROLAC TROMETHAMINE 60 MG: 30 INJECTION, SOLUTION INTRAMUSCULAR at 16:15

## 2020-07-12 ASSESSMENT — PAIN DESCRIPTION - DESCRIPTORS: DESCRIPTORS: ACHING

## 2020-07-12 ASSESSMENT — FIBROSIS 4 INDEX: FIB4 SCORE: 1.636634176769942859

## 2020-07-12 NOTE — ED TRIAGE NOTES
"Pt states that he woke up this morning with acute pain affecting his right wrist.  He is unable to move his hand. He was seen in our department approximately a year ago with an \"identical\" symptomatology.  Pt denies any domestic high risk areas, or international travel within the past 14 days.    Chief Complaint   Patient presents with   • Wrist Pain     BP (!) 174/93   Pulse 67   Temp 36.2 °C (97.1 °F) (Temporal)   Resp 18   Ht 1.829 m (6')   Wt 97 kg (213 lb 13.5 oz)   SpO2 92%   BMI 29.00 kg/m²     "

## 2020-07-12 NOTE — ED PROVIDER NOTES
ED Provider Note    CHIEF COMPLAINT  Chief Complaint   Patient presents with   • Wrist Pain       [facility]  Chuck Ortiz is a 80 y.o. male who presents with a history of arthritis, atrial fibrillation on Eliquis, the patient reports that he woke up with acute right wrist pain this morning.  It is so painful he has difficulty moving his hand.  He said he was here about 1 year ago with similar symptoms but not as severe.  Pain is worse when he tries to move the fingers or the wrist.  He denies any trauma.  He has had no fever.    REVIEW OF SYSTEMS  See HPI for further details. All other systems are negative.     PAST MEDICAL HISTORY   has a past medical history of Arrhythmia, Arthritis, Cardiac murmur, CATARACT, Dental disorder, ED (erectile dysfunction), Essential hypertension (11/12/2015), Facial droop, Hemorrhagic disorder (HCC), High cholesterol, Hypertension, Kidney stones, Seizure (HCC), Sleep apnea, Snoring, Stroke (HCC) (08/07/2018), and Zenker diverticula.    SOCIAL HISTORY  Social History     Tobacco Use   • Smoking status: Never Smoker   • Smokeless tobacco: Never Used   Substance and Sexual Activity   • Alcohol use: Yes     Comment: 2 drinks per week    • Drug use: No   • Sexual activity: Not on file     Comment: , retired [employer]        SURGICAL HISTORY   has a past surgical history that includes colonoscopy (2004); inguinal hernia repair (2/19/2009); laminotomy (1996); finger arthroplasty (12/17/2012); cataract phaco with iol (1/21/2014); uvulopharyngopalatoplasty (1985); and zzz cardiac cath (09/28/2018).    CURRENT MEDICATIONS  Home Medications     Reviewed by Trish Carpenter (Pharmacy Tech) on 07/12/20 at 1613  Med List Status: Complete   Medication Last Dose Status   acetaminophen (TYLENOL) 325 MG Tab 7/12/2020 Active   allopurinol (ZYLOPRIM) 100 MG Tab 7/12/2020 Active   apixaban (ELIQUIS) 5mg Tab 7/12/2020 Active   atorvastatin (LIPITOR) 40 MG Tab 7/12/2020 Active   losartan  (COZAAR) 50 MG Tab 7/12/2020 Active   oxyCODONE HCl (OXYCONTIN PO) 7/12/2020 Active   rivaroxaban (XARELTO) 20 MG Tab tablet 7/11/2020 Active   tamsulosin (FLOMAX) 0.4 MG capsule 7/12/2020 Active   triamterene/hctz (MAXZIDE-25/DYAZIDE) 37.5-25 MG Cap 7/12/2020 Active                ALLERGIES  Allergies   Allergen Reactions   • Morphine      Bradycardia       PHYSICAL EXAM  VITAL SIGNS: /63   Pulse 62   Temp 36.2 °C (97.1 °F) (Temporal)   Resp 18   Ht 1.829 m (6')   Wt 97 kg (213 lb 13.5 oz)   SpO2 90%   BMI 29.00 kg/m²  @ANUJ[[id number]::@   Pulse ox interpretation: I interpret this pulse ox as normal.  Constitutional: Alert, peers uncomfortable from right wrist pain.  HENT: No signs of trauma, Bilateral external ears normal, Nose normal.   Eyes: Pupils are equal and reactive, Conjunctiva normal, Non-icteric.   Neck: Normal range of motion, No tenderness, Supple, No stridor.   Lymphatic: No lymphadenopathy noted.   Skin: Warm, Dry, No erythema, No rash.   Extremities: Intact distal pulses, No edema, No tenderness, No cyanosis.  Musculoskeletal: Pain with palpation and range of motion of the right wrist.  Neurologic: Alert , decreased range of motion of the fingers secondary to pain.  Psychiatric: Affect normal, Judgment normal, Mood normal.       DIAGNOSTIC STUDIES / PROCEDURES        RADIOLOGY  DX-WRIST-COMPLETE 3+ RIGHT   Final Result      1.  No evidence of acute fracture.      2.  Widened scapholunate distance with abnormal scapholunate angle consistent with scapholunate ligament tear and DISI deformity.      3.  Multifocal osteoarthritis.                 COURSE & MEDICAL DECISION MAKING  Pertinent Labs & Imaging studies reviewed. (See chart for details)    Differential diagnosis: Arthritis, carpal tunnel syndrome    The patient is given Toradol 60 mg IM and Dilaudid 1 mg IM.    Patient feels improved and is reassessed, he is able to move his fingers now.  He is placed in a Velcro splint.  His x-ray  shows no acute disease, he will follow-up with his hand surgeon Dr. Montes.  He will return for any redness fever, warmth to touch, or anything indicating infection.    I reviewed prescription monitoring program for patient's narcotic use before prescribing a scheduled drug.The patient will not drink alcohol nor drive with prescribed medications. The patient will return for new or worsening symptoms and is stable at the time of discharge.    The patient is referred to a primary physician for blood pressure management, diabetic screening, and for all other preventative health concerns.    In prescribing controlled substances to this patient, I certify that I have obtained and reviewed the medical history of Chuck Ortiz. I have also made a good florentino effort to obtain applicable records from other providers who have treated the patient and records did not demonstrate any increased risk of substance abuse that would prevent me from prescribing controlled substances.     I have conducted a physical exam and documented it. I have reviewed Mr. Ortiz’s prescription history as maintained by the [place] Prescription Monitoring Program.     I have assessed the patient’s risk for abuse, dependency, and addiction using the validated Opioid Risk Tool available at https://www.mdcalc.com/qvrlyr-uqrn-yhud-ort-narcotic-abuse.     Given the above, I believe the benefits of controlled substance therapy outweigh the risks. The reasons for prescribing controlled substances include non-narcotic, oral analgesic alternatives have been inadequate for pain control. Accordingly, I have discussed the risk and benefits, treatment plan, and alternative therapies with the patient.         DISPOSITION:  Patient will be discharged home in stable condition.    FOLLOW UP:  [facility], Emergency Dept  [address]  [place] [address] [phone]    If symptoms worsen    Reilly Jc M.D.  [address] [facility]  [address]  [facility] [address]  [phone]      As needed    Adam Christopher M.D.  [address]  [facility] [address]  [phone]      call for follow up      OUTPATIENT MEDICATIONS:  New Prescriptions    METHYLPREDNISOLONE (MEDROL DOSEPAK) 4 MG TABLET THERAPY PACK    Take Medrol Dosepak as directed    OXYCODONE-ACETAMINOPHEN (PERCOCET) 5-325 MG TAB    Take 1 Tab by mouth every four hours as needed (pain) for up to 5 days. No driving, no drinking alcohol         The patient will not drink alcohol nor drive with prescribed medications. The patient will return for worsening symptoms and is stable at the time of discharge. The patient verbalizes understanding and will comply.    FINAL IMPRESSION  1. Acute pain of right wrist                 Electronically signed by: Zion Amaya M.D., 7/12/2020 4:09 PM

## 2020-07-12 NOTE — DISCHARGE INSTRUCTIONS
Joint Pain  Joint pain (arthralgia) may be caused by many things. Joint pain is likely to go away when you follow instructions from your health care provider for relieving pain at home. However, joint pain can also be caused by conditions that require more treatment. Common causes of joint pain include:  · Bruising in the area of the joint.  · Injury caused by repeating certain movements too many times (overuse injury).  · Age-related joint wear and tear (osteoarthritis).  · Buildup of uric acid crystals in the joint (gout).  · Inflammation of the joint (rheumatic disease).  · Various other forms of arthritis.  · Infections of the joint (septic arthritis) or of the bone (osteomyelitis).  Your health care provider may recommend that you take pain medicine or wear a supportive device like an elastic bandage, sling, or splint. If your joint pain continues, you may need lab or imaging tests to diagnose the cause of your joint pain.  Follow these instructions at home:  Managing pain, stiffness, and swelling    · If directed, put ice on the painful area. Icing can help to relieve joint pain and swelling.  ? Put ice in a plastic bag.  ? Place a towel between your skin and the bag.  ? Leave the ice on for 20 minutes, 2-3 times a day.  · If directed, apply heat to the painful area as often as told by your health care provider. Heat can reduce the stiffness of your muscles and joints. Use the heat source that your health care provider recommends, such as a moist heat pack or a heating pad.  ? Place a towel between your skin and the heat source.  ? Leave the heat on for 20-30 minutes.  ? Remove the heat if your skin turns bright red. This is especially important if you are unable to feel pain, heat, or cold. You may have a greater risk of getting burned.  · Move your fingers or toes below the painful joint often. You can avoid stiffness and lessen swelling by doing this.  · If possible, raise (elevate) the painful joint above  the level of your heart while you are sitting or lying down. To do this, try putting a few pillows under the painful joint.  Activity  · Rest the painful joint for as long as directed. Do not do anything that causes or worsens pain.  · Begin exercising or stretching the affected area, as told by your health care provider. Ask your health care provider what types of exercise are safe for you.  If you have an elastic bandage, sling, or splint:  · Wear the supportive device as told by your health care provider. Remove it only as told by your health care provider.  · Loosen the device if your fingers or toes below the joint tingle, become numb, or turn cold and blue.  · Keep the device clean.   · Ask your health care provider if you should remove the device before bathing. You may need to cover it with a watertight covering when you take a bath or a shower.  General instructions  · Take over-the-counter and prescription medicines only as told by your health care provider.  · Do not use any products that contain nicotine or tobacco, such as cigarettes and e-cigarettes. If you need help quitting, ask your health care provider.  · Keep all follow-up visits as told by your health care provider. This is important.  Contact a health care provider if:  · You have pain that gets worse and does not get better with medicine.  · Your joint pain does not improve within 3 days.  · You have increased bruising or swelling.  · You have a fever.  · You lose 10 lb (4.5 kg) or more without trying.  Get help right away if:  · You cannot move the joint.  · Your fingers or toes tingle, become numb, or turn cold and blue.  · You have a fever along with a joint that is red, warm, and swollen.  Summary  · Joint pain (arthralgia) may be caused by many things.  · Your health care provider may recommend that you take pain medicine or wear a supportive device like an elastic bandage, sling, or splint.  · If your joint pain continues, you may need  tests to diagnose the cause of your joint pain.  · Take over-the-counter and prescription medicines only as told by your health care provider.  This information is not intended to replace advice given to you by your health care provider. Make sure you discuss any questions you have with your health care provider.  Document Released: 12/18/2006 Document Revised: 11/30/2018 Document Reviewed: 10/03/2018  Elsevier Patient Education © 2020 [employer]

## 2020-07-12 NOTE — ED NOTES
Med rec updated and complete  Allergies reviewed  Pt reports that he stopped his PLAVIX 75MG on 7/11/2020 and started ELIQUIS 5MG today (7/12/2020).  Pt reports that he took his wife's OXYCODONE 1 tablet @ 1200 and 1430  Pt reports no antibiotics in the last 2 weeks

## 2020-07-13 ENCOUNTER — OFFICE VISIT (OUTPATIENT)
Dept: INTERNAL MEDICINE | Facility: IMAGING CENTER | Age: 80
End: 2020-07-13
Payer: MEDICARE

## 2020-07-13 VITALS
HEART RATE: 62 BPM | OXYGEN SATURATION: 95 % | SYSTOLIC BLOOD PRESSURE: 110 MMHG | RESPIRATION RATE: 16 BRPM | DIASTOLIC BLOOD PRESSURE: 70 MMHG | TEMPERATURE: 98.1 F

## 2020-07-13 DIAGNOSIS — I48.91 ATRIAL FIBRILLATION, UNSPECIFIED TYPE (HCC): ICD-10-CM

## 2020-07-13 DIAGNOSIS — M25.531 RIGHT WRIST PAIN: ICD-10-CM

## 2020-07-13 PROCEDURE — 99213 OFFICE O/P EST LOW 20 MIN: CPT | Performed by: INTERNAL MEDICINE

## 2020-07-13 NOTE — ED NOTES
Wife here for ride home  Pt given dischg instructions  Verbally understands Rx percocet and medrol dosepak given  Pt aware to fill and take was prescribed   D/c'ed to home in NAD  Aware to f/u w/ PCP as needed

## 2020-07-13 NOTE — PROGRESS NOTES
Chief Complaint   Patient presents with   • Hospital Follow-up     Right wrist pain   • Atrial Fibrillation       HISTORY OF THE PRESENT ILLNESS: Patient is a 80 y.o. male.     Patient with right wrist pain.  He reports similar problem 8 months ago.  He was seen by Dr. Montes, orthopedics.  He has received injections in the past with good results.  He had x-rays in the emergency department that showed no fracture.  He has pain with making a fist.  No improvement with Tylenol.  No anti-inflammatories because he is now on Eliquis for anticoagulation due to diagnosis of atrial fibrillation.    Allergies: Morphine    Current Outpatient Medications Ordered in Epic   Medication Sig Dispense Refill   • apixaban (ELIQUIS) 5mg Tab Take 5 mg by mouth 2 Times a Day. Pt started on 7/12/2020     • allopurinol (ZYLOPRIM) 100 MG Tab Take 100 mg by mouth 2 times a day.     • losartan (COZAAR) 50 MG Tab Take 50 mg by mouth every day.     • triamterene/hctz (MAXZIDE-25/DYAZIDE) 37.5-25 MG Cap Take 1 Cap by mouth every morning.     • acetaminophen (TYLENOL) 325 MG Tab Take 650 mg by mouth every four hours as needed for Moderate Pain.     • oxyCODONE-acetaminophen (PERCOCET) 5-325 MG Tab Take 1 Tab by mouth every four hours as needed (pain) for up to 5 days. No driving, no drinking alcohol 20 Tab 0   • methylPREDNISolone (MEDROL DOSEPAK) 4 MG Tablet Therapy Pack Take Medrol Dosepak as directed 1 Package 0   • atorvastatin (LIPITOR) 40 MG Tab Take 1 Tab by mouth every day. 100 Tab 1   • tamsulosin (FLOMAX) 0.4 MG capsule Take 1 Cap by mouth every day. 7 Cap 0     No current Epic-ordered facility-administered medications on file.        Past medical history, social history and family history were reviewed from chart today    Review of systems: Per HPI.    Denies headache, chest pain, fever, chills, diarrhea, constipation, abdominal pain, palpitations, depression   All others negative.     Exam: /70 (BP Location: Left arm,  Patient Position: Sitting, BP Cuff Size: Adult)   Pulse 62   Temp 36.7 °C (98.1 °F) (Temporal)   Resp 16   SpO2 95%   General: Well-appearing. Well-developed. No signs of distress.  HEENT: Grossly normal. Oral cavity is pink and moist.  Neck: Supple without JVD or bruit.  Pulmonary: Clear with good breath sounds. Normal effort.  Cardiovascular: Regular. Carotid and radial pulses are intact.  Abdomen: Soft, nontender, nondistended. Spleen and liver are not enlarged.  Neurologic: Cranial nerves II through XII are grossly normal, alert and oriented x3  Extremities: Right wrist is swollen compared to left.  It is also warm.  No erythema.  Minimal pain with passive range of motion but moderate with active range of motion.  Pain seems to be localized along the plantar aspect of the hand and wrist    Diagnosis:  1. Right wrist pain     2. Atrial fibrillation, unspecified type (HCC)         Right wrist pain.  No fracture on x-ray.  He has a history of arthritis.  He is inflamed on exam including warmth, swelling and decreased strength.    Newly diagnosed with atrial fibrillation.  He is now on Eliquis.    Medications:  No change in medication.  I agree with a trial of Medrol as prescribed by emergency room.    Laboratory:  No new laboratory today    Imaging:  Imaging from the emergency room was reviewed with patient.    Referrals:  No referrals but patient plans to follow-up with Dr. Montes

## 2020-07-13 NOTE — ED NOTES
Pt states pain decreased after medication, oxygen sat decreased after medication pt placed on 2L NC.

## 2020-07-13 NOTE — TELEPHONE ENCOUNTER
Tried to call patient's cell to schedule this procedure, per patient's wife's request. Phone just rang and rang. No voicemail picked up. I will try again at a later time.

## 2020-07-19 NOTE — OR NURSING
COVID-19 Pre-surgery screenin. Do you have an undiagnosed respiratory illness or symptoms such as coughing or sneezing? (No)   a. Onset of Sx   b. Acute vs. chronic respiratory illness      2. Do you have an unexplained fever greater than 100.4 degrees Fahrenheit or 38 degrees Celsius?                     (No)      3. Have you had direct exposure to a patient who tested positive for Covid-19?                           (No)      4. Have you traveled within the last 14 days to [place], [place], [place], [place], or [place]?                     (No)      Informed of visitor policy

## 2020-07-20 ENCOUNTER — TELEPHONE (OUTPATIENT)
Dept: CARDIOLOGY | Facility: MEDICAL CENTER | Age: 80
End: 2020-07-20

## 2020-07-20 ENCOUNTER — HOSPITAL ENCOUNTER (OUTPATIENT)
Facility: MEDICAL CENTER | Age: 80
End: 2020-07-20
Attending: INTERNAL MEDICINE | Admitting: INTERNAL MEDICINE
Payer: MEDICARE

## 2020-07-20 VITALS
HEIGHT: 72 IN | TEMPERATURE: 98.1 F | RESPIRATION RATE: 19 BRPM | OXYGEN SATURATION: 92 % | SYSTOLIC BLOOD PRESSURE: 147 MMHG | BODY MASS INDEX: 28.52 KG/M2 | DIASTOLIC BLOOD PRESSURE: 62 MMHG | WEIGHT: 210.54 LBS | HEART RATE: 65 BPM

## 2020-07-20 DIAGNOSIS — I48.91 ATRIAL FIBRILLATION, UNSPECIFIED TYPE (HCC): ICD-10-CM

## 2020-07-20 PROCEDURE — 160002 HCHG RECOVERY MINUTES (STAT)

## 2020-07-20 PROCEDURE — 700101 HCHG RX REV CODE 250

## 2020-07-20 PROCEDURE — 33286 RMVL SUBQ CAR RHYTHM MNTR: CPT | Performed by: INTERNAL MEDICINE

## 2020-07-20 RX ORDER — LIDOCAINE HYDROCHLORIDE AND EPINEPHRINE 10; 10 MG/ML; UG/ML
10 INJECTION, SOLUTION INFILTRATION; PERINEURAL ONCE
Status: COMPLETED | OUTPATIENT
Start: 2020-07-20 | End: 2020-07-20

## 2020-07-20 RX ORDER — LIDOCAINE HYDROCHLORIDE AND EPINEPHRINE 10; 10 MG/ML; UG/ML
INJECTION, SOLUTION INFILTRATION; PERINEURAL
Status: COMPLETED
Start: 2020-07-20 | End: 2020-07-20

## 2020-07-20 RX ADMIN — LIDOCAINE HYDROCHLORIDE,EPINEPHRINE BITARTRATE: 10; .01 INJECTION, SOLUTION INFILTRATION; PERINEURAL at 13:45

## 2020-07-20 RX ADMIN — LIDOCAINE HYDROCHLORIDE AND EPINEPHRINE: 10; 10 INJECTION, SOLUTION INFILTRATION; PERINEURAL at 13:45

## 2020-07-20 ASSESSMENT — FIBROSIS 4 INDEX: FIB4 SCORE: 1.636634176769942859

## 2020-07-20 NOTE — H&P
Physician H&P    Patient ID:  Chuck Ortiz  [id number]  80 y.o. male  1940    History:  Primary Diagnosis: Admitted for removal of ILR    HPI:  Previous history of cryptogenic CVA.  ILR placed.  A. fib diagnosed.  On NOAC.  Feels well.  Initially placed by [facility].  Scheduled for removal.    Past Medical History:  has a past medical history of Arrhythmia, Arthritis, Cardiac murmur, CATARACT, Dental disorder, ED (erectile dysfunction), Essential hypertension (11/12/2015), Facial droop, Hemorrhagic disorder (HCC), High cholesterol, Hypertension, Kidney stones, Seizure (HCC), Sleep apnea, Snoring, Stroke (HCC) (08/07/2018), and Zenker diverticula.  Past Surgical History:  has a past surgical history that includes colonoscopy (2004); inguinal hernia repair (2/19/2009); laminotomy (1996); finger arthroplasty (12/17/2012); cataract phaco with iol (1/21/2014); uvulopharyngopalatoplasty (1985); and zzz cardiac cath (09/28/2018).  Past Social History:  reports that he has never smoked. He has never used smokeless tobacco. He reports current alcohol use. He reports that he does not use drugs.  Past Family History:   Family History   Problem Relation Age of Onset   • Heart Disease Maternal Grandmother    • Diabetes Paternal Grandfather    • Stroke Sister    • Lung Disease Father         black lung    • Cancer Neg Hx      Allergies: Morphine    Current Medications:  Prior to Admission medications    Medication Sig Start Date End Date Taking? Authorizing Provider   apixaban (ELIQUIS) 5mg Tab Take 5 mg by mouth 2 Times a Day. Pt started on 7/12/2020 7/12/20   Physician Outpatient   allopurinol (ZYLOPRIM) 100 MG Tab Take 100 mg by mouth 2 times a day.    Physician Outpatient   losartan (COZAAR) 50 MG Tab Take 50 mg by mouth every day.    Physician Outpatient   triamterene/hctz (MAXZIDE-25/DYAZIDE) 37.5-25 MG Cap Take 1 Cap by mouth every morning.    Physician Outpatient   acetaminophen (TYLENOL) 325 MG Tab Take 650 mg  by mouth every four hours as needed for Moderate Pain.    Physician Outpatient   methylPREDNISolone (MEDROL DOSEPAK) 4 MG Tablet Therapy Pack Take Medrol Dosepak as directed 7/12/20   Zion Amaya M.D.   atorvastatin (LIPITOR) 40 MG Tab Take 1 Tab by mouth every day. 1/23/20   Yair Gonzalez M.D.   tamsulosin (FLOMAX) 0.4 MG capsule Take 1 Cap by mouth every day. 10/28/19   Bel Avendaño D.O.       Review of Systems:  ROS  /80   Pulse 60   Temp 36.8 °C (98.2 °F) (Temporal)   Resp 18   Ht 1.829 m (6')   Wt 95.5 kg (210 lb 8.6 oz)   SpO2 96%     Physical Examination:  Physical Exam  Vitals signs reviewed.   Constitutional:       General: He is not in acute distress.     Appearance: He is well-developed. He is not diaphoretic.   Eyes:      Conjunctiva/sclera: Conjunctivae normal.   Neck:      Thyroid: No thyroid mass or thyromegaly.      Vascular: No JVD.      Trachea: No tracheal deviation.   Cardiovascular:      Rate and Rhythm: Normal rate and regular rhythm.      Heart sounds: No murmur.   Pulmonary:      Effort: Pulmonary effort is normal. No respiratory distress.      Breath sounds: Normal breath sounds.   Chest:      Chest wall: No tenderness.   Abdominal:      Palpations: Abdomen is soft.      Tenderness: There is no abdominal tenderness.   Musculoskeletal: Normal range of motion.   Skin:     General: Skin is warm and dry.   Neurological:      Mental Status: He is alert and oriented to person, place, and time.      Motor: No tremor.   Psychiatric:         Behavior: Behavior normal.         Impression:  1.  ILR placement for cryptogenic stroke.  Diagnosed A. Fib.  For removal device.  Risk and benefits explained patient wishes to proceed.      Pre Procedure Assessment:  Pre-Procedure Assessment - Applicable for patients receiving sedation by non-anesthesia practitioner.  Previous drug history, other anesthetic experiences, potential anesthetic problems and choice of anesthesia assessed,  discussed with patient.     No prior complications.  Results of relevant diagnostic studies reviewed.    Plan of Sedation:  Patient deemed appropriate candidate for conscious sedation options and plans discussed with patient / family    ASA 3 - Patient with severe systemic disease

## 2020-07-20 NOTE — TELEPHONE ENCOUNTER
Pt ILR removed; pt discontinued from [employer]-- Return kit ordered to be sent to his address for [employer] [facility] Device.

## 2020-07-20 NOTE — OR NURSING
1405 Pt over from cath lab post loop recorder removal. Site CDI and soft, no signs of bleeding.  1415 Discharge instructions provided to pt. Discussed diet, activity, follow up, medications and symptom management. Pt states understanding. Pt states all questions have been answered. Copy of discharge provided to pt.   1420 Pt ambulated to restroom, tolerated well  1425 Pt walked off of unit escorted by CNA, with all belongings, without incident.

## 2020-07-20 NOTE — DISCHARGE INSTRUCTIONS
ACTIVITY: Rest and take it easy for the first 24 hours.      SPECIAL INSTRUCTIONS:     Loop Recorder Removal, Care After  This sheet gives you information about how to care for yourself after your procedure. Your health care provider may also give you more specific instructions. If you have problems or questions, contact your health care provider.  What can I expect after the procedure?  After the procedure, it is common to have:  · Soreness or discomfort near the incision.  · Some swelling or bruising near the incision.  Follow these instructions at home:  Incision care    · Follow instructions from your health care provider about how to take care of your incision. Make sure you:  ? Wash your hands with soap and water before you change your bandage (dressing). If soap and water are not available, use hand .  ? Change your dressing as told by your health care provider.  ? Keep your dressing dry.  ? Leave stitches (sutures), skin glue, or adhesive strips in place. These skin closures may need to stay in place for 2 weeks or longer. If adhesive strip edges start to loosen and curl up, you may trim the loose edges. Do not remove adhesive strips completely unless your health care provider tells you to do that.  · Check your incision area every day for signs of infection. Check for:  ? Redness, swelling, or pain.  ? Fluid or blood.  ? Warmth.  ? Pus or a bad smell.  · Do not take baths, swim, or use a hot tub until your health care provider approves. Ask your health care provider if you can take showers.  Activity  · Return to your normal activities as told by your health care provider. Ask your health care provider what activities are safe for you.  · Do not drive for 24 hours if you were given a sedative during your procedure.  · Do not lift anything heavier than 10 pounds for 2 days  Contact a health care provider if:  · You have redness, swelling, or pain around your incision.  · You have a fever.  · You  have pain that is not relieved by your pain medicine.  Get help right away if you have:  · Chest pain.  · Difficulty breathing.  This information is not intended to replace advice given to you by your health care provider. Make sure you discuss any questions you have with your health care provider.  Document Released: 11/28/2016 Document Revised: 02/02/2019 Document Reviewed: 02/02/2019  [employer] Patient Education © 2020 [employer]    SURGICAL DRESSING/BATHING: Keep dressing and incision dry and intact for 7 days. Sponge bath only for 7 days. May remove dressing and shower after 2 PM on 7/27. If steri strips in place underneath outer dressing, please allow to fall off on their own.     FOLLOW-UP APPOINTMENT:  A follow-up appointment should be arranged with your cardiologist; call to schedule.    You should CALL YOUR PHYSICIAN if you develop:  Fever greater than 101 degrees F.  Pain not relieved by medication, or persistent nausea or vomiting.  Excessive bleeding (blood soaking through dressing) or unexpected drainage from the wound.  Extreme redness or swelling around the incision site, drainage of pus or foul smelling drainage.  Inability to urinate or empty your bladder within 8 hours.  Problems with breathing or chest pain.    You should call 911 if you develop problems with breathing or chest pain.  If you are unable to contact your doctor or surgical center, you should go to the nearest emergency room or urgent care center.  Physician's telephone #: [phone]    If any questions arise, call your doctor.  If your doctor is not available, please feel free to call the Surgical Center at [phone].  The Center is open Monday through Friday from 7AM to 7PM.  You can also call the [facility] HOTLINE open 24 hours/day, 7 days/week and speak to a nurse at [phone], or toll free at [phone].    A registered nurse may call you a few days after your surgery to see how you are doing after your  procedure.    MEDICATIONS: Resume taking daily medication.  Take prescribed pain medication with food.  If no medication is prescribed, you may take non-aspirin pain medication if needed.  PAIN MEDICATION CAN BE VERY CONSTIPATING.  Take a stool softener or laxative such as senokot, pericolace, or milk of magnesia if needed.    If your physician has prescribed pain medication that includes Acetaminophen (Tylenol), do not take additional Acetaminophen (Tylenol) while taking the prescribed medication.    Depression / Suicide Risk    As you are discharged from this [facility] facility, it is important to learn how to keep safe from harming yourself.    Recognize the warning signs:  · Abrupt changes in personality, positive or negative- including increase in energy   · Giving away possessions  · Change in eating patterns- significant weight changes-  positive or negative  · Change in sleeping patterns- unable to sleep or sleeping all the time   · Unwillingness or inability to communicate  · Depression  · Unusual sadness, discouragement and loneliness  · Talk of wanting to die  · Neglect of personal appearance   · Rebelliousness- reckless behavior  · Withdrawal from people/activities they love  · Confusion- inability to concentrate     If you or a loved one observes any of these behaviors or has concerns about self-harm, here's what you can do:  · Talk about it- your feelings and reasons for harming yourself  · Remove any means that you might use to hurt yourself (examples: pills, rope, extension cords, firearm)  · Get professional help from the community (Mental Health, Substance Abuse, psychological counseling)  · Do not be alone:Call your Safe Contact- someone whom you trust who will be there for you.  · Call your local CRISIS HOTLINE 297-8271 or 235-379-5470  · Call your local Children's Mobile Crisis Response Team [place] (265) 433-0729 or www.[employer]  · Call the toll free National Suicide  Prevention Hotlines   · National Suicide Prevention Lifeline 138-932-BHKK (0660)  · National Hope Line Network 800-SUICIDE (017-8433)

## 2020-07-20 NOTE — OP REPORT
Electrophysiology Procedure Note  [facility]      PROCEDURE PERFORMED: Implantable Loop Recorder Removal    : BENJAMÍN Coyne M.D.    ASSISTANT: None    ANESTHESIA: Local    EBL: <5 cc    SPECIMENS: None    INDICATION: Cryptogenic stroke, status post ILR, A. fib    COMPLICATIONS: None    PRE-PROCEDURE ECG: Sinus rhythm    POST-PROCEDURE ECG: Sinus rhythm    DESCRIPTION OF PROCEDURE:  After informed written consent, the patient was brought to the cath lab pre/post procedure area. The patient was prepped and draped in the usual sterile fashion. Next, the skin above the ILR was anesthetized and 2 cm incision performed. The ILR was then removed.  The incision was closed with 4.0 vicryl and dermabond.Sterile dressing was applied.     IMPLANTED DEVICE INFORMATION:  Model removed: Medtronic LINQ 11    IMPRESSIONS:  1. Successful removal of implantable loop recorder implantation    RECOMMENDATIONS:  1.  Follow-up in cardiology clinic

## 2020-07-28 ENCOUNTER — OFFICE VISIT (OUTPATIENT)
Dept: INTERNAL MEDICINE | Facility: IMAGING CENTER | Age: 80
End: 2020-07-28
Payer: MEDICARE

## 2020-07-28 VITALS
RESPIRATION RATE: 12 BRPM | SYSTOLIC BLOOD PRESSURE: 142 MMHG | TEMPERATURE: 98.4 F | DIASTOLIC BLOOD PRESSURE: 82 MMHG | HEART RATE: 62 BPM | OXYGEN SATURATION: 98 %

## 2020-07-28 DIAGNOSIS — R00.2 PALPITATION: ICD-10-CM

## 2020-07-28 DIAGNOSIS — H91.93 DECREASED HEARING OF BOTH EARS: ICD-10-CM

## 2020-07-28 DIAGNOSIS — M25.531 WRIST PAIN, ACUTE, RIGHT: ICD-10-CM

## 2020-07-28 DIAGNOSIS — I48.91 ATRIAL FIBRILLATION, UNSPECIFIED TYPE (HCC): ICD-10-CM

## 2020-07-28 DIAGNOSIS — R42 DIZZINESSES: ICD-10-CM

## 2020-07-28 PROCEDURE — 99214 OFFICE O/P EST MOD 30 MIN: CPT | Performed by: INTERNAL MEDICINE

## 2020-07-29 NOTE — PROGRESS NOTES
Chief Complaint   Patient presents with   • Wrist Pain       HISTORY OF THE PRESENT ILLNESS: Patient is a 80 y.o. male.     Patient comes in complaining of ongoing wrist pain.  He was seen in the office recently for this.  We discussed follow-up with orthopedics.  He was seen in the office after emergency room visit on July 12.  He was seen here on July 13.  He took the Medrol Dosepak that was prescribed.  He reports that improved his symptoms but they did not resolve.  He was seen by orthopedics previously.  He reports arthritis on x-rays but otherwise negative.  They discussed surgery or possibly injecting the joint but this was never followed through.    He also complains of dizzy spells.  They typically occur in the morning after breakfast.  They may last for a few minutes.  No obvious change with head position or positional change.  He is also noticed an increase in palpitations.  He was recently diagnosed with atrial fibrillation based on loop recorder.  He is on appropriate anticoagulation with Eliquis.  He noticed that the symptoms seem to correlate with starting this medication.    He is also interested in evaluation with audiology.  Reports that his wife has noticed a significant decrease in his hearing.    Allergies: Morphine    Current Outpatient Medications Ordered in Epic   Medication Sig Dispense Refill   • apixaban (ELIQUIS) 5mg Tab Take 5 mg by mouth 2 Times a Day. Pt started on 7/12/2020     • allopurinol (ZYLOPRIM) 100 MG Tab Take 100 mg by mouth 2 times a day.     • losartan (COZAAR) 50 MG Tab Take 50 mg by mouth every day.     • triamterene/hctz (MAXZIDE-25/DYAZIDE) 37.5-25 MG Cap Take 1 Cap by mouth every morning.     • acetaminophen (TYLENOL) 325 MG Tab Take 650 mg by mouth every four hours as needed for Moderate Pain.     • atorvastatin (LIPITOR) 40 MG Tab Take 1 Tab by mouth every day. 100 Tab 1   • tamsulosin (FLOMAX) 0.4 MG capsule Take 1 Cap by mouth every day. 7 Cap 0     No current  Epic-ordered facility-administered medications on file.        Past medical history, social history and family history were reviewed from chart today    Review of systems: Per HPI.    Denies headache, chest pain, fever, chills, diarrhea, constipation, abdominal pain, palpitations, depression   All others negative.     Exam: /82 (BP Location: Left arm, Patient Position: Sitting, BP Cuff Size: Adult)   Pulse 62   Temp 36.9 °C (98.4 °F) (Temporal)   Resp 12   SpO2 98%   General: Well-appearing. Well-developed. No signs of distress.  HEENT: Grossly normal. Oral cavity is pink and moist.  Neck: Supple without JVD or bruit.  Pulmonary: Clear with good breath sounds. Normal effort.  Cardiovascular: Irregularly irregular. Carotid and radial pulses are intact.  Abdomen: Soft, nontender, nondistended. Spleen and liver are not enlarged.  Neurologic: Cranial nerves II through XII are grossly normal, alert and oriented x3  Extremities: Right wrist pain at the base of the thumb extending into the distal forearm.    Diagnosis:  1. Wrist pain, acute, right  REFERRAL TO ORTHOPEDICS    MR-WRIST W/O RIGHT   2. Atrial fibrillation, unspecified type (HCC)     3. Palpitation     4. Dizzinesses     5. Decreased hearing of both ears  REFERRAL TO AUDIOLOGY     Ongoing wrist pain.  Arthritis on x-rays but no definitive cause for his pain.  Not a candidate for anti-inflammatories because of Eliquis use    He is irregularly irregular today.  He seems more consistent with PAC than atrial fibrillation.  He has follow-up with cardiology scheduled.    He is experiencing palpitations and dizziness.  This may be related to atrial fibrillation or other.  I recommended follow-up with cardiology    Medications:  Consider trial of Tumeric as anti-inflammatory for wrist    Laboratory:  No new labs    Imaging:  MRI of the wrist    Referrals:  Audiology for hearing loss  Refer back to orthopedics for wrist pain

## 2020-07-30 ENCOUNTER — APPOINTMENT (OUTPATIENT)
Dept: RADIOLOGY | Facility: MEDICAL CENTER | Age: 80
End: 2020-07-30
Attending: INTERNAL MEDICINE
Payer: MEDICARE

## 2020-07-30 DIAGNOSIS — M25.531 WRIST PAIN, ACUTE, RIGHT: ICD-10-CM

## 2020-07-30 PROCEDURE — 73221 MRI JOINT UPR EXTREM W/O DYE: CPT | Mod: RT

## 2020-08-02 DIAGNOSIS — I10 ESSENTIAL HYPERTENSION: Primary | ICD-10-CM

## 2020-08-05 RX ORDER — LOSARTAN POTASSIUM 50 MG/1
TABLET ORAL
Qty: 100 TAB | Refills: 3 | Status: SHIPPED | OUTPATIENT
Start: 2020-08-05 | End: 2021-06-01

## 2020-08-07 RX ORDER — ALLOPURINOL 100 MG/1
100 TABLET ORAL 2 TIMES DAILY
Qty: 90 TAB | Refills: 3 | Status: SHIPPED | OUTPATIENT
Start: 2020-08-07 | End: 2020-12-23

## 2020-08-20 DIAGNOSIS — E78.2 HYPERLIPEMIA, MIXED: ICD-10-CM

## 2020-08-26 RX ORDER — ATORVASTATIN CALCIUM 40 MG/1
TABLET, FILM COATED ORAL
Qty: 100 TAB | Refills: 1 | Status: SHIPPED | OUTPATIENT
Start: 2020-08-26 | End: 2021-01-05

## 2020-10-06 ENCOUNTER — TELEPHONE (OUTPATIENT)
Dept: CARDIOLOGY | Facility: MEDICAL CENTER | Age: 80
End: 2020-10-06

## 2020-10-06 DIAGNOSIS — I48.91 ATRIAL FIBRILLATION, UNSPECIFIED TYPE (HCC): ICD-10-CM

## 2020-10-06 NOTE — TELEPHONE ENCOUNTER
BORIS/ann-marie    Pt calling to report [facility] told pt eliquis is cancelled.   Pt is very surprised and would like to understand if this is accurate information.    Please call Chuck

## 2020-10-06 NOTE — TELEPHONE ENCOUNTER
Elvis sent to Dr. Gonzalez to confirm that question needs to be deferred to Neurology.      Medication reordered and patient updated. Patient appreciative for the return phone call.

## 2020-10-07 RX ORDER — TRIAMTERENE AND HYDROCHLOROTHIAZIDE 37.5; 25 MG/1; MG/1
1 CAPSULE ORAL EVERY MORNING
Qty: 90 CAP | Refills: 3 | Status: SHIPPED | OUTPATIENT
Start: 2020-10-07 | End: 2021-08-23

## 2020-10-12 ENCOUNTER — NON-PROVIDER VISIT (OUTPATIENT)
Dept: INTERNAL MEDICINE | Facility: IMAGING CENTER | Age: 80
End: 2020-10-12
Payer: MEDICARE

## 2020-10-12 DIAGNOSIS — Z23 NEED FOR VACCINATION: ICD-10-CM

## 2020-10-12 PROCEDURE — 90662 IIV NO PRSV INCREASED AG IM: CPT | Performed by: INTERNAL MEDICINE

## 2020-10-12 PROCEDURE — G0008 ADMIN INFLUENZA VIRUS VAC: HCPCS | Performed by: INTERNAL MEDICINE

## 2020-10-23 ENCOUNTER — HOSPITAL ENCOUNTER (OUTPATIENT)
Facility: MEDICAL CENTER | Age: 80
End: 2020-10-23
Attending: INTERNAL MEDICINE
Payer: MEDICARE

## 2020-10-23 ENCOUNTER — OFFICE VISIT (OUTPATIENT)
Dept: INTERNAL MEDICINE | Facility: IMAGING CENTER | Age: 80
End: 2020-10-23
Payer: MEDICARE

## 2020-10-23 VITALS
OXYGEN SATURATION: 94 % | DIASTOLIC BLOOD PRESSURE: 76 MMHG | SYSTOLIC BLOOD PRESSURE: 128 MMHG | TEMPERATURE: 97.7 F | HEART RATE: 84 BPM | RESPIRATION RATE: 12 BRPM

## 2020-10-23 DIAGNOSIS — K22.5 ZENKER DIVERTICULUM: ICD-10-CM

## 2020-10-23 DIAGNOSIS — R04.2 HEMOPTYSIS: ICD-10-CM

## 2020-10-23 PROCEDURE — 85025 COMPLETE CBC W/AUTO DIFF WBC: CPT

## 2020-10-23 PROCEDURE — 99214 OFFICE O/P EST MOD 30 MIN: CPT | Performed by: INTERNAL MEDICINE

## 2020-10-23 NOTE — PROGRESS NOTES
Chief Complaint   Patient presents with   • Upper GI Bleed       HISTORY OF THE PRESENT ILLNESS: Patient is a 80 y.o. male.     Patient with history of Zenker's diverticulum presents with episode blood in his sputum/phlegm.  He reports that this morning he cleared his throat and brought up some red blood with clear sputum.  This is new.  There was some dark material present also.  No blood when he clears his nasal passage.  He denies hemoptysis.  Denies hematemesis.  He has issues with his Zenker's diverticulum.  Reports that it can feel full and he will begin sneezing.  The sneezing can bring up food material.  He feels that the current symptoms are most consistent with this.  He was able to go in the bathroom during our visit and expel some red blood tinged oral secretions.  He reports that the bleeding has significantly improved since this morning.  He had EGD in October 2018 which was unremarkable    He is on chronic anticoagulation for atrial fibrillation with history of stroke.      Allergies: Morphine    Current Outpatient Medications Ordered in Epic   Medication Sig Dispense Refill   • triamterene/hctz (MAXZIDE-25/DYAZIDE) 37.5-25 MG Cap Take 1 Cap by mouth every morning. 90 Cap 3   • apixaban (ELIQUIS) 5mg Tab Take 1 Tab by mouth 2 Times a Day. 180 Tab 3   • atorvastatin (LIPITOR) 40 MG Tab TAKE 1 TABLET BY MOUTH EVERY  Tab 1   • allopurinol (ZYLOPRIM) 100 MG Tab Take 1 Tab by mouth 2 times a day. 90 Tab 3   • losartan (COZAAR) 50 MG Tab TAKE 1 TABLET BY MOUTH EVERY  Tab 3   • acetaminophen (TYLENOL) 325 MG Tab Take 650 mg by mouth every four hours as needed for Moderate Pain.     • tamsulosin (FLOMAX) 0.4 MG capsule Take 1 Cap by mouth every day. 7 Cap 0     No current Epic-ordered facility-administered medications on file.        Past medical history, social history and family history were reviewed from chart today    Review of systems: Per HPI.    Denies headache, chest pain, fever, chills,  diarrhea, constipation, abdominal pain, palpitations, depression   All others negative.     Exam: /76 (BP Location: Left arm, Patient Position: Sitting, BP Cuff Size: Adult)   Pulse 84   Temp 36.5 °C (97.7 °F) (Temporal)   Resp 12   SpO2 94%   General: Well-appearing. Well-developed. No signs of distress.  HEENT: Grossly normal. Oral cavity is pink and moist.  Neck: Supple without JVD or bruit.  Pulmonary: Clear with good breath sounds. Normal effort.  Cardiovascular: Regular. Carotid and radial pulses are intact.  Abdomen: Soft, nontender, nondistended. Spleen and liver are not enlarged.  Neurologic: Cranial nerves II through XII are grossly normal, alert and oriented x3      Diagnosis:  1. Hemoptysis  CBC WITH DIFFERENTIAL   2. Zenker diverticulum         Patient with bleeding from upper respiratory/digestive tract.  I did not see any active bleeding on exam of his posterior pharynx or nasal passages.  I suspect he is having irritation of the Zenker diverticulum from chronic food and secretions.  This is further complicated by his chronic anticoagulation.    Medications:  No change in medications.  I do not recommend discontinuing his anticoagulation.  If the bleeding should become worse he may need to discontinue his anticoagulation however he is aware this would increase his risk of stroke    Laboratory:  Check CBC to rule out anemia    Imaging:  No imaging currently.    Referrals:  Encourage follow-up with ENT.

## 2020-10-24 DIAGNOSIS — R04.2 HEMOPTYSIS: ICD-10-CM

## 2020-10-24 LAB
BASOPHILS # BLD AUTO: 0.6 % (ref 0–1.8)
BASOPHILS # BLD: 0.05 K/UL (ref 0–0.12)
EOSINOPHIL # BLD AUTO: 0.11 K/UL (ref 0–0.51)
EOSINOPHIL NFR BLD: 1.2 % (ref 0–6.9)
ERYTHROCYTE [DISTWIDTH] IN BLOOD BY AUTOMATED COUNT: 54.5 FL (ref 35.9–50)
HCT VFR BLD AUTO: 55 % (ref 42–52)
HGB BLD-MCNC: 17.6 G/DL (ref 14–18)
IMM GRANULOCYTES # BLD AUTO: 0.03 K/UL (ref 0–0.11)
IMM GRANULOCYTES NFR BLD AUTO: 0.3 % (ref 0–0.9)
LYMPHOCYTES # BLD AUTO: 2.18 K/UL (ref 1–4.8)
LYMPHOCYTES NFR BLD: 24.2 % (ref 22–41)
MCH RBC QN AUTO: 32.8 PG (ref 27–33)
MCHC RBC AUTO-ENTMCNC: 32 G/DL (ref 33.7–35.3)
MCV RBC AUTO: 102.4 FL (ref 81.4–97.8)
MONOCYTES # BLD AUTO: 0.96 K/UL (ref 0–0.85)
MONOCYTES NFR BLD AUTO: 10.7 % (ref 0–13.4)
NEUTROPHILS # BLD AUTO: 5.68 K/UL (ref 1.82–7.42)
NEUTROPHILS NFR BLD: 63 % (ref 44–72)
NRBC # BLD AUTO: 0 K/UL
NRBC BLD-RTO: 0 /100 WBC
PLATELET # BLD AUTO: 163 K/UL (ref 164–446)
PMV BLD AUTO: 13.1 FL (ref 9–12.9)
RBC # BLD AUTO: 5.37 M/UL (ref 4.7–6.1)
WBC # BLD AUTO: 9 K/UL (ref 4.8–10.8)

## 2020-10-26 ENCOUNTER — HOSPITAL ENCOUNTER (OUTPATIENT)
Facility: MEDICAL CENTER | Age: 80
End: 2020-10-26
Attending: INTERNAL MEDICINE
Payer: MEDICARE

## 2020-10-26 ENCOUNTER — NON-PROVIDER VISIT (OUTPATIENT)
Dept: INTERNAL MEDICINE | Facility: IMAGING CENTER | Age: 80
End: 2020-10-26
Payer: MEDICARE

## 2020-10-26 DIAGNOSIS — E79.0 HYPERURICEMIA: ICD-10-CM

## 2020-10-26 DIAGNOSIS — I10 ESSENTIAL HYPERTENSION: ICD-10-CM

## 2020-10-26 DIAGNOSIS — E78.5 HYPERLIPIDEMIA LDL GOAL <70: ICD-10-CM

## 2020-10-26 DIAGNOSIS — R35.0 BENIGN PROSTATIC HYPERPLASIA WITH URINARY FREQUENCY: ICD-10-CM

## 2020-10-26 DIAGNOSIS — N40.1 BENIGN PROSTATIC HYPERPLASIA WITH URINARY FREQUENCY: ICD-10-CM

## 2020-10-26 PROCEDURE — 80053 COMPREHEN METABOLIC PANEL: CPT

## 2020-10-26 PROCEDURE — 84550 ASSAY OF BLOOD/URIC ACID: CPT

## 2020-10-26 PROCEDURE — 84153 ASSAY OF PSA TOTAL: CPT

## 2020-10-26 PROCEDURE — 80061 LIPID PANEL: CPT

## 2020-10-27 LAB
ALBUMIN SERPL BCP-MCNC: 3.9 G/DL (ref 3.2–4.9)
ALBUMIN/GLOB SERPL: 2 G/DL
ALP SERPL-CCNC: 100 U/L (ref 30–99)
ALT SERPL-CCNC: 19 U/L (ref 2–50)
ANION GAP SERPL CALC-SCNC: 12 MMOL/L (ref 7–16)
AST SERPL-CCNC: 18 U/L (ref 12–45)
BILIRUB SERPL-MCNC: 0.9 MG/DL (ref 0.1–1.5)
BUN SERPL-MCNC: 22 MG/DL (ref 8–22)
CALCIUM SERPL-MCNC: 9.4 MG/DL (ref 8.5–10.5)
CHLORIDE SERPL-SCNC: 99 MMOL/L (ref 96–112)
CHOLEST SERPL-MCNC: 107 MG/DL (ref 100–199)
CO2 SERPL-SCNC: 28 MMOL/L (ref 20–33)
CREAT SERPL-MCNC: 0.99 MG/DL (ref 0.5–1.4)
GLOBULIN SER CALC-MCNC: 2 G/DL (ref 1.9–3.5)
GLUCOSE SERPL-MCNC: 93 MG/DL (ref 65–99)
HDLC SERPL-MCNC: 41 MG/DL
LDLC SERPL CALC-MCNC: 51 MG/DL
POTASSIUM SERPL-SCNC: 4.2 MMOL/L (ref 3.6–5.5)
PROT SERPL-MCNC: 5.9 G/DL (ref 6–8.2)
PSA SERPL-MCNC: 1.58 NG/ML (ref 0–4)
SODIUM SERPL-SCNC: 139 MMOL/L (ref 135–145)
TRIGL SERPL-MCNC: 75 MG/DL (ref 0–149)
URATE SERPL-MCNC: 5 MG/DL (ref 2.5–8.3)

## 2020-11-04 ENCOUNTER — TELEPHONE (OUTPATIENT)
Dept: INTERNAL MEDICINE | Facility: IMAGING CENTER | Age: 80
End: 2020-11-04

## 2020-11-04 NOTE — TELEPHONE ENCOUNTER
Dr Jc is interested in previous ENT referral notes/testing/eval.  2017 [facility]  They state that they have never seen patient.  Message left for patient to call office with information on local ENT consults from the past so office can request records

## 2020-11-09 ENCOUNTER — OFFICE VISIT (OUTPATIENT)
Dept: INTERNAL MEDICINE | Facility: IMAGING CENTER | Age: 80
End: 2020-11-09
Payer: MEDICARE

## 2020-11-09 ENCOUNTER — HOSPITAL ENCOUNTER (OUTPATIENT)
Facility: MEDICAL CENTER | Age: 80
End: 2020-11-09
Attending: INTERNAL MEDICINE
Payer: MEDICARE

## 2020-11-09 ENCOUNTER — OFFICE VISIT (OUTPATIENT)
Dept: CARDIOLOGY | Facility: MEDICAL CENTER | Age: 80
End: 2020-11-09
Payer: MEDICARE

## 2020-11-09 VITALS
HEIGHT: 72 IN | DIASTOLIC BLOOD PRESSURE: 76 MMHG | OXYGEN SATURATION: 95 % | WEIGHT: 215 LBS | SYSTOLIC BLOOD PRESSURE: 110 MMHG | HEART RATE: 66 BPM | TEMPERATURE: 98.3 F | BODY MASS INDEX: 29.12 KG/M2 | RESPIRATION RATE: 12 BRPM

## 2020-11-09 VITALS
SYSTOLIC BLOOD PRESSURE: 120 MMHG | BODY MASS INDEX: 29.28 KG/M2 | HEIGHT: 72 IN | OXYGEN SATURATION: 95 % | DIASTOLIC BLOOD PRESSURE: 74 MMHG | WEIGHT: 216.2 LBS | HEART RATE: 46 BPM

## 2020-11-09 DIAGNOSIS — R35.0 BENIGN PROSTATIC HYPERPLASIA WITH URINARY FREQUENCY: ICD-10-CM

## 2020-11-09 DIAGNOSIS — I63.9 CRYPTOGENIC STROKE (HCC): ICD-10-CM

## 2020-11-09 DIAGNOSIS — E78.5 HYPERLIPIDEMIA LDL GOAL <70: ICD-10-CM

## 2020-11-09 DIAGNOSIS — N40.1 BENIGN PROSTATIC HYPERPLASIA WITH URINARY FREQUENCY: ICD-10-CM

## 2020-11-09 DIAGNOSIS — K22.5 ZENKER DIVERTICULUM: ICD-10-CM

## 2020-11-09 DIAGNOSIS — I25.10 CORONARY ARTERY DISEASE INVOLVING NATIVE CORONARY ARTERY OF NATIVE HEART WITHOUT ANGINA PECTORIS: ICD-10-CM

## 2020-11-09 DIAGNOSIS — Z00.00 MEDICARE ANNUAL WELLNESS VISIT, SUBSEQUENT: ICD-10-CM

## 2020-11-09 DIAGNOSIS — I10 ESSENTIAL HYPERTENSION: ICD-10-CM

## 2020-11-09 DIAGNOSIS — N28.1 RENAL CYSTS, ACQUIRED, BILATERAL: ICD-10-CM

## 2020-11-09 DIAGNOSIS — Z79.01 ANTICOAGULATED: ICD-10-CM

## 2020-11-09 DIAGNOSIS — I48.0 PAF (PAROXYSMAL ATRIAL FIBRILLATION) (HCC): ICD-10-CM

## 2020-11-09 DIAGNOSIS — N20.0 URIC ACID NEPHROLITHIASIS: ICD-10-CM

## 2020-11-09 DIAGNOSIS — I48.91 ATRIAL FIBRILLATION, UNSPECIFIED TYPE (HCC): ICD-10-CM

## 2020-11-09 DIAGNOSIS — Z12.11 SCREENING FOR COLON CANCER: ICD-10-CM

## 2020-11-09 DIAGNOSIS — I49.3 VENTRICULAR ECTOPY: ICD-10-CM

## 2020-11-09 DIAGNOSIS — M19.042 PRIMARY OSTEOARTHRITIS OF LEFT HAND: ICD-10-CM

## 2020-11-09 PROCEDURE — 82570 ASSAY OF URINE CREATININE: CPT

## 2020-11-09 PROCEDURE — 99214 OFFICE O/P EST MOD 30 MIN: CPT | Performed by: INTERNAL MEDICINE

## 2020-11-09 PROCEDURE — 81003 URINALYSIS AUTO W/O SCOPE: CPT

## 2020-11-09 PROCEDURE — 82043 UR ALBUMIN QUANTITATIVE: CPT

## 2020-11-09 PROCEDURE — G0439 PPPS, SUBSEQ VISIT: HCPCS | Performed by: INTERNAL MEDICINE

## 2020-11-09 ASSESSMENT — ENCOUNTER SYMPTOMS
COUGH: 0
SHORTNESS OF BREATH: 0
DIZZINESS: 0
LOSS OF CONSCIOUSNESS: 0
PALPITATIONS: 0
MYALGIAS: 0
BRUISES/BLEEDS EASILY: 0
GENERAL WELL-BEING: EXCELLENT

## 2020-11-09 ASSESSMENT — ACTIVITIES OF DAILY LIVING (ADL): BATHING_REQUIRES_ASSISTANCE: 0

## 2020-11-09 ASSESSMENT — FIBROSIS 4 INDEX
FIB4 SCORE: 2.05
FIB4 SCORE: 2.05

## 2020-11-09 ASSESSMENT — PATIENT HEALTH QUESTIONNAIRE - PHQ9: CLINICAL INTERPRETATION OF PHQ2 SCORE: 0

## 2020-11-09 NOTE — PROGRESS NOTES
80 y.o. male presents for the following:    Patient comes in for annual health risk assessment, physical and review of laboratory. He is active. He feels that he is in good health. No balance issues. No cognitive to have issues. No depression.    Colonoscopy 10/9/19 no repeat recommended Dexa  PSA  10/26/20  1.58  [facility] He is active.[facility]      Coronary artery disease-patient is followed by Dr. Gonzalez. He is scheduled to follow-up with cardiology later today. He is considered to have noncritical disease. He is asymptomatic. Cholesterol is at goal, LDL is 51 Blood pressure stable on losartan and diuretic. He remains on atorvastatin 40 mg.     Atrial fibrillation-stable. Followed by cardiology. On Eliquis     Essential hypertension-stable on losartan and diuretic as above.     Cryptogenic stroke-MRI August 2018 showed two small acute and subacute infarcts in the left posterior frontal below. Carotid ultrasound were negative for significant stenosis. He remains on statin as above.     Chronic left facial nerve palsy-stable.     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 Uriostegui.  Patient is interested in second opinion.     Esophageal dysphasia-new onset symptoms not related to Zinkers. Scheduled for EGD with gastroenterology.  Patient has noticed more problems.  He can cough and expel food from the mouth or occasionally nose.     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 Uric acid level is 5.0 mg/deciliter     Obstructive sleep apnea-followed by pulmonary. He is currently on CPAP at 11 cm. Initial AHI was 42. He refuses to follow-up unless there is an issue     Renal cyst-last imaging was February 2018. This was done due to kidney stone. Two simple cysts in the right kidney measuring 6.4 and 1.9 cm were reported. This is slightly larger than 2014 where the maximal diameter was measured at 5 cm.     Colonoscopy-October 2014 five-year  "repeat recommended.  Due for influenza and she works. Patient is due for Tdap.    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   • CATARACT PHACO WITH IOL  1/21/2014    Performed by Joni Duarte M.D. at [facility]ERY [facility] ORS   • FINGER ARTHROPLASTY  12/17/2012    Performed by Adam Christopher M.D. at SURGERY SAME DAY [facility] ORS   • INGUINAL HERNIA REPAIR  2/19/2009    Performed by ALEX ALATORRE at SURGERY SAME DAY [facility] ORS   • COLONOSCOPY  2004    neg   • LAMINOTOMY  1996    lumbar laminectomy, cyst x3   • UVULOPHARYNGOPALATOPLASTY  1985       Family history: relating to possible risk factors for your patient  Family History   Problem Relation Age of Onset   • Heart Disease Maternal Grandmother    • Diabetes Paternal Grandfather    • Stroke Sister    • Lung Disease Father         black lung    • Cancer Neg Hx        Current Providers (including home care/DME’s):   Colonoscopy 10/9/19 no repeat recommended Dexa  PSA  10/26/20  1.58  [facility]      Patient Care Team:  Reilly Jc M.D. as PCP - General (Internal Medicine)  Ana Stearns R.N. as Registered Nurse      Medications:   Current " Outpatient Medications Ordered in Epic   Medication Sig Dispense Refill   • triamterene/hctz (MAXZIDE-25/DYAZIDE) 37.5-25 MG Cap Take 1 Cap by mouth every morning. 90 Cap 3   • apixaban (ELIQUIS) 5mg Tab Take 1 Tab by mouth 2 Times a Day. 180 Tab 3   • allopurinol (ZYLOPRIM) 100 MG Tab Take 1 Tab by mouth 2 times a day. 90 Tab 3   • losartan (COZAAR) 50 MG Tab TAKE 1 TABLET BY MOUTH EVERY  Tab 3   • atorvastatin (LIPITOR) 40 MG Tab TAKE 1 TABLET BY MOUTH EVERY  Tab 1   • acetaminophen (TYLENOL) 325 MG Tab Take 650 mg by mouth every four hours as needed for Moderate Pain.     • tamsulosin (FLOMAX) 0.4 MG capsule Take 1 Cap by mouth every day. 7 Cap 0     No current Epic-ordered facility-administered medications on file.        Supplements (calcium/vitamins): if not lisited in medications    Chief Complaint   Patient presents with   • Annual Exam         HPI:  Chuck Banegas Ajin is a 80 y.o. here for Medicare Annual Wellness Visit     Patient Active Problem List    Diagnosis Date Noted   • Atrial fibrillation (HCC) 07/13/2020   • Abnormal EKG 03/02/2020   • Irregular heart rhythm 03/02/2020   • Cryptogenic stroke (HCC) 09/17/2019   • Hyperuricemia 09/17/2019   • Lumbar radiculopathy 03/26/2019   • Zenker diverticulum 11/15/2018   • Status post placement of implantable loop recorder 11/15/2018   • Hyperlipidemia LDL goal <70 11/15/2018   • Coronary artery disease involving native coronary artery of native heart without angina pectoris 11/15/2018   • History of arterial ischemic stroke 08/23/2018   • Ventricular ectopy 08/23/2018   • 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 90 Cap 3   • apixaban (ELIQUIS) 5mg Tab Take 1 Tab by mouth 2 Times a Day. 180 Tab 3   • allopurinol (ZYLOPRIM) 100 MG Tab Take 1 Tab by mouth 2 times a day. 90 Tab 3   • losartan (COZAAR) 50 MG Tab TAKE 1 TABLET BY MOUTH EVERY  Tab 3   • atorvastatin (LIPITOR) 40 MG Tab TAKE 1 TABLET BY MOUTH EVERY  Tab 1   • acetaminophen (TYLENOL) 325 MG Tab Take 650 mg by mouth every four hours as needed for Moderate Pain.     • tamsulosin (FLOMAX) 0.4 MG capsule Take 1 Cap by mouth every day. 7 Cap 0     No current facility-administered medications for this visit.             Current supplements as per medication list.       Allergies: Morphine    Current social contact/activities:  Social with friends and family..    He  reports that he has never smoked. He has never used smokeless tobacco. He reports current alcohol use. He reports that he does not use drugs.  Counseling given: Not Answered        DPA/Advanced Directive:  Online in EPIC      ROS:    Gait: Uses : None  Ostomy: No  Other tubes: no   Amputations: no   Chronic oxygen use: no   Last eye exam: 2015   : Denies any urinary leakage during the last 6 months incontinence.       Screening:  Colonoscopy 10/9/19 no repeat recommended Dexa  PSA  10/26/20  1.58  [facility]      Depression Screening    Little interest or pleasure in doing things?  0 - not at all  Feeling down, depressed , or hopeless? 0 - not at all  Patient Health Questionnaire Score: 0     If depressive symptoms identified deferred to follow up visit unless specifically addressed in assessment and plan.    Interpretation of PHQ-9 Total Score   Score Severity   1-4 No Depression   5-9 Mild Depression   10-14 Moderate Depression   15-19 Moderately Severe Depression   20-27 Severe Depression    Screening for Cognitive Impairment    Three Minute Recall (river,  amol whitt) 2/3    Wil clock face with all 12 numbers and set the hands to show 10 past 11.  Yes    Cognitive concerns identified deferred for follow up unless specifically addressed in assessment and plan.    Fall Risk Assessment    Has the patient had two or more falls in the last year or any fall with injury in the last year?  No    Safety Assessment    Throw rugs on floor.  No  Handrails on all stairs.  Yes  Good lighting in all hallways.  Yes  Difficulty hearing.  No  Patient counseled about all safety risks that were identified.    Functional Assessment ADLs    Are there any barriers preventing you from cooking for yourself or meeting nutritional needs?  No.    Are there any barriers preventing you from driving safely or obtaining transportation?  No.    Are there any barriers preventing you from using a telephone or calling for help?  No.    Are there any barriers preventing you from shopping?  No.    Are there any barriers preventing you from taking care of your own finances?  No.    Are there any barriers preventing you from managing your medications?  No.    Are there any barriers preventing you from showering, bathing or dressing yourself?  No.    Are you currently engaging in any exercise or physical activity?  Yes.  Treadmill  What is your perception of your health?  Excellent.      Health Maintenance Summary                IMM ZOSTER VACCINES Overdue 3/18/1989     Annual Wellness Visit Next Due 11/10/2021      Done 11/9/2020 Visit Dx: Medicare annual wellness visit, subsequent     Patient has more history with this topic...    IMM DTaP/Tdap/Td Vaccine Next Due 9/17/2029      Done 9/17/2019 Imm Admin: Tdap Vaccine          Patient Care Team:  Reilly Jc M.D. as PCP - General (Internal Medicine)  Ana Stearns R.N. as Registered Nurse        Social History     Tobacco Use   • Smoking status: Never Smoker   • Smokeless tobacco: Never Used   Substance Use Topics   • Alcohol use: Yes     Comment: 2  drinks per week    • Drug use: No     Family History   Problem Relation Age of Onset   • Heart Disease Maternal Grandmother    • Diabetes Paternal Grandfather    • Stroke Sister    • Lung Disease Father         black lung    • Cancer Neg Hx      He  has a past medical history of Arrhythmia, Arthritis, Cardiac murmur, CATARACT, Dental disorder, ED (erectile dysfunction), Essential hypertension (11/12/2015), Facial droop, Hemorrhagic disorder (HCC), High cholesterol, Hypertension, Kidney stones, Seizure (HCC), Sleep apnea, Snoring, Stroke (HCC) (08/07/2018), and Zenker diverticula.   Past Surgical History:   Procedure Laterality Date   • ZZZ CARDIAC CATH  09/28/2018    40% LAD other arteries no disease.   • CATARACT PHACO WITH IOL  1/21/2014    Performed by Joni Duarte M.D. at [facility]   • FINGER ARTHROPLASTY  12/17/2012    Performed by Adam Christopher M.D. at SURGERY SAME DAY [facility] ORS   • INGUINAL HERNIA REPAIR  2/19/2009    Performed by ALEX ALATORRE at SURGERY SAME DAY [facility] ORS   • COLONOSCOPY  2004    neg   • LAMINOTOMY  1996    lumbar laminectomy, cyst x3   • UVULOPHARYNGOPALATOPLASTY  1985       Exam:     /76 (BP Location: Left arm, Patient Position: Sitting, BP Cuff Size: Adult)   Pulse 66   Temp 36.8 °C (98.3 °F) (Temporal)   Resp 12   Ht 1.829 m (6')   Wt 97.5 kg (215 lb)   SpO2 95%  Body mass index is 29.16 kg/m².    Hearing good.    Dentition good  Alert, oriented in no acute distress.  Eye contact is good, speech goal directed, affect calm  General: Mildly overweight.  No distress.  Normal appearing.  HEENT: Pupils are equal.  Conjunctiva is normal.  Head is normal appearing.  Ears, canals and tympanic membranes are normal.  Oral cavity is pink and moist without lesion.  Neck: Supple without JVD or bruit.  Thyroid is not enlarged.  Pulmonary: Clear with good breath sounds.  Cardiovascular regular rate and rhythm.  No murmur auscultated.  Carotid, radial  and pedal pulses are intact.  Abdomen: Soft, nontender, nondistended.  Normal bowel sounds.  Organs are not enlarged.  Neurologic: Cranial nerves intact.  Strength and sensation are normal.  Normal patellar reflex.  Skin: No obvious lesions  Lymph: No cervical, supraclavicular, axillary, abdominal or inguinal adenopathy noted.  Genitourinary: Prostate is not enlarged, soft and without nodule.        Assessment and Plan. The following treatment and monitoring plan is recommended:    1. Medicare annual wellness visit, subsequent     2. Essential hypertension     3. Hyperlipidemia LDL goal <70     4. Benign prostatic hyperplasia with urinary frequency     5. Zenker diverticulum     6. Atrial fibrillation, unspecified type (HCC)     7. Uric acid nephrolithiasis     8. Primary osteoarthritis of left hand     9. Renal cysts, acquired, bilateral     10. Screening for colon cancer  OCCULT BLOOD FECES IMMUNOASSAY       80 year old in good health.   Rec FIT for colon cancer screening   Rec follow-up with pulm for LOVE  Cardiac issues are stable.   No change in medications.       Services suggested: No services required at this time  Health Care Screening: Age-appropriate preventive services Medicare covers discussed today and ordered if indicated.  Referrals offered: Community-based lifestyle interventions to reduce health risks and promote self-management and wellness, fall prevention, nutrition, physical activity, tobacco-use cessation, weight loss, and mental health services as per orders if indicated.    Discussion today about general wellness and lifestyle habits:    · Prevent falls and reduce trip hazards; Cautioned about securing or removing rugs.  · Have a working fire alarm and carbon monoxide detector;   · Engage in regular physical activity and social activities       Follow-up: 3 months

## 2020-11-09 NOTE — PROGRESS NOTES
Chief Complaint   Patient presents with   • Coronary Artery Disease     f/v Dx:Coronary artery disease involving native coronary artery of native heart without angina pectoris       Subjective:   Chuck Ortiz is a 80 y.o. male who presents today for CAD, hypertension, hyperlipidemia, cryptogenic stroke and implantable loop recorder monitoring.      Last seen on 3/2/2020.    Since 3/2/2020 the patient was found to have 2 separate episodes of PAF on ILR.  Eliquis started.  Event recorder removed 7/2020.  No cardiac symptoms.  No bleeding problems.  Remains active, enjoys fishing.    Since 9/3/2019 the patient has had no cardiac symptoms including palpitations, chest pain or new neurological symptoms.    Since 3/20/2019 appointment the patient said no cardiac problems or symptoms.  No palpitations.  Remains active gardening and fishing.  Continues to have problems with a Zenker's diverticulum with anatomical limitations at surgical correction could not be done locally by Dr. Uriostegui and he [place].    Since 11/15/2018 patient said no cardiac symptoms.  No palpitations.  No neurological events.    Last seen on 8/28/2018 at the time of his implantable loop recorder implantation procedure.  In the interim the patient had an MPI which was abnormal showing inferior perfusion abnormalities.  He has had no symptoms of palpitations, angina pectoris or heart failure symptoms.  He has been compliant with his medications.     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     On 8/7/2018 while on a fishing trip at [place] the patient had a sudden onset of loss of short-term memory for about 10 minutes.  The patient was seen in [place] [facility] and was diagnosed with a TIA and discharged to follow-up with his physicians in  [place].     Patient subsequently saw Dr. Hannah Henderson, neurologist.  Brain MRI on 8/14/2018 showed 2 small acute and subacute infarcts in the left posterior frontal lobe.  Carotid ultrasounds were negative for any obstructive disease  The patient was switched from aspirin which he had been on empirically for 15 years to Plavix.     The patient denies any prior history of cardiac arrhythmia including atrial fibrillation.  The patient denies any symptoms of palpitations, chest discomfort or exertional shortness of breath.    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   • CATARACT PHACO WITH IOL  1/21/2014    Performed by Joni Duarte M.D. at [facility]   • FINGER ARTHROPLASTY  12/17/2012    Performed by Adam Christopher M.D. at [facility]   • INGUINAL HERNIA REPAIR  2/19/2009    Performed by ALEX ALATORRE at [facility]   • COLONOSCOPY  2004    neg   • LAMINOTOMY  1996    lumbar laminectomy, cyst x3   • UVULOPHARYNGOPALATOPLASTY  1985     Family History   Problem Relation Age of Onset   • Heart Disease Maternal Grandmother    • Diabetes Paternal Grandfather    • Stroke Sister    • Lung Disease Father         black lung    • Cancer Neg Hx      Social History "     Socioeconomic History   • Marital status:      Spouse name: Not on file   • Number of children: Not on file   • Years of education: Not on file   • Highest education level: Not on file   Occupational History   • Not on file   Social Needs   • Financial resource strain: Not on file   • Food insecurity     Worry: Not on file     Inability: Not on file   • Transportation needs     Medical: Not on file     Non-medical: Not on file   Tobacco Use   • Smoking status: Never Smoker   • Smokeless tobacco: Never Used   Substance and Sexual Activity   • Alcohol use: Yes     Comment: 2 drinks per week    • Drug use: No   • Sexual activity: Not on file     Comment: , retired [employer]    Lifestyle   • Physical activity     Days per week: Not on file     Minutes per session: Not on file   • Stress: Not on file   Relationships   • Social connections     Talks on phone: Not on file     Gets together: Not on file     Attends [employer] service: Not on file     Active member of club or organization: Not on file     Attends meetings of clubs or organizations: Not on file     Relationship status: Not on file   • Intimate partner violence     Fear of current or ex partner: Not on file     Emotionally abused: Not on file     Physically abused: Not on file     Forced sexual activity: Not on file   Other Topics Concern   • Not on file   Social History Narrative   • Not on file     Allergies   Allergen Reactions   • Morphine      Bradycardia     Outpatient Encounter Medications as of 11/9/2020   Medication Sig Dispense Refill   • triamterene/hctz (MAXZIDE-25/DYAZIDE) 37.5-25 MG Cap Take 1 Cap by mouth every morning. 90 Cap 3   • apixaban (ELIQUIS) 5mg Tab Take 1 Tab by mouth 2 Times a Day. 180 Tab 3   • atorvastatin (LIPITOR) 40 MG Tab TAKE 1 TABLET BY MOUTH EVERY  Tab 1   • allopurinol (ZYLOPRIM) 100 MG Tab Take 1 Tab by mouth 2 times a day. 90 Tab 3   • losartan (COZAAR) 50 MG Tab TAKE 1 TABLET BY MOUTH EVERY DAY  100 Tab 3   • acetaminophen (TYLENOL) 325 MG Tab Take 650 mg by mouth every four hours as needed for Moderate Pain.     • tamsulosin (FLOMAX) 0.4 MG capsule Take 1 Cap by mouth every day. 7 Cap 0     No facility-administered encounter medications on file as of 11/9/2020.      Review of Systems   Respiratory: Negative for cough and shortness of breath.    Cardiovascular: Negative for chest pain and palpitations.   Musculoskeletal: Negative for myalgias.   Neurological: Negative for dizziness and loss of consciousness.   Endo/Heme/Allergies: Does not bruise/bleed easily.        Objective:   /74 (BP Location: Left arm, Patient Position: Sitting, BP Cuff Size: Adult)   Pulse (!) 46   Ht 1.829 m (6')   Wt 98.1 kg (216 lb 3.2 oz)   SpO2 95%   BMI 29.32 kg/m²     Physical Exam   Constitutional: He is oriented to person, place, and time. He appears well-developed and well-nourished.   Eyes: Pupils are equal, round, and reactive to light. Conjunctivae are normal.   Neck: No JVD present.   Cardiovascular: Normal rate, normal heart sounds and intact distal pulses. A regularly irregular rhythm present.   Pulmonary/Chest: Effort normal and breath sounds normal. No accessory muscle usage. No respiratory distress. He has no wheezes. He has no rales.   Musculoskeletal:         General: No edema.   Neurological: He is alert and oriented to person, place, and time.   Skin: Skin is warm, dry and intact. No rash noted. No cyanosis. Nails show no clubbing.   Psychiatric: He has a normal mood and affect. His behavior is normal.     08/14/2018 BRAIN MRI  1.  Acute to subacute small sized infarcts in the left posterior frontal lobe.  2.  Mild diffuse cerebral substance loss.  3.  Mild microangiopathic ischemic change versus demyelination or gliosis.  4.  Right maxillary sinus mucous retention cyst or polyp.     08/23/2018 EKG: Normal sinus rhythm, rate 68.  First-degree AV block.  Ventricular ectopy.  Reviewed by myself.      Recent 2018 laboratory tests reviewed.    ECHOCARDIOGRAM 09/06/2018.  Normal left ventricular systolic function.  Left ventricular ejection fraction is visually estimated to be 60%.  Mild aortic stenosis.  Mild posterior mitral valve leaflet prolapse.  Mild mitral regurgitation.  Mitral annular calcification.  Unable to estimate pulmonary artery pressure due to an inadequate   tricuspid regurgitant jet.  No prior study is available for comparison.      MPI 09/06/2018.  There is a predominently fixed basal to mid inferior septal perfusion defect.   There is a distal inferior wall defect, predominantly reversible, moderate    size, mild intensity.   Stress Image LV EF: 71     %    CARDIAC CATHETERIZATION  09/28/2018  A. Left heart catheterization.  B. Left ventriculography.  C. Selective coronary angiography.  D. Right radial artery approach.  PREPROCEDURE DIAGNOSES:  1.  Abnormal myocardial perfusion scan.  2.  Mild aortic stenosis.  3.  Ventricular ectopy.  4.  Cryptogenic stroke.  5.  Hyperlipidemia.   POSTPROCEDURE DIAGNOSES:  1.  Coronary artery disease, moderate predominantly involving the mid left anterior descending artery.  2.  Left ventricular ejection fraction 77% post PVC with basal inferior wall akinesis.    ILR recording Atrial fibrillation 5/11/2020 and 7/1/2020  Start eliquis 5 MG bid  Discontinue plavix  Assessment:     1. PAF (paroxysmal atrial fibrillation) (HCC)     2. Coronary artery disease involving native coronary artery of native heart without angina pectoris     3. Cryptogenic stroke (HCC)     4. Essential hypertension     5. Hyperlipidemia LDL goal <70     6. Anticoagulated     7. Ventricular ectopy         Medical Decision Making:  Today's Assessment / Status / Plan:     1.  CAD, noncritical.  2.  Hypertension, controlled.  3.  Hyperlipidemia, on statin therapy.  4.  CVA, left posterior frontal.  08/14/2018  5.  Implantable loop recorder 8/28/2018.  Removed 7/20/2020.  6.  PAF 5/11/2020,  7/1/2020 on ILR.  7.  Anticoagulation on Eliquis.  Nonsustained ventricular tachycardia 8/31/2018.  8.  Ventricular ectopy.  9.  Zenker's diverticulum.    Recommendation Discussion  1.  The patient is stable from a cardiac standpoint regarding his CAD, hypertension, dyslipidemia and PAF.  2.  Reviewed recent lab work, LDL at goal.  3.  BP controlled.  4.  Ventricular ectopy asymptomatic.  5.  Continue current cardiac therapy.  5.  RTC 9 months.

## 2020-11-10 LAB
APPEARANCE UR: CLEAR
BILIRUB UR QL STRIP.AUTO: NEGATIVE
COLOR UR: YELLOW
CREAT UR-MCNC: 133.01 MG/DL
GLUCOSE UR STRIP.AUTO-MCNC: NEGATIVE MG/DL
KETONES UR STRIP.AUTO-MCNC: NEGATIVE MG/DL
LEUKOCYTE ESTERASE UR QL STRIP.AUTO: NEGATIVE
MICRO URNS: NORMAL
MICROALBUMIN UR-MCNC: 1.2 MG/DL
MICROALBUMIN/CREAT UR: 9 MG/G (ref 0–30)
NITRITE UR QL STRIP.AUTO: NEGATIVE
PH UR STRIP.AUTO: 5 [PH] (ref 5–8)
PROT UR QL STRIP: NEGATIVE MG/DL
RBC UR QL AUTO: NEGATIVE
SP GR UR STRIP.AUTO: 1.02
UROBILINOGEN UR STRIP.AUTO-MCNC: 0.2 MG/DL

## 2020-11-12 ENCOUNTER — HOSPITAL ENCOUNTER (OUTPATIENT)
Facility: MEDICAL CENTER | Age: 80
End: 2020-11-12
Attending: INTERNAL MEDICINE
Payer: MEDICARE

## 2020-11-12 PROCEDURE — 82274 ASSAY TEST FOR BLOOD FECAL: CPT

## 2020-11-18 ENCOUNTER — APPOINTMENT (RX ONLY)
Dept: URBAN - METROPOLITAN AREA CLINIC 35 | Facility: CLINIC | Age: 80
Setting detail: DERMATOLOGY
End: 2020-11-18

## 2020-11-18 DIAGNOSIS — Z71.89 OTHER SPECIFIED COUNSELING: ICD-10-CM

## 2020-11-18 DIAGNOSIS — Z85.828 PERSONAL HISTORY OF OTHER MALIGNANT NEOPLASM OF SKIN: ICD-10-CM

## 2020-11-18 DIAGNOSIS — D22 MELANOCYTIC NEVI: ICD-10-CM

## 2020-11-18 DIAGNOSIS — L82.1 OTHER SEBORRHEIC KERATOSIS: ICD-10-CM

## 2020-11-18 DIAGNOSIS — L81.4 OTHER MELANIN HYPERPIGMENTATION: ICD-10-CM

## 2020-11-18 DIAGNOSIS — L57.0 ACTINIC KERATOSIS: ICD-10-CM

## 2020-11-18 PROBLEM — D22.5 MELANOCYTIC NEVI OF TRUNK: Status: ACTIVE | Noted: 2020-11-18

## 2020-11-18 PROCEDURE — ? COUNSELING

## 2020-11-18 PROCEDURE — 17003 DESTRUCT PREMALG LES 2-14: CPT

## 2020-11-18 PROCEDURE — 99213 OFFICE O/P EST LOW 20 MIN: CPT | Mod: 25

## 2020-11-18 PROCEDURE — ? LIQUID NITROGEN

## 2020-11-18 PROCEDURE — 17000 DESTRUCT PREMALG LESION: CPT

## 2020-11-18 ASSESSMENT — LOCATION DETAILED DESCRIPTION DERM
LOCATION DETAILED: LEFT INFERIOR FOREHEAD
LOCATION DETAILED: INFERIOR THORACIC SPINE
LOCATION DETAILED: LEFT SUPERIOR PARIETAL SCALP
LOCATION DETAILED: RIGHT SUPERIOR PARIETAL SCALP
LOCATION DETAILED: EPIGASTRIC SKIN
LOCATION DETAILED: RIGHT SUPERIOR LATERAL UPPER BACK
LOCATION DETAILED: LEFT MID TEMPLE
LOCATION DETAILED: SUPERIOR MID FOREHEAD
LOCATION DETAILED: POSTERIOR MID-PARIETAL SCALP

## 2020-11-18 ASSESSMENT — LOCATION SIMPLE DESCRIPTION DERM
LOCATION SIMPLE: ABDOMEN
LOCATION SIMPLE: LEFT FOREHEAD
LOCATION SIMPLE: SUPERIOR FOREHEAD
LOCATION SIMPLE: UPPER BACK
LOCATION SIMPLE: POSTERIOR SCALP
LOCATION SIMPLE: SCALP
LOCATION SIMPLE: LEFT TEMPLE
LOCATION SIMPLE: RIGHT UPPER BACK

## 2020-11-18 ASSESSMENT — LOCATION ZONE DERM
LOCATION ZONE: FACE
LOCATION ZONE: TRUNK
LOCATION ZONE: SCALP

## 2020-11-18 NOTE — PROCEDURE: LIQUID NITROGEN
Render Note In Bullet Format When Appropriate: No
Post-Care Instructions: I reviewed with the patient in detail post-care instructions. Patient is to wear sunprotection, and avoid picking at any of the treated lesions. Pt may apply Vaseline to crusted or scabbing areas.
Consent: The patient's consent was obtained including but not limited to risks of crusting, scabbing, blistering, scarring, darker or lighter pigmentary change, recurrence, incomplete removal and infection.
Duration Of Freeze Thaw-Cycle (Seconds): 10
Number Of Freeze-Thaw Cycles: 1 freeze-thaw cycle
Detail Level: Detailed

## 2020-11-26 DIAGNOSIS — Z12.11 SCREENING FOR COLON CANCER: ICD-10-CM

## 2020-11-27 LAB — HEMOCCULT STL QL IA: NEGATIVE

## 2020-12-23 RX ORDER — ALLOPURINOL 100 MG/1
TABLET ORAL
Qty: 180 TAB | Refills: 1 | Status: SHIPPED | OUTPATIENT
Start: 2020-12-23 | End: 2021-06-01 | Stop reason: SDUPTHER

## 2021-01-04 DIAGNOSIS — E78.2 HYPERLIPEMIA, MIXED: ICD-10-CM

## 2021-01-05 DIAGNOSIS — M25.562 ACUTE PAIN OF LEFT KNEE: ICD-10-CM

## 2021-01-11 DIAGNOSIS — Z23 NEED FOR VACCINATION: ICD-10-CM

## 2021-01-11 RX ORDER — ATORVASTATIN CALCIUM 40 MG/1
TABLET, FILM COATED ORAL
Qty: 100 TAB | Refills: 2 | Status: SHIPPED | OUTPATIENT
Start: 2021-01-11 | End: 2022-02-24

## 2021-01-14 ENCOUNTER — IMMUNIZATION (OUTPATIENT)
Dept: FAMILY PLANNING/WOMEN'S HEALTH CLINIC | Facility: IMMUNIZATION CENTER | Age: 81
End: 2021-01-14
Attending: INTERNAL MEDICINE
Payer: MEDICARE

## 2021-01-14 DIAGNOSIS — Z23 NEED FOR VACCINATION: ICD-10-CM

## 2021-01-14 DIAGNOSIS — Z23 ENCOUNTER FOR VACCINATION: Primary | ICD-10-CM

## 2021-01-14 PROCEDURE — 91300 PFIZER SARS-COV-2 VACCINE: CPT | Performed by: INTERNAL MEDICINE

## 2021-01-14 PROCEDURE — 0001A PFIZER SARS-COV-2 VACCINE: CPT | Performed by: INTERNAL MEDICINE

## 2021-01-22 ENCOUNTER — OFFICE VISIT (OUTPATIENT)
Dept: INTERNAL MEDICINE | Facility: IMAGING CENTER | Age: 81
End: 2021-01-22
Payer: MEDICARE

## 2021-01-22 VITALS
SYSTOLIC BLOOD PRESSURE: 124 MMHG | OXYGEN SATURATION: 95 % | RESPIRATION RATE: 12 BRPM | TEMPERATURE: 98.1 F | HEART RATE: 75 BPM | DIASTOLIC BLOOD PRESSURE: 80 MMHG

## 2021-01-22 DIAGNOSIS — E78.5 HYPERLIPIDEMIA LDL GOAL <70: ICD-10-CM

## 2021-01-22 DIAGNOSIS — I10 ESSENTIAL HYPERTENSION: ICD-10-CM

## 2021-01-22 DIAGNOSIS — D48.5 NEOPLASM OF UNCERTAIN BEHAVIOR OF SKIN: ICD-10-CM

## 2021-01-22 DIAGNOSIS — I48.91 ATRIAL FIBRILLATION, UNSPECIFIED TYPE (HCC): ICD-10-CM

## 2021-01-22 DIAGNOSIS — K22.5 ZENKER DIVERTICULUM: ICD-10-CM

## 2021-01-22 PROCEDURE — 99213 OFFICE O/P EST LOW 20 MIN: CPT | Performed by: INTERNAL MEDICINE

## 2021-01-22 RX ORDER — MELOXICAM 15 MG/1
15 TABLET ORAL
COMMUNITY
Start: 2021-01-12 | End: 2021-06-18

## 2021-01-22 NOTE — PROGRESS NOTES
Chief Complaint   Patient presents with   • Skin Lesion       HISTORY OF THE PRESENT ILLNESS: Patient is a 80 y.o. male.   1. Neoplasm of uncertain behavior of skin  REFERRAL TO DERMATOLOGY   2. Essential hypertension     3. Hyperlipidemia LDL goal <70     4. Zenker diverticulum     5. Atrial fibrillation, unspecified type (HCC)         Patient comes in for evaluation of skin lesion behind left ear.  He has a nonhealing lesion that oozes.  He feels that it gets larger and smaller but never heals.  He has tried Neosporin.  He also tried liquid Band-Aid without improvement.  He has a history of skin cancer removed from his scalp.  He has a significant history of sun exposure.    Allergies: Morphine    Current Outpatient Medications Ordered in Epic   Medication Sig Dispense Refill   • atorvastatin (LIPITOR) 40 MG Tab TAKE 1 TABLET BY MOUTH EVERY  Tab 2   • allopurinol (ZYLOPRIM) 100 MG Tab TAKE 1 TABLET BY MOUTH TWICE A  Tab 1   • triamterene/hctz (MAXZIDE-25/DYAZIDE) 37.5-25 MG Cap Take 1 Cap by mouth every morning. 90 Cap 3   • apixaban (ELIQUIS) 5mg Tab Take 1 Tab by mouth 2 Times a Day. 180 Tab 3   • losartan (COZAAR) 50 MG Tab TAKE 1 TABLET BY MOUTH EVERY  Tab 3   • acetaminophen (TYLENOL) 325 MG Tab Take 650 mg by mouth every four hours as needed for Moderate Pain.     • tamsulosin (FLOMAX) 0.4 MG capsule Take 1 Cap by mouth every day. 7 Cap 0   • meloxicam (MOBIC) 15 MG tablet Take 15 mg by mouth every day.       No current [facility]-ordered facility-administered medications on file.        Past medical history, social history and family history were reviewed from chart today    Review of systems: Per HPI.      All others negative.     Exam: /80 (BP Location: Left arm, Patient Position: Sitting, BP Cuff Size: Adult)   Pulse 75   Temp 36.7 °C (98.1 °F) (Temporal)   Resp 12   SpO2 95%   General: Well-nourished, well-developed. No change in appearance. No distress.  HEENT:  Normocephalic.  Pulmonary: Clear.. Normal effort.  Cardiovascular: Regular   Abdomen: Normal appearing. Soft, nontender, nondistended.   Neurologic: Cranial nerves II through XII are grossly intact, alert and oriented x3  Skin: 1 cm ulceration behind the left ear. poaterior aurical       Diagnosis:  1. Neoplasm of uncertain behavior of skin  REFERRAL TO DERMATOLOGY   2. Essential hypertension     3. Hyperlipidemia LDL goal <70     4. Zenker diverticulum     5. Atrial fibrillation, unspecified type (HCC)         Refer to derm

## 2021-02-01 ENCOUNTER — APPOINTMENT (RX ONLY)
Dept: URBAN - METROPOLITAN AREA CLINIC 35 | Facility: CLINIC | Age: 81
Setting detail: DERMATOLOGY
End: 2021-02-01

## 2021-02-01 DIAGNOSIS — L57.0 ACTINIC KERATOSIS: ICD-10-CM

## 2021-02-01 DIAGNOSIS — D485 NEOPLASM OF UNCERTAIN BEHAVIOR OF SKIN: ICD-10-CM

## 2021-02-01 PROBLEM — D48.5 NEOPLASM OF UNCERTAIN BEHAVIOR OF SKIN: Status: ACTIVE | Noted: 2021-02-01

## 2021-02-01 PROCEDURE — ? TREATMENT REGIMEN

## 2021-02-01 PROCEDURE — ? BIOPSY BY SHAVE METHOD

## 2021-02-01 PROCEDURE — 69100 BIOPSY OF EXTERNAL EAR: CPT

## 2021-02-01 PROCEDURE — 99212 OFFICE O/P EST SF 10 MIN: CPT | Mod: 25

## 2021-02-01 ASSESSMENT — LOCATION DETAILED DESCRIPTION DERM: LOCATION DETAILED: LEFT POSTERIOR EAR

## 2021-02-01 ASSESSMENT — LOCATION ZONE DERM: LOCATION ZONE: EAR

## 2021-02-01 ASSESSMENT — LOCATION SIMPLE DESCRIPTION DERM: LOCATION SIMPLE: LEFT EAR

## 2021-02-01 NOTE — PROCEDURE: TREATMENT REGIMEN
Detail Level: Zone
Plan: Discussed treatment with blue light PDT vs topicals.  Plan blue light for face.

## 2021-02-01 NOTE — PROCEDURE: BIOPSY BY SHAVE METHOD

## 2021-02-04 ENCOUNTER — IMMUNIZATION (OUTPATIENT)
Dept: FAMILY PLANNING/WOMEN'S HEALTH CLINIC | Facility: IMMUNIZATION CENTER | Age: 81
End: 2021-02-04
Attending: INTERNAL MEDICINE
Payer: MEDICARE

## 2021-02-04 DIAGNOSIS — Z23 ENCOUNTER FOR VACCINATION: Primary | ICD-10-CM

## 2021-02-04 PROCEDURE — 91300 PFIZER SARS-COV-2 VACCINE: CPT

## 2021-02-04 PROCEDURE — 0002A PFIZER SARS-COV-2 VACCINE: CPT

## 2021-03-11 ENCOUNTER — APPOINTMENT (RX ONLY)
Dept: URBAN - METROPOLITAN AREA CLINIC 36 | Facility: CLINIC | Age: 81
Setting detail: DERMATOLOGY
End: 2021-03-11

## 2021-03-11 PROBLEM — C44.229 SQUAMOUS CELL CARCINOMA OF SKIN OF LEFT EAR AND EXTERNAL AURICULAR CANAL: Status: ACTIVE | Noted: 2021-03-11

## 2021-03-11 PROCEDURE — ? MOHS SURGERY

## 2021-03-11 PROCEDURE — 17312 MOHS ADDL STAGE: CPT

## 2021-03-11 PROCEDURE — 14061 TIS TRNFR E/N/E/L10.1-30SQCM: CPT

## 2021-03-11 PROCEDURE — 17311 MOHS 1 STAGE H/N/HF/G: CPT

## 2021-03-11 NOTE — PROCEDURE: MOHS SURGERY
Mohs Case Number: 
Previous Accession (Optional): [id number]
Biopsy Photograph Reviewed: Yes
Referring Physician (Optional): Dr. Blair
Consent Type: Consent 1 (Standard)
Eye Shield Used: No
Surgeon Performing Repair (Optional): Santino
Initial Size Of Lesion: 0.8
X Size Of Lesion In Cm (Optional): 0.7
Number Of Stages: 2
Primary Defect Length In Cm (Final Defect Size - Required For Flaps/Grafts): 1.5
Primary Defect Width In Cm (Final Defect Size - Required For Flaps/Grafts): 1.1
Repair Type: Flap
Oculoplastic Surgeon (A): Jacinto
Oculoplastic Surgeon Procedure Text (A): After obtaining clear surgical margins the patient was sent to oculoplastics for surgical repair.  The patient understands they will receive post-surgical care and follow-up from the referring physician's office.
Otolaryngologist Procedure Text (A): After obtaining clear surgical margins the patient was sent to otolaryngology for surgical repair.  The patient understands they will receive post-surgical care and follow-up from the referring physician's office.
Plastic Surgeon Procedure Text (A): After obtaining clear surgical margins the patient was sent to plastics for surgical repair.  The patient understands they will receive post-surgical care and follow-up from the referring physician's office.
Mid-Level Procedure Text (A): After obtaining clear surgical margins the patient was sent to a mid-level provider for surgical repair.  The patient understands they will receive post-surgical care and follow-up from the mid-level provider.
Provider Procedure Text (A): After obtaining clear surgical margins the defect was repaired by another provider.
Asc Procedure Text (A): After obtaining clear surgical margins the patient was sent to an ASC for surgical repair.  The patient understands they will receive post-surgical care and follow-up from the ASC physician.
Suturegard Retention Suture: 2-0 Nylon
Retention Suture Bite Size: 3 mm
Length To Time In Minutes Device Was In Place: 10
Number Of Hemigard Strips Per Side: 1
Simple / Intermediate / Complex Repair - Final Wound Length In Cm: 0
Undermining Type: Entire Wound
Debridement Text: The wound edges were debrided prior to proceeding with the closure to facilitate wound healing.
Helical Rim Text: The closure involved the helical rim.
Vermilion Border Text: The closure involved the vermilion border.
Nostril Rim Text: The closure involved the nostril rim.
Retention Suture Text: Retention sutures were placed to support the closure and prevent dehiscence.
Flap Type: Rotation Flap
Secondary Defect Length In Cm (Required For Flaps): 4.3
Secondary Defect Width In Cm (Required For Flaps): 2.6
Area H Indication Text: Tumors in this location are included in Area H (eyelids, eyebrows, nose, lips, chin, ear, pre-auricular, post-auricular, temple, genitalia, hands, feet, ankles and areola).  Tissue conservation is critical in these anatomic locations.
Area M Indication Text: Tumors in this location are included in Area M (cheek, forehead, scalp, neck, jawline and pretibial skin).  Mohs surgery is indicated for tumors in these anatomic locations.
Area L Indication Text: Tumors in this location are included in Area L (trunk and extremities).  Mohs surgery is indicated for larger tumors, or tumors with aggressive histologic features, in these anatomic locations.
Surgical Defect Width In Cm (Optional): 0.9
Special Stains Stage 1 - Results: Base On Clearance Noted Above
Stage 2: Additional Anesthesia Type: 1% lidocaine with 1:100,000 epinephrine and 408mcg clindamycin/ml and a 1:10 solution of 8.4% sodium bicarbonate
Stage 4: Additional Anesthesia Type: 1% lidocaine with epinephrine
Include Tumor Staging In Mohs Note?: Please Select the Appropriate Response
Staging Info: By selecting yes to the question above you will include information on AJCC 8 tumor staging in your Mohs note. Information on tumor staging will be automatically added for SCCs on the head and neck. AJCC 8 includes tumor size, tumor depth, perineural involvement and bone invasion.
Tumor Depth: Less than 6mm from granular layer and no invasion beyond the subcutaneous fat
Medical Necessity Statement: Based on my medical judgement, Mohs surgery is the most appropriate treatment for this cancer compared to other treatments.
Alternatives Discussed Intro (Do Not Add Period): I discussed alternative treatments to Mohs surgery and specifically discussed the risks and benefits of
Consent 1/Introductory Paragraph: The rationale for Mohs was explained to the patient and consent was obtained. The risks, benefits and alternatives to therapy were discussed in detail. Specifically, the risks of infection, scarring, bleeding, prolonged wound healing, incomplete removal, allergy to anesthesia, nerve injury and recurrence were addressed. Prior to the procedure, the treatment site was clearly identified and confirmed by the patient. All components of Universal Protocol/PAUSE Rule completed.
Consent 2/Introductory Paragraph: Mohs surgery was explained to the patient and consent was obtained. The risks, benefits and alternatives to therapy were discussed in detail. Specifically, the risks of infection, scarring, bleeding, prolonged wound healing, incomplete removal, allergy to anesthesia, nerve injury and recurrence were addressed. Prior to the procedure, the treatment site was clearly identified and confirmed by the patient. All components of Universal Protocol/PAUSE Rule completed.
Consent 3/Introductory Paragraph: I gave the patient a chance to ask questions they had about the procedure.  Following this I explained the Mohs procedure and consent was obtained. The risks, benefits and alternatives to therapy were discussed in detail. Specifically, the risks of infection, scarring, bleeding, prolonged wound healing, incomplete removal, allergy to anesthesia, nerve injury and recurrence were addressed. Prior to the procedure, the treatment site was clearly identified and confirmed by the patient. All components of Universal Protocol/PAUSE Rule completed.
Consent (Temporal Branch)/Introductory Paragraph: The rationale for Mohs was explained to the patient and consent was obtained. The risks, benefits and alternatives to therapy were discussed in detail. Specifically, the risks of damage to the temporal branch of the facial nerve, infection, scarring, bleeding, prolonged wound healing, incomplete removal, allergy to anesthesia, and recurrence were addressed. Prior to the procedure, the treatment site was clearly identified and confirmed by the patient. All components of Universal Protocol/PAUSE Rule completed.
Consent (Marginal Mandibular)/Introductory Paragraph: The rationale for Mohs was explained to the patient and consent was obtained. The risks, benefits and alternatives to therapy were discussed in detail. Specifically, the risks of damage to the marginal mandibular branch of the facial nerve, infection, scarring, bleeding, prolonged wound healing, incomplete removal, allergy to anesthesia, and recurrence were addressed. Prior to the procedure, the treatment site was clearly identified and confirmed by the patient. All components of Universal Protocol/PAUSE Rule completed.
Consent (Spinal Accessory)/Introductory Paragraph: The rationale for Mohs was explained to the patient and consent was obtained. The risks, benefits and alternatives to therapy were discussed in detail. Specifically, the risks of damage to the spinal accessory nerve, infection, scarring, bleeding, prolonged wound healing, incomplete removal, allergy to anesthesia, and recurrence were addressed. Prior to the procedure, the treatment site was clearly identified and confirmed by the patient. All components of Universal Protocol/PAUSE Rule completed.
Consent (Near Eyelid Margin)/Introductory Paragraph: The rationale for Mohs was explained to the patient and consent was obtained. The risks, benefits and alternatives to therapy were discussed in detail. Specifically, the risks of ectropion or eyelid deformity, infection, scarring, bleeding, prolonged wound healing, incomplete removal, allergy to anesthesia, nerve injury and recurrence were addressed. Prior to the procedure, the treatment site was clearly identified and confirmed by the patient. All components of Universal Protocol/PAUSE Rule completed.
Consent (Ear)/Introductory Paragraph: The rationale for Mohs was explained to the patient and consent was obtained. The risks, benefits and alternatives to therapy were discussed in detail. Specifically, the risks of ear deformity, infection, scarring, bleeding, prolonged wound healing, incomplete removal, allergy to anesthesia, nerve injury and recurrence were addressed. Prior to the procedure, the treatment site was clearly identified and confirmed by the patient. All components of Universal Protocol/PAUSE Rule completed.
Consent (Nose)/Introductory Paragraph: The rationale for Mohs was explained to the patient and consent was obtained. The risks, benefits and alternatives to therapy were discussed in detail. Specifically, the risks of nasal deformity, changes in the flow of air through the nose, infection, scarring, bleeding, prolonged wound healing, incomplete removal, allergy to anesthesia, nerve injury and recurrence were addressed. Prior to the procedure, the treatment site was clearly identified and confirmed by the patient. All components of Universal Protocol/PAUSE Rule completed.
Consent (Lip)/Introductory Paragraph: The rationale for Mohs was explained to the patient and consent was obtained. The risks, benefits and alternatives to therapy were discussed in detail. Specifically, the risks of lip deformity, changes in the oral aperture, infection, scarring, bleeding, prolonged wound healing, incomplete removal, allergy to anesthesia, nerve injury and recurrence were addressed. Prior to the procedure, the treatment site was clearly identified and confirmed by the patient. All components of Universal Protocol/PAUSE Rule completed.
Consent (Scalp)/Introductory Paragraph: The rationale for Mohs was explained to the patient and consent was obtained. The risks, benefits and alternatives to therapy were discussed in detail. Specifically, the risks of changes in hair growth pattern secondary to repair, infection, scarring, bleeding, prolonged wound healing, incomplete removal, allergy to anesthesia, nerve injury and recurrence were addressed. Prior to the procedure, the treatment site was clearly identified and confirmed by the patient. All components of Universal Protocol/PAUSE Rule completed.
Detail Level: Detailed
Postop Diagnosis: same
Anesthesia Type: 0.5% lidocaine with 1:200,000 epinephrine and a 1:10 solution of 8.4% sodium bicarbonate and 408mcg clindamycin/ml
Anesthesia Volume In Cc: 2.8
Additional Anesthesia Volume In Cc: 6
Hemostasis: Electrocautery
Estimated Blood Loss (Cc): less than 5 cc
Repair Anesthesia Method: local infiltration
Brow Lift Text: A midfrontal incision was made medially to the defect to allow access to the tissues just superior to the left eyebrow. Following careful dissection inferiorly in a supraperiosteal plane to the level of the left eyebrow, several 3-0 monocryl sutures were used to resuspend the eyebrow orbicularis oculi muscular unit to the superior frontal bone periosteum. This resulted in an appropriate reapproximation of static eyebrow symmetry and correction of the left brow ptosis.
Deep Sutures: 5-0 Vicryl
Epidermal Sutures: 5-0 Caprosyn
Epidermal Closure: running cuticular
Suturegard Intro: Intraoperative tissue expansion was performed, utilizing the SUTUREGARD device, in order to reduce wound tension.
Suturegard Body: The suture ends were repeatedly re-tightened and re-clamped to achieve the desired tissue expansion.
Hemigard Intro: Due to skin fragility and wound tension, it was decided to use HEMIGARD adhesive retention suture devices to permit a linear closure. The skin was cleaned and dried for a 6cm distance away from the wound. Excessive hair, if present, was removed to allow for adhesion.
Hemigard Postcare Instructions: The HEMIGARD strips are to remain completely dry for at least 5-7 days.
Donor Site Anesthesia Type: same as repair anesthesia
Graft Basting Suture (Optional): 5-0 Fast Absorbing Gut
Graft Donor Site Epidermal Sutures (Optional): 5-0 Ethibond
Epidermal Closure Graft Donor Site (Optional): simple interrupted
Graft Donor Site Bandage (Optional-Leave Blank If You Don't Want In Note): Aquaphor and telefa placed on wound. Pressure dressing applied to donor site
Closure 2 Information: This tab is for additional flaps and grafts, including complex repair and grafts and complex repair and flaps. You can also specify a different location for the additional defect, if the location is the same you do not need to select a new one. We will insert the automated text for the repair you select below just as we do for solitary flaps and grafts. Please note that at this time if you select a location with a different insurance zone you will need to override the ICD10 and CPT if appropriate.
Closure 3 Information: This tab is for additional flaps and grafts above and beyond our usual structured repairs.  Please note if you enter information here it will not currently bill and you will need to add the billing information manually.
Wound Care: Aquaphor
Dressing: dry sterile dressing
Wound Care (No Sutures): Petrolatum
Suture Removal: 7 days
Unna Boot Text: An Unna boot was placed to help immobilize the limb and facilitate more rapid healing.
Home Suture Removal Text: Patient was provided instructions on removing sutures and will remove their sutures at home.  If they have any questions or difficulties they will call the office.
Post-Care Instructions: I reviewed with the patient in detail post-care instructions. Patient is not to engage in any heavy lifting, exercise, or swimming for the next 14 days. Should the patient develop any fevers, chills, bleeding, severe pain patient will contact the office immediately.
Pain Refusal Text: I offered to prescribe pain medication but the patient refused to take this medication.
Mauc Instructions: By selecting yes to the question below the MAUC number will be added into the note.  This will be calculated automatically based on the diagnosis chosen, the size entered, the body zone selected (H,M,L) and the specific indications you chose. You will also have the option to override the Mohs AUC if you disagree with the automatically calculated number and this option is found in the Case Summary tab.
Where Do You Want The Question To Include Opioid Counseling Located?: Case Summary Tab
Eye Protection Verbiage: Before proceeding with the stage, a plastic scleral shield was inserted. The globe was anesthetized with a few drops of 1% lidocaine with 1:100,000 epinephrine. Then, an appropriate sized scleral shield was chosen and coated with lacrilube ointment. The shield was gently inserted and left in place for the duration of each stage. After the stage was completed, the shield was gently removed.
Mohs Method Verbiage: An incision at a 45 degree angle following the standard Mohs approach was done and the specimen was harvested as a microscopic controlled layer.
Surgeon/Pathologist Verbiage (Will Incorporate Name Of Surgeon From Intro If Not Blank): operated in two distinct and integrated capacities as the surgeon and pathologist.
Mohs Histo Method Verbiage: Each section was then chromacoded and processed in the Mohs lab using the Mohs protocol and submitted for frozen section.
Subsequent Stages Histo Method Verbiage: Using a similar technique to that described above, a thin layer of tissue was removed from all areas where tumor was visible on the previous stage.  The tissue was again oriented, mapped, dyed, and processed as above.
Mohs Rapid Report Verbiage: The area of clinically evident tumor was marked with skin marking ink and appropriately hatched.  The initial incision was made following the Mohs approach through the skin.  The specimen was taken to the lab, divided into the necessary number of pieces, chromacoded and processed according to the Mohs protocol.  This was repeated in successive stages until a tumor free defect was achieved.
Complex Repair Preamble Text (Leave Blank If You Do Not Want): Extensive wide undermining was performed at least 2 cm in all directions.
Intermediate Repair Preamble Text (Leave Blank If You Do Not Want): Undermining was performed with blunt dissection.
M-Plasty Complex Repair Preamble Text (Leave Blank If You Do Not Want): Extensive wide undermining was performed.
Non-Graft Cartilage Fenestration Text: The cartilage was fenestrated with a 2mm punch biopsy to help facilitate healing.
Graft Cartilage Fenestration Text: The cartilage was fenestrated with a 2mm punch biopsy to help facilitate graft survival and healing.
Secondary Intention Text (Leave Blank If You Do Not Want): The defect will heal with secondary intention.
No Repair - Repaired With Adjacent Surgical Defect Text (Leave Blank If You Do Not Want): After obtaining clear surgical margins the defect was repaired concurrently with another surgical defect which was in close approximation.
Advancement Flap (Single) Text: The defect edges were debeveled with a #15 scalpel blade.  Given the location of the defect and the proximity to free margins a single advancement flap was deemed most appropriate.  Using a sterile surgical marker, an appropriate advancement flap was drawn incorporating the defect and placing the expected incisions within the relaxed skin tension lines where possible.    The area thus outlined was incised deep to adipose tissue with a #15 scalpel blade.  The skin margins were undermined to an appropriate distance in all directions utilizing iris scissors.
Advancement Flap (Double) Text: The defect edges were debeveled with a #15 scalpel blade.  Given the location of the defect and the proximity to free margins a double advancement flap was deemed most appropriate.  Using a sterile surgical marker, the appropriate advancement flaps were drawn incorporating the defect and placing the expected incisions within the relaxed skin tension lines where possible.    The area thus outlined was incised deep to adipose tissue with a #15 scalpel blade.  The skin margins were undermined to an appropriate distance in all directions utilizing iris scissors.
Burow's Advancement Flap Text: The defect edges were debeveled with a #15 scalpel blade.  Given the location of the defect and the proximity to free margins a Burow's advancement flap was deemed most appropriate.  Using a sterile surgical marker, the appropriate advancement flap was drawn incorporating the defect and placing the expected incisions within the relaxed skin tension lines where possible.    The area thus outlined was incised deep to adipose tissue with a #15 scalpel blade.  The skin margins were undermined to an appropriate distance in all directions utilizing iris scissors.
Chonodrocutaneous Helical Advancement Flap Text: The defect edges were debeveled with a #15 scalpel blade.  Given the location of the defect and the proximity to free margins a chondrocutaneous helical advancement flap was deemed most appropriate.  Using a sterile surgical marker, the appropriate advancement flap was drawn incorporating the defect and placing the expected incisions within the relaxed skin tension lines where possible.    The area thus outlined was incised deep to adipose tissue with a #15 scalpel blade.  The skin margins were undermined to an appropriate distance in all directions utilizing iris scissors.
Crescentic Advancement Flap Text: The defect edges were debeveled with a #15 scalpel blade.  Given the location of the defect and the proximity to free margins a crescentic advancement flap was deemed most appropriate.  Using a sterile surgical marker, the appropriate advancement flap was drawn incorporating the defect and placing the expected incisions within the relaxed skin tension lines where possible.    The area thus outlined was incised deep to adipose tissue with a #15 scalpel blade.  The skin margins were undermined to an appropriate distance in all directions utilizing iris scissors.
A-T Advancement Flap Text: The defect edges were debeveled with a #15 scalpel blade.  Given the location of the defect, shape of the defect and the proximity to free margins an A-T advancement flap was deemed most appropriate.  Using a sterile surgical marker, an appropriate advancement flap was drawn incorporating the defect and placing the expected incisions within the relaxed skin tension lines where possible.    The area thus outlined was incised deep to adipose tissue with a #15 scalpel blade.  The skin margins were undermined to an appropriate distance in all directions utilizing iris scissors.
O-T Advancement Flap Text: The defect edges were debeveled with a #15 scalpel blade.  Given the location of the defect, shape of the defect and the proximity to free margins an O-T advancement flap was deemed most appropriate.  Using a sterile surgical marker, an appropriate advancement flap was drawn incorporating the defect and placing the expected incisions within the relaxed skin tension lines where possible.    The area thus outlined was incised deep to adipose tissue with a #15 scalpel blade.  The skin margins were undermined to an appropriate distance in all directions utilizing iris scissors.
O-L Flap Text: The defect edges were debeveled with a #15 scalpel blade.  Given the location of the defect, shape of the defect and the proximity to free margins an O-L flap was deemed most appropriate.  Using a sterile surgical marker, an appropriate advancement flap was drawn incorporating the defect and placing the expected incisions within the relaxed skin tension lines where possible.    The area thus outlined was incised deep to adipose tissue with a #15 scalpel blade.  The skin margins were undermined to an appropriate distance in all directions utilizing iris scissors.
O-Z Flap Text: The defect edges were debeveled with a #15 scalpel blade.  Given the location of the defect, shape of the defect and the proximity to free margins an O-Z flap was deemed most appropriate.  Using a sterile surgical marker, an appropriate transposition flap was drawn incorporating the defect and placing the expected incisions within the relaxed skin tension lines where possible. The area thus outlined was incised deep to adipose tissue with a #15 scalpel blade.  The skin margins were undermined to an appropriate distance in all directions utilizing iris scissors.
Double O-Z Flap Text: The defect edges were debeveled with a #15 scalpel blade.  Given the location of the defect, shape of the defect and the proximity to free margins a Double O-Z flap was deemed most appropriate.  Using a sterile surgical marker, an appropriate transposition flap was drawn incorporating the defect and placing the expected incisions within the relaxed skin tension lines where possible. The area thus outlined was incised deep to adipose tissue with a #15 scalpel blade.  The skin margins were undermined to an appropriate distance in all directions utilizing iris scissors.
V-Y Flap Text: The defect edges were debeveled with a #15 scalpel blade.  Given the location of the defect, shape of the defect and the proximity to free margins a V-Y flap was deemed most appropriate.  Using a sterile surgical marker, an appropriate advancement flap was drawn incorporating the defect and placing the expected incisions within the relaxed skin tension lines where possible.    The area thus outlined was incised deep to adipose tissue with a #15 scalpel blade.  The skin margins were undermined to an appropriate distance in all directions utilizing iris scissors.
Advancement-Rotation Flap Text: The defect edges were debeveled with a #15 scalpel blade.  Given the location of the defect, shape of the defect and the proximity to free margins an advancement-rotation flap was deemed most appropriate.  Using a sterile surgical marker, an appropriate flap was drawn incorporating the defect and placing the expected incisions within the relaxed skin tension lines where possible. The area thus outlined was incised deep to adipose tissue with a #15 scalpel blade.  The skin margins were undermined to an appropriate distance in all directions utilizing iris scissors.
Mercedes Flap Text: The defect edges were debeveled with a #15 scalpel blade.  Given the location of the defect, shape of the defect and the proximity to free margins a Mercedes flap was deemed most appropriate.  Using a sterile surgical marker, an appropriate advancement flap was drawn incorporating the defect and placing the expected incisions within the relaxed skin tension lines where possible. The area thus outlined was incised deep to adipose tissue with a #15 scalpel blade.  The skin margins were undermined to an appropriate distance in all directions utilizing iris scissors.
Modified Advancement Flap Text: The defect edges were debeveled with a #15 scalpel blade.  Given the location of the defect, shape of the defect and the proximity to free margins a modified advancement flap was deemed most appropriate.  Using a sterile surgical marker, an appropriate advancement flap was drawn incorporating the defect and placing the expected incisions within the relaxed skin tension lines where possible.    The area thus outlined was incised deep to adipose tissue with a #15 scalpel blade.  The skin margins were undermined to an appropriate distance in all directions utilizing iris scissors.
Mucosal Advancement Flap Text: Given the location of the defect, shape of the defect and the proximity to free margins a mucosal advancement flap was deemed most appropriate. Incisions were made with a 15 blade scalpel in the appropriate fashion along the cutaneous vermilion border and the mucosal lip. The remaining actinically damaged mucosal tissue was excised.  The mucosal advancement flap was then elevated to the gingival sulcus with care taken to preserve the neurovascular structures and advanced into the primary defect. Care was taken to ensure that precise realignment of the vermilion border was achieved.
Peng Advancement Flap Text: The defect edges were debeveled with a #15 scalpel blade.  Given the location of the defect, shape of the defect and the proximity to free margins a Peng advancement flap was deemed most appropriate.  Using a sterile surgical marker, an appropriate advancement flap was drawn incorporating the defect and placing the expected incisions within the relaxed skin tension lines where possible. The area thus outlined was incised deep to adipose tissue with a #15 scalpel blade.  The skin margins were undermined to an appropriate distance in all directions utilizing iris scissors.
Hatchet Flap Text: The defect edges were debeveled with a #15 scalpel blade.  Given the location of the defect, shape of the defect and the proximity to free margins a hatchet flap based from the glabella was deemed most appropriate.  Using a sterile surgical marker, an appropriate glabellar hatchet flap was drawn incorporating the defect and placing the expected incisions within the relaxed skin tension lines where possible.    The area thus outlined was incised deep to adipose tissue with a #15 scalpel blade.  The skin margins were undermined to an appropriate distance in all directions utilizing iris scissors.
Rotation Flap Text: The defect edges were debeveled with a #15 scalpel blade.  Given the location of the defect, shape of the defect and the proximity to free margins a rotation flap was deemed most appropriate.  Using a sterile surgical marker, an appropriate rotation flap was drawn incorporating the defect and placing the expected incisions within the relaxed skin tension lines where possible.    The area thus outlined was incised deep to adipose tissue with a #15 scalpel blade.  The skin margins were undermined to an appropriate distance in all directions utilizing iris scissors.
Spiral Flap Text: The defect edges were debeveled with a #15 scalpel blade.  Given the location of the defect, shape of the defect and the proximity to free margins a spiral flap was deemed most appropriate.  Using a sterile surgical marker, an appropriate rotation flap was drawn incorporating the defect and placing the expected incisions within the relaxed skin tension lines where possible. The area thus outlined was incised deep to adipose tissue with a #15 scalpel blade.  The skin margins were undermined to an appropriate distance in all directions utilizing iris scissors.
Star Wedge Flap Text: The defect edges were debeveled with a #15 scalpel blade.  Given the location of the defect, shape of the defect and the proximity to free margins a star wedge flap was deemed most appropriate.  Using a sterile surgical marker, an appropriate rotation flap was drawn incorporating the defect and placing the expected incisions within the relaxed skin tension lines where possible. The area thus outlined was incised deep to adipose tissue with a #15 scalpel blade.  The skin margins were undermined to an appropriate distance in all directions utilizing iris scissors.
Transposition Flap Text: The defect edges were debeveled with a #15 scalpel blade.  Given the location of the defect and the proximity to free margins a transposition flap was deemed most appropriate.  Using a sterile surgical marker, an appropriate transposition flap was drawn incorporating the defect.    The area thus outlined was incised deep to adipose tissue with a #15 scalpel blade.  The skin margins were undermined to an appropriate distance in all directions utilizing iris scissors.
Muscle Hinge Flap Text: The defect edges were debeveled with a #15 scalpel blade.  Given the size, depth and location of the defect and the proximity to free margins a muscle hinge flap was deemed most appropriate.  Using a sterile surgical marker, an appropriate hinge flap was drawn incorporating the defect. The area thus outlined was incised with a #15 scalpel blade.  The skin margins were undermined to an appropriate distance in all directions utilizing iris scissors.
Nasal Turnover Hinge Flap Text: The defect edges were debeveled with a #15 scalpel blade.  Given the size, depth, location of the defect and the defect being full thickness a nasal turnover hinge flap was deemed most appropriate.  Using a sterile surgical marker, an appropriate hinge flap was drawn incorporating the defect. The area thus outlined was incised with a #15 scalpel blade. The flap was designed to recreate the nasal mucosal lining and the alar rim. The skin margins were undermined to an appropriate distance in all directions utilizing iris scissors.
Nasalis-Muscle-Based Myocutaneous Island Pedicle Flap Text: Using a #15 blade, an incision was made around the donor flap to the level of the nasalis muscle. Wide lateral undermining was then performed in both the subcutaneous plane above the nasalis muscle, and in a submuscular plane just above periosteum. This allowed the formation of a free nasalis muscle axial pedicle (based on the angular artery) which was still attached to the actual cutaneous flap, increasing its mobility and vascular viability. Hemostasis was obtained with pinpoint electrocoagulation. The flap was mobilized into position and the pivotal anchor points positioned and stabilized with buried interrupted sutures. Subcutaneous and dermal tissues were closed in a multilayered fashion with sutures. Tissue redundancies were excised, and the epidermal edges were apposed without significant tension and sutured with sutures.
Orbicularis Oris Muscle Flap Text: The defect edges were debeveled with a #15 scalpel blade.  Given that the defect affected the competency of the oral sphincter an obicularis oris muscle flap was deemed most appropriate to restore this competency and normal muscle function.  Using a sterile surgical marker, an appropriate flap was drawn incorporating the defect. The area thus outlined was incised with a #15 scalpel blade.
Melolabial Transposition Flap Text: The defect edges were debeveled with a #15 scalpel blade.  Given the location of the defect and the proximity to free margins a melolabial flap was deemed most appropriate.  Using a sterile surgical marker, an appropriate melolabial transposition flap was drawn incorporating the defect.    The area thus outlined was incised deep to adipose tissue with a #15 scalpel blade.  The skin margins were undermined to an appropriate distance in all directions utilizing iris scissors.
Rhombic Flap Text: The defect edges were debeveled with a #15 scalpel blade.  Given the location of the defect and the proximity to free margins a rhombic flap was deemed most appropriate.  Using a sterile surgical marker, an appropriate rhombic flap was drawn incorporating the defect.    The area thus outlined was incised deep to adipose tissue with a #15 scalpel blade.  The skin margins were undermined to an appropriate distance in all directions utilizing iris scissors.
Rhomboid Transposition Flap Text: The defect edges were debeveled with a #15 scalpel blade.  Given the location of the defect and the proximity to free margins a rhomboid transposition flap was deemed most appropriate.  Using a sterile surgical marker, an appropriate rhomboid flap was drawn incorporating the defect.    The area thus outlined was incised deep to adipose tissue with a #15 scalpel blade.  The skin margins were undermined to an appropriate distance in all directions utilizing iris scissors.
Bi-Rhombic Flap Text: The defect edges were debeveled with a #15 scalpel blade.  Given the location of the defect and the proximity to free margins a bi-rhombic flap was deemed most appropriate.  Using a sterile surgical marker, an appropriate rhombic flap was drawn incorporating the defect. The area thus outlined was incised deep to adipose tissue with a #15 scalpel blade.  The skin margins were undermined to an appropriate distance in all directions utilizing iris scissors.
Helical Rim Advancement Flap Text: The defect edges were debeveled with a #15 blade scalpel.  Given the location of the defect and the proximity to free margins (helical rim) a double helical rim advancement flap was deemed most appropriate.  Using a sterile surgical marker, the appropriate advancement flaps were drawn incorporating the defect and placing the expected incisions between the helical rim and antihelix where possible.  The area thus outlined was incised through and through with a #15 scalpel blade.  With a skin hook and iris scissors, the flaps were gently and sharply undermined and freed up.
Bilateral Helical Rim Advancement Flap Text: The defect edges were debeveled with a #15 blade scalpel.  Given the location of the defect and the proximity to free margins (helical rim) a bilateral helical rim advancement flap was deemed most appropriate.  Using a sterile surgical marker, the appropriate advancement flaps were drawn incorporating the defect and placing the expected incisions between the helical rim and antihelix where possible.  The area thus outlined was incised through and through with a #15 scalpel blade.  With a skin hook and iris scissors, the flaps were gently and sharply undermined and freed up.
Ear Star Wedge Flap Text: The defect edges were debeveled with a #15 blade scalpel.  Given the location of the defect and the proximity to free margins (helical rim) an ear star wedge flap was deemed most appropriate.  Using a sterile surgical marker, the appropriate flap was drawn incorporating the defect and placing the expected incisions between the helical rim and antihelix where possible.  The area thus outlined was incised through and through with a #15 scalpel blade.
Banner Transposition Flap Text: The defect edges were debeveled with a #15 scalpel blade.  Given the location of the defect and the proximity to free margins a Banner transposition flap was deemed most appropriate.  Using a sterile surgical marker, an appropriate flap drawn around the defect. The area thus outlined was incised deep to adipose tissue with a #15 scalpel blade.  The skin margins were undermined to an appropriate distance in all directions utilizing iris scissors.
Bilobed Flap Text: The defect edges were debeveled with a #15 scalpel blade.  Given the location of the defect and the proximity to free margins a bilobe flap was deemed most appropriate.  Using a sterile surgical marker, an appropriate bilobe flap drawn around the defect.    The area thus outlined was incised deep to adipose tissue with a #15 scalpel blade.  The skin margins were undermined to an appropriate distance in all directions utilizing iris scissors.
Bilobed Transposition Flap Text: The defect edges were debeveled with a #15 scalpel blade.  Given the location of the defect and the proximity to free margins a bilobed transposition flap was deemed most appropriate.  Using a sterile surgical marker, an appropriate bilobe flap drawn around the defect.    The area thus outlined was incised deep to adipose tissue with a #15 scalpel blade.  The skin margins were undermined to an appropriate distance in all directions utilizing iris scissors.
Trilobed Flap Text: The defect edges were debeveled with a #15 scalpel blade.  Given the location of the defect and the proximity to free margins a trilobed flap was deemed most appropriate.  Using a sterile surgical marker, an appropriate trilobed flap drawn around the defect.    The area thus outlined was incised deep to adipose tissue with a #15 scalpel blade.  The skin margins were undermined to an appropriate distance in all directions utilizing iris scissors.
Dorsal Nasal Flap Text: The defect edges were debeveled with a #15 scalpel blade.  Given the location of the defect and the proximity to free margins a dorsal nasal flap,based upon the glabellar folds, was deemed most appropriate.  Using a sterile surgical marker, an appropriate dorsal nasal flap was drawn around the defect.    The area thus outlined was incised deep to adipose tissue with a #15 scalpel blade.  The skin margins were undermined to an appropriate distance in all directions utilizing iris scissors.
Island Pedicle Flap Text: The defect edges were debeveled with a #15 scalpel blade.  Given the location of the defect, shape of the defect and the proximity to free margins an island pedicle advancement flap was deemed most appropriate.  Using a sterile surgical marker, an appropriate advancement flap was drawn incorporating the defect, outlining the appropriate donor tissue and placing the expected incisions within the relaxed skin tension lines where possible.    The area thus outlined was incised deep to adipose tissue with a #15 scalpel blade.  The skin margins were undermined to an appropriate distance in all directions around the primary defect and laterally outward around the island pedicle utilizing iris scissors.  There was minimal undermining beneath the pedicle flap.
Island Pedicle Flap With Canthal Suspension Text: The defect edges were debeveled with a #15 scalpel blade.  Given the location of the defect, shape of the defect and the proximity to free margins an island pedicle advancement flap was deemed most appropriate.  Using a sterile surgical marker, an appropriate advancement flap was drawn incorporating the defect, outlining the appropriate donor tissue and placing the expected incisions within the relaxed skin tension lines where possible. The area thus outlined was incised deep to adipose tissue with a #15 scalpel blade.  The skin margins were undermined to an appropriate distance in all directions around the primary defect and laterally outward around the island pedicle utilizing iris scissors.  There was minimal undermining beneath the pedicle flap. A suspension suture was placed in the canthal tendon to prevent tension and prevent ectropion.
Alar Island Pedicle Flap Text: The defect edges were debeveled with a #15 scalpel blade.  Given the location of the defect, shape of the defect and the proximity to the alar rim an island pedicle advancement flap was deemed most appropriate.  Using a sterile surgical marker, an appropriate advancement flap was drawn incorporating the defect, outlining the appropriate donor tissue and placing the expected incisions within the nasal ala running parallel to the alar rim. The area thus outlined was incised with a #15 scalpel blade.  The skin margins were undermined minimally to an appropriate distance in all directions around the primary defect and laterally outward around the island pedicle utilizing iris scissors.  There was minimal undermining beneath the pedicle flap.
Double Island Pedicle Flap Text: The defect edges were debeveled with a #15 scalpel blade.  Given the location of the defect, shape of the defect and the proximity to free margins a double island pedicle advancement flap was deemed most appropriate.  Using a sterile surgical marker, an appropriate advancement flap was drawn incorporating the defect, outlining the appropriate donor tissue and placing the expected incisions within the relaxed skin tension lines where possible.    The area thus outlined was incised deep to adipose tissue with a #15 scalpel blade.  The skin margins were undermined to an appropriate distance in all directions around the primary defect and laterally outward around the island pedicle utilizing iris scissors.  There was minimal undermining beneath the pedicle flap.
Island Pedicle Flap-Requiring Vessel Identification Text: The defect edges were debeveled with a #15 scalpel blade.  Given the location of the defect, shape of the defect and the proximity to free margins an island pedicle advancement flap was deemed most appropriate.  Using a sterile surgical marker, an appropriate advancement flap was drawn, based on the axial vessel mentioned above, incorporating the defect, outlining the appropriate donor tissue and placing the expected incisions within the relaxed skin tension lines where possible.    The area thus outlined was incised deep to adipose tissue with a #15 scalpel blade.  The skin margins were undermined to an appropriate distance in all directions around the primary defect and laterally outward around the island pedicle utilizing iris scissors.  There was minimal undermining beneath the pedicle flap.
Keystone Flap Text: The defect edges were debeveled with a #15 scalpel blade.  Given the location of the defect, shape of the defect a keystone flap was deemed most appropriate.  Using a sterile surgical marker, an appropriate keystone flap was drawn incorporating the defect, outlining the appropriate donor tissue and placing the expected incisions within the relaxed skin tension lines where possible. The area thus outlined was incised deep to adipose tissue with a #15 scalpel blade.  The skin margins were undermined to an appropriate distance in all directions around the primary defect and laterally outward around the flap utilizing iris scissors.
O-T Plasty Text: The defect edges were debeveled with a #15 scalpel blade.  Given the location of the defect, shape of the defect and the proximity to free margins an O-T plasty was deemed most appropriate.  Using a sterile surgical marker, an appropriate O-T plasty was drawn incorporating the defect and placing the expected incisions within the relaxed skin tension lines where possible.    The area thus outlined was incised deep to adipose tissue with a #15 scalpel blade.  The skin margins were undermined to an appropriate distance in all directions utilizing iris scissors.
O-Z Plasty Text: The defect edges were debeveled with a #15 scalpel blade.  Given the location of the defect, shape of the defect and the proximity to free margins an O-Z plasty (double transposition flap) was deemed most appropriate.  Using a sterile surgical marker, the appropriate transposition flaps were drawn incorporating the defect and placing the expected incisions within the relaxed skin tension lines where possible.    The area thus outlined was incised deep to adipose tissue with a #15 scalpel blade.  The skin margins were undermined to an appropriate distance in all directions utilizing iris scissors.  Hemostasis was achieved with electrocautery.  The flaps were then transposed into place, one clockwise and the other counterclockwise, and anchored with interrupted buried subcutaneous sutures.
Double O-Z Plasty Text: The defect edges were debeveled with a #15 scalpel blade.  Given the location of the defect, shape of the defect and the proximity to free margins a Double O-Z plasty (double transposition flap) was deemed most appropriate.  Using a sterile surgical marker, the appropriate transposition flaps were drawn incorporating the defect and placing the expected incisions within the relaxed skin tension lines where possible. The area thus outlined was incised deep to adipose tissue with a #15 scalpel blade.  The skin margins were undermined to an appropriate distance in all directions utilizing iris scissors.  Hemostasis was achieved with electrocautery.  The flaps were then transposed into place, one clockwise and the other counterclockwise, and anchored with interrupted buried subcutaneous sutures.
V-Y Plasty Text: The defect edges were debeveled with a #15 scalpel blade.  Given the location of the defect, shape of the defect and the proximity to free margins an V-Y advancement flap was deemed most appropriate.  Using a sterile surgical marker, an appropriate advancement flap was drawn incorporating the defect and placing the expected incisions within the relaxed skin tension lines where possible.    The area thus outlined was incised deep to adipose tissue with a #15 scalpel blade.  The skin margins were undermined to an appropriate distance in all directions utilizing iris scissors.
H Plasty Text: Given the location of the defect, shape of the defect and the proximity to free margins a H-plasty was deemed most appropriate for repair.  Using a sterile surgical marker, the appropriate advancement arms of the H-plasty were drawn incorporating the defect and placing the expected incisions within the relaxed skin tension lines where possible. The area thus outlined was incised deep to adipose tissue with a #15 scalpel blade. The skin margins were undermined to an appropriate distance in all directions utilizing iris scissors.  The opposing advancement arms were then advanced into place in opposite direction and anchored with interrupted buried subcutaneous sutures.
W Plasty Text: The lesion was extirpated to the level of the fat with a #15 scalpel blade.  Given the location of the defect, shape of the defect and the proximity to free margins a W-plasty was deemed most appropriate for repair.  Using a sterile surgical marker, the appropriate transposition arms of the W-plasty were drawn incorporating the defect and placing the expected incisions within the relaxed skin tension lines where possible.    The area thus outlined was incised deep to adipose tissue with a #15 scalpel blade.  The skin margins were undermined to an appropriate distance in all directions utilizing iris scissors.  The opposing transposition arms were then transposed into place in opposite direction and anchored with interrupted buried subcutaneous sutures.
Z Plasty Text: The lesion was extirpated to the level of the fat with a #15 scalpel blade.  Given the location of the defect, shape of the defect and the proximity to free margins a Z-plasty was deemed most appropriate for repair.  Using a sterile surgical marker, the appropriate transposition arms of the Z-plasty were drawn incorporating the defect and placing the expected incisions within the relaxed skin tension lines where possible.    The area thus outlined was incised deep to adipose tissue with a #15 scalpel blade.  The skin margins were undermined to an appropriate distance in all directions utilizing iris scissors.  The opposing transposition arms were then transposed into place in opposite direction and anchored with interrupted buried subcutaneous sutures.
Zygomaticofacial Flap Text: Given the location of the defect, shape of the defect and the proximity to free margins a zygomaticofacial flap was deemed most appropriate for repair.  Using a sterile surgical marker, the appropriate flap was drawn incorporating the defect and placing the expected incisions within the relaxed skin tension lines where possible. The area thus outlined was incised deep to adipose tissue with a #15 scalpel blade with preservation of a vascular pedicle.  The skin margins were undermined to an appropriate distance in all directions utilizing iris scissors.  The flap was then placed into the defect and anchored with interrupted buried subcutaneous sutures.
Cheek Interpolation Flap Text: A decision was made to reconstruct the defect utilizing an interpolation axial flap and a staged reconstruction.  A telfa template was made of the defect.  This telfa template was then used to outline the Cheek Interpolation flap.  The donor area for the pedicle flap was then injected with anesthesia.  The flap was excised through the skin and subcutaneous tissue down to the layer of the underlying musculature.  The interpolation flap was carefully excised within this deep plane to maintain its blood supply.  The edges of the donor site were undermined.   The donor site was closed in a primary fashion.  The pedicle was then rotated into position and sutured.  Once the tube was sutured into place, adequate blood supply was confirmed with blanching and refill.  The pedicle was then wrapped with xeroform gauze and dressed appropriately with a telfa and gauze bandage to ensure continued blood supply and protect the attached pedicle.
Cheek-To-Nose Interpolation Flap Text: A decision was made to reconstruct the defect utilizing an interpolation axial flap and a staged reconstruction.  A telfa template was made of the defect.  This telfa template was then used to outline the Cheek-To-Nose Interpolation flap.  The donor area for the pedicle flap was then injected with anesthesia.  The flap was excised through the skin and subcutaneous tissue down to the layer of the underlying musculature.  The interpolation flap was carefully excised within this deep plane to maintain its blood supply.  The edges of the donor site were undermined.   The donor site was closed in a primary fashion.  The pedicle was then rotated into position and sutured.  Once the tube was sutured into place, adequate blood supply was confirmed with blanching and refill.  The pedicle was then wrapped with xeroform gauze and dressed appropriately with a telfa and gauze bandage to ensure continued blood supply and protect the attached pedicle.
Interpolation Flap Text: A decision was made to reconstruct the defect utilizing an interpolation axial flap and a staged reconstruction.  A telfa template was made of the defect.  This telfa template was then used to outline the interpolation flap.  The donor area for the pedicle flap was then injected with anesthesia.  The flap was excised through the skin and subcutaneous tissue down to the layer of the underlying musculature.  The interpolation flap was carefully excised within this deep plane to maintain its blood supply.  The edges of the donor site were undermined.   The donor site was closed in a primary fashion.  The pedicle was then rotated into position and sutured.  Once the tube was sutured into place, adequate blood supply was confirmed with blanching and refill.  The pedicle was then wrapped with xeroform gauze and dressed appropriately with a telfa and gauze bandage to ensure continued blood supply and protect the attached pedicle.
Melolabial Interpolation Flap Text: A decision was made to reconstruct the defect utilizing an interpolation axial flap and a staged reconstruction.  A telfa template was made of the defect.  This telfa template was then used to outline the melolabial interpolation flap.  The donor area for the pedicle flap was then injected with anesthesia.  The flap was excised through the skin and subcutaneous tissue down to the layer of the underlying musculature.  The pedicle flap was carefully excised within this deep plane to maintain its blood supply.  The edges of the donor site were undermined.   The donor site was closed in a primary fashion.  The pedicle was then rotated into position and sutured.  Once the tube was sutured into place, adequate blood supply was confirmed with blanching and refill.  The pedicle was then wrapped with xeroform gauze and dressed appropriately with a telfa and gauze bandage to ensure continued blood supply and protect the attached pedicle.
Mastoid Interpolation Flap Text: A decision was made to reconstruct the defect utilizing an interpolation axial flap and a staged reconstruction.  A telfa template was made of the defect.  This telfa template was then used to outline the mastoid interpolation flap.  The donor area for the pedicle flap was then injected with anesthesia.  The flap was excised through the skin and subcutaneous tissue down to the layer of the underlying musculature.  The pedicle flap was carefully excised within this deep plane to maintain its blood supply.  The edges of the donor site were undermined.   The donor site was closed in a primary fashion.  The pedicle was then rotated into position and sutured.  Once the tube was sutured into place, adequate blood supply was confirmed with blanching and refill.  The pedicle was then wrapped with xeroform gauze and dressed appropriately with a telfa and gauze bandage to ensure continued blood supply and protect the attached pedicle.
Posterior Auricular Interpolation Flap Text: A decision was made to reconstruct the defect utilizing an interpolation axial flap and a staged reconstruction.  A telfa template was made of the defect.  This telfa template was then used to outline the posterior auricular interpolation flap.  The donor area for the pedicle flap was then injected with anesthesia.  The flap was excised through the skin and subcutaneous tissue down to the layer of the underlying musculature.  The pedicle flap was carefully excised within this deep plane to maintain its blood supply.  The edges of the donor site were undermined.   The donor site was closed in a primary fashion.  The pedicle was then rotated into position and sutured.  Once the tube was sutured into place, adequate blood supply was confirmed with blanching and refill.  The pedicle was then wrapped with xeroform gauze and dressed appropriately with a telfa and gauze bandage to ensure continued blood supply and protect the attached pedicle.
Paramedian Forehead Flap Text: A decision was made to reconstruct the defect utilizing an interpolation axial flap and a staged reconstruction.  A telfa template was made of the defect.  This telfa template was then used to outline the paramedian forehead pedicle flap.  The donor area for the pedicle flap was then injected with anesthesia.  The flap was excised through the skin and subcutaneous tissue down to the layer of the underlying musculature.  The pedicle flap was carefully excised within this deep plane to maintain its blood supply.  The edges of the donor site were undermined.   The donor site was closed in a primary fashion.  The pedicle was then rotated into position and sutured.  Once the tube was sutured into place, adequate blood supply was confirmed with blanching and refill.  The pedicle was then wrapped with xeroform gauze and dressed appropriately with a telfa and gauze bandage to ensure continued blood supply and protect the attached pedicle.
Cheiloplasty (Less Than 50%) Text: A decision was made to reconstruct the defect with a  cheiloplasty.  The defect was undermined extensively.  Additional obicularis oris muscle was excised with a 15 blade scalpel.  The defect was converted into a full thickness wedge, of less than 50% of the vertical height of the lip, to facilite a better cosmetic result.  Small vessels were then tied off with 5-0 monocyrl. The obicularis oris, superficial fascia, adipose and dermis were then reapproximated.  After the deeper layers were approximated the epidermis was reapproximated with particular care given to realign the vermilion border.
Cheiloplasty (Complex) Text: A decision was made to reconstruct the defect with a  cheiloplasty.  The defect was undermined extensively.  Additional obicularis oris muscle was excised with a 15 blade scalpel.  The defect was converted into a full thickness wedge to facilite a better cosmetic result.  Small vessels were then tied off with 5-0 monocyrl. The obicularis oris, superficial fascia, adipose and dermis were then reapproximated.  After the deeper layers were approximated the epidermis was reapproximated with particular care given to realign the vermilion border.
Ear Wedge Repair Text: A wedge excision was completed by carrying down an excision through the full thickness of the ear and cartilage with an inward facing Burow's triangle. The wound was then closed in a layered fashion.
Full Thickness Lip Wedge Repair (Flap) Text: Given the location of the defect and the proximity to free margins a full thickness wedge repair was deemed most appropriate.  Using a sterile surgical marker, the appropriate repair was drawn incorporating the defect and placing the expected incisions perpendicular to the vermilion border.  The vermilion border was also meticulously outlined to ensure appropriate reapproximation during the repair.  The area thus outlined was incised through and through with a #15 scalpel blade.  The muscularis and dermis were reaproximated with deep sutures following hemostasis. Care was taken to realign the vermilion border before proceeding with the superficial closure.  Once the vermilion was realigned the superfical and mucosal closure was finished.
Ftsg Text: The defect edges were debeveled with a #15 scalpel blade.  Given the location of the defect, shape of the defect and the proximity to free margins a full thickness skin graft was deemed most appropriate.  Using a sterile surgical marker, the primary defect shape was transferred to the donor site. The area thus outlined was incised deep to adipose tissue with a #15 scalpel blade.  The harvested graft was then trimmed of adipose tissue until only dermis and epidermis was left.  The skin margins of the secondary defect were undermined to an appropriate distance in all directions utilizing iris scissors.  The secondary defect was closed with interrupted buried subcutaneous sutures.  The skin edges were then re-apposed with running  sutures.  The skin graft was then placed in the primary defect and oriented appropriately.
Split-Thickness Skin Graft Text: The defect edges were debeveled with a #15 scalpel blade.  Given the location of the defect, shape of the defect and the proximity to free margins a split thickness skin graft was deemed most appropriate.  Using a sterile surgical marker, the primary defect shape was transferred to the donor site. The split thickness graft was then harvested.  The skin graft was then placed in the primary defect and oriented appropriately.
Burow's Graft Text: The defect edges were debeveled with a #15 scalpel blade.  Given the location of the defect, shape of the defect, the proximity to free margins and the presence of a standing cone deformity a Burow's skin graft was deemed most appropriate. The standing cone was removed and this tissue was then trimmed to the shape of the primary defect. The adipose tissue was also removed until only dermis and epidermis were left.  The skin margins of the secondary defect were undermined to an appropriate distance in all directions utilizing iris scissors.  The secondary defect was closed with interrupted buried subcutaneous sutures.  The skin edges were then re-apposed with running  sutures.  The skin graft was then placed in the primary defect and oriented appropriately.
Cartilage Graft Text: The defect edges were debeveled with a #15 scalpel blade.  Given the location of the defect, shape of the defect, the fact the defect involved a full thickness cartilage defect a cartilage graft was deemed most appropriate.  An appropriate donor site was identified, cleansed, and anesthetized. The cartilage graft was then harvested and transferred to the recipient site, oriented appropriately and then sutured into place.  The secondary defect was then repaired using a primary closure.
Composite Graft Text: The defect edges were debeveled with a #15 scalpel blade.  Given the location of the defect, shape of the defect, the proximity to free margins and the fact the defect was full thickness a composite graft was deemed most appropriate.  The defect was outline and then transferred to the donor site.  A full thickness graft was then excised from the donor site. The graft was then placed in the primary defect, oriented appropriately and then sutured into place.  The secondary defect was then repaired using a primary closure.
Epidermal Autograft Text: The defect edges were debeveled with a #15 scalpel blade.  Given the location of the defect, shape of the defect and the proximity to free margins an epidermal autograft was deemed most appropriate.  Using a sterile surgical marker, the primary defect shape was transferred to the donor site. The epidermal graft was then harvested.  The skin graft was then placed in the primary defect and oriented appropriately.
Dermal Autograft Text: The defect edges were debeveled with a #15 scalpel blade.  Given the location of the defect, shape of the defect and the proximity to free margins a dermal autograft was deemed most appropriate.  Using a sterile surgical marker, the primary defect shape was transferred to the donor site. The area thus outlined was incised deep to adipose tissue with a #15 scalpel blade.  The harvested graft was then trimmed of adipose and epidermal tissue until only dermis was left.  The skin graft was then placed in the primary defect and oriented appropriately.
Skin Substitute Text: The defect edges were debeveled with a #15 scalpel blade.  Given the location of the defect, shape of the defect and the proximity to free margins a skin substitute graft was deemed most appropriate.  The graft material was trimmed to fit the size of the defect. The graft was then placed in the primary defect and oriented appropriately.
Tissue Cultured Epidermal Autograft Text: The defect edges were debeveled with a #15 scalpel blade.  Given the location of the defect, shape of the defect and the proximity to free margins a tissue cultured epidermal autograft was deemed most appropriate.  The graft was then trimmed to fit the size of the defect.  The graft was then placed in the primary defect and oriented appropriately.
Xenograft Text: The defect edges were debeveled with a #15 scalpel blade.  Given the location of the defect, shape of the defect and the proximity to free margins a xenograft was deemed most appropriate.  The graft was then trimmed to fit the size of the defect.  The graft was then placed in the primary defect and oriented appropriately.
Purse String (Simple) Text: Given the location of the defect and the characteristics of the surrounding skin a purse string closure was deemed most appropriate.  Undermining was performed circumfirentially around the surgical defect.  A purse string suture was then placed and tightened.
Purse String (Intermediate) Text: Given the location of the defect and the characteristics of the surrounding skin a purse string intermediate closure was deemed most appropriate.  Undermining was performed circumfirentially around the surgical defect.  A purse string suture was then placed and tightened.
Partial Purse String (Simple) Text: Given the location of the defect and the characteristics of the surrounding skin a simple purse string closure was deemed most appropriate.  Undermining was performed circumfirentially around the surgical defect.  A purse string suture was then placed and tightened. Wound tension only allowed a partial closure of the circular defect.
Partial Purse String (Intermediate) Text: Given the location of the defect and the characteristics of the surrounding skin an intermediate purse string closure was deemed most appropriate.  Undermining was performed circumfirentially around the surgical defect.  A purse string suture was then placed and tightened. Wound tension only allowed a partial closure of the circular defect.
Localized Dermabrasion With Wire Brush Text: The patient was draped in routine manner.  Localized dermabrasion using 3 x 17 mm wire brush was performed in routine manner to papillary dermis. This spot dermabrasion is being performed to complete skin cancer reconstruction. It also will eliminate the other sun damaged precancerous cells that are known to be part of the regional effect of a lifetime's worth of sun exposure. This localized dermabrasion is therapeutic and should not be considered cosmetic in any regard.
Tarsorrhaphy Text: A tarsorrhaphy was performed using Frost sutures.
Complex Repair And Flap Additional Text (Will Appearing After The Standard Complex Repair Text): The complex repair was not sufficient to completely close the primary defect. The remaining additional defect was repaired with the flap mentioned below.
Complex Repair And Graft Additional Text (Will Appearing After The Standard Complex Repair Text): The complex repair was not sufficient to completely close the primary defect. The remaining additional defect was repaired with the graft mentioned below.
Unique Flap 1 Name: Myocutaneous Island pedicle Flap
Unique Flap 2 Name: Peng Flap
Unique Flap 3 Name: Mercedes Flap
Unique Flap 4 Name: Banner Flap
Unique Flap 5 Name: tunneled myocutaneous flap
Unique Flap 1 Text: A decision was made to reconstruct the defect utilizing a myocutaneous Island pedicle Flap based on the levator labii superioris muscle.  A telfa template was made of the defect.  This telfa template was then used to outline the myocutaneous flap, based along the meilolabial fold.  The donor area for the pedicle flap was then injected with anesthesia.  The flap was excised through the skin and subcutaneous tissue down to the layer of the underlying musculature.  The myocutaneous flap was carefully excised within this deep plane to maintain its blood supply. Based on the muscle. The edges of the donor site were undermined.   The donor site was closed in a primary fashion to the point of transposition.  The pedicle was then transposed into position and sutured.  Once the flap was sutured into place, adequate blood supply was confirmed with blanching and refill.
Unique Flap 2 Text: A decision was made to reconstruct the defect utilizing a Peng Flap (Bilateral Advancement Rotation Flap). Given the location of the defect and the proximity to free margins, this flap was deemed most appropriate.  Using a sterile surgical marker, the appropriate rotation flaps were drawn incorporating the defect and placing the expected incisions within the relaxed skin tension lines where possible.    The area thus outlined was incised deep to adipose tissue with a #15 scalpel blade.  The skin margins were undermined to an appropriate distance in all directions utilizing iris scissors.
Unique Flap 3 Text: The defect edges were debeveled with a #15 scalpel blade.  Given the location of the defect, shape of the defect and the proximity to free margins a Mercedes (double advancement flap) was deemed most appropriate.  Using a sterile surgical marker, the appropriate transposition flaps were drawn incorporating the defect and placing the expected incisions within the relaxed skin tension lines where possible.    The area thus outlined was incised deep to adipose tissue with a #15 scalpel blade.  The skin margins were undermined to an appropriate distance in all directions utilizing iris scissors.  Hemostasis was achieved with electrocautery.  The flaps were then advanced into the defect and anchored with interrupted buried subcutaneous sutures.
Unique Flap 4 Text: The defect edges were debeveled with a #15 scalpel blade.  Given the location of the defect and the proximity to free margins a Banner transposition flap was deemed most appropriate.  Using a sterile surgical marker, an appropriate Banner transposition flap was drawn incorporating the defect.    The area thus outlined was incised deep to adipose tissue with a #15 scalpel blade.  The skin margins were undermined to an appropriate distance in all directions utilizing iris scissors.
Unique Flap 5 Text: A decision was made to reconstruct the defect utilizing a tunneled myocutaneous Island pedicle Flap based on the anterior auricularis muscle.  A telfa template was made of the defect.  This telfa template was then used to outline the myocutaneous flap, based along the preauricular fold.  The donor area for the pedicle flap was then injected with anesthesia.  The flap was excised through the skin and subcutaneous tissue down to the layer of the underlying musculature.  The myocutaneous flap was carefully excised within this deep plane to maintain its blood supply based on the muscle. The edges of the donor site were undermined.   The donor site was closed in a primary fashion to the point of transposition.  The pedicle was then transposed through a tunnel into position and sutured.  Once the flap was sutured into place, adequate blood supply was confirmed with blanching and refill.
Manual Repair Warning Statement: We plan on removing the manually selected variable below in favor of our much easier automatic structured text blocks found in the previous tab. We decided to do this to help make the flow better and give you the full power of structured data. Manual selection is never going to be ideal in our platform and I would encourage you to avoid using manual selection from this point on, especially since I will be sunsetting this feature. It is important that you do one of two things with the customized text below. First, you can save all of the text in a word file so you can have it for future reference. Second, transfer the text to the appropriate area in the Library tab. Lastly, if there is a flap or graft type which we do not have you need to let us know right away so I can add it in before the variable is hidden. No need to panic, we plan to give you roughly 6 months to make the change.
Same Histology In Subsequent Stages Text: The pattern and morphology of the tumor is as described in the first stage.
No Residual Tumor Seen Histology Text: There were no malignant cells seen in the sections examined.
Inflammation Suggestive Of Cancer Camouflage Histology Text: There was a dense lymphocytic infiltrate which prevented adequate histologic evaluation of adjacent structures.
Bcc Histology Text: There were numerous aggregates of basaloid cells.
Bcc Infiltrative Histology Text: There were numerous aggregates of basaloid cells demonstrating an infiltrative pattern.
Mart-1 - Positive Histology Text: MART-1 staining demonstrates areas of higher density and clustering of melanocytes with Pagetoid spread upwards within the epidermis. The surgical margins are positive for tumor cells.
Mart-1 - Negative Histology Text: MART-1 staining demonstrates a normal density and pattern of melanocytes along the dermal-epidermal junction. The surgical margins are negative for tumor cells.
Information: Selecting Yes will display possible errors in your note based on the variables you have selected. This validation is only offered as a suggestion for you. PLEASE NOTE THAT THE VALIDATION TEXT WILL BE REMOVED WHEN YOU FINALIZE YOUR NOTE. IF YOU WANT TO FAX A PRELIMINARY NOTE YOU WILL NEED TO TOGGLE THIS TO 'NO' IF YOU DO NOT WANT IT IN YOUR FAXED NOTE.

## 2021-03-12 ENCOUNTER — HOSPITAL ENCOUNTER (OUTPATIENT)
Dept: RADIOLOGY | Facility: MEDICAL CENTER | Age: 81
End: 2021-03-12
Attending: UROLOGY
Payer: MEDICARE

## 2021-03-12 DIAGNOSIS — Z87.442 PERSONAL HISTORY OF URINARY CALCULI: ICD-10-CM

## 2021-03-12 PROCEDURE — 76775 US EXAM ABDO BACK WALL LIM: CPT

## 2021-03-15 ENCOUNTER — APPOINTMENT (RX ONLY)
Dept: URBAN - METROPOLITAN AREA CLINIC 36 | Facility: CLINIC | Age: 81
Setting detail: DERMATOLOGY
End: 2021-03-15

## 2021-03-15 DIAGNOSIS — Z48.817 ENCOUNTER FOR SURGICAL AFTERCARE FOLLOWING SURGERY ON THE SKIN AND SUBCUTANEOUS TISSUE: ICD-10-CM

## 2021-03-15 PROCEDURE — 99024 POSTOP FOLLOW-UP VISIT: CPT

## 2021-03-15 PROCEDURE — ? DRESSING CHANGE (GLOBAL PERIOD)

## 2021-03-15 ASSESSMENT — LOCATION DETAILED DESCRIPTION DERM: LOCATION DETAILED: LEFT POSTERIOR EAR

## 2021-03-15 ASSESSMENT — LOCATION SIMPLE DESCRIPTION DERM: LOCATION SIMPLE: LEFT EAR

## 2021-03-15 ASSESSMENT — LOCATION ZONE DERM: LOCATION ZONE: EAR

## 2021-03-15 NOTE — PROCEDURE: DRESSING CHANGE (GLOBAL PERIOD)
Body Location Override (Optional - Billing Will Still Be Based On Selected Body Map Location If Applicable): left post ear
Detail Level: Detailed
Add 85637 Cpt? (Important Note: In 2017 The Use Of 48218 Is Being Tracked By Cms To Determine Future Global Period Reimbursement For Global Periods): yes

## 2021-03-18 ENCOUNTER — APPOINTMENT (RX ONLY)
Dept: URBAN - METROPOLITAN AREA CLINIC 36 | Facility: CLINIC | Age: 81
Setting detail: DERMATOLOGY
End: 2021-03-18

## 2021-03-18 DIAGNOSIS — Z48.817 ENCOUNTER FOR SURGICAL AFTERCARE FOLLOWING SURGERY ON THE SKIN AND SUBCUTANEOUS TISSUE: ICD-10-CM

## 2021-03-18 PROCEDURE — ? POST-OP WOUND CHECK

## 2021-03-18 PROCEDURE — 99024 POSTOP FOLLOW-UP VISIT: CPT

## 2021-03-18 ASSESSMENT — LOCATION ZONE DERM: LOCATION ZONE: EAR

## 2021-03-18 ASSESSMENT — LOCATION DETAILED DESCRIPTION DERM: LOCATION DETAILED: LEFT POSTERIOR EAR

## 2021-03-18 ASSESSMENT — LOCATION SIMPLE DESCRIPTION DERM: LOCATION SIMPLE: LEFT EAR

## 2021-03-18 NOTE — PROCEDURE: POST-OP WOUND CHECK
Body Location Override (Optional - Billing Will Still Be Based On Selected Body Map Location If Applicable): left post ear
Detail Level: Generalized
Add 38228 Cpt? (Important Note: In 2017 The Use Of 29938 Is Being Tracked By Cms To Determine Future Global Period Reimbursement For Global Periods): yes

## 2021-03-22 ENCOUNTER — APPOINTMENT (RX ONLY)
Dept: URBAN - METROPOLITAN AREA CLINIC 36 | Facility: CLINIC | Age: 81
Setting detail: DERMATOLOGY
End: 2021-03-22

## 2021-03-22 DIAGNOSIS — Z48.02 ENCOUNTER FOR REMOVAL OF SUTURES: ICD-10-CM

## 2021-03-22 PROCEDURE — 99024 POSTOP FOLLOW-UP VISIT: CPT

## 2021-03-22 PROCEDURE — ? SUTURE REMOVAL (GLOBAL PERIOD)

## 2021-03-22 ASSESSMENT — LOCATION DETAILED DESCRIPTION DERM: LOCATION DETAILED: LEFT POSTERIOR EAR

## 2021-03-22 ASSESSMENT — LOCATION ZONE DERM: LOCATION ZONE: EAR

## 2021-03-22 ASSESSMENT — LOCATION SIMPLE DESCRIPTION DERM: LOCATION SIMPLE: LEFT EAR

## 2021-03-22 NOTE — PROCEDURE: SUTURE REMOVAL (GLOBAL PERIOD)
Detail Level: Detailed
Add 28017 Cpt? (Important Note: In 2017 The Use Of 58684 Is Being Tracked By Cms To Determine Future Global Period Reimbursement For Global Periods): yes

## 2021-03-29 ENCOUNTER — APPOINTMENT (RX ONLY)
Dept: URBAN - METROPOLITAN AREA CLINIC 36 | Facility: CLINIC | Age: 81
Setting detail: DERMATOLOGY
End: 2021-03-29

## 2021-03-29 DIAGNOSIS — Z48.817 ENCOUNTER FOR SURGICAL AFTERCARE FOLLOWING SURGERY ON THE SKIN AND SUBCUTANEOUS TISSUE: ICD-10-CM

## 2021-03-29 PROCEDURE — ? POST-OP WOUND CHECK

## 2021-03-29 PROCEDURE — 99024 POSTOP FOLLOW-UP VISIT: CPT

## 2021-03-29 ASSESSMENT — LOCATION DETAILED DESCRIPTION DERM: LOCATION DETAILED: LEFT INFERIOR POSTERIOR HELIX

## 2021-03-29 ASSESSMENT — LOCATION SIMPLE DESCRIPTION DERM: LOCATION SIMPLE: LEFT EAR

## 2021-03-29 ASSESSMENT — LOCATION ZONE DERM: LOCATION ZONE: EAR

## 2021-03-29 NOTE — PROCEDURE: POST-OP WOUND CHECK
Detail Level: Detailed
Add 63682 Cpt? (Important Note: In 2017 The Use Of 38024 Is Being Tracked By Cms To Determine Future Global Period Reimbursement For Global Periods): yes

## 2021-04-07 ENCOUNTER — TELEPHONE (OUTPATIENT)
Dept: CARDIOLOGY | Facility: MEDICAL CENTER | Age: 81
End: 2021-04-07

## 2021-04-07 ENCOUNTER — PATIENT MESSAGE (OUTPATIENT)
Dept: CARDIOLOGY | Facility: MEDICAL CENTER | Age: 81
End: 2021-04-07

## 2021-04-07 NOTE — TELEPHONE ENCOUNTER
Received surgical clearance from [facility] for patient EGD scheduled on 2021.    P: [phone]  F: [phone]    Sent a MyChart message to patient advising about any symptoms since last OV.   History: No TIA/CVA  EK2020, Sinus Rhythm, 1st degree Ventricular Trigeminy , left axis deviation, rate 67.  Echo: 2018, Normal left ventricular systolic function.  Left ventricular ejection fraction is visually estimated to be 60%.  Mild aortic stenosis.  Mild posterior mitral valve leaflet prolapse.  Mild mitral regurgitation.  Mitral annular calcification.  Unable to estimate pulmonary artery pressure due to an inadequate   tricuspid regurgitant jet.  Medications: Eliquis 5mg BID    Awaiting patient response.   Clearance placed in SW folder at [facility] RN desk.

## 2021-04-12 ENCOUNTER — APPOINTMENT (RX ONLY)
Dept: URBAN - METROPOLITAN AREA CLINIC 36 | Facility: CLINIC | Age: 81
Setting detail: DERMATOLOGY
End: 2021-04-12

## 2021-04-12 DIAGNOSIS — Z48.817 ENCOUNTER FOR SURGICAL AFTERCARE FOLLOWING SURGERY ON THE SKIN AND SUBCUTANEOUS TISSUE: ICD-10-CM

## 2021-04-12 PROCEDURE — 99024 POSTOP FOLLOW-UP VISIT: CPT

## 2021-04-12 PROCEDURE — ? POST-OP WOUND CHECK

## 2021-04-12 ASSESSMENT — LOCATION ZONE DERM: LOCATION ZONE: EAR

## 2021-04-12 ASSESSMENT — LOCATION SIMPLE DESCRIPTION DERM: LOCATION SIMPLE: LEFT EAR

## 2021-04-12 ASSESSMENT — LOCATION DETAILED DESCRIPTION DERM: LOCATION DETAILED: LEFT POSTERIOR EAR

## 2021-04-12 NOTE — PROCEDURE: POST-OP WOUND CHECK
Detail Level: Generalized
Add 21787 Cpt? (Important Note: In 2017 The Use Of 04513 Is Being Tracked By Cms To Determine Future Global Period Reimbursement For Global Periods): yes

## 2021-04-16 NOTE — TELEPHONE ENCOUNTER
Cleared low risk for colonoscopy/endoscopy  Find out if he has had anticoagulation bridging for prior procedure.

## 2021-04-19 NOTE — TELEPHONE ENCOUNTER
Called and spoke to the patients wife and she stated he has never stopped Eliquis for a procedure.  Upon medication review, do not see anything other than Aspirin, eliquis and plavix.   Routed to BORIS ARRINGTON for recommendations on bridging.

## 2021-04-23 ENCOUNTER — OFFICE VISIT (OUTPATIENT)
Dept: INTERNAL MEDICINE | Facility: IMAGING CENTER | Age: 81
End: 2021-04-23
Payer: MEDICARE

## 2021-04-23 VITALS
HEART RATE: 76 BPM | DIASTOLIC BLOOD PRESSURE: 80 MMHG | OXYGEN SATURATION: 96 % | WEIGHT: 217 LBS | TEMPERATURE: 97.1 F | BODY MASS INDEX: 29.43 KG/M2 | SYSTOLIC BLOOD PRESSURE: 125 MMHG | RESPIRATION RATE: 16 BRPM

## 2021-04-23 DIAGNOSIS — I48.91 ATRIAL FIBRILLATION, UNSPECIFIED TYPE (HCC): ICD-10-CM

## 2021-04-23 DIAGNOSIS — I25.10 CORONARY ARTERY DISEASE INVOLVING NATIVE CORONARY ARTERY OF NATIVE HEART WITHOUT ANGINA PECTORIS: ICD-10-CM

## 2021-04-23 DIAGNOSIS — R60.0 LEG EDEMA: ICD-10-CM

## 2021-04-23 DIAGNOSIS — I63.9 CRYPTOGENIC STROKE (HCC): ICD-10-CM

## 2021-04-23 DIAGNOSIS — I10 ESSENTIAL HYPERTENSION: ICD-10-CM

## 2021-04-23 DIAGNOSIS — G51.0 FACIAL NERVE PALSY: ICD-10-CM

## 2021-04-23 PROCEDURE — 99213 OFFICE O/P EST LOW 20 MIN: CPT | Performed by: INTERNAL MEDICINE

## 2021-04-23 ASSESSMENT — FIBROSIS 4 INDEX: FIB4 SCORE: 2.08

## 2021-04-23 NOTE — PROGRESS NOTES
Chief Complaint   Patient presents with   • Paperwork     [facility]       HISTORY OF THE PRESENT ILLNESS: Patient is a 81 y.o. male.     Patient brings in paperwork from [facility].  He is required to have medical clearance because of his history of cryptogenic stroke.  He has had no further issues since 2018.  He is followed by cardiology regularly.  He remains on Eliquis.  The probable etiology of cryptogenic stroke was new onset atrial fibrillation.    Allergies: Morphine    Current Outpatient Medications Ordered in Epic   Medication Sig Dispense Refill   • meloxicam (MOBIC) 15 MG tablet Take 15 mg by mouth every day.     • atorvastatin (LIPITOR) 40 MG Tab TAKE 1 TABLET BY MOUTH EVERY  Tab 2   • allopurinol (ZYLOPRIM) 100 MG Tab TAKE 1 TABLET BY MOUTH TWICE A  Tab 1   • triamterene/hctz (MAXZIDE-25/DYAZIDE) 37.5-25 MG Cap Take 1 Cap by mouth every morning. 90 Cap 3   • apixaban (ELIQUIS) 5mg Tab Take 1 Tab by mouth 2 Times a Day. 180 Tab 3   • losartan (COZAAR) 50 MG Tab TAKE 1 TABLET BY MOUTH EVERY  Tab 3   • acetaminophen (TYLENOL) 325 MG Tab Take 650 mg by mouth every four hours as needed for Moderate Pain.     • tamsulosin (FLOMAX) 0.4 MG capsule Take 1 Cap by mouth every day. 7 Cap 0     No current Epic-ordered facility-administered medications on file.       Past medical history, social history and family history were reviewed from chart today    Review of systems: Per HPI.    All others negative.     Exam: /80 (BP Location: Left arm, Patient Position: Sitting, BP Cuff Size: Adult)   Pulse 76   Temp 36.2 °C (97.1 °F) (Temporal)   Resp 16   Wt 98.4 kg (217 lb)   SpO2 96%   General: Well-appearing. Well-developed. No signs of distress.  HEENT: Grossly normal. Oral cavity is pink and moist.  Neck: Supple without JVD or bruit.  Pulmonary: Clear with good breath sounds. Normal effort.  Cardiovascular: Regular. Carotid and radial pulses are intact.  Abdomen: Soft, nontender, nondistended.  Spleen and liver are not enlarged.  Neurologic: Cranial nerves II through XII are grossly normal, alert and oriented x3      Diagnosis:  1. Essential hypertension     2. Atrial fibrillation, unspecified type (HCC)     3. Coronary artery disease involving native coronary artery of native heart without angina pectoris     4. Cryptogenic stroke (HCC)     5. Facial nerve palsy         Patient is doing well.  No medical issues or medications that would prohibit him from driving.  Paperwork was completed.  No restrictions  We discussed his lower extremity edema.  I suspect venous insufficiency.  He had an echocardiogram in 2018 that was normal.  He has not amlodipine or other medications typically associated with edema.  He is on a low-dose diuretic but I doubt it is high enough strength to alter the edema.  No history of renal disease.

## 2021-04-25 NOTE — TELEPHONE ENCOUNTER
Cleared for colonoscopy; low risk; does should not be on aspirin in addition to Eliquis and plavix; should stop aspirin and may not need to be on plavix

## 2021-04-26 NOTE — TELEPHONE ENCOUNTER
Letter drafted and faxed.  Received fax confirmation.  Notified patient via [employer] message.   Document scanned into [employer].

## 2021-05-29 DIAGNOSIS — I10 ESSENTIAL HYPERTENSION: ICD-10-CM

## 2021-06-01 RX ORDER — LOSARTAN POTASSIUM 50 MG/1
TABLET ORAL
Qty: 100 TABLET | Refills: 1 | Status: SHIPPED | OUTPATIENT
Start: 2021-06-01 | End: 2022-02-23

## 2021-06-02 RX ORDER — ALLOPURINOL 100 MG/1
TABLET ORAL
Qty: 180 TABLET | Refills: 3 | Status: SHIPPED | OUTPATIENT
Start: 2021-06-02 | End: 2022-07-20

## 2021-06-17 ENCOUNTER — PATIENT MESSAGE (OUTPATIENT)
Dept: HEALTH INFORMATION MANAGEMENT | Facility: OTHER | Age: 81
End: 2021-06-17

## 2021-06-18 ENCOUNTER — OFFICE VISIT (OUTPATIENT)
Dept: INTERNAL MEDICINE | Facility: IMAGING CENTER | Age: 81
End: 2021-06-18
Payer: MEDICARE

## 2021-06-18 VITALS
DIASTOLIC BLOOD PRESSURE: 70 MMHG | BODY MASS INDEX: 29.26 KG/M2 | WEIGHT: 216.05 LBS | TEMPERATURE: 97.9 F | SYSTOLIC BLOOD PRESSURE: 124 MMHG | HEART RATE: 66 BPM | RESPIRATION RATE: 14 BRPM | OXYGEN SATURATION: 93 % | HEIGHT: 72 IN

## 2021-06-18 DIAGNOSIS — J30.1 SEASONAL ALLERGIC RHINITIS DUE TO POLLEN: ICD-10-CM

## 2021-06-18 PROCEDURE — 99212 OFFICE O/P EST SF 10 MIN: CPT | Performed by: FAMILY MEDICINE

## 2021-06-18 ASSESSMENT — FIBROSIS 4 INDEX: FIB4 SCORE: 2.08

## 2021-06-18 NOTE — PROGRESS NOTES
"Chief Complaint   Patient presents with   • Sinusitis       Subjective:     HPI:   Chuck Ortiz is a 81 y.o. male here  to discuss the evaluation and management of:     4 day history of    Clear nasal discharge -constant  He also is experiencing discomfort in his nose and frontal sinus  Denies fever or chills  Not much congestion  He has been sneezing    He does use his CPAP for sleep apnea    No problems updated.     Objective:     /70   Pulse 66   Temp 36.6 °C (97.9 °F) (Temporal)   Resp 14   Ht 1.829 m (6' 0.01\")   Wt 98 kg (216 lb 0.8 oz)   SpO2 93%  Body mass index is 29.3 kg/m².    Physical Exam:  Physical Exam  Constitutional: Well-developed and well-nourished. Not diaphoretic. No distress.   Skin: Skin is warm and dry. No rash noted.  Head: Atraumatic without lesions.  Eyes: Conjunctivae and extraocular motions are normal.   Ears:  External ears unremarkable.    Nose: Nares patent. Mucosa without edema or erythema. No discharge. No facial tenderness.  Oropharynx with white postnasal drainage  Neck: Supple, No thyromegaly present. No JVD  Cardiovascular: Regular rate and rhythm.   Chest: Effort normal. Clear to auscultation throughout. No adventitious sounds.      Neurological: Alert and oriented x 3.    Psychiatric:  Behavior, mood, and affect are appropriate     Assessment and Plan:     The following treatment plan was discussed:     1. allergic rhinitis-recommended saline rinsing antihistamine and Flonase  If symptoms worsen he will contact Dr. Jc         Any change or worsening of signs or symptoms, patient encouraged to follow-up or report to emergency room for further evaluation. Patient verbalizes understanding and agrees.    Follow-Up: No follow-ups on file.      PLEASE NOTE: This dictation was created using voice recognition software. I have made every reasonable attempt to correct obvious errors, but I expect that there are errors of grammar and possibly content that I did not " discover before finalizing the note.    My total time spent caring for the patient on the day of the encounter was  greater than 20 minutes.   This includes obtaining history, reviewing chart, physical exam, patient education, reviewing outside records, placing orders, interpreting tests and coordinating care.

## 2021-08-23 RX ORDER — TRIAMTERENE AND HYDROCHLOROTHIAZIDE 37.5; 25 MG/1; MG/1
CAPSULE ORAL
Qty: 90 CAPSULE | Refills: 3 | Status: SHIPPED | OUTPATIENT
Start: 2021-08-23 | End: 2022-04-29

## 2021-09-22 DIAGNOSIS — I48.91 ATRIAL FIBRILLATION, UNSPECIFIED TYPE (HCC): ICD-10-CM

## 2021-09-22 RX ORDER — APIXABAN 5 MG/1
TABLET, FILM COATED ORAL
Qty: 180 TABLET | Refills: 0 | Status: SHIPPED | OUTPATIENT
Start: 2021-09-22 | End: 2022-02-28 | Stop reason: SDUPTHER

## 2021-10-01 ENCOUNTER — NON-PROVIDER VISIT (OUTPATIENT)
Dept: INTERNAL MEDICINE | Facility: IMAGING CENTER | Age: 81
End: 2021-10-01
Payer: MEDICARE

## 2021-10-01 ENCOUNTER — HOSPITAL ENCOUNTER (OUTPATIENT)
Facility: MEDICAL CENTER | Age: 81
End: 2021-10-01
Attending: INTERNAL MEDICINE
Payer: MEDICARE

## 2021-10-01 DIAGNOSIS — Z20.822 ENCOUNTER FOR LABORATORY TESTING FOR COVID-19 VIRUS: ICD-10-CM

## 2021-10-01 PROCEDURE — U0005 INFEC AGEN DETEC AMPLI PROBE: HCPCS

## 2021-10-01 PROCEDURE — U0003 INFECTIOUS AGENT DETECTION BY NUCLEIC ACID (DNA OR RNA); SEVERE ACUTE RESPIRATORY SYNDROME CORONAVIRUS 2 (SARS-COV-2) (CORONAVIRUS DISEASE [COVID-19]), AMPLIFIED PROBE TECHNIQUE, MAKING USE OF HIGH THROUGHPUT TECHNOLOGIES AS DESCRIBED BY CMS-2020-01-R: HCPCS

## 2021-10-02 DIAGNOSIS — Z20.822 ENCOUNTER FOR LABORATORY TESTING FOR COVID-19 VIRUS: ICD-10-CM

## 2021-10-02 LAB
COVID ORDER STATUS COVID19: NORMAL
SARS-COV-2 RNA RESP QL NAA+PROBE: NOTDETECTED
SPECIMEN SOURCE: NORMAL

## 2021-10-21 ENCOUNTER — OFFICE VISIT (OUTPATIENT)
Dept: CARDIOLOGY | Facility: MEDICAL CENTER | Age: 81
End: 2021-10-21
Payer: MEDICARE

## 2021-10-21 VITALS
OXYGEN SATURATION: 94 % | BODY MASS INDEX: 29.39 KG/M2 | SYSTOLIC BLOOD PRESSURE: 132 MMHG | DIASTOLIC BLOOD PRESSURE: 86 MMHG | RESPIRATION RATE: 16 BRPM | HEIGHT: 72 IN | HEART RATE: 72 BPM | WEIGHT: 217 LBS

## 2021-10-21 DIAGNOSIS — I49.3 VENTRICULAR ECTOPY: ICD-10-CM

## 2021-10-21 DIAGNOSIS — I25.10 CORONARY ARTERY DISEASE, NON-OCCLUSIVE: ICD-10-CM

## 2021-10-21 DIAGNOSIS — Z86.73 H/O: CVA (CEREBROVASCULAR ACCIDENT): ICD-10-CM

## 2021-10-21 DIAGNOSIS — I48.0 PAF (PAROXYSMAL ATRIAL FIBRILLATION) (HCC): ICD-10-CM

## 2021-10-21 DIAGNOSIS — E78.5 DYSLIPIDEMIA: ICD-10-CM

## 2021-10-21 DIAGNOSIS — I10 ESSENTIAL HYPERTENSION: ICD-10-CM

## 2021-10-21 PROCEDURE — 99214 OFFICE O/P EST MOD 30 MIN: CPT | Performed by: INTERNAL MEDICINE

## 2021-10-21 ASSESSMENT — FIBROSIS 4 INDEX: FIB4 SCORE: 2.08

## 2021-10-21 ASSESSMENT — ENCOUNTER SYMPTOMS
LOSS OF CONSCIOUSNESS: 0
MYALGIAS: 0
COUGH: 0
PALPITATIONS: 0
DIZZINESS: 0
SHORTNESS OF BREATH: 0
BRUISES/BLEEDS EASILY: 0

## 2021-10-21 NOTE — PROGRESS NOTES
"Chief Complaint   Patient presents with   • Atrial Fibrillation     F/V Dx: PAF (paroxysmal atrial fibrillation) (HCC)   • Coronary Artery Disease     F/V Dx: Coronary artery disease involving native coronary artery of native heart without angina pectoris   • Hyperlipidemia     F/V Dx: Hyperlipidemia LDL goal <70       Subjective:   Chuck Ortiz is a 81 y.o. male who presents today for CAD, hypertension, hyperlipidemia, PAF, anticoagulation, mild aortic stenosis.    Since 11/9/2020 appointment patient denies any cardiac problems or symptoms.  No angina pectoris, palpitations though he notes at times a regular irregular pulse with a pause but no symptoms.  No syncope.  No bleeding problems.    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     On 8/7/2018 while on a fishing trip at [place] the patient had a sudden onset of loss of short-term memory for about 10 minutes.  The patient was seen in [place] ER and was diagnosed with a TIA and discharged to follow-up with his physicians in [place].     Subsequently saw Dr. Hannah Henderson, neurologist.  Brain MRI on 8/14/2018 showed 2 small acute and subacute infarcts in the left posterior frontal lobe.  Carotid ultrasounds were negative for any obstructive disease  The patient was switched from aspirin which he had been on empirically for 15 years to Plavix.    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   • CATARACT PHACO WITH IOL  1/21/2014    Performed by Joni Duarte M.D. at [facility]   • FINGER ARTHROPLASTY  12/17/2012    Performed by Adam Christopher M.D. at [facility]   • INGUINAL HERNIA REPAIR  2/19/2009    Performed by ALEX ALATORRE at [facility]   • COLONOSCOPY  2004    neg   • LAMINOTOMY  1996    lumbar laminectomy, cyst x3   • UVULOPHARYNGOPALATOPLASTY  1985     Family History   Problem Relation Age of Onset   • Heart Disease Maternal Grandmother    • Diabetes Paternal Grandfather    • Stroke Sister    • Lung Disease Father         black lung    • Cancer Neg Hx      Social History     Socioeconomic History   • Marital status:      Spouse name: Not on file   • Number of children: Not on file   • Years of education: Not on file   • Highest education level: Not on file   Occupational History   • Not on file   Tobacco Use   • Smoking status: Never Smoker   • Smokeless tobacco: Never Used   Substance and Sexual Activity   • Alcohol use: Yes     Comment: 2 drinks per week    • Drug use: No   • Sexual activity: Not on file     Comment: , retired [employer]    Other Topics Concern   • Not on file   Social History Narrative   • Not on file     Social Determinants of Health     Financial Resource Strain:    • Difficulty of Paying Living Expenses:    Food Insecurity:    • Worried About Running Out of Food in the Last Year:    • Ran Out of Food in the Last Year:    Transportation Needs:    • Lack of Transportation (Medical):    • Lack of Transportation (Non-Medical):    Physical Activity:    • Days of Exercise per Week:    • Minutes  of Exercise per Session:    Stress:    • Feeling of Stress :    Social Connections:    • Frequency of Communication with Friends and Family:    • Frequency of Social Gatherings with Friends and Family:    • Attends [employer] Services:    • Active Member of Clubs or Organizations:    • Attends Club or Organization Meetings:    • Marital Status:    Intimate Partner Violence:    • Fear of Current or Ex-Partner:    • Emotionally Abused:    • Physically Abused:    • Sexually Abused:      Allergies   Allergen Reactions   • Morphine      Bradycardia     Outpatient Encounter Medications as of 10/21/2021   Medication Sig Dispense Refill   • ELIQUIS 5 MG Tab TAKE 1 TABLET BY MOUTH TWICE A  Tablet 0   • triamterene/hctz (MAXZIDE-25/DYAZIDE) 37.5-25 MG Cap TAKE 1 CAPSULE BY MOUTH EVERY DAY IN THE MORNING 90 Capsule 3   • allopurinol (ZYLOPRIM) 100 MG Tab TAKE 1 TABLET BY MOUTH TWICE A  tablet 3   • losartan (COZAAR) 50 MG Tab TAKE 1 TABLET BY MOUTH EVERY  tablet 1   • atorvastatin (LIPITOR) 40 MG Tab TAKE 1 TABLET BY MOUTH EVERY  Tab 2   • acetaminophen (TYLENOL) 325 MG Tab Take 650 mg by mouth every four hours as needed for Moderate Pain.     • tamsulosin (FLOMAX) 0.4 MG capsule Take 1 Cap by mouth every day. 7 Cap 0     No facility-administered encounter medications on file as of 10/21/2021.     Review of Systems   Respiratory: Negative for cough and shortness of breath.    Cardiovascular: Negative for chest pain and palpitations.   Musculoskeletal: Negative for myalgias.   Neurological: Negative for dizziness and loss of consciousness.   Endo/Heme/Allergies: Does not bruise/bleed easily.        Objective:   /86 (BP Location: Left arm, Patient Position: Sitting, BP Cuff Size: Adult)   Pulse 72   Resp 16   Ht 1.829 m (6')   Wt 98.4 kg (217 lb)   SpO2 94%   BMI 29.43 kg/m²     Physical Exam  Constitutional:       Appearance: He is well-developed.   Eyes:      Conjunctiva/sclera:  Conjunctivae normal.      Pupils: Pupils are equal, round, and reactive to light.   Neck:      Vascular: No JVD.   Cardiovascular:      Rate and Rhythm: Normal rate. Rhythm regularly irregular.      Heart sounds: Normal heart sounds.   Pulmonary:      Effort: Pulmonary effort is normal. No accessory muscle usage or respiratory distress.      Breath sounds: Normal breath sounds. No wheezing or rales.   Skin:     General: Skin is warm and dry.      Findings: No rash.      Nails: There is no clubbing.   Neurological:      Mental Status: He is alert and oriented to person, place, and time.   Psychiatric:         Behavior: Behavior normal.       08/14/2018 BRAIN MRI  1.  Acute to subacute small sized infarcts in the left posterior frontal lobe.  2.  Mild diffuse cerebral substance loss.  3.  Mild microangiopathic ischemic change versus demyelination or gliosis.  4.  Right maxillary sinus mucous retention cyst or polyp.    ECHOCARDIOGRAM 09/06/2018.  Normal left ventricular systolic function.  Left ventricular ejection fraction is visually estimated to be 60%.  Mild aortic stenosis.  Mild posterior mitral valve leaflet prolapse.  Mild mitral regurgitation.  Mitral annular calcification.  Unable to estimate pulmonary artery pressure due to an inadequate   tricuspid regurgitant jet.  No prior study is available for comparison.      MPI 09/06/2018.  There is a predominently fixed basal to mid inferior septal perfusion defect.   There is a distal inferior wall defect, predominantly reversible, moderate    size, mild intensity.   Stress Image LV EF: 71     %    CARDIAC CATHETERIZATION  09/28/2018  A. Left heart catheterization.  B. Left ventriculography.  C. Selective coronary angiography.  D. Right radial artery approach.  PREPROCEDURE DIAGNOSES:  1.  Abnormal myocardial perfusion scan.  2.  Mild aortic stenosis.  3.  Ventricular ectopy.  4.  Cryptogenic stroke.  5.  Hyperlipidemia.   POSTPROCEDURE DIAGNOSES:  1.  Coronary  artery disease, moderate predominantly involving the mid left anterior descending artery.  2.  Left ventricular ejection fraction 77% post PVC with basal inferior wall akinesis.    ILR recording Atrial fibrillation 5/11/2020 and 7/1/2020  Start eliquis 5 MG bid  Discontinue plavix  Assessment:     1. Coronary artery disease, non-occlusive     2. Essential hypertension     3. Dyslipidemia     4. Ventricular ectopy     5. PAF (paroxysmal atrial fibrillation) ([facility])     6. H/O: CVA (cerebrovascular accident)         Medical Decision Making:  Today's Assessment / Status / Plan:     1.  CAD, noncritical.  2.  Hypertension   3.  Hyperlipidemia   4.  CVA, left posterior frontal.  08/14/2018  5.  Implantable loop recorder 8/28/2018.  Removed 7/20/2020.  6.  PAF 5/11/2020, 7/1/2020 on ILR.  7.  Anticoagulation on Eliquis.   8.  Ventricular ectopy, chronic.  9.  Aortic stenosis, mild.  10.  LOVE.  S/P uvulectomy.  11.  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  12.  S/P back surgery.  13.  Zenker's diverticulum.    Recommendation Discussion  1.  The patient is stable from a cardiac standpoint regarding his CAD, hypertension, dyslipidemia and PAF.  2.  Reviewed recent lab work, LDL at goal.  3.  BP acceptable  4.  Ventricular ectopy is chronic and asymptomatic.  5.  Continue current cardiac therapy.  5.  RTC 9 months; echocardiogram next appointment.

## 2021-12-01 ENCOUNTER — APPOINTMENT (RX ONLY)
Dept: URBAN - METROPOLITAN AREA CLINIC 35 | Facility: CLINIC | Age: 81
Setting detail: DERMATOLOGY
End: 2021-12-01

## 2021-12-01 DIAGNOSIS — F42.4 EXCORIATION (SKIN-PICKING) DISORDER: ICD-10-CM

## 2021-12-01 DIAGNOSIS — L82.1 OTHER SEBORRHEIC KERATOSIS: ICD-10-CM

## 2021-12-01 DIAGNOSIS — Z71.89 OTHER SPECIFIED COUNSELING: ICD-10-CM

## 2021-12-01 DIAGNOSIS — L81.4 OTHER MELANIN HYPERPIGMENTATION: ICD-10-CM

## 2021-12-01 DIAGNOSIS — D22 MELANOCYTIC NEVI: ICD-10-CM

## 2021-12-01 DIAGNOSIS — Z85.828 PERSONAL HISTORY OF OTHER MALIGNANT NEOPLASM OF SKIN: ICD-10-CM

## 2021-12-01 DIAGNOSIS — L57.0 ACTINIC KERATOSIS: ICD-10-CM

## 2021-12-01 PROBLEM — L30.9 DERMATITIS, UNSPECIFIED: Status: ACTIVE | Noted: 2021-12-01

## 2021-12-01 PROBLEM — D22.5 MELANOCYTIC NEVI OF TRUNK: Status: ACTIVE | Noted: 2021-12-01

## 2021-12-01 PROCEDURE — ? DEFER

## 2021-12-01 PROCEDURE — ? PRESCRIPTION

## 2021-12-01 PROCEDURE — ? TREATMENT REGIMEN

## 2021-12-01 PROCEDURE — 99213 OFFICE O/P EST LOW 20 MIN: CPT

## 2021-12-01 PROCEDURE — ? COUNSELING

## 2021-12-01 RX ORDER — FLUOROURACIL 5 MG/G
THIN LAYER CREAM TOPICAL BID
Qty: 40 | Refills: 1 | Status: ERX | COMMUNITY
Start: 2021-12-01

## 2021-12-01 RX ORDER — CALCIPOTRIENE 0.05 MG/G
THIN LAYER CREAM TOPICAL BID
Qty: 60 | Refills: 1 | Status: ERX | COMMUNITY
Start: 2021-12-01

## 2021-12-01 RX ADMIN — CALCIPOTRIENE THIN LAYER: 0.05 CREAM TOPICAL at 00:00

## 2021-12-01 RX ADMIN — FLUOROURACIL THIN LAYER: 5 CREAM TOPICAL at 00:00

## 2021-12-01 ASSESSMENT — LOCATION SIMPLE DESCRIPTION DERM
LOCATION SIMPLE: RIGHT CHEEK
LOCATION SIMPLE: RIGHT UPPER BACK
LOCATION SIMPLE: UPPER BACK
LOCATION SIMPLE: LEFT CHEEK
LOCATION SIMPLE: RIGHT FOREHEAD
LOCATION SIMPLE: SUPERIOR FOREHEAD
LOCATION SIMPLE: LEFT EAR
LOCATION SIMPLE: ABDOMEN

## 2021-12-01 ASSESSMENT — LOCATION DETAILED DESCRIPTION DERM
LOCATION DETAILED: LEFT CENTRAL MALAR CHEEK
LOCATION DETAILED: RIGHT CENTRAL MALAR CHEEK
LOCATION DETAILED: RIGHT LATERAL FOREHEAD
LOCATION DETAILED: INFERIOR THORACIC SPINE
LOCATION DETAILED: LEFT POSTERIOR EAR
LOCATION DETAILED: SUPERIOR MID FOREHEAD
LOCATION DETAILED: EPIGASTRIC SKIN
LOCATION DETAILED: RIGHT SUPERIOR LATERAL UPPER BACK

## 2021-12-01 ASSESSMENT — LOCATION ZONE DERM
LOCATION ZONE: EAR
LOCATION ZONE: FACE
LOCATION ZONE: TRUNK

## 2021-12-01 NOTE — PROCEDURE: TREATMENT REGIMEN
Detail Level: Zone
Initiate Treatment: Fluorouracil and calcipotriene cream bid for 4 days on the face

## 2021-12-08 NOTE — PROCEDURE: LIQUID NITROGEN
Consent: The patient's consent was obtained including but not limited to risks of crusting, scabbing, blistering, scarring, darker or lighter pigmentary change, recurrence, incomplete removal and infection.
Render Note In Bullet Format When Appropriate: No
Duration Of Freeze Thaw-Cycle (Seconds): 10
Post-Care Instructions: I reviewed with the patient in detail post-care instructions. Patient is to wear sunprotection, and avoid picking at any of the treated lesions. Pt may apply Vaseline to crusted or scabbing areas.
Number Of Freeze-Thaw Cycles: 1 freeze-thaw cycle
Detail Level: Detailed
yes

## 2021-12-21 ENCOUNTER — TELEPHONE (OUTPATIENT)
Dept: HEALTH INFORMATION MANAGEMENT | Facility: OTHER | Age: 81
End: 2021-12-21

## 2021-12-21 NOTE — TELEPHONE ENCOUNTER
Outcome: Left Message with wife. Wife stated she would let him know.     Please transfer to Patient Outreach Team at [phone] when patient returns call.    Attempt # 2

## 2022-01-06 ENCOUNTER — NON-PROVIDER VISIT (OUTPATIENT)
Dept: INTERNAL MEDICINE | Facility: IMAGING CENTER | Age: 82
End: 2022-01-06
Payer: MEDICARE

## 2022-01-06 DIAGNOSIS — Z23 NEED FOR INFLUENZA VACCINATION: ICD-10-CM

## 2022-01-06 PROCEDURE — 90662 IIV NO PRSV INCREASED AG IM: CPT | Performed by: INTERNAL MEDICINE

## 2022-01-06 PROCEDURE — G0008 ADMIN INFLUENZA VIRUS VAC: HCPCS | Performed by: INTERNAL MEDICINE

## 2022-01-11 ENCOUNTER — APPOINTMENT (RX ONLY)
Dept: URBAN - METROPOLITAN AREA CLINIC 35 | Facility: CLINIC | Age: 82
Setting detail: DERMATOLOGY
End: 2022-01-11

## 2022-01-11 DIAGNOSIS — L57.0 ACTINIC KERATOSIS: ICD-10-CM

## 2022-01-11 PROCEDURE — 99212 OFFICE O/P EST SF 10 MIN: CPT

## 2022-01-11 PROCEDURE — ? ADDITIONAL NOTES

## 2022-01-11 PROCEDURE — ? COUNSELING

## 2022-01-11 ASSESSMENT — LOCATION DETAILED DESCRIPTION DERM: LOCATION DETAILED: RIGHT LATERAL FOREHEAD

## 2022-01-11 ASSESSMENT — LOCATION ZONE DERM: LOCATION ZONE: FACE

## 2022-01-11 ASSESSMENT — LOCATION SIMPLE DESCRIPTION DERM: LOCATION SIMPLE: RIGHT FOREHEAD

## 2022-01-11 NOTE — PROCEDURE: ADDITIONAL NOTES
Additional Notes: Resolved with Fluorouracil/ calcipotriene cream rx
Detail Level: Simple
Render Risk Assessment In Note?: no

## 2022-01-27 DIAGNOSIS — G47.33 OBSTRUCTIVE SLEEP APNEA SYNDROME: ICD-10-CM

## 2022-02-14 ENCOUNTER — OFFICE VISIT (OUTPATIENT)
Dept: INTERNAL MEDICINE | Facility: IMAGING CENTER | Age: 82
End: 2022-02-14
Payer: MEDICARE

## 2022-02-14 VITALS
DIASTOLIC BLOOD PRESSURE: 90 MMHG | HEART RATE: 75 BPM | SYSTOLIC BLOOD PRESSURE: 130 MMHG | TEMPERATURE: 98.3 F | RESPIRATION RATE: 12 BRPM | OXYGEN SATURATION: 93 %

## 2022-02-14 DIAGNOSIS — I35.0 NONRHEUMATIC AORTIC VALVE STENOSIS: ICD-10-CM

## 2022-02-14 DIAGNOSIS — I48.91 ATRIAL FIBRILLATION, UNSPECIFIED TYPE (HCC): ICD-10-CM

## 2022-02-14 DIAGNOSIS — I10 PRIMARY HYPERTENSION: ICD-10-CM

## 2022-02-14 PROCEDURE — 99213 OFFICE O/P EST LOW 20 MIN: CPT | Performed by: INTERNAL MEDICINE

## 2022-02-14 RX ORDER — FLUOROURACIL 50 MG/G
CREAM TOPICAL PRN
COMMUNITY
Start: 2021-12-02 | End: 2022-12-19

## 2022-02-14 RX ORDER — CYCLOBENZAPRINE HCL 10 MG
10 TABLET ORAL 3 TIMES DAILY PRN
Qty: 30 TABLET | Refills: 0 | Status: SHIPPED | OUTPATIENT
Start: 2022-02-14 | End: 2023-02-14

## 2022-02-14 RX ORDER — CALCIPOTRIENE 50 UG/G
CREAM TOPICAL PRN
COMMUNITY
Start: 2021-12-02 | End: 2023-02-08

## 2022-02-14 NOTE — PROGRESS NOTES
Chief Complaint   Patient presents with   • Hypertension       HISTORY OF THE PRESENT ILLNESS: Patient is a 81 y.o. male.     Patient comes in for evaluation of elevated blood pressure.  Yesterday he took his blood pressure on 3 separate occasions and it was always elevated.  He has a wrist cuff.  Systolic readings were as high as 160.  He states at that time he felt a fullness/flush feeling in the back of his head.  No chest pain or palpitations.  No dyspnea.  Blood pressure is typically well controlled with diuretic and losartan.  Takes the medications as prescribed.  He also has a history of atrial fibrillation.  He denies any tachycardia, palpitations or other.  He is on Eliquis for anticoagulation.    Allergies: Morphine    Current Outpatient Medications Ordered in Epic   Medication Sig Dispense Refill   • cyclobenzaprine (FLEXERIL) 10 mg Tab Take 1 Tablet by mouth 3 times a day as needed for Moderate Pain (back spasms). 30 Tablet 0   • ELIQUIS 5 MG Tab TAKE 1 TABLET BY MOUTH TWICE A  Tablet 0   • triamterene/hctz (MAXZIDE-25/DYAZIDE) 37.5-25 MG Cap TAKE 1 CAPSULE BY MOUTH EVERY DAY IN THE MORNING 90 Capsule 3   • allopurinol (ZYLOPRIM) 100 MG Tab TAKE 1 TABLET BY MOUTH TWICE A  tablet 3   • losartan (COZAAR) 50 MG Tab TAKE 1 TABLET BY MOUTH EVERY  tablet 1   • atorvastatin (LIPITOR) 40 MG Tab TAKE 1 TABLET BY MOUTH EVERY  Tab 2   • fluorouracil (EFUDEX) 5 % cream      • calcipotriene (DOVONEX) 0.005 % Cream      • acetaminophen (TYLENOL) 325 MG Tab Take 650 mg by mouth every four hours as needed for Moderate Pain.       No current Epic-ordered facility-administered medications on file.       Past medical history, social history and family history were reviewed from chart today    Review of systems: Per HPI.    All others negative.     Exam: /90 (BP Location: Left arm, Patient Position: Sitting, BP Cuff Size: Adult)   Pulse 75   Temp 36.8 °C (98.3 °F) (Temporal)   Resp 12    SpO2 93%    Repeat blood pressure 130/80  General: Well-appearing. Well-developed. No signs of distress.  HEENT: Grossly normal. Oral cavity is pink and moist.  Neck: Supple without JVD or bruit.  Pulmonary: Clear with good breath sounds. Normal effort.  Cardiovascular: Regular. Carotid and radial pulses are intact.  Abdomen: Soft, nontender, nondistended. Spleen and liver are not enlarged.  Neurologic: Cranial nerves II through XII are grossly normal, alert and oriented x3      Diagnosis:  1. Primary hypertension     2. Atrial fibrillation, unspecified type (HCC)         Patient with elevated blood pressure readings yesterday.  He has a wrist cuff which may have inaccurate readings.  He was also having abnormal sensation in his neck and head at the time.  We discussed that some people are sensitive to blood pressure changes however it could be unrelated.  We also discussed possible reasons for elevated blood pressure including caffeine, cold medication, allergy medication and diet.  Patient noted that he had caffeine and significant problem for the intake the day before.    His blood pressure seems reasonable in the office.  His diastolic was mildly elevated on initial evaluation.  This improved with rest.  I encouraged him to continue with home monitoring.  If he should find that his blood pressures continue to be elevated I would like him to come back with his monitor so that we can compare to ours.  Adjust medication as indicated.  I also encouraged him to take extra losartan x1 if his blood pressure is elevated

## 2022-02-23 DIAGNOSIS — E78.2 HYPERLIPEMIA, MIXED: ICD-10-CM

## 2022-02-23 DIAGNOSIS — I10 ESSENTIAL HYPERTENSION: ICD-10-CM

## 2022-02-24 RX ORDER — ATORVASTATIN CALCIUM 40 MG/1
TABLET, FILM COATED ORAL
Qty: 100 TABLET | Refills: 2 | Status: SHIPPED | OUTPATIENT
Start: 2022-02-24 | End: 2023-01-06

## 2022-02-24 RX ORDER — LOSARTAN POTASSIUM 50 MG/1
TABLET ORAL
Qty: 100 TABLET | Refills: 2 | Status: SHIPPED | OUTPATIENT
Start: 2022-02-24 | End: 2023-02-14

## 2022-02-28 ENCOUNTER — TELEPHONE (OUTPATIENT)
Dept: CARDIOLOGY | Facility: MEDICAL CENTER | Age: 82
End: 2022-02-28
Payer: MEDICARE

## 2022-02-28 DIAGNOSIS — I48.91 ATRIAL FIBRILLATION, UNSPECIFIED TYPE (HCC): ICD-10-CM

## 2022-02-28 NOTE — TELEPHONE ENCOUNTER
SW    Patient is completely out of ELIQUIS 5 MG Tab  Since Friday.Please send to [facility] at [phone].    Thank you,  Gail MORATAYA

## 2022-04-29 ENCOUNTER — OFFICE VISIT (OUTPATIENT)
Dept: CARDIOLOGY | Facility: MEDICAL CENTER | Age: 82
End: 2022-04-29
Payer: MEDICARE

## 2022-04-29 VITALS
SYSTOLIC BLOOD PRESSURE: 132 MMHG | HEART RATE: 71 BPM | WEIGHT: 216 LBS | OXYGEN SATURATION: 92 % | RESPIRATION RATE: 14 BRPM | DIASTOLIC BLOOD PRESSURE: 80 MMHG | BODY MASS INDEX: 29.26 KG/M2 | HEIGHT: 72 IN

## 2022-04-29 DIAGNOSIS — E78.5 DYSLIPIDEMIA: ICD-10-CM

## 2022-04-29 DIAGNOSIS — I48.0 PAF (PAROXYSMAL ATRIAL FIBRILLATION) (HCC): ICD-10-CM

## 2022-04-29 DIAGNOSIS — Z79.01 ANTICOAGULATED: ICD-10-CM

## 2022-04-29 DIAGNOSIS — I35.0 AORTIC VALVE STENOSIS, ETIOLOGY OF CARDIAC VALVE DISEASE UNSPECIFIED: ICD-10-CM

## 2022-04-29 DIAGNOSIS — I10 ESSENTIAL HYPERTENSION: ICD-10-CM

## 2022-04-29 DIAGNOSIS — I25.10 CORONARY ARTERY DISEASE, NON-OCCLUSIVE: ICD-10-CM

## 2022-04-29 PROCEDURE — 99214 OFFICE O/P EST MOD 30 MIN: CPT | Performed by: INTERNAL MEDICINE

## 2022-04-29 RX ORDER — MELOXICAM 15 MG/1
15 TABLET ORAL PRN
COMMUNITY
Start: 2022-03-14 | End: 2022-10-18 | Stop reason: SDUPTHER

## 2022-04-29 ASSESSMENT — ENCOUNTER SYMPTOMS
LOSS OF CONSCIOUSNESS: 0
SHORTNESS OF BREATH: 0
COUGH: 0
DIZZINESS: 0
MYALGIAS: 0
PALPITATIONS: 0
BRUISES/BLEEDS EASILY: 0

## 2022-04-29 ASSESSMENT — FIBROSIS 4 INDEX: FIB4 SCORE: 2.1

## 2022-04-29 NOTE — PROGRESS NOTES
"Chief Complaint   Patient presents with   • Atrial Fibrillation     F/V Dx: PAF (paroxysmal atrial fibrillation) (HCC)   • Irregular Heart Beat   • Coronary Artery Disease     F/V Dx: Coronary artery disease involving native coronary artery of native heart without angina pectoris       Subjective     Chuck Ortiz is a 82 y.o. male who presents today for CAD, hypertension, hyperlipidemia, PAF, anticoagulation, mild aortic stenosis.    Since 10/21/2021 appointment the patient has had no specific cardiac problems or symptoms including chest pain, palpitations or lightheadedness.  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 Bustamante who does not feel surgery is a viable option.  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   • CATARACT PHACO WITH IOL  1/21/2014    Performed by Joni Duarte M.D. at [facility]   • FINGER ARTHROPLASTY  12/17/2012    Performed by Adam Christopher M.D. at [facility]   • INGUINAL HERNIA REPAIR  2/19/2009    Performed by ALEX ALATORRE at SURGERY SAME DAY [facility]   • COLONOSCOPY  2004    neg   • LAMINOTOMY  1996    lumbar laminectomy, cyst x3   • UVULOPHARYNGOPALATOPLASTY  1985     Family History   Problem Relation Age of Onset   • Heart Disease Maternal Grandmother    • Diabetes Paternal Grandfather    • Stroke Sister    • Lung Disease Father         black lung    • Cancer Neg Hx      Social History     Socioeconomic History   • Marital status:      Spouse name: Not on file   • Number of children: Not on file   • Years of education: Not on file   • Highest education level: Not on file   Occupational History   • Not on file   Tobacco Use   • Smoking status: Never Smoker   • Smokeless tobacco: Never Used   Substance and Sexual Activity   • Alcohol use: Yes     Comment: 2 drinks per week    • Drug use: No   • Sexual activity: Not on file     Comment: , retired [employer]    Other Topics Concern   • Not on file   Social History Narrative   • Not on file     Social Determinants of Health     Financial Resource Strain: Not on file   Food Insecurity: Not on file   Transportation Needs: Not on file   Physical Activity: Not on file   Stress: Not on file   Social Connections: Not on file   Intimate Partner Violence: Not on file   Housing Stability: Not on file     Allergies   Allergen Reactions   • Morphine      Bradycardia     Outpatient Encounter Medications as of 4/29/2022   Medication Sig Dispense Refill   • meloxicam (MOBIC) 15 MG tablet Take 15 mg by mouth as needed.     • apixaban (ELIQUIS) 5mg Tab Take 1 Tablet by mouth 2 times a day. 180 Tablet 2   • losartan (COZAAR) 50 MG Tab TAKE 1  TABLET BY MOUTH EVERY  Tablet 2   • atorvastatin (LIPITOR) 40 MG Tab TAKE 1 TABLET BY MOUTH EVERY  Tablet 2   • cyclobenzaprine (FLEXERIL) 10 mg Tab Take 1 Tablet by mouth 3 times a day as needed for Moderate Pain (back spasms). 30 Tablet 0   • fluorouracil (EFUDEX) 5 % cream Apply  topically as needed.     • calcipotriene (DOVONEX) 0.005 % Cream Apply  topically as needed.     • allopurinol (ZYLOPRIM) 100 MG Tab TAKE 1 TABLET BY MOUTH TWICE A  tablet 3   • acetaminophen (TYLENOL) 325 MG Tab Take 650 mg by mouth every four hours as needed for Moderate Pain.     • [DISCONTINUED] triamterene/hctz (MAXZIDE-25/DYAZIDE) 37.5-25 MG Cap TAKE 1 CAPSULE BY MOUTH EVERY DAY IN THE MORNING (Patient not taking: Reported on 4/29/2022) 90 Capsule 3     No facility-administered encounter medications on file as of 4/29/2022.     Review of Systems   Respiratory: Negative for cough and shortness of breath.    Cardiovascular: Negative for chest pain and palpitations.   Musculoskeletal: Negative for myalgias.   Neurological: Negative for dizziness and loss of consciousness.   Endo/Heme/Allergies: Does not bruise/bleed easily.              Objective     /80 (BP Location: Left arm, Patient Position: Sitting, BP Cuff Size: Adult)   Pulse 71   Resp 14   Ht 1.829 m (6')   Wt 98 kg (216 lb)   SpO2 92%   BMI 29.29 kg/m²     Physical Exam  Constitutional:       Appearance: He is well-developed.   Eyes:      Conjunctiva/sclera: Conjunctivae normal.      Pupils: Pupils are equal, round, and reactive to light.   Neck:      Vascular: No JVD.   Cardiovascular:      Rate and Rhythm: Normal rate. Rhythm regularly irregular.      Heart sounds: Murmur heard.   Pulmonary:      Effort: Pulmonary effort is normal. No accessory muscle usage or respiratory distress.      Breath sounds: Normal breath sounds. No wheezing or rales.   Musculoskeletal:      Cervical back: Normal range of motion and neck supple.   Skin:      General: Skin is warm and dry.      Findings: No rash.      Nails: There is no clubbing.   Neurological:      Mental Status: He is alert and oriented to person, place, and time.   Psychiatric:         Behavior: Behavior normal.                 08/14/2018 BRAIN MRI  1.  Acute to subacute small sized infarcts in the left posterior frontal lobe.  2.  Mild diffuse cerebral substance loss.  3.  Mild microangiopathic ischemic change versus demyelination or gliosis.  4.  Right maxillary sinus mucous retention cyst or polyp.     ECHOCARDIOGRAM 09/06/2018.  Normal left ventricular systolic function.  Left ventricular ejection fraction is visually estimated to be 60%.  Mild aortic stenosis.  Mild posterior mitral valve leaflet prolapse.  Mild mitral regurgitation.  Mitral annular calcification.  Unable to estimate pulmonary artery pressure due to an inadequate   tricuspid regurgitant jet.  No prior study is available for comparison.       MPI 09/06/2018.  There is a predominently fixed basal to mid inferior septal perfusion defect.   There is a distal inferior wall defect, predominantly reversible, moderate    size, mild intensity.   Stress Image LV EF: 71     %     CARDIAC CATHETERIZATION  09/28/2018  A. Left heart catheterization.  B. Left ventriculography.  C. Selective coronary angiography.  D. Right radial artery approach.  PREPROCEDURE DIAGNOSES:  1.  Abnormal myocardial perfusion scan.  2.  Mild aortic stenosis.  3.  Ventricular ectopy.  4.  Cryptogenic stroke.  5.  Hyperlipidemia.   POSTPROCEDURE DIAGNOSES:  1.  Coronary artery disease, moderate predominantly involving the mid left anterior descending artery.  2.  Left ventricular ejection fraction 77% post PVC with basal inferior wall akinesis.     ILR recording Atrial fibrillation 5/11/2020 and 7/1/2020  Start eliquis 5 MG bid  Discontinue plavix    Assessment & Plan     1. Coronary artery disease, non-occlusive     2. PAF (paroxysmal atrial fibrillation) (HCC)      3. Anticoagulated     4. Essential hypertension     5. Dyslipidemia  LIPID PANEL   6. Aortic valve stenosis, etiology of cardiac valve disease unspecified         Medical Decision Making: Today's Assessment/Status/Plan:   1.  CAD, noncritical.  2.  Hypertension   3.  Hyperlipidemia   4.  CVA, left posterior frontal.  08/14/2018  5.  Implantable loop recorder 8/28/2018, removed 7/20/2020.  6.  PAF 5/11/2020, 7/1/2020 on ILR.  7.  Anticoagulation on apixaban  8.  Ventricular ectopy, chronic.  9.  Aortic stenosis, mild.  10.  LOVE.  S/P uvulectomy.  11.  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  12.  S/P back surgery.  13.  Zenker's diverticulum.     Recommendation Discussion  1.  CAD, asymptomatic, continue atorvastatin, apixaban (no aspirin)  2.  PAF: Clinically asymptomatic, continue apixaban.  3.  Aortic stenosis: Asymptomatic, echocardiogram pending next week.  4.  Hypertension: BP 130s, optimally BP goal<130 over 80, continue losartan and directions per PCP.  5.  Dyslipidemia: LDL 51, at goal, continue atorvastatin, recheck lipid panel.  6.  Ventricular ectopy: Asymptomatic, continue monitoring.  7.  RTC 6 months

## 2022-05-05 ENCOUNTER — HOSPITAL ENCOUNTER (OUTPATIENT)
Dept: CARDIOLOGY | Facility: MEDICAL CENTER | Age: 82
End: 2022-05-05
Attending: INTERNAL MEDICINE
Payer: MEDICARE

## 2022-05-05 ENCOUNTER — PATIENT MESSAGE (OUTPATIENT)
Dept: CARDIOLOGY | Facility: MEDICAL CENTER | Age: 82
End: 2022-05-05
Payer: MEDICARE

## 2022-05-05 DIAGNOSIS — I35.0 NONRHEUMATIC AORTIC VALVE STENOSIS: ICD-10-CM

## 2022-05-05 LAB
LV EJECT FRACT  99904: 70
LV EJECT FRACT MOD 2C 99903: 77.79
LV EJECT FRACT MOD 4C 99902: 59.34
LV EJECT FRACT MOD BP 99901: 71.53

## 2022-05-05 PROCEDURE — 93306 TTE W/DOPPLER COMPLETE: CPT | Mod: 26 | Performed by: INTERNAL MEDICINE

## 2022-05-05 PROCEDURE — 93306 TTE W/DOPPLER COMPLETE: CPT

## 2022-05-20 ENCOUNTER — PATIENT MESSAGE (OUTPATIENT)
Dept: CARDIOLOGY | Facility: MEDICAL CENTER | Age: 82
End: 2022-05-20
Payer: MEDICARE

## 2022-05-23 ENCOUNTER — PATIENT MESSAGE (OUTPATIENT)
Dept: HEALTH INFORMATION MANAGEMENT | Facility: OTHER | Age: 82
End: 2022-05-23

## 2022-05-24 ENCOUNTER — OFFICE VISIT (OUTPATIENT)
Dept: INTERNAL MEDICINE | Facility: IMAGING CENTER | Age: 82
End: 2022-05-24
Payer: MEDICARE

## 2022-05-24 VITALS
WEIGHT: 216.05 LBS | HEART RATE: 54 BPM | BODY MASS INDEX: 29.26 KG/M2 | HEIGHT: 72 IN | RESPIRATION RATE: 17 BRPM | OXYGEN SATURATION: 93 % | SYSTOLIC BLOOD PRESSURE: 150 MMHG | DIASTOLIC BLOOD PRESSURE: 70 MMHG | TEMPERATURE: 99.5 F

## 2022-05-24 DIAGNOSIS — K57.92 DIVERTICULITIS: ICD-10-CM

## 2022-05-24 PROCEDURE — 99213 OFFICE O/P EST LOW 20 MIN: CPT | Performed by: FAMILY MEDICINE

## 2022-05-24 RX ORDER — CIPROFLOXACIN 500 MG/1
500 TABLET, FILM COATED ORAL 2 TIMES DAILY
Qty: 14 TABLET | Refills: 0 | Status: SHIPPED | OUTPATIENT
Start: 2022-05-24 | End: 2022-05-31

## 2022-05-24 RX ORDER — METRONIDAZOLE 500 MG/1
500 TABLET ORAL 3 TIMES DAILY
Qty: 21 TABLET | Refills: 0 | Status: SHIPPED | OUTPATIENT
Start: 2022-05-24 | End: 2022-06-01 | Stop reason: SDUPTHER

## 2022-05-24 ASSESSMENT — FIBROSIS 4 INDEX: FIB4 SCORE: 2.1

## 2022-05-25 NOTE — PROGRESS NOTES
Chief Complaint   Patient presents with   • Abdominal Pain     Started last night around 3-4am. Pain around low abdomen. Temp of 100.5 at home.        HPI:  Patient is a 82 y.o. male established patient of Dr. Jc, who presents today for evaluation of new LLQ abdominal pain/fullness that started at approximately 3 AM this morning. He woke up with this discomfort and went to the bathroom (urinated) to attempt to relieve the sensation without success. He also noted Tm:100.5 and has been taking Tylenol with good results. He endorses decreased appetite also but denies N/V/D and had a normal bowel movement today. He has history of prior diverticulitis (last episode was 3 years ago per report) that resolved with abx use. He denies other new concerns, denies recent travel or new food exposures, and is in no overt distress today.     Patient Active Problem List    Diagnosis Date Noted   • Coronary artery disease, non-occlusive 10/21/2021   • Dyslipidemia 10/21/2021   • H/O: CVA (cerebrovascular accident) 10/21/2021   • PAF (paroxysmal atrial fibrillation) ([facility]) 11/09/2020   • Anticoagulated 11/09/2020   • Abnormal EKG 03/02/2020   • Irregular heart rhythm 03/02/2020   • Cryptogenic stroke ([facility]) 09/17/2019   • Hyperuricemia 09/17/2019   • Lumbar radiculopathy 03/26/2019   • Zenker diverticulum 11/15/2018   • Status post placement of implantable loop recorder 11/15/2018   • Hyperlipidemia LDL goal <70 11/15/2018   • Coronary artery disease involving native coronary artery of native heart without angina pectoris 11/15/2018   • History of arterial ischemic stroke 08/23/2018   • Ventricular ectopy 08/23/2018   • 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     Medications and allergies reviewed and updated in Epic chart by me today.     ROS:  Pertinent positives listed above in HPI. All other systems have been reviewed and are negative.    PE:   BP (!) 150/70 (BP Location: Left arm, Patient Position: Sitting, BP Cuff Size: Adult)   Pulse (!) 54   Temp 37.5 °C (99.5 °F) (Temporal)   Resp 17   Ht 1.829 m (6')   Wt 98 kg (216 lb 0.8 oz)   SpO2 93%   BMI 29.30 kg/m²   Vital signs reviewed with patient.     Gen: Well developed; well nourished; no acute distress; tired appearance   HEENT: Normocephalic; atraumatic; PEERLA b/l; sclera clear b/l; b/l external auditory canals WNL; b/l TM WNL; nares patent; oropharynx clear; mask in place  Neck: No adenopathy; no thyromegaly  CV: irregular rhythm; rate controlled; S1/ S2 present; no murmur, gallop or rub noted  Pulm: No respiratory distress; clear to ascultation b/l; no wheezing or stridor noted b/l  Abd: hypoactive bowel sounds noted; soft and non tender with exception of mild tenderness in LLQ; no rebound, rigidity, nor significant distention; no hepatosplenogmegaly   Extremities: No new peripheral edema b/l LE extremities/ no clubbing nor cyanosis noted  Skin: Warm and dry; no rashes noted   Neuro: No focal deficits noted   Psych: AAOx4; mood and affect are appropriate    A/P:  1. Diverticulitis  Patient awoke early this morning with new LLQ abdominal pain and fullness that is similar to previous diverticulitis episodes per his report. Recommend patient start abx use today, clear liquid diet with focus on hydration, rest, pain management (he prefers to use Tylenol for now), and follow clinical response. No indication for imaging or labs today but patient educated about signs and symptoms that would warrant immediate re-evaluation.   - metroNIDAZOLE (FLAGYL) 500 MG Tab; Take 1 Tablet by mouth 3 times a day for 7  days.  Dispense: 21 Tablet; Refill: 0  - ciprofloxacin (CIPRO) 500 MG Tab; Take 1 Tablet by mouth 2 times a day for 7 days.  Dispense: 14 Tablet; Refill: 0

## 2022-05-26 ENCOUNTER — TELEPHONE (OUTPATIENT)
Dept: CARDIOLOGY | Facility: MEDICAL CENTER | Age: 82
End: 2022-05-26
Payer: MEDICARE

## 2022-05-26 PROBLEM — I35.0 AORTIC STENOSIS: Status: ACTIVE | Noted: 2022-05-26

## 2022-05-26 NOTE — TELEPHONE ENCOUNTER
The patient called about the results of his recent echocardiogram.  I called and spoke with the patient.  I reviewed the echocardiogram images of 5/5/2022 compared to the prior study on 9/6/2018  There is moderate aortic stenosis previously mild but the left ventricular ejection fraction is increased to 70% which may have some impact on increasing the flow velocity.  Mitral regurgitation remains mild.  Discussed the results and significance of the findings.  At this point the patient will continue to be monitored with serial echocardiograms and clinical follow-up.    The patient reports that since his last appointment with me while driving he had a brief episode of lightheadedness in which he was about to pull over before his symptoms resolved.    Of note the patient had an ILR implanted for 2 years, removed 7/2020 demonstrating only isolated PVCs with no sustained ventricular tachycardia pauses or block.  Was removed after PAF was discovered resulting in anticoagulation.    Will arrange for a 2-week cardiac monitor

## 2022-05-27 ENCOUNTER — TELEPHONE (OUTPATIENT)
Dept: INTERNAL MEDICINE | Facility: IMAGING CENTER | Age: 82
End: 2022-05-27
Payer: MEDICARE

## 2022-05-31 DIAGNOSIS — E78.5 HYPERLIPIDEMIA LDL GOAL <70: ICD-10-CM

## 2022-05-31 DIAGNOSIS — I10 ESSENTIAL HYPERTENSION: ICD-10-CM

## 2022-05-31 DIAGNOSIS — R35.0 BENIGN PROSTATIC HYPERPLASIA WITH URINARY FREQUENCY: ICD-10-CM

## 2022-05-31 DIAGNOSIS — G47.33 OBSTRUCTIVE SLEEP APNEA SYNDROME: ICD-10-CM

## 2022-05-31 DIAGNOSIS — I10 PRIMARY HYPERTENSION: ICD-10-CM

## 2022-05-31 DIAGNOSIS — E79.0 HYPERURICEMIA: ICD-10-CM

## 2022-05-31 DIAGNOSIS — N40.1 BENIGN PROSTATIC HYPERPLASIA WITH URINARY FREQUENCY: ICD-10-CM

## 2022-06-01 ENCOUNTER — HOSPITAL ENCOUNTER (OUTPATIENT)
Facility: MEDICAL CENTER | Age: 82
End: 2022-06-01
Attending: INTERNAL MEDICINE
Payer: MEDICARE

## 2022-06-01 ENCOUNTER — OFFICE VISIT (OUTPATIENT)
Dept: INTERNAL MEDICINE | Facility: IMAGING CENTER | Age: 82
End: 2022-06-01
Payer: MEDICARE

## 2022-06-01 ENCOUNTER — NON-PROVIDER VISIT (OUTPATIENT)
Dept: INTERNAL MEDICINE | Facility: IMAGING CENTER | Age: 82
End: 2022-06-01
Payer: MEDICARE

## 2022-06-01 ENCOUNTER — TELEPHONE (OUTPATIENT)
Dept: HEALTH INFORMATION MANAGEMENT | Facility: OTHER | Age: 82
End: 2022-06-01

## 2022-06-01 VITALS
OXYGEN SATURATION: 96 % | HEART RATE: 68 BPM | BODY MASS INDEX: 29.02 KG/M2 | RESPIRATION RATE: 16 BRPM | SYSTOLIC BLOOD PRESSURE: 130 MMHG | DIASTOLIC BLOOD PRESSURE: 78 MMHG | WEIGHT: 214 LBS | TEMPERATURE: 97.4 F

## 2022-06-01 DIAGNOSIS — E79.0 HYPERURICEMIA: ICD-10-CM

## 2022-06-01 DIAGNOSIS — N40.1 BENIGN PROSTATIC HYPERPLASIA WITH URINARY FREQUENCY: ICD-10-CM

## 2022-06-01 DIAGNOSIS — R10.32 LEFT LOWER QUADRANT ABDOMINAL PAIN: ICD-10-CM

## 2022-06-01 DIAGNOSIS — I10 PRIMARY HYPERTENSION: ICD-10-CM

## 2022-06-01 DIAGNOSIS — I10 ESSENTIAL HYPERTENSION: ICD-10-CM

## 2022-06-01 DIAGNOSIS — G47.33 OBSTRUCTIVE SLEEP APNEA SYNDROME: ICD-10-CM

## 2022-06-01 DIAGNOSIS — K57.92 DIVERTICULITIS: ICD-10-CM

## 2022-06-01 DIAGNOSIS — E78.5 HYPERLIPIDEMIA LDL GOAL <70: ICD-10-CM

## 2022-06-01 DIAGNOSIS — R35.0 BENIGN PROSTATIC HYPERPLASIA WITH URINARY FREQUENCY: ICD-10-CM

## 2022-06-01 PROCEDURE — 84550 ASSAY OF BLOOD/URIC ACID: CPT

## 2022-06-01 PROCEDURE — 99213 OFFICE O/P EST LOW 20 MIN: CPT | Performed by: INTERNAL MEDICINE

## 2022-06-01 PROCEDURE — 81003 URINALYSIS AUTO W/O SCOPE: CPT

## 2022-06-01 PROCEDURE — 82570 ASSAY OF URINE CREATININE: CPT

## 2022-06-01 PROCEDURE — 80061 LIPID PANEL: CPT

## 2022-06-01 PROCEDURE — 80053 COMPREHEN METABOLIC PANEL: CPT

## 2022-06-01 PROCEDURE — 84153 ASSAY OF PSA TOTAL: CPT

## 2022-06-01 PROCEDURE — 85025 COMPLETE CBC W/AUTO DIFF WBC: CPT

## 2022-06-01 PROCEDURE — 82043 UR ALBUMIN QUANTITATIVE: CPT

## 2022-06-01 RX ORDER — SULFAMETHOXAZOLE AND TRIMETHOPRIM 800; 160 MG/1; MG/1
1 TABLET ORAL 2 TIMES DAILY
Qty: 14 TABLET | Refills: 0 | Status: SHIPPED | OUTPATIENT
Start: 2022-06-01 | End: 2022-06-06 | Stop reason: SDUPTHER

## 2022-06-01 RX ORDER — METRONIDAZOLE 500 MG/1
500 TABLET ORAL 3 TIMES DAILY
Qty: 21 TABLET | Refills: 0 | Status: SHIPPED | OUTPATIENT
Start: 2022-06-01 | End: 2022-06-06 | Stop reason: SDUPTHER

## 2022-06-01 ASSESSMENT — FIBROSIS 4 INDEX: FIB4 SCORE: 2.1

## 2022-06-01 NOTE — PROGRESS NOTES
Chief Complaint   Patient presents with   • Abdominal Pain       HISTORY OF THE PRESENT ILLNESS: Patient is a 82 y.o. male.     Patient comes in with complaint of left-sided abdominal pain.  He was seen approximately 1 week ago and diagnosed with diverticulitis.  He has a history of diverticulitis.  His symptoms have improved but not resolved.  He is tolerating a soft/liquid diet.  His fever has resolved.    Allergies: Morphine    Current Outpatient Medications Ordered in Epic   Medication Sig Dispense Refill   • sulfamethoxazole-trimethoprim (BACTRIM DS) 800-160 MG tablet Take 1 Tablet by mouth 2 times a day. 14 Tablet 0   • metroNIDAZOLE (FLAGYL) 500 MG Tab Take 1 Tablet by mouth 3 times a day for 7 days. 21 Tablet 0   • meloxicam (MOBIC) 15 MG tablet Take 15 mg by mouth as needed.     • apixaban (ELIQUIS) 5mg Tab Take 1 Tablet by mouth 2 times a day. 180 Tablet 2   • losartan (COZAAR) 50 MG Tab TAKE 1 TABLET BY MOUTH EVERY  Tablet 2   • atorvastatin (LIPITOR) 40 MG Tab TAKE 1 TABLET BY MOUTH EVERY  Tablet 2   • cyclobenzaprine (FLEXERIL) 10 mg Tab Take 1 Tablet by mouth 3 times a day as needed for Moderate Pain (back spasms). 30 Tablet 0   • fluorouracil (EFUDEX) 5 % cream Apply  topically as needed.     • calcipotriene (DOVONEX) 0.005 % Cream Apply  topically as needed.     • allopurinol (ZYLOPRIM) 100 MG Tab TAKE 1 TABLET BY MOUTH TWICE A  tablet 3   • acetaminophen (TYLENOL) 325 MG Tab Take 650 mg by mouth every four hours as needed for Moderate Pain.       No current Epic-ordered facility-administered medications on file.       Past medical history, social history and family history were reviewed from chart today    Review of systems: Per HPI.    Denies headache, chest pain, fever, chills, diarrhea, constipation, abdominal pain, palpitations, depression   All others negative.     Exam: /78 (BP Location: Left arm, Patient Position: Sitting, BP Cuff Size: Adult)   Pulse 68   Temp  36.3 °C (97.4 °F) (Temporal)   Resp 16   Wt 97.1 kg (214 lb)   SpO2 96%   General: Well-appearing. Well-developed. No signs of distress.  HEENT: Grossly normal.   Pulmonary: Clear  Cardiovascular: Regular.  Abdomen: Moderate tenderness to palpation in the left mid and lower quadrant.  Bowel sounds are present.  No rebound.  Neurologic: Cranial nerves II through XII are grossly normal, alert and oriented x3      Diagnosis:  1. Diverticulitis  metroNIDAZOLE (FLAGYL) 500 MG Tab    CT-ABDOMEN-PELVIS W/O   2. Left lower quadrant abdominal pain  CT-ABDOMEN-PELVIS W/O       Underwent diverticulitis.  Resume antibiotics.  Recommended Bactrim and Flagyl.  He was previously on Cipro.  He had an episode of dizziness.  Cardiology has recommended monitor.  If it was antibiotic induced this would most likely get the culprit.  CT for evaluation of extensiveness of symptoms.  He is tender from the left mid through the lower quadrant.  Refer to surgery if indicated  My total time spent caring for the patient on the day of the encounter was  greater than 20 minutes.   This includes obtaining history, reviewing chart, physical exam, patient education, reviewing outside records, placing orders, interpreting tests and coordinating care.

## 2022-06-02 LAB
ALBUMIN SERPL BCP-MCNC: 3.5 G/DL (ref 3.2–4.9)
ALBUMIN/GLOB SERPL: 1.5 G/DL
ALP SERPL-CCNC: 90 U/L (ref 30–99)
ALT SERPL-CCNC: 20 U/L (ref 2–50)
ANION GAP SERPL CALC-SCNC: 7 MMOL/L (ref 7–16)
APPEARANCE UR: CLEAR
AST SERPL-CCNC: 27 U/L (ref 12–45)
BASOPHILS # BLD AUTO: 0.7 % (ref 0–1.8)
BASOPHILS # BLD: 0.05 K/UL (ref 0–0.12)
BILIRUB SERPL-MCNC: 0.5 MG/DL (ref 0.1–1.5)
BILIRUB UR QL STRIP.AUTO: NEGATIVE
BUN SERPL-MCNC: 16 MG/DL (ref 8–22)
CALCIUM SERPL-MCNC: 8.8 MG/DL (ref 8.5–10.5)
CHLORIDE SERPL-SCNC: 103 MMOL/L (ref 96–112)
CHOLEST SERPL-MCNC: 93 MG/DL (ref 100–199)
CO2 SERPL-SCNC: 28 MMOL/L (ref 20–33)
COLOR UR: YELLOW
CREAT SERPL-MCNC: 1.02 MG/DL (ref 0.5–1.4)
CREAT UR-MCNC: 158.11 MG/DL
EOSINOPHIL # BLD AUTO: 0.1 K/UL (ref 0–0.51)
EOSINOPHIL NFR BLD: 1.4 % (ref 0–6.9)
ERYTHROCYTE [DISTWIDTH] IN BLOOD BY AUTOMATED COUNT: 53.1 FL (ref 35.9–50)
GFR SERPLBLD CREATININE-BSD FMLA CKD-EPI: 73 ML/MIN/1.73 M 2
GLOBULIN SER CALC-MCNC: 2.4 G/DL (ref 1.9–3.5)
GLUCOSE SERPL-MCNC: 92 MG/DL (ref 65–99)
GLUCOSE UR STRIP.AUTO-MCNC: NEGATIVE MG/DL
HCT VFR BLD AUTO: 51.8 % (ref 42–52)
HDLC SERPL-MCNC: 37 MG/DL
HGB BLD-MCNC: 17 G/DL (ref 14–18)
IMM GRANULOCYTES # BLD AUTO: 0.03 K/UL (ref 0–0.11)
IMM GRANULOCYTES NFR BLD AUTO: 0.4 % (ref 0–0.9)
KETONES UR STRIP.AUTO-MCNC: NEGATIVE MG/DL
LDLC SERPL CALC-MCNC: 44 MG/DL
LEUKOCYTE ESTERASE UR QL STRIP.AUTO: NEGATIVE
LYMPHOCYTES # BLD AUTO: 1.66 K/UL (ref 1–4.8)
LYMPHOCYTES NFR BLD: 24 % (ref 22–41)
MCH RBC QN AUTO: 32.1 PG (ref 27–33)
MCHC RBC AUTO-ENTMCNC: 32.8 G/DL (ref 33.7–35.3)
MCV RBC AUTO: 97.7 FL (ref 81.4–97.8)
MICRO URNS: NORMAL
MICROALBUMIN UR-MCNC: 1.5 MG/DL
MICROALBUMIN/CREAT UR: 9 MG/G (ref 0–30)
MONOCYTES # BLD AUTO: 0.9 K/UL (ref 0–0.85)
MONOCYTES NFR BLD AUTO: 13 % (ref 0–13.4)
NEUTROPHILS # BLD AUTO: 4.18 K/UL (ref 1.82–7.42)
NEUTROPHILS NFR BLD: 60.5 % (ref 44–72)
NITRITE UR QL STRIP.AUTO: NEGATIVE
NRBC # BLD AUTO: 0 K/UL
NRBC BLD-RTO: 0 /100 WBC
PH UR STRIP.AUTO: 6.5 [PH] (ref 5–8)
PLATELET # BLD AUTO: 188 K/UL (ref 164–446)
PMV BLD AUTO: 11.3 FL (ref 9–12.9)
POTASSIUM SERPL-SCNC: 4.5 MMOL/L (ref 3.6–5.5)
PROT SERPL-MCNC: 5.9 G/DL (ref 6–8.2)
PROT UR QL STRIP: NEGATIVE MG/DL
PSA SERPL-MCNC: 2.27 NG/ML (ref 0–4)
RBC # BLD AUTO: 5.3 M/UL (ref 4.7–6.1)
RBC UR QL AUTO: NEGATIVE
SODIUM SERPL-SCNC: 138 MMOL/L (ref 135–145)
SP GR UR STRIP.AUTO: 1.02
TRIGL SERPL-MCNC: 58 MG/DL (ref 0–149)
URATE SERPL-MCNC: 4.7 MG/DL (ref 2.5–8.3)
UROBILINOGEN UR STRIP.AUTO-MCNC: 0.2 MG/DL
WBC # BLD AUTO: 6.9 K/UL (ref 4.8–10.8)

## 2022-06-04 ENCOUNTER — HOSPITAL ENCOUNTER (OUTPATIENT)
Dept: RADIOLOGY | Facility: MEDICAL CENTER | Age: 82
End: 2022-06-04
Attending: INTERNAL MEDICINE
Payer: MEDICARE

## 2022-06-04 DIAGNOSIS — R10.32 LEFT LOWER QUADRANT ABDOMINAL PAIN: ICD-10-CM

## 2022-06-04 DIAGNOSIS — K57.92 DIVERTICULITIS: ICD-10-CM

## 2022-06-04 PROCEDURE — 74176 CT ABD & PELVIS W/O CONTRAST: CPT | Mod: ME

## 2022-06-06 DIAGNOSIS — K57.92 DIVERTICULITIS: ICD-10-CM

## 2022-06-06 RX ORDER — SULFAMETHOXAZOLE AND TRIMETHOPRIM 800; 160 MG/1; MG/1
1 TABLET ORAL 2 TIMES DAILY
Qty: 14 TABLET | Refills: 0 | Status: SHIPPED | OUTPATIENT
Start: 2022-06-06 | End: 2022-10-18

## 2022-06-06 RX ORDER — METRONIDAZOLE 500 MG/1
500 TABLET ORAL 3 TIMES DAILY
Qty: 21 TABLET | Refills: 0 | Status: SHIPPED | OUTPATIENT
Start: 2022-06-06 | End: 2022-06-13

## 2022-06-07 ENCOUNTER — APPOINTMENT (RX ONLY)
Dept: URBAN - METROPOLITAN AREA CLINIC 35 | Facility: CLINIC | Age: 82
Setting detail: DERMATOLOGY
End: 2022-06-07

## 2022-06-07 DIAGNOSIS — L82.1 OTHER SEBORRHEIC KERATOSIS: ICD-10-CM

## 2022-06-07 DIAGNOSIS — L57.0 ACTINIC KERATOSIS: ICD-10-CM

## 2022-06-07 DIAGNOSIS — Z71.89 OTHER SPECIFIED COUNSELING: ICD-10-CM

## 2022-06-07 DIAGNOSIS — D22 MELANOCYTIC NEVI: ICD-10-CM

## 2022-06-07 DIAGNOSIS — L81.4 OTHER MELANIN HYPERPIGMENTATION: ICD-10-CM

## 2022-06-07 DIAGNOSIS — Z85.828 PERSONAL HISTORY OF OTHER MALIGNANT NEOPLASM OF SKIN: ICD-10-CM

## 2022-06-07 PROBLEM — D22.5 MELANOCYTIC NEVI OF TRUNK: Status: ACTIVE | Noted: 2022-06-07

## 2022-06-07 PROCEDURE — 17000 DESTRUCT PREMALG LESION: CPT

## 2022-06-07 PROCEDURE — 17003 DESTRUCT PREMALG LES 2-14: CPT

## 2022-06-07 PROCEDURE — ? LIQUID NITROGEN

## 2022-06-07 PROCEDURE — ? COUNSELING

## 2022-06-07 PROCEDURE — 99213 OFFICE O/P EST LOW 20 MIN: CPT | Mod: 25

## 2022-06-07 ASSESSMENT — LOCATION DETAILED DESCRIPTION DERM
LOCATION DETAILED: LEFT ANTIHELIX
LOCATION DETAILED: LEFT CENTRAL MALAR CHEEK
LOCATION DETAILED: EPIGASTRIC SKIN
LOCATION DETAILED: LEFT SUPERIOR OCCIPITAL SCALP
LOCATION DETAILED: SUPERIOR MID FOREHEAD
LOCATION DETAILED: INFERIOR THORACIC SPINE
LOCATION DETAILED: LEFT POSTERIOR EAR
LOCATION DETAILED: LEFT SUPERIOR CRUS OF ANTIHELIX
LOCATION DETAILED: RIGHT SUPERIOR LATERAL FOREHEAD
LOCATION DETAILED: RIGHT SUPERIOR PARIETAL SCALP
LOCATION DETAILED: RIGHT SUPERIOR LATERAL UPPER BACK

## 2022-06-07 ASSESSMENT — LOCATION SIMPLE DESCRIPTION DERM
LOCATION SIMPLE: SUPERIOR FOREHEAD
LOCATION SIMPLE: UPPER BACK
LOCATION SIMPLE: LEFT CHEEK
LOCATION SIMPLE: LEFT OCCIPITAL SCALP
LOCATION SIMPLE: ABDOMEN
LOCATION SIMPLE: LEFT EAR
LOCATION SIMPLE: RIGHT FOREHEAD
LOCATION SIMPLE: RIGHT UPPER BACK
LOCATION SIMPLE: SCALP

## 2022-06-07 ASSESSMENT — LOCATION ZONE DERM
LOCATION ZONE: SCALP
LOCATION ZONE: FACE
LOCATION ZONE: EAR
LOCATION ZONE: TRUNK

## 2022-06-07 NOTE — PROCEDURE: LIQUID NITROGEN
Render Post-Care Instructions In Note?: no
Number Of Freeze-Thaw Cycles: 1 freeze-thaw cycle
Detail Level: Detailed
Consent: The patient's consent was obtained including but not limited to risks of crusting, scabbing, blistering, scarring, darker or lighter pigmentary change, recurrence, incomplete removal and infection.
Show Applicator Variable?: Yes
Post-Care Instructions: I reviewed with the patient in detail post-care instructions. Patient is to wear sunprotection, and avoid picking at any of the treated lesions. Pt may apply Vaseline to crusted or scabbing areas.
Duration Of Freeze Thaw-Cycle (Seconds): 10

## 2022-07-20 RX ORDER — ALLOPURINOL 100 MG/1
TABLET ORAL
Qty: 180 TABLET | Refills: 3 | Status: SHIPPED | OUTPATIENT
Start: 2022-07-20 | End: 2023-07-28

## 2022-08-19 RX ORDER — TRIAMTERENE AND HYDROCHLOROTHIAZIDE 37.5; 25 MG/1; MG/1
1 CAPSULE ORAL EVERY MORNING
Qty: 90 CAPSULE | Refills: 3 | Status: SHIPPED | OUTPATIENT
Start: 2022-08-19 | End: 2023-09-18

## 2022-08-26 DIAGNOSIS — I48.91 ATRIAL FIBRILLATION, UNSPECIFIED TYPE (HCC): ICD-10-CM

## 2022-08-28 RX ORDER — APIXABAN 5 MG/1
TABLET, FILM COATED ORAL
Qty: 180 TABLET | Refills: 2 | Status: SHIPPED | OUTPATIENT
Start: 2022-08-28 | End: 2023-11-15

## 2022-10-18 ENCOUNTER — OFFICE VISIT (OUTPATIENT)
Dept: INTERNAL MEDICINE | Facility: IMAGING CENTER | Age: 82
End: 2022-10-18
Payer: MEDICARE

## 2022-10-18 ENCOUNTER — APPOINTMENT (OUTPATIENT)
Dept: INTERNAL MEDICINE | Facility: IMAGING CENTER | Age: 82
End: 2022-10-18
Payer: MEDICARE

## 2022-10-18 VITALS
SYSTOLIC BLOOD PRESSURE: 124 MMHG | WEIGHT: 211 LBS | HEART RATE: 78 BPM | OXYGEN SATURATION: 96 % | RESPIRATION RATE: 12 BRPM | BODY MASS INDEX: 28.62 KG/M2 | TEMPERATURE: 98.5 F | DIASTOLIC BLOOD PRESSURE: 80 MMHG

## 2022-10-18 DIAGNOSIS — N50.811 RIGHT TESTICULAR PAIN: ICD-10-CM

## 2022-10-18 DIAGNOSIS — K22.5 ZENKER DIVERTICULUM: ICD-10-CM

## 2022-10-18 DIAGNOSIS — Z23 NEED FOR INFLUENZA VACCINATION: ICD-10-CM

## 2022-10-18 PROCEDURE — 90662 IIV NO PRSV INCREASED AG IM: CPT | Performed by: INTERNAL MEDICINE

## 2022-10-18 PROCEDURE — 99213 OFFICE O/P EST LOW 20 MIN: CPT | Mod: 25 | Performed by: INTERNAL MEDICINE

## 2022-10-18 PROCEDURE — G0008 ADMIN INFLUENZA VIRUS VAC: HCPCS | Performed by: INTERNAL MEDICINE

## 2022-10-18 RX ORDER — MELOXICAM 15 MG/1
15 TABLET ORAL
Qty: 30 TABLET | Refills: 1 | Status: SHIPPED | OUTPATIENT
Start: 2022-10-18 | End: 2023-12-21

## 2022-10-18 RX ORDER — SULFAMETHOXAZOLE AND TRIMETHOPRIM 800; 160 MG/1; MG/1
1 TABLET ORAL 2 TIMES DAILY
Qty: 20 TABLET | Refills: 0 | Status: SHIPPED | OUTPATIENT
Start: 2022-10-18 | End: 2022-12-19

## 2022-10-18 ASSESSMENT — FIBROSIS 4 INDEX: FIB4 SCORE: 2.67

## 2022-10-18 NOTE — PROGRESS NOTES
Chief Complaint   Patient presents with    Testicle Pain       HISTORY OF THE PRESENT ILLNESS: Patient is a 82 y.o. male.     Patient comes in with right testicular pain.  Symptoms for approximately 1 week.  He gets multiple episodes of discomfort.  The pain lasts for 1-2 seconds.  He describes it as sharp and moderate to severe.  No dysuria.  He reports no sexual activity in multiple years.  No trauma.  He feels the right testicle may be slightly larger than the left.    Patient also reports that he is becoming more symptomatic from his sinker diverticulum.  He is able to eat less.  He also notices that when he coughs or sneezes that food typically comes up through his nose.      Allergies: Morphine    Current Outpatient Medications Ordered in Epic   Medication Sig Dispense Refill    sulfamethoxazole-trimethoprim (BACTRIM DS) 800-160 MG tablet Take 1 Tablet by mouth 2 times a day. 20 Tablet 0    meloxicam (MOBIC) 15 MG tablet Take 1 Tablet by mouth 1 time a day as needed for Inflammation, Mild Pain or Moderate Pain. 30 Tablet 1    ELIQUIS 5 MG Tab TAKE 1 TABLET BY MOUTH TWICE A  Tablet 2    triamterene/hctz (MAXZIDE-25/DYAZIDE) 37.5-25 MG Cap Take 1 Capsule by mouth every morning. Take 1 capsule by mouth every morning 90 Capsule 3    allopurinol (ZYLOPRIM) 100 MG Tab TAKE 1 TABLET BY MOUTH TWICE A  Tablet 3    losartan (COZAAR) 50 MG Tab TAKE 1 TABLET BY MOUTH EVERY  Tablet 2    atorvastatin (LIPITOR) 40 MG Tab TAKE 1 TABLET BY MOUTH EVERY  Tablet 2    cyclobenzaprine (FLEXERIL) 10 mg Tab Take 1 Tablet by mouth 3 times a day as needed for Moderate Pain (back spasms). 30 Tablet 0    fluorouracil (EFUDEX) 5 % cream Apply  topically as needed.      calcipotriene (DOVONEX) 0.005 % Cream Apply  topically as needed.      acetaminophen (TYLENOL) 325 MG Tab Take 650 mg by mouth every four hours as needed for Moderate Pain.       No current Epic-ordered facility-administered medications on file.        Past medical history, social history and family history were reviewed from chart today    Review of systems: Per HPI.    All others negative.     Exam: /80 (BP Location: Left arm, Patient Position: Sitting, BP Cuff Size: Adult)   Pulse 78   Temp 36.9 °C (98.5 °F) (Temporal)   Resp 12   Wt 95.7 kg (211 lb)   SpO2 96%   General: Well-nourished, well-developed. No change in appearance. No distress.  HEENT: Normocephalic.  Pulmonary: Clear.. Normal effort.  Cardiovascular: Regular   Abdomen: Normal appearing. Soft, nontender, nondistended.   Neurologic: Cranial nerves II through XII are grossly intact, alert and oriented x3  Genitourinary: Penis and scrotum are normal in appearance.  The right testicle is slightly larger than the left but soft and without nodule.  It is more tender to palpate than the left.  Epididymis is slightly tender.  Negative evaluation for hernia      Diagnosis:  1. Right testicular pain  sulfamethoxazole-trimethoprim (BACTRIM DS) 800-160 MG tablet    meloxicam (MOBIC) 15 MG tablet      2. Need for influenza vaccination  INFLUENZA VACCINE, HIGH DOSE (65+ ONLY)        Testicular pain of unclear etiology.  Possibly epididymitis?    Agreed on trial of anti-inflammatories and Bactrim.  Minimize anti-inflammatory use due to Eliquis    Patient is having more symptoms from his sinker diverticulum.  We will refer him back to ENT for evaluation    My total time spent caring for the patient on the day of the encounter was  greater than 20 minutes.   This includes obtaining history, reviewing chart, physical exam, patient education, reviewing outside records, placing orders, interpreting tests and coordinating care.

## 2022-11-21 ENCOUNTER — HOSPITAL ENCOUNTER (OUTPATIENT)
Dept: RADIOLOGY | Facility: MEDICAL CENTER | Age: 82
End: 2022-11-21
Attending: INTERNAL MEDICINE
Payer: MEDICARE

## 2022-11-21 ENCOUNTER — OFFICE VISIT (OUTPATIENT)
Dept: INTERNAL MEDICINE | Facility: IMAGING CENTER | Age: 82
End: 2022-11-21
Payer: MEDICARE

## 2022-11-21 VITALS
TEMPERATURE: 99 F | OXYGEN SATURATION: 99 % | RESPIRATION RATE: 16 BRPM | SYSTOLIC BLOOD PRESSURE: 142 MMHG | DIASTOLIC BLOOD PRESSURE: 82 MMHG | HEART RATE: 56 BPM | WEIGHT: 217 LBS | BODY MASS INDEX: 29.43 KG/M2

## 2022-11-21 DIAGNOSIS — M79.642 LEFT HAND PAIN: ICD-10-CM

## 2022-11-21 DIAGNOSIS — M13.842 ALLERGIC ARTHRITIS OF LEFT HAND: ICD-10-CM

## 2022-11-21 PROCEDURE — 99214 OFFICE O/P EST MOD 30 MIN: CPT | Performed by: INTERNAL MEDICINE

## 2022-11-21 PROCEDURE — 73130 X-RAY EXAM OF HAND: CPT | Mod: LT

## 2022-11-21 ASSESSMENT — FIBROSIS 4 INDEX: FIB4 SCORE: 2.67

## 2022-11-21 NOTE — PROGRESS NOTES
Chief Complaint   Patient presents with    Hand Pain     Pain in left hand following approximately 10 days ago       HISTORY OF THE PRESENT ILLNESS: Patient is a 82 y.o. male.     Patient comes in with complaint of swelling in his left hand.  Symptoms occurred after a mechanical fall approximately 8 days ago.  He is on Eliquis for chronic anticoagulation.  No significant bruising.  No swelling in the arm.  He denies pain.    Allergies: Morphine    Current Outpatient Medications Ordered in Epic   Medication Sig Dispense Refill    sulfamethoxazole-trimethoprim (BACTRIM DS) 800-160 MG tablet Take 1 Tablet by mouth 2 times a day. 20 Tablet 0    meloxicam (MOBIC) 15 MG tablet Take 1 Tablet by mouth 1 time a day as needed for Inflammation, Mild Pain or Moderate Pain. 30 Tablet 1    ELIQUIS 5 MG Tab TAKE 1 TABLET BY MOUTH TWICE A  Tablet 2    triamterene/hctz (MAXZIDE-25/DYAZIDE) 37.5-25 MG Cap Take 1 Capsule by mouth every morning. Take 1 capsule by mouth every morning 90 Capsule 3    allopurinol (ZYLOPRIM) 100 MG Tab TAKE 1 TABLET BY MOUTH TWICE A  Tablet 3    losartan (COZAAR) 50 MG Tab TAKE 1 TABLET BY MOUTH EVERY  Tablet 2    atorvastatin (LIPITOR) 40 MG Tab TAKE 1 TABLET BY MOUTH EVERY  Tablet 2    cyclobenzaprine (FLEXERIL) 10 mg Tab Take 1 Tablet by mouth 3 times a day as needed for Moderate Pain (back spasms). 30 Tablet 0    fluorouracil (EFUDEX) 5 % cream Apply  topically as needed.      calcipotriene (DOVONEX) 0.005 % Cream Apply  topically as needed.      acetaminophen (TYLENOL) 325 MG Tab Take 650 mg by mouth every four hours as needed for Moderate Pain.       No current Epic-ordered facility-administered medications on file.       Past medical history, social history and family history were reviewed from chart today    Review of systems: Per HPI.    All others negative.     Exam: BP (!) 142/82 (BP Location: Left arm, Patient Position: Sitting, BP Cuff Size: Adult)   Pulse (!) 56    Temp 37.2 °C (99 °F) (Temporal)   Resp 16   Wt 98.4 kg (217 lb)   SpO2 99%   General: Well-nourished, well-developed. No change in appearance. No distress.  HEENT: Normocephalic.  Pulmonary: Clear.. Normal effort.  Cardiovascular: Regular.  Radial pulses normal.  Capillary refill is normal.  Abdomen: Normal appearing. Soft, nontender, nondistended.   Neurologic: Cranial nerves II through XII are grossly intact, alert and oriented x3  Extremities: Left hand tenderness on the dorsal aspect of the hand proximal metatarsals.  Full range of motion of the wrist.      Diagnosis:  1. Left hand pain  DX-HAND 3+ LEFT        Patient with left hand pain after mechanical fall.  He was sent for x-ray to rule out fracture.  X-ray showed:  IMPRESSION:     1.  No evidence of fracture or dislocation.     2.  There is increased joint surface destruction at the PIP joint of the second digit. This could be due to worsening of osteoarthritis or inflammatory arthropathy. Septic arthritis with osteomyelitis is also possible.     3.  Some progression of osteoarthritis in other joints since the prior radiographs.    We agreed upon monitoring at this time.  If symptoms do not improve over the next 7 days or if they worsen then I would recommend MRI to rule out fracture.    My total time spent caring for the patient on the day of the encounter was  greater than 20 minutes.   This includes obtaining history, reviewing chart, physical exam, patient education, reviewing outside records, placing orders, interpreting tests and coordinating care.

## 2022-12-12 ENCOUNTER — NON-PROVIDER VISIT (OUTPATIENT)
Dept: INTERNAL MEDICINE | Facility: IMAGING CENTER | Age: 82
End: 2022-12-12
Payer: MEDICARE

## 2022-12-12 ENCOUNTER — HOSPITAL ENCOUNTER (OUTPATIENT)
Facility: MEDICAL CENTER | Age: 82
End: 2022-12-12
Attending: INTERNAL MEDICINE
Payer: MEDICARE

## 2022-12-12 DIAGNOSIS — G47.33 OBSTRUCTIVE SLEEP APNEA SYNDROME: ICD-10-CM

## 2022-12-12 DIAGNOSIS — M25.532 LEFT WRIST PAIN: ICD-10-CM

## 2022-12-12 DIAGNOSIS — N20.0 URIC ACID NEPHROLITHIASIS: ICD-10-CM

## 2022-12-12 DIAGNOSIS — I10 PRIMARY HYPERTENSION: ICD-10-CM

## 2022-12-12 DIAGNOSIS — E79.0 HYPERURICEMIA: ICD-10-CM

## 2022-12-12 DIAGNOSIS — R97.20 INCREASED PROSTATE SPECIFIC ANTIGEN (PSA) VELOCITY: ICD-10-CM

## 2022-12-12 DIAGNOSIS — E78.5 HYPERLIPIDEMIA LDL GOAL <70: ICD-10-CM

## 2022-12-12 LAB
ALBUMIN SERPL BCP-MCNC: 3.9 G/DL (ref 3.2–4.9)
ALBUMIN/GLOB SERPL: 1.6 G/DL
ALP SERPL-CCNC: 120 U/L (ref 30–99)
ALT SERPL-CCNC: 20 U/L (ref 2–50)
ANION GAP SERPL CALC-SCNC: 6 MMOL/L (ref 7–16)
APPEARANCE UR: CLEAR
AST SERPL-CCNC: 22 U/L (ref 12–45)
BACTERIA #/AREA URNS HPF: NEGATIVE /HPF
BASOPHILS # BLD AUTO: 0.7 % (ref 0–1.8)
BASOPHILS # BLD: 0.05 K/UL (ref 0–0.12)
BILIRUB SERPL-MCNC: 0.9 MG/DL (ref 0.1–1.5)
BILIRUB UR QL STRIP.AUTO: NEGATIVE
BUN SERPL-MCNC: 21 MG/DL (ref 8–22)
CALCIUM SERPL-MCNC: 9.5 MG/DL (ref 8.5–10.5)
CHLORIDE SERPL-SCNC: 102 MMOL/L (ref 96–112)
CHOLEST SERPL-MCNC: 116 MG/DL (ref 100–199)
CO2 SERPL-SCNC: 32 MMOL/L (ref 20–33)
COLOR UR: YELLOW
CREAT SERPL-MCNC: 1.11 MG/DL (ref 0.5–1.4)
CREAT UR-MCNC: 125.76 MG/DL
EOSINOPHIL # BLD AUTO: 0.17 K/UL (ref 0–0.51)
EOSINOPHIL NFR BLD: 2.2 % (ref 0–6.9)
EPI CELLS #/AREA URNS HPF: NEGATIVE /HPF
ERYTHROCYTE [DISTWIDTH] IN BLOOD BY AUTOMATED COUNT: 49.9 FL (ref 35.9–50)
GFR SERPLBLD CREATININE-BSD FMLA CKD-EPI: 66 ML/MIN/1.73 M 2
GLOBULIN SER CALC-MCNC: 2.4 G/DL (ref 1.9–3.5)
GLUCOSE SERPL-MCNC: 97 MG/DL (ref 65–99)
GLUCOSE UR STRIP.AUTO-MCNC: NEGATIVE MG/DL
HCT VFR BLD AUTO: 54.9 % (ref 42–52)
HDLC SERPL-MCNC: 39 MG/DL
HGB BLD-MCNC: 18.1 G/DL (ref 14–18)
HYALINE CASTS #/AREA URNS LPF: ABNORMAL /LPF
IMM GRANULOCYTES # BLD AUTO: 0.03 K/UL (ref 0–0.11)
IMM GRANULOCYTES NFR BLD AUTO: 0.4 % (ref 0–0.9)
KETONES UR STRIP.AUTO-MCNC: NEGATIVE MG/DL
LDLC SERPL CALC-MCNC: 59 MG/DL
LEUKOCYTE ESTERASE UR QL STRIP.AUTO: NEGATIVE
LYMPHOCYTES # BLD AUTO: 2.2 K/UL (ref 1–4.8)
LYMPHOCYTES NFR BLD: 28.9 % (ref 22–41)
MCH RBC QN AUTO: 32.4 PG (ref 27–33)
MCHC RBC AUTO-ENTMCNC: 33 G/DL (ref 33.7–35.3)
MCV RBC AUTO: 98.2 FL (ref 81.4–97.8)
MICRO URNS: ABNORMAL
MICROALBUMIN UR-MCNC: <1.2 MG/DL
MICROALBUMIN/CREAT UR: NORMAL MG/G (ref 0–30)
MONOCYTES # BLD AUTO: 0.9 K/UL (ref 0–0.85)
MONOCYTES NFR BLD AUTO: 11.8 % (ref 0–13.4)
NEUTROPHILS # BLD AUTO: 4.27 K/UL (ref 1.82–7.42)
NEUTROPHILS NFR BLD: 56 % (ref 44–72)
NITRITE UR QL STRIP.AUTO: NEGATIVE
NRBC # BLD AUTO: 0 K/UL
NRBC BLD-RTO: 0 /100 WBC
PH UR STRIP.AUTO: 6 [PH] (ref 5–8)
PLATELET # BLD AUTO: 165 K/UL (ref 164–446)
PMV BLD AUTO: 11.8 FL (ref 9–12.9)
POTASSIUM SERPL-SCNC: 4.7 MMOL/L (ref 3.6–5.5)
PROT SERPL-MCNC: 6.3 G/DL (ref 6–8.2)
PROT UR QL STRIP: NEGATIVE MG/DL
PSA SERPL-MCNC: 1.83 NG/ML (ref 0–4)
RBC # BLD AUTO: 5.59 M/UL (ref 4.7–6.1)
RBC # URNS HPF: ABNORMAL /HPF
RBC UR QL AUTO: ABNORMAL
SODIUM SERPL-SCNC: 140 MMOL/L (ref 135–145)
SP GR UR STRIP.AUTO: 1.02
TRIGL SERPL-MCNC: 89 MG/DL (ref 0–149)
URATE SERPL-MCNC: 4.4 MG/DL (ref 2.5–8.3)
UROBILINOGEN UR STRIP.AUTO-MCNC: 0.2 MG/DL
WBC # BLD AUTO: 7.6 K/UL (ref 4.8–10.8)
WBC #/AREA URNS HPF: ABNORMAL /HPF

## 2022-12-12 PROCEDURE — 80053 COMPREHEN METABOLIC PANEL: CPT

## 2022-12-12 PROCEDURE — 80061 LIPID PANEL: CPT

## 2022-12-12 PROCEDURE — 85025 COMPLETE CBC W/AUTO DIFF WBC: CPT

## 2022-12-12 PROCEDURE — 81001 URINALYSIS AUTO W/SCOPE: CPT

## 2022-12-12 PROCEDURE — 82043 UR ALBUMIN QUANTITATIVE: CPT

## 2022-12-12 PROCEDURE — 84153 ASSAY OF PSA TOTAL: CPT

## 2022-12-12 PROCEDURE — 82570 ASSAY OF URINE CREATININE: CPT

## 2022-12-12 PROCEDURE — 84550 ASSAY OF BLOOD/URIC ACID: CPT

## 2022-12-13 ENCOUNTER — APPOINTMENT (RX ONLY)
Dept: URBAN - METROPOLITAN AREA CLINIC 35 | Facility: CLINIC | Age: 82
Setting detail: DERMATOLOGY
End: 2022-12-13

## 2022-12-13 DIAGNOSIS — Z71.89 OTHER SPECIFIED COUNSELING: ICD-10-CM

## 2022-12-13 DIAGNOSIS — L20.89 OTHER ATOPIC DERMATITIS: ICD-10-CM

## 2022-12-13 DIAGNOSIS — L81.4 OTHER MELANIN HYPERPIGMENTATION: ICD-10-CM

## 2022-12-13 DIAGNOSIS — L57.0 ACTINIC KERATOSIS: ICD-10-CM

## 2022-12-13 DIAGNOSIS — D22 MELANOCYTIC NEVI: ICD-10-CM

## 2022-12-13 DIAGNOSIS — L72.0 EPIDERMAL CYST: ICD-10-CM

## 2022-12-13 DIAGNOSIS — Z85.828 PERSONAL HISTORY OF OTHER MALIGNANT NEOPLASM OF SKIN: ICD-10-CM

## 2022-12-13 DIAGNOSIS — L82.1 OTHER SEBORRHEIC KERATOSIS: ICD-10-CM

## 2022-12-13 PROBLEM — D48.5 NEOPLASM OF UNCERTAIN BEHAVIOR OF SKIN: Status: ACTIVE | Noted: 2022-12-13

## 2022-12-13 PROBLEM — L20.84 INTRINSIC (ALLERGIC) ECZEMA: Status: ACTIVE | Noted: 2022-12-13

## 2022-12-13 PROBLEM — D22.5 MELANOCYTIC NEVI OF TRUNK: Status: ACTIVE | Noted: 2022-12-13

## 2022-12-13 PROCEDURE — ? OBSERVATION AND MEASURE

## 2022-12-13 PROCEDURE — ? PRESCRIPTION

## 2022-12-13 PROCEDURE — ? COUNSELING

## 2022-12-13 PROCEDURE — ? TREATMENT REGIMEN

## 2022-12-13 PROCEDURE — ? LIQUID NITROGEN

## 2022-12-13 PROCEDURE — 17000 DESTRUCT PREMALG LESION: CPT

## 2022-12-13 PROCEDURE — 99213 OFFICE O/P EST LOW 20 MIN: CPT | Mod: 25

## 2022-12-13 RX ORDER — TRIAMCINOLONE ACETONIDE 1 MG/G
THIN LAYER CREAM TOPICAL BID
Qty: 80 | Refills: 3 | Status: ERX | COMMUNITY
Start: 2022-12-13

## 2022-12-13 RX ADMIN — TRIAMCINOLONE ACETONIDE THIN LAYER: 1 CREAM TOPICAL at 00:00

## 2022-12-13 ASSESSMENT — LOCATION ZONE DERM
LOCATION ZONE: EAR
LOCATION ZONE: ARM
LOCATION ZONE: TRUNK
LOCATION ZONE: LEG
LOCATION ZONE: FACE

## 2022-12-13 ASSESSMENT — LOCATION SIMPLE DESCRIPTION DERM
LOCATION SIMPLE: LEFT EAR
LOCATION SIMPLE: SUPERIOR FOREHEAD
LOCATION SIMPLE: RIGHT UPPER BACK
LOCATION SIMPLE: LEFT POSTERIOR THIGH
LOCATION SIMPLE: ABDOMEN
LOCATION SIMPLE: RIGHT POSTERIOR THIGH
LOCATION SIMPLE: RIGHT THIGH
LOCATION SIMPLE: RIGHT FOREARM
LOCATION SIMPLE: UPPER BACK

## 2022-12-13 ASSESSMENT — LOCATION DETAILED DESCRIPTION DERM
LOCATION DETAILED: RIGHT SUPERIOR LATERAL UPPER BACK
LOCATION DETAILED: EPIGASTRIC SKIN
LOCATION DETAILED: LEFT PROXIMAL POSTERIOR THIGH
LOCATION DETAILED: RIGHT DISTAL DORSAL FOREARM
LOCATION DETAILED: SUPERIOR MID FOREHEAD
LOCATION DETAILED: RIGHT ANTERIOR DISTAL THIGH
LOCATION DETAILED: LEFT POSTERIOR EAR
LOCATION DETAILED: INFERIOR THORACIC SPINE
LOCATION DETAILED: RIGHT DISTAL POSTERIOR THIGH

## 2022-12-13 NOTE — PROCEDURE: LIQUID NITROGEN
Show Applicator Variable?: Yes
Duration Of Freeze Thaw-Cycle (Seconds): 10
Post-Care Instructions: I reviewed with the patient in detail post-care instructions. Patient is to wear sunprotection, and avoid picking at any of the treated lesions. Pt may apply Vaseline to crusted or scabbing areas.
Number Of Freeze-Thaw Cycles: 1 freeze-thaw cycle
Render Post-Care Instructions In Note?: no
Consent: The patient's consent was obtained including but not limited to risks of crusting, scabbing, blistering, scarring, darker or lighter pigmentary change, recurrence, incomplete removal and infection.
Detail Level: Detailed

## 2022-12-13 NOTE — PROCEDURE: TREATMENT REGIMEN
Detail Level: Zone
Initiate Treatment: Triamcinolone 0.1% bid for 2 weeks alternating with 2 weeks off

## 2022-12-19 ENCOUNTER — OFFICE VISIT (OUTPATIENT)
Dept: INTERNAL MEDICINE | Facility: IMAGING CENTER | Age: 82
End: 2022-12-19
Payer: MEDICARE

## 2022-12-19 ENCOUNTER — HOSPITAL ENCOUNTER (OUTPATIENT)
Facility: MEDICAL CENTER | Age: 82
End: 2022-12-19
Attending: INTERNAL MEDICINE
Payer: MEDICARE

## 2022-12-19 VITALS
BODY MASS INDEX: 29.26 KG/M2 | SYSTOLIC BLOOD PRESSURE: 138 MMHG | OXYGEN SATURATION: 93 % | DIASTOLIC BLOOD PRESSURE: 75 MMHG | HEIGHT: 72 IN | TEMPERATURE: 98.1 F | HEART RATE: 72 BPM | RESPIRATION RATE: 14 BRPM | WEIGHT: 216 LBS

## 2022-12-19 DIAGNOSIS — G47.33 OBSTRUCTIVE SLEEP APNEA SYNDROME: ICD-10-CM

## 2022-12-19 DIAGNOSIS — Z00.00 MEDICARE ANNUAL WELLNESS VISIT, SUBSEQUENT: ICD-10-CM

## 2022-12-19 DIAGNOSIS — R31.29 MICROHEMATURIA: ICD-10-CM

## 2022-12-19 DIAGNOSIS — Z12.11 COLON CANCER SCREENING: ICD-10-CM

## 2022-12-19 DIAGNOSIS — I63.9 CRYPTOGENIC STROKE (HCC): ICD-10-CM

## 2022-12-19 DIAGNOSIS — I35.0 AORTIC VALVE STENOSIS, ETIOLOGY OF CARDIAC VALVE DISEASE UNSPECIFIED: ICD-10-CM

## 2022-12-19 DIAGNOSIS — I48.0 PAF (PAROXYSMAL ATRIAL FIBRILLATION) (HCC): ICD-10-CM

## 2022-12-19 DIAGNOSIS — Z79.01 ANTICOAGULATED: ICD-10-CM

## 2022-12-19 DIAGNOSIS — E79.0 HYPERURICEMIA: ICD-10-CM

## 2022-12-19 DIAGNOSIS — K22.5 ZENKER DIVERTICULUM: ICD-10-CM

## 2022-12-19 DIAGNOSIS — I25.10 CORONARY ARTERY DISEASE, NON-OCCLUSIVE: ICD-10-CM

## 2022-12-19 DIAGNOSIS — E78.5 HYPERLIPIDEMIA LDL GOAL <70: ICD-10-CM

## 2022-12-19 DIAGNOSIS — Z86.73 H/O: CVA (CEREBROVASCULAR ACCIDENT): ICD-10-CM

## 2022-12-19 DIAGNOSIS — N20.0 URIC ACID NEPHROLITHIASIS: ICD-10-CM

## 2022-12-19 DIAGNOSIS — I10 PRIMARY HYPERTENSION: ICD-10-CM

## 2022-12-19 DIAGNOSIS — I77.810 THORACIC AORTIC ECTASIA (HCC): ICD-10-CM

## 2022-12-19 PROCEDURE — 81003 URINALYSIS AUTO W/O SCOPE: CPT

## 2022-12-19 PROCEDURE — G0439 PPPS, SUBSEQ VISIT: HCPCS | Performed by: INTERNAL MEDICINE

## 2022-12-19 RX ORDER — TRIAMCINOLONE ACETONIDE 1 MG/G
CREAM TOPICAL
COMMUNITY
Start: 2022-12-13 | End: 2023-02-08

## 2022-12-19 ASSESSMENT — ACTIVITIES OF DAILY LIVING (ADL): BATHING_REQUIRES_ASSISTANCE: 0

## 2022-12-19 ASSESSMENT — FIBROSIS 4 INDEX: FIB4 SCORE: 2.47

## 2022-12-19 ASSESSMENT — PATIENT HEALTH QUESTIONNAIRE - PHQ9: CLINICAL INTERPRETATION OF PHQ2 SCORE: 0

## 2022-12-19 ASSESSMENT — ENCOUNTER SYMPTOMS: GENERAL WELL-BEING: EXCELLENT

## 2022-12-19 NOTE — PROGRESS NOTES
82 y.o. male presents for the followin. Medicare annual wellness visit, subsequent  Patient comes in for annual health risk assessment, physical review of laboratory. He considers himself in good health. No depression. No balance issues. No cognitive issues.    2. Obstructive sleep apnea syndrome  History of sleep apnea. Compliant with CPAP. He has a new device which he thinks is less effective. Typically runs out of fluid which can make and fill dry and affect his sleep.    3. Primary hypertension  History of essential hypertension. Blood pressure is been stable and current medications. No sign of an organ disease.    4. Hyperlipidemia LDL goal <70  Hyperlipidemia stable onLipid work. LDL cholesterol is 59.     5. Uric acid nephrolithiasis  History of uric acid nephrolithiasis. Uric acid level is at goal, <6. Recent urine analysis with microscopic hematuria. No flank pain or other symptoms.    6. Zenker diverticulum  Chronically stable. No dysphasia.    7. Coronary artery disease, non-occlusive  Stable. No chest pain, angina or equivalent. Patient is followed by cardiology. He remains on statin, ARB and is on Eliquis    8. Aortic valve stenosis, etiology of cardiac valve disease unspecified  Moderate stenosis based on echocardiogram. No dyspnea, orthopnea or PND. He experiences lower extremity swelling but this is been chronic for many years. It is unchanged.    9. H/O: CVA (cerebrovascular accident)  Distant history of stroke. Presumably due to atrial fibrillation.    10. PAF (paroxysmal atrial fibrillation) (HCC)  Atrial for ablation stable. Rate is controlled without medication. He remains on Eliquis. No chest pain or palpitations.    11. Anticoagulated  Stable on Eliquis. His dose is appropriate based on age, weight and kidney function    12. Cryptogenic stroke (HCC)  As above    13. Hyperuricemia  Stable. Uric acid level is at goal as above.    14. Colon cancer screening  Do for screening. Last was  "October 2019. Patient with elevated hemoglobin/hematocrit but normal RBC count.    15. Microhematuria  Moderate microscopic hematuria. History of kidney stones. Renal function is normal otherwise.      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    CATARACT PHACO WITH IOL  1/21/2014    Performed by Joni Duarte M.D. [facility]    FINGER ARTHROPLASTY  12/17/2012    Performed by Adam Christopher M.D. at SURGERY SAME DAY [facility]S    INGUINAL HERNIA REPAIR  2/19/2009    Performed by ALEX ALATORRE at SURGERY SAME DAY [facility]S    COLONOSCOPY  2004    neg    LAMINOTOMY  1996    lumbar laminectomy, cyst x3    UVULOPHARYNGOPALATOPLASTY  1985       Family history: relating to possible risk factors for your patient  Family History   Problem Relation Age of Onset    Heart Disease Maternal Grandmother     Diabetes Paternal Grandfather     Stroke Sister     Lung Disease Father         black lung     Cancer Neg Hx        Current Providers (including home care/DME’s):   Colonoscopy 10/9/19 no repeat recommended Dexa  PSA  10/26/20  1.58  [facility] [facility]    Patient Care Team:  Reilly Jc M.D. as PCP - General " (Internal Medicine)  Josefa Keyes M.D. as PCP - [facility] Paneled  Ana Stearns R.N. as Registered Nurse      Medications:   Current Outpatient Medications Ordered in Epic   Medication Sig Dispense Refill    meloxicam (MOBIC) 15 MG tablet Take 1 Tablet by mouth 1 time a day as needed for Inflammation, Mild Pain or Moderate Pain. 30 Tablet 1    ELIQUIS 5 MG Tab TAKE 1 TABLET BY MOUTH TWICE A  Tablet 2    triamterene/hctz (MAXZIDE-25/DYAZIDE) 37.5-25 MG Cap Take 1 Capsule by mouth every morning. Take 1 capsule by mouth every morning 90 Capsule 3    allopurinol (ZYLOPRIM) 100 MG Tab TAKE 1 TABLET BY MOUTH TWICE A  Tablet 3    losartan (COZAAR) 50 MG Tab TAKE 1 TABLET BY MOUTH EVERY  Tablet 2    atorvastatin (LIPITOR) 40 MG Tab TAKE 1 TABLET BY MOUTH EVERY  Tablet 2    cyclobenzaprine (FLEXERIL) 10 mg Tab Take 1 Tablet by mouth 3 times a day as needed for Moderate Pain (back spasms). 30 Tablet 0    calcipotriene (DOVONEX) 0.005 % Cream Apply  topically as needed.      acetaminophen (TYLENOL) 325 MG Tab Take 650 mg by mouth every four hours as needed for Moderate Pain.      triamcinolone acetonide (KENALOG) 0.1 % Cream        No current Epic-ordered facility-administered medications on file.       Supplements (calcium/vitamins): if not lisited in medications    Chief Complaint   Patient presents with    Medicare Annual Wellness         HPI:  Chcuk Rochain is a 82 y.o. here for Medicare Annual Wellness Visit     Patient Active Problem List    Diagnosis Date Noted    Aortic stenosis 05/26/2022    Coronary artery disease, non-occlusive 10/21/2021    Dyslipidemia 10/21/2021    H/O: CVA (cerebrovascular accident) 10/21/2021    PAF (paroxysmal atrial fibrillation) (HCC) 11/09/2020    Anticoagulated 11/09/2020    Abnormal EKG 03/02/2020    Irregular heart rhythm 03/02/2020    Cryptogenic stroke (HCC) 09/17/2019    Hyperuricemia 09/17/2019    Lumbar radiculopathy 03/26/2019    Zenker diverticulum  11/15/2018    Status post placement of implantable loop recorder 11/15/2018    Hyperlipidemia LDL goal <70 11/15/2018    Coronary artery disease involving native coronary artery of native heart without angina pectoris 11/15/2018    History of arterial ischemic stroke 08/23/2018    Ventricular ectopy 08/23/2018    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 30 Tablet 1    ELIQUIS 5 MG Tab TAKE 1 TABLET BY MOUTH TWICE A  Tablet 2    triamterene/hctz (MAXZIDE-25/DYAZIDE) 37.5-25 MG Cap Take 1 Capsule by mouth every morning. Take 1 capsule by mouth every morning 90 Capsule 3    allopurinol (ZYLOPRIM) 100 MG Tab TAKE 1 TABLET BY MOUTH TWICE A  Tablet 3    losartan (COZAAR) 50 MG Tab TAKE 1 TABLET BY MOUTH EVERY  Tablet 2    atorvastatin (LIPITOR) 40 MG Tab TAKE 1 TABLET BY MOUTH EVERY  Tablet 2    cyclobenzaprine (FLEXERIL) 10 mg Tab Take 1 Tablet by mouth 3 times a day as needed for Moderate Pain (back spasms). 30 Tablet 0    calcipotriene (DOVONEX) 0.005 % Cream Apply  topically as needed.      acetaminophen (TYLENOL) 325 MG Tab Take 650 mg by mouth every four hours as needed for Moderate Pain.      triamcinolone acetonide (KENALOG) 0.1 % Cream        No current facility-administered medications for this visit.            Current supplements as per medication list.       Allergies: Morphine    Current social contact/activities:  Social with friends and family..    He  reports that he has never smoked. He has never used smokeless tobacco.  He reports current alcohol use. He reports that he does not use drugs.  Counseling given: Not Answered        DPA/Advanced Directive:  Completed. Available in epic      ROS:    Gait: Uses : None  Ostomy: No  Other tubes: no   Amputations: no   Chronic oxygen use: no   Last eye exam: Every 2022   : Denies any urinary leakage during the last 6 months incontinence.       Screening:  Colonoscopy 10/9/19 no repeat recommended Dexa  PSA  10/26/20  1.58  [facility] [facility]    Depression Screening  Little interest or pleasure in doing things?  0 - not at all  Feeling down, depressed , or hopeless? 0 - not at all  Patient Health Questionnaire Score: 0     If depressive symptoms identified deferred to follow up visit unless specifically addressed in assessment and plan.    Interpretation of PHQ-9 Total Score   Score Severity   1-4 No Depression   5-9 Mild Depression   10-14 Moderate Depression   15-19 Moderately Severe Depression   20-27 Severe Depression    Screening for Cognitive Impairment  Three Minute Recall (daughter, heaven, mountain) 3/3    Wil clock face with all 12 numbers and set the hands to show 10 past 11.  Yes    Cognitive concerns identified deferred for follow up unless specifically addressed in assessment and plan.    Fall Risk Assessment  Has the patient had two or more falls in the last year or any fall with injury in the last year?  No    Safety Assessment  Throw rugs on floor.  Yes  Handrails on all stairs.  Yes  Good lighting in all hallways.  Yes  Difficulty hearing.  No  Patient counseled about all safety risks that were identified.    Functional Assessment ADLs  Are there any barriers preventing you from cooking for yourself or meeting nutritional needs?  No.    Are there any barriers preventing you from driving safely or obtaining transportation?  No.    Are there any barriers preventing you from using a telephone or calling for help?  No.    Are there any barriers preventing you  from shopping?  No.    Are there any barriers preventing you from taking care of your own finances?  No.    Are there any barriers preventing you from managing your medications?  No.    Are there any barriers preventing you from showering, bathing or dressing yourself?  No.    Are you currently engaging in any exercise or physical activity?  Yes.     What is your perception of your health?  Excellent    Advance Care Planning  Do you have an Advance Directive, Living Will, Durable Power of , or POLST? Yes  Advance Directive Living Will Durable Power of    is on file      Health Maintenance Summary            Overdue - IMM ZOSTER VACCINES (1 of 2) Overdue - never done      No completion history exists for this topic.              Annual Wellness Visit (Every 366 Days) Next due on 12/20/2023 12/19/2022  Visit Dx: Medicare annual wellness visit, subsequent    11/09/2020  Visit Dx: Medicare annual wellness visit, subsequent    09/17/2019  Subsequent Annual Wellness Visit - Includes PPPS ()    09/17/2019  Visit Dx: Medicare annual wellness visit, subsequent    07/25/2017  Visit Dx: Medicare annual wellness visit, subsequent    Only the first 5 history entries have been loaded, but more history exists.              IMM DTaP/Tdap/Td Vaccine (2 - Td or Tdap) Next due on 9/17/2029 09/17/2019  Imm Admin: Tdap Vaccine              IMM PNEUMOCOCCAL VACCINE: 65+ Years (Series Information) Completed      11/12/2014  Imm Admin: Pneumococcal Conjugate Vaccine (Prevnar/PCV-13)    12/02/2009  Imm Admin: Pneumococcal polysaccharide vaccine (PPSV-23)              IMM INFLUENZA (Series Information) Completed      10/18/2022  Imm Admin: Influenza Vaccine Adult HD    01/06/2022  Imm Admin: Influenza Vaccine Adult HD    10/12/2020  Imm Admin: Influenza Vaccine Adult HD    10/11/2019  Imm Admin: Influenza Vaccine Adult HD    10/24/2018  Imm Admin: Influenza Vaccine Adult HD    Only the first 5 history entries  have been loaded, but more history exists.              COVID-19 Vaccine (Series Information) Completed      11/08/2022  Imm Admin: Avisena BIVALENT BOOSTER SARS-COV-2 VACCINE (12+)    10/26/2021  Imm Admin: PFIZER PURPLE CAP SARS-COV-2 VACCINATION (12+)    02/04/2021  Imm Admin: PFIZER PURPLE CAP SARS-COV-2 VACCINATION (12+)    01/14/2021  Imm Admin: PFIZER PURPLE CAP SARS-COV-2 VACCINATION (12+)              IMM MENINGOCOCCAL ACWY VACCINE (Series Information) Aged Out      No completion history exists for this topic.              Discontinued - COLORECTAL CANCER SCREENING  Ordered on 12/19/2022        Frequency changed to Never automatically (Topic No Longer Applies)    11/12/2020  OCCULT BLOOD FECES IMMUNOASSAY    07/30/2017  OCCULT BLOOD FECES IMMUNOASSAY    10/01/2014  COLONOSCOPY (Done)              Discontinued - IMM HEP B VACCINE  Discontinued      No completion history exists for this topic.                    Patient Care Team:  Reilly Jc M.D. as PCP - General (Internal Medicine)  Josefa Keyes M.D. as PCP - [facility] Paneled  Ana Stearns R.N. as Registered Nurse        Social History     Tobacco Use    Smoking status: Never    Smokeless tobacco: Never   Substance Use Topics    Alcohol use: Yes     Comment: 2 drinks per week     Drug use: No     Family History   Problem Relation Age of Onset    Heart Disease Maternal Grandmother     Diabetes Paternal Grandfather     Stroke Sister     Lung Disease Father         black lung     Cancer Neg Hx      He  has a past medical history of Arrhythmia, Arthritis, Cardiac murmur, CATARACT, Dental disorder, ED (erectile dysfunction), Essential hypertension (11/12/2015), Facial droop, Hemorrhagic disorder (HCC), High cholesterol, Hypertension, Kidney stones, Seizure (HCC), Sleep apnea, Snoring, Stroke (HCC) (08/07/2018), and Zenker diverticula.   Past Surgical History:   Procedure Laterality Date    [facility] CARDIAC CATH  09/28/2018    40% LAD other arteries no disease.     "CATARACT PHACO WITH IOL  1/21/2014    Performed by Joni Duarte M.D. at [facility]    FINGER ARTHROPLASTY  12/17/2012    Performed by Adam Christopher M.D. at SUR[facility]ERY [facility] DAY [facility]    INGUINAL HERNIA REPAIR  2/19/2009    Performed by ALEX ALATORRE at SURGERY SAME DAY [facility] ORS    COLONOSCOPY  2004    neg    LAMINOTOMY  1996    lumbar laminectomy, cyst x3    UVULOPHARYNGOPALATOPLASTY  1985       Exam:     /75   Pulse 72   Temp 36.7 °C (98.1 °F)   Resp 14   Ht 1.829 m (6' 0.01\")   Wt 98 kg (216 lb)   SpO2 93%  Body mass index is 29.29 kg/m².    Hearing good.    Dentition good  Alert, oriented in no acute distress.  Eye contact is good, speech goal directed, affect calm  General: Well-appearing and in no distress.  HEENT: Grossly normal. Oral cavity is pink and moist. Ears, canals and tympanic membranes are grossly normal  Neck: Supple without JVD or bruit.  Pulmonary: Clear with good breath sounds. Normal effort.  Cardiovascular: Regular.4/6 systolic murmur. Murmur seems distinct on right qquarter versus left. Carotid and radial pulses are intact.  Abdomen: Soft, nontender, nondistended. Liver and spleen are not palpable  Neurologic: Grossly nonfocal. Chronic left facial palsy.  MSK: Grossly without abnormality.      Assessment and Plan. The following treatment and monitoring plan is recommended:    1. Medicare annual wellness visit, subsequent        2. Obstructive sleep apnea syndrome        3. Primary hypertension        4. Hyperlipidemia LDL goal <70        5. Uric acid nephrolithiasis        6. Zenker diverticulum        7. Coronary artery disease, non-occlusive        8. Aortic valve stenosis, etiology of cardiac valve disease unspecified        9. H/O: CVA (cerebrovascular accident)        10. PAF (paroxysmal atrial fibrillation) (HCC)        11. Anticoagulated        12. Cryptogenic stroke (HCC)        13. Hyperuricemia        14. Colon cancer screening  OCCULT " BLOOD FECES IMMUNOASSAY      15. Microhematuria  URINALYSIS,CULTURE IF INDICATED      16. Thoracic aortic ectasia (HCC)              Healthy 82-year-old male.    Scheduled for MRI to follow-up on left wrist pain. Native x-rays.    Hypertension is stable.  Atrial fibrillation stable. He is on appropriate anticoagulation.  History of uric stenosis. Due for follow-up echocardiogram. He is followed by cardiology.  History of noninclusive CAD. Stable. He is on appropriate therapy.  Obstructive sleep apnea. Possibly undertreated. This could be reflected in elevated hemoglobin/hematocrit. He's going to discuss the device with his CPAP company.  Uric acid is a goal. No change in treatment.  Blood pressure stable. No change in treatment.    Services suggested: No services required at this time  Health Care Screening: Age-appropriate preventive services Medicare covers discussed today and ordered if indicated.  Referrals offered: Community-based lifestyle interventions to reduce health risks and promote self-management and wellness, fall prevention, nutrition, physical activity, tobacco-use cessation, weight loss, and mental health services as per orders if indicated.    Discussion today about general wellness and lifestyle habits:    Prevent falls and reduce trip hazards; Cautioned about securing or removing rugs.  Have a working fire alarm and carbon monoxide detector;   Engage in regular physical activity and social activities       Follow-up: 1 year for HRA

## 2022-12-20 ENCOUNTER — HOSPITAL ENCOUNTER (OUTPATIENT)
Facility: MEDICAL CENTER | Age: 82
End: 2022-12-20
Attending: INTERNAL MEDICINE
Payer: MEDICARE

## 2022-12-20 LAB
APPEARANCE UR: CLEAR
BILIRUB UR QL STRIP.AUTO: NEGATIVE
COLOR UR: YELLOW
GLUCOSE UR STRIP.AUTO-MCNC: NEGATIVE MG/DL
KETONES UR STRIP.AUTO-MCNC: NEGATIVE MG/DL
LEUKOCYTE ESTERASE UR QL STRIP.AUTO: NEGATIVE
MICRO URNS: NORMAL
NITRITE UR QL STRIP.AUTO: NEGATIVE
PH UR STRIP.AUTO: 7 [PH] (ref 5–8)
PROT UR QL STRIP: NEGATIVE MG/DL
RBC UR QL AUTO: NEGATIVE
SP GR UR STRIP.AUTO: 1.02
UROBILINOGEN UR STRIP.AUTO-MCNC: 0.2 MG/DL

## 2022-12-20 PROCEDURE — 82274 ASSAY TEST FOR BLOOD FECAL: CPT

## 2022-12-21 ENCOUNTER — APPOINTMENT (OUTPATIENT)
Dept: RADIOLOGY | Facility: MEDICAL CENTER | Age: 82
End: 2022-12-21
Attending: INTERNAL MEDICINE
Payer: MEDICARE

## 2022-12-21 DIAGNOSIS — M25.532 LEFT WRIST PAIN: ICD-10-CM

## 2022-12-21 PROCEDURE — 73221 MRI JOINT UPR EXTREM W/O DYE: CPT | Mod: LT

## 2022-12-28 DIAGNOSIS — Z12.11 COLON CANCER SCREENING: ICD-10-CM

## 2022-12-28 DIAGNOSIS — S63.599A COMPLETE TEAR OF SCAPHOLUNATE LIGAMENT: ICD-10-CM

## 2022-12-28 LAB — AMBIGUOUS DTTM AMBI4: NORMAL

## 2022-12-30 LAB — IMM ASSAY OCC BLD FITOB: NEGATIVE

## 2023-01-04 DIAGNOSIS — E78.2 HYPERLIPEMIA, MIXED: ICD-10-CM

## 2023-01-06 RX ORDER — ATORVASTATIN CALCIUM 40 MG/1
TABLET, FILM COATED ORAL
Qty: 100 TABLET | Refills: 0 | Status: SHIPPED | OUTPATIENT
Start: 2023-01-06 | End: 2023-07-28

## 2023-01-06 NOTE — TELEPHONE ENCOUNTER
Is the patient due for a refill? Yes    Was the patient seen the past year? Yes    Date of last office visit: 4/29/22    Does the patient have an upcoming appointment?  Yes   If yes, When? 2/8/23    Provider to refill:sharon    Does the patients insurance require a 100 day supply?  Yes

## 2023-01-11 ENCOUNTER — OFFICE VISIT (OUTPATIENT)
Dept: INTERNAL MEDICINE | Facility: IMAGING CENTER | Age: 83
End: 2023-01-11
Payer: MEDICARE

## 2023-01-11 VITALS
HEART RATE: 86 BPM | OXYGEN SATURATION: 96 % | DIASTOLIC BLOOD PRESSURE: 70 MMHG | RESPIRATION RATE: 16 BRPM | SYSTOLIC BLOOD PRESSURE: 128 MMHG | TEMPERATURE: 98 F

## 2023-01-11 DIAGNOSIS — I77.810 THORACIC AORTIC ECTASIA (HCC): ICD-10-CM

## 2023-01-11 DIAGNOSIS — J34.89 NASAL PAIN: ICD-10-CM

## 2023-01-11 DIAGNOSIS — I48.0 PAF (PAROXYSMAL ATRIAL FIBRILLATION) (HCC): ICD-10-CM

## 2023-01-11 DIAGNOSIS — G47.33 OBSTRUCTIVE SLEEP APNEA: ICD-10-CM

## 2023-01-11 DIAGNOSIS — H61.21 IMPACTED CERUMEN OF RIGHT EAR: ICD-10-CM

## 2023-01-11 PROCEDURE — 99213 OFFICE O/P EST LOW 20 MIN: CPT | Performed by: INTERNAL MEDICINE

## 2023-01-11 NOTE — PROGRESS NOTES
Chief Complaint   Patient presents with    Pain     Patient complains of pain in his sinus cavity when he breathes.       HISTORY OF THE PRESENT ILLNESS: Patient is a 82 y.o. male.     Patient comes in complaining of pain when he breathes through his nose.  He has discomfort on both sides approximately half-two thirds of the way up the passage.  He also complains of headache in the ethmoid sinus area.  No discharge.  No epistaxis.  He tried nasal saline with some improvement.  He uses no other nasal medicines.  He is on CPAP at night for sleep apnea.  He typically does not turn up the heat on his humidifier.    [facility] Form    Diagnosis to address: I48.0 - PAF (paroxysmal atrial fibrillation) ([facility])  Assessment and plan: Chronic, stable. Continue with current defined treatment plan: Eliquis for anticoagulation.  No medications for rate control.  Followed by cardiology. Follow-up at least annually.  Diagnosis: I77.810 - Thoracic aortic ectasia ([facility])  Assessment and plan: Chronic, stable. Continue with current defined treatment plan: Blood pressure and lipid control.. Follow-up at least annually.  Last edited 01/11/23 13:49 PST by Reilly Jc M.D.           Allergies: Morphine    Current Outpatient Medications Ordered in Epic   Medication Sig Dispense Refill    atorvastatin (LIPITOR) 40 MG Tab TAKE 1 TABLET BY MOUTH EVERY  Tablet 0    triamcinolone acetonide (KENALOG) 0.1 % Cream       meloxicam (MOBIC) 15 MG tablet Take 1 Tablet by mouth 1 time a day as needed for Inflammation, Mild Pain or Moderate Pain. 30 Tablet 1    ELIQUIS 5 MG Tab TAKE 1 TABLET BY MOUTH TWICE A  Tablet 2    triamterene/hctz (MAXZIDE-25/DYAZIDE) 37.5-25 MG Cap Take 1 Capsule by mouth every morning. Take 1 capsule by mouth every morning 90 Capsule 3    allopurinol (ZYLOPRIM) 100 MG Tab TAKE 1 TABLET BY MOUTH TWICE A  Tablet 3    losartan (COZAAR) 50 MG Tab TAKE 1 TABLET BY MOUTH EVERY  Tablet 2    cyclobenzaprine  (FLEXERIL) 10 mg Tab Take 1 Tablet by mouth 3 times a day as needed for Moderate Pain (back spasms). 30 Tablet 0    calcipotriene (DOVONEX) 0.005 % Cream Apply  topically as needed.      acetaminophen (TYLENOL) 325 MG Tab Take 650 mg by mouth every four hours as needed for Moderate Pain.       No current Epic-ordered facility-administered medications on file.       Past medical history, social history and family history were reviewed from chart today    Review of systems: Per HPI.    All others negative.     Exam: /70 (BP Location: Left arm, Patient Position: Sitting, BP Cuff Size: Adult)   Pulse 86   Temp 36.7 °C (98 °F) (Temporal)   Resp 16   SpO2 96%   General: Well-appearing. Well-developed. No signs of distress.  HEENT: Head is grossly normal.  The right ear canal is mostly obstructed with cerumen.  Left is normal.  He has significant edema with dryness in the right nasal passage.  The left is patent but also dry.  No ulceration or irritation seen.  Neck: Supple without JVD or bruit.  Pulmonary: Clear with good breath sounds. Normal effort.  Cardiovascular: Regular. Carotid and radial pulses are intact.  Abdomen: Soft, nontender, nondistended. Spleen and liver are not enlarged.  Neurologic: Cranial nerves II through XII are grossly normal, alert and oriented x3      Diagnosis:  1. Nasal pain        2. Obstructive sleep apnea        3. Impacted cerumen of right ear          Patient with nasal passage discomfort.  Suspect he has dryness from his CPAP and recent cold, dry air.  This is probably exacerbated by central heat.  I recommended AYA R gel 4 times daily.  I also recommended that he use a humidifier in his room and that he turned the heat up on his CPAP water to better humidify during use.  If he does not respond consider ENT?    My total time spent caring for the patient on the day of the encounter was  greater than 20 minutes.   This includes obtaining history, reviewing chart, physical exam,  patient education, reviewing outside records, placing orders, interpreting tests and coordinating care.

## 2023-01-30 PROBLEM — M19.032 OSTEOARTHRITIS OF LEFT WRIST: Status: ACTIVE | Noted: 2023-01-30

## 2023-01-30 PROBLEM — M19.031 OSTEOARTHRITIS OF RIGHT WRIST: Status: ACTIVE | Noted: 2023-01-30

## 2023-02-03 DIAGNOSIS — G47.33 OBSTRUCTIVE SLEEP APNEA: ICD-10-CM

## 2023-02-08 ENCOUNTER — OFFICE VISIT (OUTPATIENT)
Dept: CARDIOLOGY | Facility: MEDICAL CENTER | Age: 83
End: 2023-02-08
Payer: MEDICARE

## 2023-02-08 VITALS
HEART RATE: 86 BPM | HEIGHT: 72 IN | RESPIRATION RATE: 16 BRPM | BODY MASS INDEX: 29.53 KG/M2 | OXYGEN SATURATION: 97 % | DIASTOLIC BLOOD PRESSURE: 74 MMHG | WEIGHT: 218 LBS | SYSTOLIC BLOOD PRESSURE: 116 MMHG

## 2023-02-08 DIAGNOSIS — I48.91 HYPERCOAGULABILITY DUE TO ATRIAL FIBRILLATION (HCC): ICD-10-CM

## 2023-02-08 DIAGNOSIS — I35.0 AORTIC VALVE STENOSIS, ETIOLOGY OF CARDIAC VALVE DISEASE UNSPECIFIED: ICD-10-CM

## 2023-02-08 DIAGNOSIS — D68.69 HYPERCOAGULABILITY DUE TO ATRIAL FIBRILLATION (HCC): ICD-10-CM

## 2023-02-08 DIAGNOSIS — Z79.01 ANTICOAGULATED: ICD-10-CM

## 2023-02-08 DIAGNOSIS — I48.0 PAF (PAROXYSMAL ATRIAL FIBRILLATION) (HCC): ICD-10-CM

## 2023-02-08 DIAGNOSIS — I49.3 VENTRICULAR ECTOPY: ICD-10-CM

## 2023-02-08 DIAGNOSIS — I25.10 CORONARY ARTERY DISEASE, NON-OCCLUSIVE: ICD-10-CM

## 2023-02-08 PROCEDURE — 99214 OFFICE O/P EST MOD 30 MIN: CPT | Performed by: INTERNAL MEDICINE

## 2023-02-08 ASSESSMENT — ENCOUNTER SYMPTOMS
BRUISES/BLEEDS EASILY: 0
SHORTNESS OF BREATH: 0
PALPITATIONS: 0
COUGH: 0
MYALGIAS: 0
DIZZINESS: 0
LOSS OF CONSCIOUSNESS: 0

## 2023-02-08 ASSESSMENT — FIBROSIS 4 INDEX: FIB4 SCORE: 2.47

## 2023-02-08 NOTE — PROGRESS NOTES
"Chief Complaint   Patient presents with    Coronary Artery Disease     F/V Dx: Coronary artery disease, non-occlusive    Atrial Fibrillation     F/V Dx: PAF (paroxysmal atrial fibrillation) (HCC)    Aortic Stenosis       Subjective     Chuck Ortiz is a 82 y.o. male who presents today for follow-up cardiac care.    The patient has CAD, PAF, OAC on apixaban, aortic stenosis, HTN, HLD, cryptogenic stroke presumed embolic based on poststroke ILR PAF, LOVE, Zenker's diverticulum    Since 4/29/2022 appointment the patient has had no cardiac symptoms including chest pain, palpitations, shortness of breath.  No lightheadedness or dizziness.  He remains very active, does all of his yard work, takes care of his fruit trees, regularly fishes, boats and camps without any physical limitations.     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    CATARACT PHACO WITH IOL  " 1/21/2014    Performed by Joni Duarte M.D. at [facility]    FINGER ARTHROPLASTY  12/17/2012    Performed by Adam Christopher M.D. at [facility]    INGUINAL HERNIA REPAIR  2/19/2009    Performed by ALEX ALATORRE at [facility]    COLONOSCOPY  2004    neg    LAMINOTOMY  1996    lumbar laminectomy, cyst x3    UVULOPHARYNGOPALATOPLASTY  1985     Family History   Problem Relation Age of Onset    Heart Disease Maternal Grandmother     Diabetes Paternal Grandfather     Stroke Sister     Lung Disease Father         black lung     Cancer Neg Hx      Social History     Socioeconomic History    Marital status:      Spouse name: Not on file    Number of children: Not on file    Years of education: Not on file    Highest education level: Not on file   Occupational History    Not on file   Tobacco Use    Smoking status: Never    Smokeless tobacco: Never   Substance and Sexual Activity    Alcohol use: Yes     Comment: 2 drinks per week     Drug use: No    Sexual activity: Not on file     Comment: , retired [employer]    Other Topics Concern    Not on file   Social History Narrative    Not on file     Social Determinants of Health     Financial Resource Strain: Not on file   Food Insecurity: Not on file   Transportation Needs: Not on file   Physical Activity: Not on file   Stress: Not on file   Social Connections: Not on file   Intimate Partner Violence: Not on file   Housing Stability: Not on file     Allergies   Allergen Reactions    Morphine      Bradycardia     Outpatient Encounter Medications as of 2/8/2023   Medication Sig Dispense Refill    atorvastatin (LIPITOR) 40 MG Tab TAKE 1 TABLET BY MOUTH EVERY  Tablet 0    meloxicam (MOBIC) 15 MG tablet Take 1 Tablet by mouth 1 time a day as needed for Inflammation, Mild Pain or Moderate Pain. 30 Tablet 1    ELIQUIS 5 MG Tab TAKE 1 TABLET BY MOUTH TWICE A  Tablet 2    triamterene/hctz  (MAXZIDE-25/DYAZIDE) 37.5-25 MG Cap Take 1 Capsule by mouth every morning. Take 1 capsule by mouth every morning 90 Capsule 3    allopurinol (ZYLOPRIM) 100 MG Tab TAKE 1 TABLET BY MOUTH TWICE A  Tablet 3    losartan (COZAAR) 50 MG Tab TAKE 1 TABLET BY MOUTH EVERY  Tablet 2    cyclobenzaprine (FLEXERIL) 10 mg Tab Take 1 Tablet by mouth 3 times a day as needed for Moderate Pain (back spasms). 30 Tablet 0    acetaminophen (TYLENOL) 325 MG Tab Take 650 mg by mouth every four hours as needed for Moderate Pain.      [DISCONTINUED] triamcinolone acetonide (KENALOG) 0.1 % Cream  (Patient not taking: Reported on 2/8/2023)      [DISCONTINUED] calcipotriene (DOVONEX) 0.005 % Cream Apply  topically as needed. (Patient not taking: Reported on 2/8/2023)       No facility-administered encounter medications on file as of 2/8/2023.     Review of Systems   Respiratory:  Negative for cough and shortness of breath.    Cardiovascular:  Negative for chest pain and palpitations.   Musculoskeletal:  Negative for myalgias.   Neurological:  Negative for dizziness and loss of consciousness.   Endo/Heme/Allergies:  Does not bruise/bleed easily.            Objective     /74 (BP Location: Left arm, Patient Position: Sitting, BP Cuff Size: Adult)   Pulse 86   Resp 16   Ht 1.829 m (6')   Wt 98.9 kg (218 lb)   SpO2 97%   BMI 29.57 kg/m²     Physical Exam  Constitutional:       Appearance: He is well-developed.   Eyes:      Conjunctiva/sclera: Conjunctivae normal.      Pupils: Pupils are equal, round, and reactive to light.   Neck:      Vascular: No JVD.   Cardiovascular:      Rate and Rhythm: Normal rate. Rhythm regularly irregular.      Heart sounds: Murmur heard.      Comments: Diminished A2  Pulmonary:      Effort: Pulmonary effort is normal. No accessory muscle usage or respiratory distress.      Breath sounds: Normal breath sounds. No wheezing or rales.   Musculoskeletal:      Cervical back: Normal range of motion and  neck supple.      Right lower leg: No edema.      Left lower leg: No edema.   Skin:     General: Skin is warm and dry.      Findings: No rash.      Nails: There is no clubbing.   Neurological:      Mental Status: He is alert and oriented to person, place, and time.   Psychiatric:         Behavior: Behavior normal.               08/14/2018 BRAIN MRI  1.  Acute to subacute small sized infarcts in the left posterior frontal lobe.  2.  Mild diffuse cerebral substance loss.  3.  Mild microangiopathic ischemic change versus demyelination or gliosis.  4.  Right maxillary sinus mucous retention cyst or polyp.     ECHOCARDIOGRAM 09/06/2018.  Normal left ventricular systolic function.  Left ventricular ejection fraction is visually estimated to be 60%.  Mild aortic stenosis.  Mild posterior mitral valve leaflet prolapse.  Mild mitral regurgitation.  Mitral annular calcification.  Unable to estimate pulmonary artery pressure due to an inadequate   tricuspid regurgitant jet.  No prior study is available for comparison.    ECHOCARDIOGRAM 5/5/2022  Moderate aortic valve stenosis. Transvalvular gradients are - Peak: 51   mmHg, Mean: 33 mmHg. Vmax is 3.6 m/s.  Normal left ventricular systolic function.  The left ventricular ejection fraction is visually estimated to be 70%.  Mild concentric left ventricular hypertrophy and also Sigmoid septum.  Grade I diastolic dysfunction.  Normal right ventricular size and systolic function.  Mildly dilated left atrium.  Mildly elevated estimated right atrial pressure.   Normal pulmonary artery pressure.      MPI 09/06/2018.  There is a predominently fixed basal to mid inferior septal perfusion defect.   There is a distal inferior wall defect, predominantly reversible, moderate    size, mild intensity.   Stress Image LV EF: 71     %     CARDIAC CATHETERIZATION  09/28/2018  A. Left heart catheterization.  B. Left ventriculography.  C. Selective coronary angiography.  D. Right radial artery  approach.  PREPROCEDURE DIAGNOSES:  1.  Abnormal myocardial perfusion scan.  2.  Mild aortic stenosis.  3.  Ventricular ectopy.  4.  Cryptogenic stroke.  5.  Hyperlipidemia.   POSTPROCEDURE DIAGNOSES:  1.  Coronary artery disease, moderate predominantly involving the mid left anterior descending artery.  2.  Left ventricular ejection fraction 77% post PVC with basal inferior wall akinesis.     ILR recording Atrial fibrillation 5/11/2020 and 7/1/2020  Start eliquis 5 MG bid  Discontinue plavix    Assessment & Plan     1. Aortic valve stenosis, etiology of cardiac valve disease unspecified  EC-ECHOCARDIOGRAM COMPLETE W/O CONT      2. Coronary artery disease, non-occlusive        3. PAF (paroxysmal atrial fibrillation) (HCC)        4. Hypercoagulability due to atrial fibrillation (HCC)        5. Anticoagulated        6. Ventricular ectopy              Medical Decision Making: Today's Assessment/Status/Plan:   CAD, noncritical.  Aortic stenosis  PAF entheses 5/11/2020, 7/1/2020 on ILR)  OAC on apixaban  Hypertension   Hyperlipidemia   CVA, left posterior frontal.  08/14/2018  Implantable loop recorder 8/28/2018, removed 7/20/2020.  Ventricular ectopy, chronic.  Aortic stenosis.  LOVE.  S/P uvulectomy.  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  S/P back surgery.  Zenker's diverticulum.     Recommendation Discussion  CAD, asymptomatic, continue atorvastatin, apixaban (no aspirin)  PAF: Clinically asymptomatic, continue apixaban.  Aortic stenosis: Asymptomatic, echocardiogram, educated patient regarding future potential need for TAVR, instructed patient to monitor for the development of PAL, chest pain, lightheadedness and report those symptoms immediately.  Hypertension: BP normal, at goal, continue losartan.  Dyslipidemia: LDL 59, at goal, continue atorvastatin.  Ventricular ectopy: Asymptomatic, continue monitoring.  RTC 6 months

## 2023-02-09 ENCOUNTER — DOCUMENTATION (OUTPATIENT)
Dept: CARDIOLOGY | Facility: MEDICAL CENTER | Age: 83
End: 2023-02-09
Payer: MEDICARE

## 2023-02-09 NOTE — PROGRESS NOTES
This patient has been flagged through our echocardiogram surveillance with the following echocardiogram results:    Moderate Aortic Stenosis    Ordering Provider: Dr. Reilly Jc    Current Plan of Care for Structural Heart Disease: Added to surveillance tracking list    Next Echo date needed by: 05/05/2023 (scheduled 03/03/2023)    Next Follow up appointment: 08/10/2023 with Dr. Gonzalez

## 2023-02-14 DIAGNOSIS — I10 ESSENTIAL HYPERTENSION: ICD-10-CM

## 2023-02-14 RX ORDER — LOSARTAN POTASSIUM 50 MG/1
TABLET ORAL
Qty: 100 TABLET | Refills: 3 | Status: SHIPPED | OUTPATIENT
Start: 2023-02-14

## 2023-02-14 RX ORDER — CYCLOBENZAPRINE HCL 10 MG
10 TABLET ORAL 3 TIMES DAILY PRN
Qty: 30 TABLET | Refills: 0 | Status: SHIPPED | OUTPATIENT
Start: 2023-02-14 | End: 2023-12-21 | Stop reason: SDUPTHER

## 2023-02-14 NOTE — TELEPHONE ENCOUNTER
Is the patient due for a refill? Yes    Was the patient seen the past year? Yes    Date of last office visit: 2/8/2023    Does the patient have an upcoming appointment?  Yes   If yes, When? 8/10/2023    Provider to refill:BORIS    Does the patients insurance require a 100 day supply?  Yes

## 2023-03-30 ENCOUNTER — HOSPITAL ENCOUNTER (OUTPATIENT)
Dept: CARDIOLOGY | Facility: MEDICAL CENTER | Age: 83
End: 2023-03-30
Attending: INTERNAL MEDICINE
Payer: MEDICARE

## 2023-03-30 DIAGNOSIS — I35.0 AORTIC VALVE STENOSIS, ETIOLOGY OF CARDIAC VALVE DISEASE UNSPECIFIED: ICD-10-CM

## 2023-03-30 LAB
LV EJECT FRACT  99904: 65
LV EJECT FRACT MOD 2C 99903: 61.18
LV EJECT FRACT MOD 4C 99902: 57.56
LV EJECT FRACT MOD BP 99901: 60.23

## 2023-03-30 PROCEDURE — 93306 TTE W/DOPPLER COMPLETE: CPT

## 2023-03-30 PROCEDURE — 93306 TTE W/DOPPLER COMPLETE: CPT | Mod: 26 | Performed by: INTERNAL MEDICINE

## 2023-04-03 NOTE — RESULT ENCOUNTER NOTE
Chuck I have reviewed your echocardiogram and is continues to show very good heart muscle function and the aortic valve remains stable with moderate narrowing which is unchanged from a year ago  I don't recommend any changes at this time  See you in August.

## 2023-04-10 ENCOUNTER — DOCUMENTATION (OUTPATIENT)
Dept: CARDIOLOGY | Facility: MEDICAL CENTER | Age: 83
End: 2023-04-10
Payer: MEDICARE

## 2023-04-10 NOTE — PROGRESS NOTES
This patient has been flagged through our echocardiogram surveillance with the following echocardiogram results:    Moderate Aortic Stenosis    Cardiologist: Dr. Gonzalez    Current Plan of Care for Structural Heart Disease: Added to surveillance tracking list    Next Echo date needed by: 03/30/2024    Next Follow up appointment: 08/10/2023 with Dr. Gonzalez

## 2023-06-13 ENCOUNTER — APPOINTMENT (RX ONLY)
Dept: URBAN - METROPOLITAN AREA CLINIC 35 | Facility: CLINIC | Age: 83
Setting detail: DERMATOLOGY
End: 2023-06-13

## 2023-06-13 DIAGNOSIS — D22 MELANOCYTIC NEVI: ICD-10-CM

## 2023-06-13 DIAGNOSIS — D485 NEOPLASM OF UNCERTAIN BEHAVIOR OF SKIN: ICD-10-CM

## 2023-06-13 DIAGNOSIS — Z85.828 PERSONAL HISTORY OF OTHER MALIGNANT NEOPLASM OF SKIN: ICD-10-CM

## 2023-06-13 DIAGNOSIS — Z71.89 OTHER SPECIFIED COUNSELING: ICD-10-CM

## 2023-06-13 DIAGNOSIS — L82.0 INFLAMED SEBORRHEIC KERATOSIS: ICD-10-CM

## 2023-06-13 DIAGNOSIS — L81.4 OTHER MELANIN HYPERPIGMENTATION: ICD-10-CM

## 2023-06-13 DIAGNOSIS — L82.1 OTHER SEBORRHEIC KERATOSIS: ICD-10-CM

## 2023-06-13 DIAGNOSIS — L57.0 ACTINIC KERATOSIS: ICD-10-CM

## 2023-06-13 PROBLEM — D48.5 NEOPLASM OF UNCERTAIN BEHAVIOR OF SKIN: Status: ACTIVE | Noted: 2023-06-13

## 2023-06-13 PROBLEM — D22.5 MELANOCYTIC NEVI OF TRUNK: Status: ACTIVE | Noted: 2023-06-13

## 2023-06-13 PROCEDURE — ? TREATMENT REGIMEN

## 2023-06-13 PROCEDURE — ? COUNSELING

## 2023-06-13 PROCEDURE — 17110 DESTRUCTION B9 LES UP TO 14: CPT | Mod: 59

## 2023-06-13 PROCEDURE — ? LIQUID NITROGEN

## 2023-06-13 PROCEDURE — 11103 TANGNTL BX SKIN EA SEP/ADDL: CPT | Mod: 59

## 2023-06-13 PROCEDURE — ? BIOPSY BY PUNCH METHOD

## 2023-06-13 PROCEDURE — 99213 OFFICE O/P EST LOW 20 MIN: CPT | Mod: 25

## 2023-06-13 PROCEDURE — 11105 PUNCH BX SKIN EA SEP/ADDL: CPT

## 2023-06-13 PROCEDURE — 17000 DESTRUCT PREMALG LESION: CPT | Mod: 59

## 2023-06-13 PROCEDURE — ? BIOPSY BY SHAVE METHOD

## 2023-06-13 PROCEDURE — 11104 PUNCH BX SKIN SINGLE LESION: CPT

## 2023-06-13 PROCEDURE — 17003 DESTRUCT PREMALG LES 2-14: CPT | Mod: 59

## 2023-06-13 ASSESSMENT — LOCATION ZONE DERM
LOCATION ZONE: EAR
LOCATION ZONE: TRUNK
LOCATION ZONE: ARM
LOCATION ZONE: FACE
LOCATION ZONE: NECK

## 2023-06-13 ASSESSMENT — LOCATION DETAILED DESCRIPTION DERM
LOCATION DETAILED: LEFT INFERIOR FOREHEAD
LOCATION DETAILED: LEFT SUPERIOR LATERAL MALAR CHEEK
LOCATION DETAILED: RIGHT SUPERIOR LATERAL UPPER BACK
LOCATION DETAILED: LEFT POSTERIOR EAR
LOCATION DETAILED: RIGHT INFERIOR MEDIAL FOREHEAD
LOCATION DETAILED: SUPERIOR MID FOREHEAD
LOCATION DETAILED: RIGHT SUPERIOR POSTERIOR NECK
LOCATION DETAILED: LEFT SUPERIOR HELIX
LOCATION DETAILED: LEFT LATERAL TEMPLE
LOCATION DETAILED: RIGHT DISTAL DORSAL FOREARM
LOCATION DETAILED: INFERIOR THORACIC SPINE
LOCATION DETAILED: RIGHT SUPERIOR MEDIAL MIDBACK
LOCATION DETAILED: EPIGASTRIC SKIN

## 2023-06-13 ASSESSMENT — LOCATION SIMPLE DESCRIPTION DERM
LOCATION SIMPLE: LEFT EAR
LOCATION SIMPLE: LEFT TEMPLE
LOCATION SIMPLE: SUPERIOR FOREHEAD
LOCATION SIMPLE: LEFT FOREHEAD
LOCATION SIMPLE: LEFT CHEEK
LOCATION SIMPLE: NECK
LOCATION SIMPLE: RIGHT FOREHEAD
LOCATION SIMPLE: RIGHT LOWER BACK
LOCATION SIMPLE: ABDOMEN
LOCATION SIMPLE: RIGHT FOREARM
LOCATION SIMPLE: RIGHT UPPER BACK
LOCATION SIMPLE: UPPER BACK

## 2023-06-13 NOTE — PROCEDURE: TREATMENT REGIMEN
Continue Regimen: Fluorouracil 5% and Calcipotriene 0.005% creams twice for 4 days
Detail Level: Zone

## 2023-06-13 NOTE — PROCEDURE: BIOPSY BY SHAVE METHOD

## 2023-06-13 NOTE — PROCEDURE: LIQUID NITROGEN
Detail Level: Detailed
Show Aperture Variable?: Yes
Duration Of Freeze Thaw-Cycle (Seconds): 10
Render Note In Bullet Format When Appropriate: No
Consent: The patient's consent was obtained including but not limited to risks of crusting, scabbing, blistering, scarring, darker or lighter pigmentary change, recurrence, incomplete removal and infection.
Post-Care Instructions: I reviewed with the patient in detail post-care instructions. Patient is to wear sunprotection, and avoid picking at any of the treated lesions. Pt may apply Vaseline to crusted or scabbing areas.
Number Of Freeze-Thaw Cycles: 1 freeze-thaw cycle
Number Of Freeze-Thaw Cycles: 2 freeze-thaw cycles
Spray Paint Text: The liquid nitrogen was applied to the skin utilizing a spray paint frosting technique.
Medical Necessity Information: It is in your best interest to select a reason for this procedure from the list below. All of these items fulfill various CMS LCD requirements except the new and changing color options.
Medical Necessity Clause: This procedure was medically necessary because the lesions that were treated were:

## 2023-06-13 NOTE — PROCEDURE: BIOPSY BY PUNCH METHOD
Detail Level: Detailed
Was A Bandage Applied: Yes
Punch Size In Mm: 3
Size Of Lesion In Cm (Optional): 0.4
X Size Of Lesion In Cm (Optional): 0
Depth Of Punch Biopsy: dermis
Biopsy Type: H and E
Anesthesia Type: 0.5% lidocaine without epinephrine
Anesthesia Volume In Cc: 0.6
Hemostasis: None
Epidermal Sutures: 5-0 Nylon
Wound Care: Petrolatum
Dressing: bandage
Suture Removal: 14 days
Patient Will Remove Sutures At Home?: No
Lab: [id number]
Lab Facility: 
Consent: Written consent was obtained and risks were reviewed including but not limited to scarring, infection, bleeding, scabbing, incomplete removal, nerve damage and allergy to anesthesia.
Post-Care Instructions: I reviewed with the patient in detail post-care instructions. Patient is to keep the biopsy site dry overnight, and then change the bandage and apply petrolatum once daily until sutures are removed.  Use a clean q-tip to apply the petrolatum and avoid touching the biopsy site with your hands.  Avoid immersing in water such as bathtub or swimming pools. Any concerns about a wound infection come into the office for a walk in visit Monday through Friday 8:30 am to 12 pm or 1 pm to 4:30 pm Signs of infection include increasing pain, redness, and drainage from biopsy site.  If we are not in the office, please call through the answering service.
Home Suture Removal Text: Patient will remove their sutures at home.  If they have any questions or difficulties they will call the office.
Notification Instructions: Patient will be notified of biopsy results. However, patient instructed to call the office if not contacted within 2 weeks.
Billing Type: Third-Party Bill
Information: Selecting Yes will display possible errors in your note based on the variables you have selected. This validation is only offered as a suggestion for you. PLEASE NOTE THAT THE VALIDATION TEXT WILL BE REMOVED WHEN YOU FINALIZE YOUR NOTE. IF YOU WANT TO FAX A PRELIMINARY NOTE YOU WILL NEED TO TOGGLE THIS TO 'NO' IF YOU DO NOT WANT IT IN YOUR FAXED NOTE.
Punch Size In Mm: 6
Size Of Lesion In Cm (Optional): 0.7

## 2023-06-20 ENCOUNTER — APPOINTMENT (RX ONLY)
Dept: URBAN - METROPOLITAN AREA CLINIC 35 | Facility: CLINIC | Age: 83
Setting detail: DERMATOLOGY
End: 2023-06-20

## 2023-06-20 DIAGNOSIS — Z48.02 ENCOUNTER FOR REMOVAL OF SUTURES: ICD-10-CM

## 2023-06-20 PROCEDURE — ? SUTURE REMOVAL (GLOBAL PERIOD)

## 2023-06-20 ASSESSMENT — LOCATION SIMPLE DESCRIPTION DERM
LOCATION SIMPLE: RIGHT FOREHEAD
LOCATION SIMPLE: RIGHT FOREARM

## 2023-06-20 ASSESSMENT — LOCATION DETAILED DESCRIPTION DERM
LOCATION DETAILED: RIGHT INFERIOR MEDIAL FOREHEAD
LOCATION DETAILED: RIGHT DISTAL RADIAL DORSAL FOREARM

## 2023-06-20 ASSESSMENT — LOCATION ZONE DERM
LOCATION ZONE: ARM
LOCATION ZONE: FACE

## 2023-06-20 NOTE — PROCEDURE: SUTURE REMOVAL (GLOBAL PERIOD)
Detail Level: Detailed
Add 95939 Cpt? (Important Note: In 2017 The Use Of 14356 Is Being Tracked By Cms To Determine Future Global Period Reimbursement For Global Periods): no

## 2023-07-20 ENCOUNTER — OFFICE VISIT (OUTPATIENT)
Dept: INTERNAL MEDICINE | Facility: IMAGING CENTER | Age: 83
End: 2023-07-20
Payer: MEDICARE

## 2023-07-20 VITALS
RESPIRATION RATE: 14 BRPM | DIASTOLIC BLOOD PRESSURE: 84 MMHG | HEART RATE: 67 BPM | SYSTOLIC BLOOD PRESSURE: 142 MMHG | TEMPERATURE: 99 F | OXYGEN SATURATION: 94 %

## 2023-07-20 DIAGNOSIS — J01.00 ACUTE NON-RECURRENT MAXILLARY SINUSITIS: ICD-10-CM

## 2023-07-20 PROCEDURE — 99213 OFFICE O/P EST LOW 20 MIN: CPT | Performed by: INTERNAL MEDICINE

## 2023-07-20 PROCEDURE — 3077F SYST BP >= 140 MM HG: CPT | Performed by: INTERNAL MEDICINE

## 2023-07-20 PROCEDURE — 3079F DIAST BP 80-89 MM HG: CPT | Performed by: INTERNAL MEDICINE

## 2023-07-20 RX ORDER — AMOXICILLIN AND CLAVULANATE POTASSIUM 875; 125 MG/1; MG/1
1 TABLET, FILM COATED ORAL 2 TIMES DAILY
Qty: 20 TABLET | Refills: 0 | Status: SHIPPED | OUTPATIENT
Start: 2023-07-20 | End: 2023-08-17 | Stop reason: SDUPTHER

## 2023-07-20 RX ORDER — METHYLPREDNISOLONE 4 MG/1
TABLET ORAL
Qty: 21 TABLET | Refills: 0 | Status: SHIPPED | OUTPATIENT
Start: 2023-07-20 | End: 2023-12-21

## 2023-07-20 NOTE — PROGRESS NOTES
Chief Complaint   Patient presents with    Nasal Congestion       HISTORY OF THE PRESENT ILLNESS: Patient is a 83 y.o. male.     Patient comes in with 2 weeks of nasal congestion and maxillary sinus pain.  Discharge is clear.  No fever or chills.  He gets fullness in his ears and feels that he has decreased hearing.  He tried cleaning out his ears with hydroperoxide.  No history of allergies.  He has tried nasal irrigation with little success.    Allergies: Morphine    Current Outpatient Medications Ordered in Epic   Medication Sig Dispense Refill    amoxicillin-clavulanate (AUGMENTIN) 875-125 MG Tab Take 1 Tablet by mouth 2 times a day. Take with food. 20 Tablet 0    methylPREDNISolone (MEDROL DOSEPAK) 4 MG Tablet Therapy Pack As directed on the packaging label. 21 Tablet 0    losartan (COZAAR) 50 MG Tab TAKE 1 TABLET BY MOUTH EVERY  Tablet 3    cyclobenzaprine (FLEXERIL) 10 mg Tab TAKE 1 TABLET BY MOUTH 3 TIMES A DAY AS NEEDED FOR MODERATE PAIN (BACK SPASMS). 30 Tablet 0    atorvastatin (LIPITOR) 40 MG Tab TAKE 1 TABLET BY MOUTH EVERY  Tablet 0    meloxicam (MOBIC) 15 MG tablet Take 1 Tablet by mouth 1 time a day as needed for Inflammation, Mild Pain or Moderate Pain. 30 Tablet 1    ELIQUIS 5 MG Tab TAKE 1 TABLET BY MOUTH TWICE A  Tablet 2    triamterene/hctz (MAXZIDE-25/DYAZIDE) 37.5-25 MG Cap Take 1 Capsule by mouth every morning. Take 1 capsule by mouth every morning 90 Capsule 3    allopurinol (ZYLOPRIM) 100 MG Tab TAKE 1 TABLET BY MOUTH TWICE A  Tablet 3    acetaminophen (TYLENOL) 325 MG Tab Take 650 mg by mouth every four hours as needed for Moderate Pain.       No current Epic-ordered facility-administered medications on file.       Past medical history, social history and family history were reviewed from chart today    Review of systems: Per HPI.    All others negative.     Exam: BP (!) 142/84 (BP Location: Left arm, Patient Position: Sitting, BP Cuff Size: Adult)   Pulse 67    Temp 37.2 °C (99 °F) (Temporal)   Resp 14   SpO2 94%   General: Well-appearing. Well-developed. No signs of distress.  HEENT: Bilateral nasal cavities are edematous and obstructed.  The right ear canal is obstructed with brown cerumen.  Attempt to remove with curette was unsuccessful due to discomfort.  Neck: Supple without JVD or bruit.  Pulmonary: Clear with good breath sounds. Normal effort.  Cardiovascular: Regular. Carotid and radial pulses are intact.  Abdomen: Soft, nontender, nondistended. Spleen and liver are not enlarged.  Neurologic: Cranial nerves II through XII are grossly normal, alert and oriented x3      Diagnosis:  1. Acute non-recurrent maxillary sinusitis          Suspect acute maxillary sinusitis  Augmentin  Medrol Dosepak  Nasal irrigation with saline.    Okay to use Afrin for 3-5 days      My total time spent caring for the patient on the day of the encounter was  greater than 20 minutes.   This includes obtaining history, reviewing chart, physical exam, patient education, reviewing outside records, placing orders, interpreting tests and coordinating care.

## 2023-07-28 DIAGNOSIS — E78.2 HYPERLIPEMIA, MIXED: ICD-10-CM

## 2023-07-28 RX ORDER — ALLOPURINOL 100 MG/1
TABLET ORAL
Qty: 180 TABLET | Refills: 3 | Status: SHIPPED | OUTPATIENT
Start: 2023-07-28

## 2023-07-28 RX ORDER — ATORVASTATIN CALCIUM 40 MG/1
TABLET, FILM COATED ORAL
Qty: 100 TABLET | Refills: 2 | Status: SHIPPED | OUTPATIENT
Start: 2023-07-28

## 2023-08-01 ENCOUNTER — APPOINTMENT (RX ONLY)
Dept: URBAN - METROPOLITAN AREA CLINIC 36 | Facility: CLINIC | Age: 83
Setting detail: DERMATOLOGY
End: 2023-08-01

## 2023-08-01 PROBLEM — C44.319 BASAL CELL CARCINOMA OF SKIN OF OTHER PARTS OF FACE: Status: ACTIVE | Noted: 2023-08-01

## 2023-08-01 PROCEDURE — ? MOHS SURGERY

## 2023-08-01 PROCEDURE — 17312 MOHS ADDL STAGE: CPT

## 2023-08-01 PROCEDURE — 17311 MOHS 1 STAGE H/N/HF/G: CPT

## 2023-08-01 PROCEDURE — 13132 CMPLX RPR F/C/C/M/N/AX/G/H/F: CPT

## 2023-08-01 NOTE — PROCEDURE: MOHS SURGERY
Mohs Case Number: 
Previous Accession (Optional): [id number]
Biopsy Photograph Reviewed: Yes
Referring Physician (Optional): Matthias
Consent Type: Consent 1 (Standard)
Eye Shield Used: No
Surgeon Performing Repair (Optional): Santino
Initial Size Of Lesion: 0.4
Number Of Stages: 2
Primary Defect Length In Cm (Final Defect Size - Required For Flaps/Grafts): 1.7
Primary Defect Width In Cm (Final Defect Size - Required For Flaps/Grafts): 0.9
Repair Type: Complex Repair
Oculoplastic Surgeon (A): Jacinto
Oculoplastic Surgeon Procedure Text (A): After obtaining clear surgical margins the patient was sent to oculoplastics for surgical repair.  The patient understands they will receive post-surgical care and follow-up from the referring physician's office.
Otolaryngologist Procedure Text (A): After obtaining clear surgical margins the patient was sent to otolaryngology for surgical repair.  The patient understands they will receive post-surgical care and follow-up from the referring physician's office.
Plastic Surgeon Procedure Text (A): After obtaining clear surgical margins the patient was sent to plastics for surgical repair.  The patient understands they will receive post-surgical care and follow-up from the referring physician's office.
Mid-Level (A): did
Mid-Level Procedure Text (A): After obtaining clear surgical margins the patient was sent to a mid-level provider for surgical repair.  The patient understands they will receive post-surgical care and follow-up from the mid-level provider.
Provider Procedure Text (A): After obtaining clear surgical margins the defect was repaired by another provider.
Asc Procedure Text (A): After obtaining clear surgical margins the patient was sent to an ASC for surgical repair.  The patient understands they will receive post-surgical care and follow-up from the ASC physician.
Simple / Intermediate / Complex Repair - Final Wound Length In Cm: 2.9
Suturegard Retention Suture: 2-0 Nylon
Retention Suture Bite Size: 3 mm
Length To Time In Minutes Device Was In Place: 10
Number Of Hemigard Strips Per Side: 1
Complex/Intermediate Repair Variations: Crescentic
Distance Of Undermining In Cm (Required): 1.5
Undermining Type: One Wound Edge
Debridement Text: The wound edges were debrided prior to proceeding with the closure to facilitate wound healing.
Helical Rim Text: The closure involved the helical rim.
Vermilion Border Text: The closure involved the vermilion border.
Nostril Rim Text: The closure involved the nostril rim.
Retention Suture Text: Retention sutures were placed to support the closure and prevent dehiscence.
Secondary Defect Length In Cm (Required For Flaps): 0
Area H Indication Text: Tumors in this location are included in Area H (eyelids, eyebrows, nose, lips, chin, ear, pre-auricular, post-auricular, temple, genitalia, hands, feet, ankles and areola).  Tissue conservation is critical in these anatomic locations.
Area M Indication Text: Tumors in this location are included in Area M (cheek, forehead, scalp, neck, jawline and pretibial skin).  Mohs surgery is indicated for tumors in these anatomic locations.
Area L Indication Text: Tumors in this location are included in Area L (trunk and extremities).  Mohs surgery is indicated for larger tumors, or tumors with aggressive histologic features, in these anatomic locations.
Depth Of Tumor Invasion (For Histology): adipose tissue
Perineural Invasion (For Histology - Be Specific If Possible): absent
Presence Of Scar Tissue (For Histology): present
Surgical Defect Length In Cm (Optional): 0.7
Special Stains Stage 1 - Results: Base On Clearance Noted Above
Stage 2: Additional Anesthesia Type: 1% lidocaine with 1:100,000 epinephrine and 408mcg clindamycin/ml and a 1:10 solution of 8.4% sodium bicarbonate
Stage 4: Additional Anesthesia Type: 1% lidocaine with epinephrine
Staging Info: By selecting yes to the question above you will include information on AJCC 8 tumor staging in your Mohs note. Information on tumor staging will be automatically added for SCCs on the head and neck. AJCC 8 includes tumor size, tumor depth, perineural involvement and bone invasion.
Tumor Depth: Less than 6mm from granular layer and no invasion beyond the subcutaneous fat
Was The Patient On Physician Recommended Anticoagulation Therapy?: Please Select the Appropriate Response
Medical Necessity Statement: Based on my medical judgement, Mohs surgery is the most appropriate treatment for this cancer compared to other treatments.
Alternatives Discussed Intro (Do Not Add Period): I discussed alternative treatments to Mohs surgery and specifically discussed the risks and benefits of
Consent 1/Introductory Paragraph: The rationale for Mohs was explained to the patient and consent was obtained. The risks, benefits and alternatives to therapy were discussed in detail. Specifically, the risks of infection, scarring, bleeding, prolonged wound healing, incomplete removal, allergy to anesthesia, nerve injury and recurrence were addressed. Prior to the procedure, the treatment site was clearly identified and confirmed by the patient. All components of Universal Protocol/PAUSE Rule completed.
Consent 2/Introductory Paragraph: Mohs surgery was explained to the patient and consent was obtained. The risks, benefits and alternatives to therapy were discussed in detail. Specifically, the risks of infection, scarring, bleeding, prolonged wound healing, incomplete removal, allergy to anesthesia, nerve injury and recurrence were addressed. Prior to the procedure, the treatment site was clearly identified and confirmed by the patient. All components of Universal Protocol/PAUSE Rule completed.
Consent 3/Introductory Paragraph: I gave the patient a chance to ask questions they had about the procedure.  Following this I explained the Mohs procedure and consent was obtained. The risks, benefits and alternatives to therapy were discussed in detail. Specifically, the risks of infection, scarring, bleeding, prolonged wound healing, incomplete removal, allergy to anesthesia, nerve injury and recurrence were addressed. Prior to the procedure, the treatment site was clearly identified and confirmed by the patient. All components of Universal Protocol/PAUSE Rule completed.
Consent (Temporal Branch)/Introductory Paragraph: The rationale for Mohs was explained to the patient and consent was obtained. The risks, benefits and alternatives to therapy were discussed in detail. Specifically, the risks of damage to the temporal branch of the facial nerve, infection, scarring, bleeding, prolonged wound healing, incomplete removal, allergy to anesthesia, and recurrence were addressed. Prior to the procedure, the treatment site was clearly identified and confirmed by the patient. All components of Universal Protocol/PAUSE Rule completed.
Consent (Marginal Mandibular)/Introductory Paragraph: The rationale for Mohs was explained to the patient and consent was obtained. The risks, benefits and alternatives to therapy were discussed in detail. Specifically, the risks of damage to the marginal mandibular branch of the facial nerve, infection, scarring, bleeding, prolonged wound healing, incomplete removal, allergy to anesthesia, and recurrence were addressed. Prior to the procedure, the treatment site was clearly identified and confirmed by the patient. All components of Universal Protocol/PAUSE Rule completed.
Consent (Spinal Accessory)/Introductory Paragraph: The rationale for Mohs was explained to the patient and consent was obtained. The risks, benefits and alternatives to therapy were discussed in detail. Specifically, the risks of damage to the spinal accessory nerve, infection, scarring, bleeding, prolonged wound healing, incomplete removal, allergy to anesthesia, and recurrence were addressed. Prior to the procedure, the treatment site was clearly identified and confirmed by the patient. All components of Universal Protocol/PAUSE Rule completed.
Consent (Near Eyelid Margin)/Introductory Paragraph: The rationale for Mohs was explained to the patient and consent was obtained. The risks, benefits and alternatives to therapy were discussed in detail. Specifically, the risks of ectropion or eyelid deformity, infection, scarring, bleeding, prolonged wound healing, incomplete removal, allergy to anesthesia, nerve injury and recurrence were addressed. Prior to the procedure, the treatment site was clearly identified and confirmed by the patient. All components of Universal Protocol/PAUSE Rule completed.
Consent (Ear)/Introductory Paragraph: The rationale for Mohs was explained to the patient and consent was obtained. The risks, benefits and alternatives to therapy were discussed in detail. Specifically, the risks of ear deformity, infection, scarring, bleeding, prolonged wound healing, incomplete removal, allergy to anesthesia, nerve injury and recurrence were addressed. Prior to the procedure, the treatment site was clearly identified and confirmed by the patient. All components of Universal Protocol/PAUSE Rule completed.
Consent (Nose)/Introductory Paragraph: The rationale for Mohs was explained to the patient and consent was obtained. The risks, benefits and alternatives to therapy were discussed in detail. Specifically, the risks of nasal deformity, changes in the flow of air through the nose, infection, scarring, bleeding, prolonged wound healing, incomplete removal, allergy to anesthesia, nerve injury and recurrence were addressed. Prior to the procedure, the treatment site was clearly identified and confirmed by the patient. All components of Universal Protocol/PAUSE Rule completed.
Consent (Lip)/Introductory Paragraph: The rationale for Mohs was explained to the patient and consent was obtained. The risks, benefits and alternatives to therapy were discussed in detail. Specifically, the risks of lip deformity, changes in the oral aperture, infection, scarring, bleeding, prolonged wound healing, incomplete removal, allergy to anesthesia, nerve injury and recurrence were addressed. Prior to the procedure, the treatment site was clearly identified and confirmed by the patient. All components of Universal Protocol/PAUSE Rule completed.
Consent (Scalp)/Introductory Paragraph: The rationale for Mohs was explained to the patient and consent was obtained. The risks, benefits and alternatives to therapy were discussed in detail. Specifically, the risks of changes in hair growth pattern secondary to repair, infection, scarring, bleeding, prolonged wound healing, incomplete removal, allergy to anesthesia, nerve injury and recurrence were addressed. Prior to the procedure, the treatment site was clearly identified and confirmed by the patient. All components of Universal Protocol/PAUSE Rule completed.
Detail Level: Detailed
Postop Diagnosis: same
Anesthesia Type: 1% lidocaine with 1:100,000 epinephrine and a 1:10 solution of 8.4% sodium bicarbonate
Anesthesia Volume In Cc: 2.5
Additional Anesthesia Volume In Cc: 6
Hemostasis: Electrocautery
Estimated Blood Loss (Cc): less than 5 cc
Repair Anesthesia Method: local infiltration
Brow Lift Text: A midfrontal incision was made medially to the defect to allow access to the tissues just superior to the left eyebrow. Following careful dissection inferiorly in a supraperiosteal plane to the level of the left eyebrow, several 3-0 monocryl sutures were used to resuspend the eyebrow orbicularis oculi muscular unit to the superior frontal bone periosteum. This resulted in an appropriate reapproximation of static eyebrow symmetry and correction of the left brow ptosis.
Deep Sutures: 5-0 Polysorb
Epidermal Sutures: 5-0 Prolene
Epidermal Closure: running cuticular
Suturegard Intro: Intraoperative tissue expansion was performed, utilizing the SUTUREGARD device, in order to reduce wound tension.
Suturegard Body: The suture ends were repeatedly re-tightened and re-clamped to achieve the desired tissue expansion.
Hemigard Intro: Due to skin fragility and wound tension, it was decided to use HEMIGARD adhesive retention suture devices to permit a linear closure. The skin was cleaned and dried for a 6cm distance away from the wound. Excessive hair, if present, was removed to allow for adhesion.
Hemigard Postcare Instructions: The HEMIGARD strips are to remain completely dry for at least 5-7 days.
Donor Site Anesthesia Type: same as repair anesthesia
Graft Basting Suture (Optional): 5-0 Fast Absorbing Gut
Graft Donor Site Epidermal Sutures (Optional): 5-0 Ethibond
Epidermal Closure Graft Donor Site (Optional): simple interrupted
Graft Donor Site Bandage (Optional-Leave Blank If You Don't Want In Note): Aquaphor and telefa placed on wound. Pressure dressing applied to donor site
Closure 2 Information: This tab is for additional flaps and grafts, including complex repair and grafts and complex repair and flaps. You can also specify a different location for the additional defect, if the location is the same you do not need to select a new one. We will insert the automated text for the repair you select below just as we do for solitary flaps and grafts. Please note that at this time if you select a location with a different insurance zone you will need to override the ICD10 and CPT if appropriate.
Closure 3 Information: This tab is for additional flaps and grafts above and beyond our usual structured repairs.  Please note if you enter information here it will not currently bill and you will need to add the billing information manually.
Wound Care: Aquaphor
Dressing: dry sterile dressing
Wound Care (No Sutures): Petrolatum
Suture Removal: 7 days
Unna Boot Text: An Unna boot was placed to help immobilize the limb and facilitate more rapid healing.
Home Suture Removal Text: Patient was provided instructions on removing sutures and will remove their sutures at home.  If they have any questions or difficulties they will call the office.
Post-Care Instructions: I reviewed with the patient in detail post-care instructions. Patient is not to engage in any heavy lifting, exercise, or swimming for the next 14 days. Should the patient develop any fevers, chills, bleeding, severe pain patient will contact the office immediately.
Pain Refusal Text: I offered to prescribe pain medication but the patient refused to take this medication.
Mauc Instructions: By selecting yes to the question below the MAUC number will be added into the note.  This will be calculated automatically based on the diagnosis chosen, the size entered, the body zone selected (H,M,L) and the specific indications you chose. You will also have the option to override the Mohs AUC if you disagree with the automatically calculated number and this option is found in the Case Summary tab.
Where Do You Want The Question To Include Opioid Counseling Located?: Case Summary Tab
Eye Protection Verbiage: Before proceeding with the stage, a plastic scleral shield was inserted. The globe was anesthetized with a few drops of 1% lidocaine with 1:100,000 epinephrine. Then, an appropriate sized scleral shield was chosen and coated with lacrilube ointment. The shield was gently inserted and left in place for the duration of each stage. After the stage was completed, the shield was gently removed.
Mohs Method Verbiage: An incision at a 45 degree angle following the standard Mohs approach was done and the specimen was harvested as a microscopic controlled layer.
Surgeon/Pathologist Verbiage (Will Incorporate Name Of Surgeon From Intro If Not Blank): operated in two distinct and integrated capacities as the surgeon and pathologist.
Mohs Histo Method Verbiage: Each section was then chromacoded and processed in the Mohs lab using the Mohs protocol and submitted for frozen section.
Subsequent Stages Histo Method Verbiage: Using a similar technique to that described above, a thin layer of tissue was removed from all areas where tumor was visible on the previous stage.  The tissue was again oriented, mapped, dyed, and processed as above.
Mohs Rapid Report Verbiage: The area of clinically evident tumor was marked with skin marking ink and appropriately hatched.  The initial incision was made following the Mohs approach through the skin.  The specimen was taken to the lab, divided into the necessary number of pieces, chromacoded and processed according to the Mohs protocol.  This was repeated in successive stages until a tumor free defect was achieved.
Complex Repair Preamble Text (Leave Blank If You Do Not Want): Extensive wide undermining was performed at least 2 cm in all directions.
Intermediate Repair Preamble Text (Leave Blank If You Do Not Want): Undermining was performed with blunt dissection.
M-Plasty Complex Repair Preamble Text (Leave Blank If You Do Not Want): Extensive wide undermining was performed.
Non-Graft Cartilage Fenestration Text: The cartilage was fenestrated with a 2mm punch biopsy to help facilitate healing.
Graft Cartilage Fenestration Text: The cartilage was fenestrated with a 2mm punch biopsy to help facilitate graft survival and healing.
Secondary Intention Text (Leave Blank If You Do Not Want): The defect will heal with secondary intention.
No Repair - Repaired With Adjacent Surgical Defect Text (Leave Blank If You Do Not Want): After obtaining clear surgical margins the defect was repaired concurrently with another surgical defect which was in close approximation.
Unique Flap 1 Name: Myocutaneous Island pedicle Flap
Unique Flap 2 Name: Peng Flap
Unique Flap 3 Name: Mercedes Flap
Unique Flap 4 Name: Banner Flap
Unique Flap 5 Name: tunneled myocutaneous flap
Unique Flap 6 Name: Sugar-B?ch flap
Unique Flap 7 Name: Mustarde flap
Unique Flap 8 Name: East to West Flap
Unique Flap 1 Text: A decision was made to reconstruct the defect utilizing a myocutaneous Island pedicle Flap based on the levator labii superioris muscle.  A telfa template was made of the defect.  This telfa template was then used to outline the myocutaneous flap, based along the meilolabial fold.  The donor area for the pedicle flap was then injected with anesthesia.  The flap was excised through the skin and subcutaneous tissue down to the layer of the underlying musculature.  The myocutaneous flap was carefully excised within this deep plane to maintain its blood supply. Based on the muscle. The edges of the donor site were undermined.   The donor site was closed in a primary fashion to the point of transposition.  The pedicle was then transposed into position and sutured.  Once the flap was sutured into place, adequate blood supply was confirmed with blanching and refill.
Unique Flap 2 Text: A decision was made to reconstruct the defect utilizing a Peng Flap (Bilateral Advancement Rotation Flap). Given the location of the defect and the proximity to free margins, this flap was deemed most appropriate.  Using a sterile surgical marker, the appropriate rotation flaps were drawn incorporating the defect and placing the expected incisions within the relaxed skin tension lines where possible.    The area thus outlined was incised deep to adipose tissue with a #15 scalpel blade.  The skin margins were undermined to an appropriate distance in all directions utilizing iris scissors.
Unique Flap 3 Text: The defect edges were debeveled with a #15 scalpel blade.  Given the location of the defect, shape of the defect and the proximity to free margins a Mercedes (double advancement flap) was deemed most appropriate.  Using a sterile surgical marker, the appropriate transposition flaps were drawn incorporating the defect and placing the expected incisions within the relaxed skin tension lines where possible.    The area thus outlined was incised deep to adipose tissue with a #15 scalpel blade.  The skin margins were undermined to an appropriate distance in all directions utilizing iris scissors.  Hemostasis was achieved with electrocautery.  The flaps were then advanced into the defect and anchored with interrupted buried subcutaneous sutures.
Unique Flap 4 Text: The defect edges were debeveled with a #15 scalpel blade.  Given the location of the defect and the proximity to free margins a Banner transposition flap was deemed most appropriate.  Using a sterile surgical marker, an appropriate Banner transposition flap was drawn incorporating the defect.    The area thus outlined was incised deep to adipose tissue with a #15 scalpel blade.  The skin margins were undermined to an appropriate distance in all directions utilizing iris scissors.
Unique Flap 5 Text: A decision was made to reconstruct the defect utilizing a tunneled myocutaneous Island pedicle Flap based on the anterior auricularis muscle.  A telfa template was made of the defect.  This telfa template was then used to outline the myocutaneous flap, based along the preauricular fold.  The donor area for the pedicle flap was then injected with anesthesia.  The flap was excised through the skin and subcutaneous tissue down to the layer of the underlying musculature.  The myocutaneous flap was carefully excised within this deep plane to maintain its blood supply based on the muscle. The edges of the donor site were undermined.   The donor site was closed in a primary fashion to the point of transposition.  The pedicle was then transposed through a tunnel into position and sutured.  Once the flap was sutured into place, adequate blood supply was confirmed with blanching and refill.
Unique Flap 6 Text: A decision was made to reconstruct the defect utilizing an Anti-aging-B?ch Flap (Bilateral helical Advancement Rotation Flap). Given the location of the defect and the proximity to free margins, this flap was deemed most appropriate.  Using a sterile surgical marker, the appropriate flaps were drawn incorporating the defect and placing the expected incisions within the relaxed skin tension lines where possible.  The area thus outlined was incised deep to adipose tissue with a #15 scalpel blade.  The skin margins were undermined to an appropriate distance in all directions utilizing iris scissors. Cartilage was incorporated into the flap arms to maintain helical anatomy.
Unique Flap 7 Text: A decision was made to reconstruct the defect utilizing a Mustarde Flap (Advancement Rotation Flap). Given the location of the defect and the proximity to free margins, this flap was deemed most appropriate.  Using a sterile surgical marker, the appropriate rotation flap was drawn incorporating the defect and placing the expected incisions within the relaxed skin tension lines where possible.    The area thus outlined was incised deep to adipose tissue with a #15 scalpel blade.  The skin margins were undermined to an appropriate distance in all directions utilizing iris scissors. The flap was advanced and rotated under the eyelid with a sling created laterally to keep ectropion minimal.
Unique Flap 8 Text: A decision was made to reconstruct the defect utilizing an East to West Flap (Modified Burows Advancement Flap). Given the location of the defect and the proximity to free margins, this flap was deemed most appropriate.  Using a sterile surgical marker, the appropriate advancement flaps were drawn incorporating the defect and placing the expected incisions within the relaxed skin tension lines where possible.    The area thus outlined was incised deep to adipose tissue with a #15 scalpel blade.  The skin margins were undermined to an appropriate distance in all directions utilizing iris scissors. Minimal alar distortion was created with flap approximation.
Adjacent Tissue Transfer Text: The defect edges were debeveled with a #15 scalpel blade.  Given the location of the defect and the proximity to free margins an adjacent tissue transfer was deemed most appropriate.  Using a sterile surgical marker, an appropriate flap was drawn incorporating the defect and placing the expected incisions within the relaxed skin tension lines where possible.    The area thus outlined was incised deep to adipose tissue with a #15 scalpel blade.  The skin margins were undermined to an appropriate distance in all directions utilizing iris scissors.
Advancement Flap (Single) Text: The defect edges were debeveled with a #15 scalpel blade.  Given the location of the defect and the proximity to free margins a single advancement flap was deemed most appropriate.  Using a sterile surgical marker, an appropriate advancement flap was drawn incorporating the defect and placing the expected incisions within the relaxed skin tension lines where possible.    The area thus outlined was incised deep to adipose tissue with a #15 scalpel blade.  The skin margins were undermined to an appropriate distance in all directions utilizing iris scissors.
Advancement Flap (Double) Text: The defect edges were debeveled with a #15 scalpel blade.  Given the location of the defect and the proximity to free margins a double advancement flap was deemed most appropriate.  Using a sterile surgical marker, the appropriate advancement flaps were drawn incorporating the defect and placing the expected incisions within the relaxed skin tension lines where possible.    The area thus outlined was incised deep to adipose tissue with a #15 scalpel blade.  The skin margins were undermined to an appropriate distance in all directions utilizing iris scissors.
Burow's Advancement Flap Text: The defect edges were debeveled with a #15 scalpel blade.  Given the location of the defect and the proximity to free margins a Burow's advancement flap was deemed most appropriate.  Using a sterile surgical marker, the appropriate advancement flap was drawn incorporating the defect and placing the expected incisions within the relaxed skin tension lines where possible.    The area thus outlined was incised deep to adipose tissue with a #15 scalpel blade.  The skin margins were undermined to an appropriate distance in all directions utilizing iris scissors.
Chonodrocutaneous Helical Advancement Flap Text: The defect edges were debeveled with a #15 scalpel blade.  Given the location of the defect and the proximity to free margins a chondrocutaneous helical advancement flap was deemed most appropriate.  Using a sterile surgical marker, the appropriate advancement flap was drawn incorporating the defect and placing the expected incisions within the relaxed skin tension lines where possible.    The area thus outlined was incised deep to adipose tissue with a #15 scalpel blade.  The skin margins were undermined to an appropriate distance in all directions utilizing iris scissors.
Crescentic Advancement Flap Text: The defect edges were debeveled with a #15 scalpel blade.  Given the location of the defect and the proximity to free margins a crescentic advancement flap was deemed most appropriate.  Using a sterile surgical marker, the appropriate advancement flap was drawn incorporating the defect and placing the expected incisions within the relaxed skin tension lines where possible.    The area thus outlined was incised deep to adipose tissue with a #15 scalpel blade.  The skin margins were undermined to an appropriate distance in all directions utilizing iris scissors.
A-T Advancement Flap Text: The defect edges were debeveled with a #15 scalpel blade.  Given the location of the defect, shape of the defect and the proximity to free margins an A-T advancement flap was deemed most appropriate.  Using a sterile surgical marker, an appropriate advancement flap was drawn incorporating the defect and placing the expected incisions within the relaxed skin tension lines where possible.    The area thus outlined was incised deep to adipose tissue with a #15 scalpel blade.  The skin margins were undermined to an appropriate distance in all directions utilizing iris scissors.
O-T Advancement Flap Text: The defect edges were debeveled with a #15 scalpel blade.  Given the location of the defect, shape of the defect and the proximity to free margins an O-T advancement flap was deemed most appropriate.  Using a sterile surgical marker, an appropriate advancement flap was drawn incorporating the defect and placing the expected incisions within the relaxed skin tension lines where possible.    The area thus outlined was incised deep to adipose tissue with a #15 scalpel blade.  The skin margins were undermined to an appropriate distance in all directions utilizing iris scissors.
O-L Flap Text: The defect edges were debeveled with a #15 scalpel blade.  Given the location of the defect, shape of the defect and the proximity to free margins an O-L flap was deemed most appropriate.  Using a sterile surgical marker, an appropriate advancement flap was drawn incorporating the defect and placing the expected incisions within the relaxed skin tension lines where possible.    The area thus outlined was incised deep to adipose tissue with a #15 scalpel blade.  The skin margins were undermined to an appropriate distance in all directions utilizing iris scissors.
O-Z Flap Text: The defect edges were debeveled with a #15 scalpel blade.  Given the location of the defect, shape of the defect and the proximity to free margins an O-Z flap was deemed most appropriate.  Using a sterile surgical marker, an appropriate transposition flap was drawn incorporating the defect and placing the expected incisions within the relaxed skin tension lines where possible. The area thus outlined was incised deep to adipose tissue with a #15 scalpel blade.  The skin margins were undermined to an appropriate distance in all directions utilizing iris scissors.
Double O-Z Flap Text: The defect edges were debeveled with a #15 scalpel blade.  Given the location of the defect, shape of the defect and the proximity to free margins a Double O-Z flap was deemed most appropriate.  Using a sterile surgical marker, an appropriate transposition flap was drawn incorporating the defect and placing the expected incisions within the relaxed skin tension lines where possible. The area thus outlined was incised deep to adipose tissue with a #15 scalpel blade.  The skin margins were undermined to an appropriate distance in all directions utilizing iris scissors.
V-Y Flap Text: The defect edges were debeveled with a #15 scalpel blade.  Given the location of the defect, shape of the defect and the proximity to free margins a V-Y flap was deemed most appropriate.  Using a sterile surgical marker, an appropriate advancement flap was drawn incorporating the defect and placing the expected incisions within the relaxed skin tension lines where possible.    The area thus outlined was incised deep to adipose tissue with a #15 scalpel blade.  The skin margins were undermined to an appropriate distance in all directions utilizing iris scissors.
Advancement-Rotation Flap Text: The defect edges were debeveled with a #15 scalpel blade.  Given the location of the defect, shape of the defect and the proximity to free margins an advancement-rotation flap was deemed most appropriate.  Using a sterile surgical marker, an appropriate flap was drawn incorporating the defect and placing the expected incisions within the relaxed skin tension lines where possible. The area thus outlined was incised deep to adipose tissue with a #15 scalpel blade.  The skin margins were undermined to an appropriate distance in all directions utilizing iris scissors.
Mercedes Flap Text: The defect edges were debeveled with a #15 scalpel blade.  Given the location of the defect, shape of the defect and the proximity to free margins a Mercedes flap was deemed most appropriate.  Using a sterile surgical marker, an appropriate advancement flap was drawn incorporating the defect and placing the expected incisions within the relaxed skin tension lines where possible. The area thus outlined was incised deep to adipose tissue with a #15 scalpel blade.  The skin margins were undermined to an appropriate distance in all directions utilizing iris scissors.
Modified Advancement Flap Text: The defect edges were debeveled with a #15 scalpel blade.  Given the location of the defect, shape of the defect and the proximity to free margins a modified advancement flap was deemed most appropriate.  Using a sterile surgical marker, an appropriate advancement flap was drawn incorporating the defect and placing the expected incisions within the relaxed skin tension lines where possible.    The area thus outlined was incised deep to adipose tissue with a #15 scalpel blade.  The skin margins were undermined to an appropriate distance in all directions utilizing iris scissors.
Mucosal Advancement Flap Text: Given the location of the defect, shape of the defect and the proximity to free margins a mucosal advancement flap was deemed most appropriate. Incisions were made with a 15 blade scalpel in the appropriate fashion along the cutaneous vermilion border and the mucosal lip. The remaining actinically damaged mucosal tissue was excised.  The mucosal advancement flap was then elevated to the gingival sulcus with care taken to preserve the neurovascular structures and advanced into the primary defect. Care was taken to ensure that precise realignment of the vermilion border was achieved.
Peng Advancement Flap Text: The defect edges were debeveled with a #15 scalpel blade.  Given the location of the defect, shape of the defect and the proximity to free margins a Peng advancement flap was deemed most appropriate.  Using a sterile surgical marker, an appropriate advancement flap was drawn incorporating the defect and placing the expected incisions within the relaxed skin tension lines where possible. The area thus outlined was incised deep to adipose tissue with a #15 scalpel blade.  The skin margins were undermined to an appropriate distance in all directions utilizing iris scissors.
Hatchet Flap Text: The defect edges were debeveled with a #15 scalpel blade.  Given the location of the defect, shape of the defect and the proximity to free margins a hatchet flap based from the glabella was deemed most appropriate.  Using a sterile surgical marker, an appropriate glabellar hatchet flap was drawn incorporating the defect and placing the expected incisions within the relaxed skin tension lines where possible.    The area thus outlined was incised deep to adipose tissue with a #15 scalpel blade.  The skin margins were undermined to an appropriate distance in all directions utilizing iris scissors.
Rotation Flap Text: The defect edges were debeveled with a #15 scalpel blade.  Given the location of the defect, shape of the defect and the proximity to free margins a rotation flap was deemed most appropriate.  Using a sterile surgical marker, an appropriate rotation flap was drawn incorporating the defect and placing the expected incisions within the relaxed skin tension lines where possible.    The area thus outlined was incised deep to adipose tissue with a #15 scalpel blade.  The skin margins were undermined to an appropriate distance in all directions utilizing iris scissors.
Bilateral Rotation Flap Text: The defect edges were debeveled with a #15 scalpel blade. Given the location of the defect, shape of the defect and the proximity to free margins a bilateral rotation flap was deemed most appropriate. Using a sterile surgical marker, an appropriate rotation flap was drawn incorporating the defect and placing the expected incisions within the relaxed skin tension lines where possible. The area thus outlined was incised deep to adipose tissue with a #15 scalpel blade. The skin margins were undermined to an appropriate distance in all directions utilizing iris scissors. Following this, the designed flap was carried over into the primary defect and sutured into place.
Spiral Flap Text: The defect edges were debeveled with a #15 scalpel blade.  Given the location of the defect, shape of the defect and the proximity to free margins a spiral flap was deemed most appropriate.  Using a sterile surgical marker, an appropriate rotation flap was drawn incorporating the defect and placing the expected incisions within the relaxed skin tension lines where possible. The area thus outlined was incised deep to adipose tissue with a #15 scalpel blade.  The skin margins were undermined to an appropriate distance in all directions utilizing iris scissors.
Staged Advancement Flap Text: The defect edges were debeveled with a #15 scalpel blade.  Given the location of the defect, shape of the defect and the proximity to free margins a staged advancement flap was deemed most appropriate.  Using a sterile surgical marker, an appropriate advancement flap was drawn incorporating the defect and placing the expected incisions within the relaxed skin tension lines where possible. The area thus outlined was incised deep to adipose tissue with a #15 scalpel blade.  The skin margins were undermined to an appropriate distance in all directions utilizing iris scissors.
Star Wedge Flap Text: The defect edges were debeveled with a #15 scalpel blade.  Given the location of the defect, shape of the defect and the proximity to free margins a star wedge flap was deemed most appropriate.  Using a sterile surgical marker, an appropriate rotation flap was drawn incorporating the defect and placing the expected incisions within the relaxed skin tension lines where possible. The area thus outlined was incised deep to adipose tissue with a #15 scalpel blade.  The skin margins were undermined to an appropriate distance in all directions utilizing iris scissors.
Transposition Flap Text: The defect edges were debeveled with a #15 scalpel blade.  Given the location of the defect and the proximity to free margins a transposition flap was deemed most appropriate.  Using a sterile surgical marker, an appropriate transposition flap was drawn incorporating the defect.    The area thus outlined was incised deep to adipose tissue with a #15 scalpel blade.  The skin margins were undermined to an appropriate distance in all directions utilizing iris scissors.
Muscle Hinge Flap Text: The defect edges were debeveled with a #15 scalpel blade.  Given the size, depth and location of the defect and the proximity to free margins a muscle hinge flap was deemed most appropriate.  Using a sterile surgical marker, an appropriate hinge flap was drawn incorporating the defect. The area thus outlined was incised with a #15 scalpel blade.  The skin margins were undermined to an appropriate distance in all directions utilizing iris scissors.
Mustarde Flap Text: The defect edges were debeveled with a #15 scalpel blade.  Given the size, depth and location of the defect and the proximity to free margins a Mustarde flap was deemed most appropriate.  Using a sterile surgical marker, an appropriate flap was drawn incorporating the defect. The area thus outlined was incised with a #15 scalpel blade.  The skin margins were undermined to an appropriate distance in all directions utilizing iris scissors.
Nasal Turnover Hinge Flap Text: The defect edges were debeveled with a #15 scalpel blade.  Given the size, depth, location of the defect and the defect being full thickness a nasal turnover hinge flap was deemed most appropriate.  Using a sterile surgical marker, an appropriate hinge flap was drawn incorporating the defect. The area thus outlined was incised with a #15 scalpel blade. The flap was designed to recreate the nasal mucosal lining and the alar rim. The skin margins were undermined to an appropriate distance in all directions utilizing iris scissors.
Nasalis-Muscle-Based Myocutaneous Island Pedicle Flap Text: Using a #15 blade, an incision was made around the donor flap to the level of the nasalis muscle. Wide lateral undermining was then performed in both the subcutaneous plane above the nasalis muscle, and in a submuscular plane just above periosteum. This allowed the formation of a free nasalis muscle axial pedicle (based on the angular artery) which was still attached to the actual cutaneous flap, increasing its mobility and vascular viability. Hemostasis was obtained with pinpoint electrocoagulation. The flap was mobilized into position and the pivotal anchor points positioned and stabilized with buried interrupted sutures. Subcutaneous and dermal tissues were closed in a multilayered fashion with sutures. Tissue redundancies were excised, and the epidermal edges were apposed without significant tension and sutured with sutures.
Orbicularis Oris Muscle Flap Text: The defect edges were debeveled with a #15 scalpel blade.  Given that the defect affected the competency of the oral sphincter an orbicularis oris muscle flap was deemed most appropriate to restore this competency and normal muscle function.  Using a sterile surgical marker, an appropriate flap was drawn incorporating the defect. The area thus outlined was incised with a #15 scalpel blade.
Melolabial Transposition Flap Text: The defect edges were debeveled with a #15 scalpel blade.  Given the location of the defect and the proximity to free margins a melolabial flap was deemed most appropriate.  Using a sterile surgical marker, an appropriate melolabial transposition flap was drawn incorporating the defect.    The area thus outlined was incised deep to adipose tissue with a #15 scalpel blade.  The skin margins were undermined to an appropriate distance in all directions utilizing iris scissors.
Rhombic Flap Text: The defect edges were debeveled with a #15 scalpel blade.  Given the location of the defect and the proximity to free margins a rhombic flap was deemed most appropriate.  Using a sterile surgical marker, an appropriate rhombic flap was drawn incorporating the defect.    The area thus outlined was incised deep to adipose tissue with a #15 scalpel blade.  The skin margins were undermined to an appropriate distance in all directions utilizing iris scissors.
Rhomboid Transposition Flap Text: The defect edges were debeveled with a #15 scalpel blade.  Given the location of the defect and the proximity to free margins a rhomboid transposition flap was deemed most appropriate.  Using a sterile surgical marker, an appropriate rhomboid flap was drawn incorporating the defect.    The area thus outlined was incised deep to adipose tissue with a #15 scalpel blade.  The skin margins were undermined to an appropriate distance in all directions utilizing iris scissors.
Bi-Rhombic Flap Text: The defect edges were debeveled with a #15 scalpel blade.  Given the location of the defect and the proximity to free margins a bi-rhombic flap was deemed most appropriate.  Using a sterile surgical marker, an appropriate rhombic flap was drawn incorporating the defect. The area thus outlined was incised deep to adipose tissue with a #15 scalpel blade.  The skin margins were undermined to an appropriate distance in all directions utilizing iris scissors.
Helical Rim Advancement Flap Text: The defect edges were debeveled with a #15 blade scalpel.  Given the location of the defect and the proximity to free margins (helical rim) a double helical rim advancement flap was deemed most appropriate.  Using a sterile surgical marker, the appropriate advancement flaps were drawn incorporating the defect and placing the expected incisions between the helical rim and antihelix where possible.  The area thus outlined was incised through and through with a #15 scalpel blade.  With a skin hook and iris scissors, the flaps were gently and sharply undermined and freed up.
Bilateral Helical Rim Advancement Flap Text: The defect edges were debeveled with a #15 blade scalpel.  Given the location of the defect and the proximity to free margins (helical rim) a bilateral helical rim advancement flap was deemed most appropriate.  Using a sterile surgical marker, the appropriate advancement flaps were drawn incorporating the defect and placing the expected incisions between the helical rim and antihelix where possible.  The area thus outlined was incised through and through with a #15 scalpel blade.  With a skin hook and iris scissors, the flaps were gently and sharply undermined and freed up.
Ear Star Wedge Flap Text: The defect edges were debeveled with a #15 blade scalpel.  Given the location of the defect and the proximity to free margins (helical rim) an ear star wedge flap was deemed most appropriate.  Using a sterile surgical marker, the appropriate flap was drawn incorporating the defect and placing the expected incisions between the helical rim and antihelix where possible.  The area thus outlined was incised through and through with a #15 scalpel blade.
Banner Transposition Flap Text: The defect edges were debeveled with a #15 scalpel blade.  Given the location of the defect and the proximity to free margins a Banner transposition flap was deemed most appropriate.  Using a sterile surgical marker, an appropriate flap drawn around the defect. The area thus outlined was incised deep to adipose tissue with a #15 scalpel blade.  The skin margins were undermined to an appropriate distance in all directions utilizing iris scissors.
Bilobed Flap Text: The defect edges were debeveled with a #15 scalpel blade.  Given the location of the defect and the proximity to free margins a bilobe flap was deemed most appropriate.  Using a sterile surgical marker, an appropriate bilobe flap drawn around the defect.    The area thus outlined was incised deep to adipose tissue with a #15 scalpel blade.  The skin margins were undermined to an appropriate distance in all directions utilizing iris scissors.
Bilobed Transposition Flap Text: The defect edges were debeveled with a #15 scalpel blade.  Given the location of the defect and the proximity to free margins a bilobed transposition flap was deemed most appropriate.  Using a sterile surgical marker, an appropriate bilobe flap drawn around the defect.    The area thus outlined was incised deep to adipose tissue with a #15 scalpel blade.  The skin margins were undermined to an appropriate distance in all directions utilizing iris scissors.
Trilobed Flap Text: The defect edges were debeveled with a #15 scalpel blade.  Given the location of the defect and the proximity to free margins a trilobed flap was deemed most appropriate.  Using a sterile surgical marker, an appropriate trilobed flap drawn around the defect.    The area thus outlined was incised deep to adipose tissue with a #15 scalpel blade.  The skin margins were undermined to an appropriate distance in all directions utilizing iris scissors.
Dorsal Nasal Flap Text: The defect edges were debeveled with a #15 scalpel blade.  Given the location of the defect and the proximity to free margins a dorsal nasal flap,based upon the glabellar folds, was deemed most appropriate.  Using a sterile surgical marker, an appropriate dorsal nasal flap was drawn around the defect.    The area thus outlined was incised deep to adipose tissue with a #15 scalpel blade.  The skin margins were undermined to an appropriate distance in all directions utilizing iris scissors.
Island Pedicle Flap Text: The defect edges were debeveled with a #15 scalpel blade.  Given the location of the defect, shape of the defect and the proximity to free margins an island pedicle advancement flap was deemed most appropriate.  Using a sterile surgical marker, an appropriate advancement flap was drawn incorporating the defect, outlining the appropriate donor tissue and placing the expected incisions within the relaxed skin tension lines where possible.    The area thus outlined was incised deep to adipose tissue with a #15 scalpel blade.  The skin margins were undermined to an appropriate distance in all directions around the primary defect and laterally outward around the island pedicle utilizing iris scissors.  There was minimal undermining beneath the pedicle flap.
Island Pedicle Flap With Canthal Suspension Text: The defect edges were debeveled with a #15 scalpel blade.  Given the location of the defect, shape of the defect and the proximity to free margins an island pedicle advancement flap was deemed most appropriate.  Using a sterile surgical marker, an appropriate advancement flap was drawn incorporating the defect, outlining the appropriate donor tissue and placing the expected incisions within the relaxed skin tension lines where possible. The area thus outlined was incised deep to adipose tissue with a #15 scalpel blade.  The skin margins were undermined to an appropriate distance in all directions around the primary defect and laterally outward around the island pedicle utilizing iris scissors.  There was minimal undermining beneath the pedicle flap. A suspension suture was placed in the canthal tendon to prevent tension and prevent ectropion.
Alar Island Pedicle Flap Text: The defect edges were debeveled with a #15 scalpel blade.  Given the location of the defect, shape of the defect and the proximity to the alar rim an island pedicle advancement flap was deemed most appropriate.  Using a sterile surgical marker, an appropriate advancement flap was drawn incorporating the defect, outlining the appropriate donor tissue and placing the expected incisions within the nasal ala running parallel to the alar rim. The area thus outlined was incised with a #15 scalpel blade.  The skin margins were undermined minimally to an appropriate distance in all directions around the primary defect and laterally outward around the island pedicle utilizing iris scissors.  There was minimal undermining beneath the pedicle flap.
Double Island Pedicle Flap Text: The defect edges were debeveled with a #15 scalpel blade.  Given the location of the defect, shape of the defect and the proximity to free margins a double island pedicle advancement flap was deemed most appropriate.  Using a sterile surgical marker, an appropriate advancement flap was drawn incorporating the defect, outlining the appropriate donor tissue and placing the expected incisions within the relaxed skin tension lines where possible.    The area thus outlined was incised deep to adipose tissue with a #15 scalpel blade.  The skin margins were undermined to an appropriate distance in all directions around the primary defect and laterally outward around the island pedicle utilizing iris scissors.  There was minimal undermining beneath the pedicle flap.
Island Pedicle Flap-Requiring Vessel Identification Text: The defect edges were debeveled with a #15 scalpel blade.  Given the location of the defect, shape of the defect and the proximity to free margins an island pedicle advancement flap was deemed most appropriate.  Using a sterile surgical marker, an appropriate advancement flap was drawn, based on the axial vessel mentioned above, incorporating the defect, outlining the appropriate donor tissue and placing the expected incisions within the relaxed skin tension lines where possible.    The area thus outlined was incised deep to adipose tissue with a #15 scalpel blade.  The skin margins were undermined to an appropriate distance in all directions around the primary defect and laterally outward around the island pedicle utilizing iris scissors.  There was minimal undermining beneath the pedicle flap.
Keystone Flap Text: The defect edges were debeveled with a #15 scalpel blade.  Given the location of the defect, shape of the defect a keystone flap was deemed most appropriate.  Using a sterile surgical marker, an appropriate keystone flap was drawn incorporating the defect, outlining the appropriate donor tissue and placing the expected incisions within the relaxed skin tension lines where possible. The area thus outlined was incised deep to adipose tissue with a #15 scalpel blade.  The skin margins were undermined to an appropriate distance in all directions around the primary defect and laterally outward around the flap utilizing iris scissors.
O-T Plasty Text: The defect edges were debeveled with a #15 scalpel blade.  Given the location of the defect, shape of the defect and the proximity to free margins an O-T plasty was deemed most appropriate.  Using a sterile surgical marker, an appropriate O-T plasty was drawn incorporating the defect and placing the expected incisions within the relaxed skin tension lines where possible.    The area thus outlined was incised deep to adipose tissue with a #15 scalpel blade.  The skin margins were undermined to an appropriate distance in all directions utilizing iris scissors.
O-Z Plasty Text: The defect edges were debeveled with a #15 scalpel blade.  Given the location of the defect, shape of the defect and the proximity to free margins an O-Z plasty (double transposition flap) was deemed most appropriate.  Using a sterile surgical marker, the appropriate transposition flaps were drawn incorporating the defect and placing the expected incisions within the relaxed skin tension lines where possible.    The area thus outlined was incised deep to adipose tissue with a #15 scalpel blade.  The skin margins were undermined to an appropriate distance in all directions utilizing iris scissors.  Hemostasis was achieved with electrocautery.  The flaps were then transposed into place, one clockwise and the other counterclockwise, and anchored with interrupted buried subcutaneous sutures.
Double O-Z Plasty Text: The defect edges were debeveled with a #15 scalpel blade.  Given the location of the defect, shape of the defect and the proximity to free margins a Double O-Z plasty (double transposition flap) was deemed most appropriate.  Using a sterile surgical marker, the appropriate transposition flaps were drawn incorporating the defect and placing the expected incisions within the relaxed skin tension lines where possible. The area thus outlined was incised deep to adipose tissue with a #15 scalpel blade.  The skin margins were undermined to an appropriate distance in all directions utilizing iris scissors.  Hemostasis was achieved with electrocautery.  The flaps were then transposed into place, one clockwise and the other counterclockwise, and anchored with interrupted buried subcutaneous sutures.
V-Y Plasty Text: The defect edges were debeveled with a #15 scalpel blade.  Given the location of the defect, shape of the defect and the proximity to free margins an V-Y advancement flap was deemed most appropriate.  Using a sterile surgical marker, an appropriate advancement flap was drawn incorporating the defect and placing the expected incisions within the relaxed skin tension lines where possible.    The area thus outlined was incised deep to adipose tissue with a #15 scalpel blade.  The skin margins were undermined to an appropriate distance in all directions utilizing iris scissors.
H Plasty Text: Given the location of the defect, shape of the defect and the proximity to free margins a H-plasty was deemed most appropriate for repair.  Using a sterile surgical marker, the appropriate advancement arms of the H-plasty were drawn incorporating the defect and placing the expected incisions within the relaxed skin tension lines where possible. The area thus outlined was incised deep to adipose tissue with a #15 scalpel blade. The skin margins were undermined to an appropriate distance in all directions utilizing iris scissors.  The opposing advancement arms were then advanced into place in opposite direction and anchored with interrupted buried subcutaneous sutures.
W Plasty Text: The lesion was extirpated to the level of the fat with a #15 scalpel blade.  Given the location of the defect, shape of the defect and the proximity to free margins a W-plasty was deemed most appropriate for repair.  Using a sterile surgical marker, the appropriate transposition arms of the W-plasty were drawn incorporating the defect and placing the expected incisions within the relaxed skin tension lines where possible.    The area thus outlined was incised deep to adipose tissue with a #15 scalpel blade.  The skin margins were undermined to an appropriate distance in all directions utilizing iris scissors.  The opposing transposition arms were then transposed into place in opposite direction and anchored with interrupted buried subcutaneous sutures.
Z Plasty Text: The lesion was extirpated to the level of the fat with a #15 scalpel blade.  Given the location of the defect, shape of the defect and the proximity to free margins a Z-plasty was deemed most appropriate for repair.  Using a sterile surgical marker, the appropriate transposition arms of the Z-plasty were drawn incorporating the defect and placing the expected incisions within the relaxed skin tension lines where possible.    The area thus outlined was incised deep to adipose tissue with a #15 scalpel blade.  The skin margins were undermined to an appropriate distance in all directions utilizing iris scissors.  The opposing transposition arms were then transposed into place in opposite direction and anchored with interrupted buried subcutaneous sutures.
Double Z Plasty Text: The lesion was extirpated to the level of the fat with a #15 scalpel blade. Given the location of the defect, shape of the defect and the proximity to free margins a double Z-plasty was deemed most appropriate for repair. Using a sterile surgical marker, the appropriate transposition arms of the double Z-plasty were drawn incorporating the defect and placing the expected incisions within the relaxed skin tension lines where possible. The area thus outlined was incised deep to adipose tissue with a #15 scalpel blade. The skin margins were undermined to an appropriate distance in all directions utilizing iris scissors. The opposing transposition arms were then transposed and carried over into place in opposite direction and anchored with interrupted buried subcutaneous sutures.
Zygomaticofacial Flap Text: Given the location of the defect, shape of the defect and the proximity to free margins a zygomaticofacial flap was deemed most appropriate for repair.  Using a sterile surgical marker, the appropriate flap was drawn incorporating the defect and placing the expected incisions within the relaxed skin tension lines where possible. The area thus outlined was incised deep to adipose tissue with a #15 scalpel blade with preservation of a vascular pedicle.  The skin margins were undermined to an appropriate distance in all directions utilizing iris scissors.  The flap was then placed into the defect and anchored with interrupted buried subcutaneous sutures.
Cheek Interpolation Flap Text: A decision was made to reconstruct the defect utilizing an interpolation axial flap and a staged reconstruction.  A telfa template was made of the defect.  This telfa template was then used to outline the Cheek Interpolation flap.  The donor area for the pedicle flap was then injected with anesthesia.  The flap was excised through the skin and subcutaneous tissue down to the layer of the underlying musculature.  The interpolation flap was carefully excised within this deep plane to maintain its blood supply.  The edges of the donor site were undermined.   The donor site was closed in a primary fashion.  The pedicle was then rotated into position and sutured.  Once the tube was sutured into place, adequate blood supply was confirmed with blanching and refill.  The pedicle was then wrapped with xeroform gauze and dressed appropriately with a telfa and gauze bandage to ensure continued blood supply and protect the attached pedicle.
Cheek-To-Nose Interpolation Flap Text: A decision was made to reconstruct the defect utilizing an interpolation axial flap and a staged reconstruction.  A telfa template was made of the defect.  This telfa template was then used to outline the Cheek-To-Nose Interpolation flap.  The donor area for the pedicle flap was then injected with anesthesia.  The flap was excised through the skin and subcutaneous tissue down to the layer of the underlying musculature.  The interpolation flap was carefully excised within this deep plane to maintain its blood supply.  The edges of the donor site were undermined.   The donor site was closed in a primary fashion.  The pedicle was then rotated into position and sutured.  Once the tube was sutured into place, adequate blood supply was confirmed with blanching and refill.  The pedicle was then wrapped with xeroform gauze and dressed appropriately with a telfa and gauze bandage to ensure continued blood supply and protect the attached pedicle.
Interpolation Flap Text: A decision was made to reconstruct the defect utilizing an interpolation axial flap and a staged reconstruction.  A telfa template was made of the defect.  This telfa template was then used to outline the interpolation flap.  The donor area for the pedicle flap was then injected with anesthesia.  The flap was excised through the skin and subcutaneous tissue down to the layer of the underlying musculature.  The interpolation flap was carefully excised within this deep plane to maintain its blood supply.  The edges of the donor site were undermined.   The donor site was closed in a primary fashion.  The pedicle was then rotated into position and sutured.  Once the tube was sutured into place, adequate blood supply was confirmed with blanching and refill.  The pedicle was then wrapped with xeroform gauze and dressed appropriately with a telfa and gauze bandage to ensure continued blood supply and protect the attached pedicle.
Melolabial Interpolation Flap Text: A decision was made to reconstruct the defect utilizing an interpolation axial flap and a staged reconstruction.  A telfa template was made of the defect.  This telfa template was then used to outline the melolabial interpolation flap.  The donor area for the pedicle flap was then injected with anesthesia.  The flap was excised through the skin and subcutaneous tissue down to the layer of the underlying musculature.  The pedicle flap was carefully excised within this deep plane to maintain its blood supply.  The edges of the donor site were undermined.   The donor site was closed in a primary fashion.  The pedicle was then rotated into position and sutured.  Once the tube was sutured into place, adequate blood supply was confirmed with blanching and refill.  The pedicle was then wrapped with xeroform gauze and dressed appropriately with a telfa and gauze bandage to ensure continued blood supply and protect the attached pedicle.
Mastoid Interpolation Flap Text: A decision was made to reconstruct the defect utilizing an interpolation axial flap and a staged reconstruction.  A telfa template was made of the defect.  This telfa template was then used to outline the mastoid interpolation flap.  The donor area for the pedicle flap was then injected with anesthesia.  The flap was excised through the skin and subcutaneous tissue down to the layer of the underlying musculature.  The pedicle flap was carefully excised within this deep plane to maintain its blood supply.  The edges of the donor site were undermined.   The donor site was closed in a primary fashion.  The pedicle was then rotated into position and sutured.  Once the tube was sutured into place, adequate blood supply was confirmed with blanching and refill.  The pedicle was then wrapped with xeroform gauze and dressed appropriately with a telfa and gauze bandage to ensure continued blood supply and protect the attached pedicle.
Posterior Auricular Interpolation Flap Text: A decision was made to reconstruct the defect utilizing an interpolation axial flap and a staged reconstruction.  A telfa template was made of the defect.  This telfa template was then used to outline the posterior auricular interpolation flap.  The donor area for the pedicle flap was then injected with anesthesia.  The flap was excised through the skin and subcutaneous tissue down to the layer of the underlying musculature.  The pedicle flap was carefully excised within this deep plane to maintain its blood supply.  The edges of the donor site were undermined.   The donor site was closed in a primary fashion.  The pedicle was then rotated into position and sutured.  Once the tube was sutured into place, adequate blood supply was confirmed with blanching and refill.  The pedicle was then wrapped with xeroform gauze and dressed appropriately with a telfa and gauze bandage to ensure continued blood supply and protect the attached pedicle.
Paramedian Forehead Flap Text: A decision was made to reconstruct the defect utilizing an interpolation axial flap and a staged reconstruction.  A telfa template was made of the defect.  This telfa template was then used to outline the paramedian forehead pedicle flap.  The donor area for the pedicle flap was then injected with anesthesia.  The flap was excised through the skin and subcutaneous tissue down to the layer of the underlying musculature.  The pedicle flap was carefully excised within this deep plane to maintain its blood supply.  The edges of the donor site were undermined.   The donor site was closed in a primary fashion.  The pedicle was then rotated into position and sutured.  Once the tube was sutured into place, adequate blood supply was confirmed with blanching and refill.  The pedicle was then wrapped with xeroform gauze and dressed appropriately with a telfa and gauze bandage to ensure continued blood supply and protect the attached pedicle.
Abbe Flap (Upper To Lower Lip) Text: The defect of the lower lip was assessed and measured.  Given the location and size of the defect, an Abbe flap was deemed most appropriate.  Using a sterile surgical marker, an appropriate Abbe flap was measured and drawn on the upper lip. Local anesthesia was then infiltrated.  A scalpel was then used to incise the upper lip through and through the skin, vermilion, muscle and mucosa, leaving the flap pedicled on the opposite side.  The flap was then rotated and transferred to the lower lip defect.  The flap was then sutured into place with a three layer technique, closing the orbicularis oris muscle layer with subcutaneous buried sutures, followed by a mucosal layer and an epidermal layer.
Abbe Flap (Lower To Upper Lip) Text: The defect of the upper lip was assessed and measured.  Given the location and size of the defect, an Abbe flap was deemed most appropriate.  Using a sterile surgical marker, an appropriate Abbe flap was measured and drawn on the lower lip. Local anesthesia was then infiltrated. A scalpel was then used to incise the upper lip through and through the skin, vermilion, muscle and mucosa, leaving the flap pedicled on the opposite side.  The flap was then rotated and transferred to the lower lip defect.  The flap was then sutured into place with a three layer technique, closing the orbicularis oris muscle layer with subcutaneous buried sutures, followed by a mucosal layer and an epidermal layer.
Estlander Flap (Upper To Lower Lip) Text: The defect of the lower lip was assessed and measured.  Given the location and size of the defect, an Estlander flap was deemed most appropriate.  Using a sterile surgical marker, an appropriate Estlander flap was measured and drawn on the upper lip. Local anesthesia was then infiltrated. A scalpel was then used to incise the lateral aspect of the flap, through skin, muscle and mucosa, leaving the flap pedicled medially.  The flap was then rotated and positioned to fill the lower lip defect.  The flap was then sutured into place with a three layer technique, closing the orbicularis oris muscle layer with subcutaneous buried sutures, followed by a mucosal layer and an epidermal layer.
Cheiloplasty (Less Than 50%) Text: A decision was made to reconstruct the defect with a  cheiloplasty.  The defect was undermined extensively.  Additional obicularis oris muscle was excised with a 15 blade scalpel.  The defect was converted into a full thickness wedge, of less than 50% of the vertical height of the lip, to facilite a better cosmetic result.  Small vessels were then tied off with 5-0 monocyrl. The obicularis oris, superficial fascia, adipose and dermis were then reapproximated.  After the deeper layers were approximated the epidermis was reapproximated with particular care given to realign the vermilion border.
Cheiloplasty (Complex) Text: A decision was made to reconstruct the defect with a  cheiloplasty.  The defect was undermined extensively.  Additional obicularis oris muscle was excised with a 15 blade scalpel.  The defect was converted into a full thickness wedge to facilite a better cosmetic result.  Small vessels were then tied off with 5-0 monocyrl. The obicularis oris, superficial fascia, adipose and dermis were then reapproximated.  After the deeper layers were approximated the epidermis was reapproximated with particular care given to realign the vermilion border.
Ear Wedge Repair Text: A wedge excision was completed by carrying down an excision through the full thickness of the ear and cartilage with an inward facing Burow's triangle. The wound was then closed in a layered fashion.
Full Thickness Lip Wedge Repair (Flap) Text: Given the location of the defect and the proximity to free margins a full thickness wedge repair was deemed most appropriate.  Using a sterile surgical marker, the appropriate repair was drawn incorporating the defect and placing the expected incisions perpendicular to the vermilion border.  The vermilion border was also meticulously outlined to ensure appropriate reapproximation during the repair.  The area thus outlined was incised through and through with a #15 scalpel blade.  The muscularis and dermis were reaproximated with deep sutures following hemostasis. Care was taken to realign the vermilion border before proceeding with the superficial closure.  Once the vermilion was realigned the superfical and mucosal closure was finished.
Ftsg Text: The defect edges were debeveled with a #15 scalpel blade.  Given the location of the defect, shape of the defect and the proximity to free margins a full thickness skin graft was deemed most appropriate.  Using a sterile surgical marker, the primary defect shape was transferred to the donor site. The area thus outlined was incised deep to adipose tissue with a #15 scalpel blade.  The harvested graft was then trimmed of adipose tissue until only dermis and epidermis was left.  The skin margins of the secondary defect were undermined to an appropriate distance in all directions utilizing iris scissors.  The secondary defect was closed with interrupted buried subcutaneous sutures.  The skin edges were then re-apposed with running  sutures.  The skin graft was then placed in the primary defect and oriented appropriately.
Split-Thickness Skin Graft Text: The defect edges were debeveled with a #15 scalpel blade.  Given the location of the defect, shape of the defect and the proximity to free margins a split thickness skin graft was deemed most appropriate.  Using a sterile surgical marker, the primary defect shape was transferred to the donor site. The split thickness graft was then harvested.  The skin graft was then placed in the primary defect and oriented appropriately.
Pinch Graft Text: The defect edges were debeveled with a #15 scalpel blade. Given the location of the defect, shape of the defect and the proximity to free margins a pinch graft was deemed most appropriate. Using a sterile surgical marker, the primary defect shape was transferred to the donor site. The area thus outlined was incised deep to adipose tissue with a #15 scalpel blade.  The harvested graft was then trimmed of adipose tissue until only dermis and epidermis was left. The skin margins of the secondary defect were undermined to an appropriate distance in all directions utilizing iris scissors.  The secondary defect was closed with interrupted buried subcutaneous sutures.  The skin edges were then re-apposed with running  sutures.  The skin graft was then placed in the primary defect and oriented appropriately.
Burow's Graft Text: The defect edges were debeveled with a #15 scalpel blade.  Given the location of the defect, shape of the defect, the proximity to free margins and the presence of a standing cone deformity a Burow's skin graft was deemed most appropriate. The standing cone was removed and this tissue was then trimmed to the shape of the primary defect. The adipose tissue was also removed until only dermis and epidermis were left.  The skin margins of the secondary defect were undermined to an appropriate distance in all directions utilizing iris scissors.  The secondary defect was closed with interrupted buried subcutaneous sutures.  The skin edges were then re-apposed with running  sutures.  The skin graft was then placed in the primary defect and oriented appropriately.
Cartilage Graft Text: The defect edges were debeveled with a #15 scalpel blade.  Given the location of the defect, shape of the defect, the fact the defect involved a full thickness cartilage defect a cartilage graft was deemed most appropriate.  An appropriate donor site was identified, cleansed, and anesthetized. The cartilage graft was then harvested and transferred to the recipient site, oriented appropriately and then sutured into place.  The secondary defect was then repaired using a primary closure.
Composite Graft Text: The defect edges were debeveled with a #15 scalpel blade.  Given the location of the defect, shape of the defect, the proximity to free margins and the fact the defect was full thickness a composite graft was deemed most appropriate.  The defect was outline and then transferred to the donor site.  A full thickness graft was then excised from the donor site. The graft was then placed in the primary defect, oriented appropriately and then sutured into place.  The secondary defect was then repaired using a primary closure.
Epidermal Autograft Text: The defect edges were debeveled with a #15 scalpel blade.  Given the location of the defect, shape of the defect and the proximity to free margins an epidermal autograft was deemed most appropriate.  Using a sterile surgical marker, the primary defect shape was transferred to the donor site. The epidermal graft was then harvested.  The skin graft was then placed in the primary defect and oriented appropriately.
Dermal Autograft Text: The defect edges were debeveled with a #15 scalpel blade.  Given the location of the defect, shape of the defect and the proximity to free margins a dermal autograft was deemed most appropriate.  Using a sterile surgical marker, the primary defect shape was transferred to the donor site. The area thus outlined was incised deep to adipose tissue with a #15 scalpel blade.  The harvested graft was then trimmed of adipose and epidermal tissue until only dermis was left.  The skin graft was then placed in the primary defect and oriented appropriately.
Skin Substitute Text: The defect edges were debeveled with a #15 scalpel blade.  Given the location of the defect, shape of the defect and the proximity to free margins a skin substitute graft was deemed most appropriate.  The graft material was trimmed to fit the size of the defect. The graft was then placed in the primary defect and oriented appropriately.
Tissue Cultured Epidermal Autograft Text: The defect edges were debeveled with a #15 scalpel blade.  Given the location of the defect, shape of the defect and the proximity to free margins a tissue cultured epidermal autograft was deemed most appropriate.  The graft was then trimmed to fit the size of the defect.  The graft was then placed in the primary defect and oriented appropriately.
Xenograft Text: The defect edges were debeveled with a #15 scalpel blade.  Given the location of the defect, shape of the defect and the proximity to free margins a xenograft was deemed most appropriate.  The graft was then trimmed to fit the size of the defect.  The graft was then placed in the primary defect and oriented appropriately.
Purse String (Simple) Text: Given the location of the defect and the characteristics of the surrounding skin a purse string closure was deemed most appropriate.  Undermining was performed circumfirentially around the surgical defect.  A purse string suture was then placed and tightened.
Purse String (Intermediate) Text: Given the location of the defect and the characteristics of the surrounding skin a purse string intermediate closure was deemed most appropriate.  Undermining was performed circumfirentially around the surgical defect.  A purse string suture was then placed and tightened.
Partial Purse String (Simple) Text: Given the location of the defect and the characteristics of the surrounding skin a simple purse string closure was deemed most appropriate.  Undermining was performed circumfirentially around the surgical defect.  A purse string suture was then placed and tightened. Wound tension only allowed a partial closure of the circular defect.
Partial Purse String (Intermediate) Text: Given the location of the defect and the characteristics of the surrounding skin an intermediate purse string closure was deemed most appropriate.  Undermining was performed circumfirentially around the surgical defect.  A purse string suture was then placed and tightened. Wound tension only allowed a partial closure of the circular defect.
Localized Dermabrasion With Wire Brush Text: The patient was draped in routine manner.  Localized dermabrasion using 3 x 17 mm wire brush was performed in routine manner to papillary dermis. This spot dermabrasion is being performed to complete skin cancer reconstruction. It also will eliminate the other sun damaged precancerous cells that are known to be part of the regional effect of a lifetime's worth of sun exposure. This localized dermabrasion is therapeutic and should not be considered cosmetic in any regard.
Tarsorrhaphy Text: A tarsorrhaphy was performed using Frost sutures.
Intermediate Repair And Flap Additional Text (Will Appearing After The Standard Complex Repair Text): The intermediate repair was not sufficient to completely close the primary defect. The remaining additional defect was repaired with the flap mentioned below.
Intermediate Repair And Graft Additional Text (Will Appearing After The Standard Complex Repair Text): The intermediate repair was not sufficient to completely close the primary defect. The remaining additional defect was repaired with the graft mentioned below.
Complex Repair And Flap Additional Text (Will Appearing After The Standard Complex Repair Text): The complex repair was not sufficient to completely close the primary defect. The remaining additional defect was repaired with the flap mentioned below.
Complex Repair And Graft Additional Text (Will Appearing After The Standard Complex Repair Text): The complex repair was not sufficient to completely close the primary defect. The remaining additional defect was repaired with the graft mentioned below.
Manual Repair Warning Statement: We plan on removing the manually selected variable below in favor of our much easier automatic structured text blocks found in the previous tab. We decided to do this to help make the flow better and give you the full power of structured data. Manual selection is never going to be ideal in our platform and I would encourage you to avoid using manual selection from this point on, especially since I will be sunsetting this feature. It is important that you do one of two things with the customized text below. First, you can save all of the text in a word file so you can have it for future reference. Second, transfer the text to the appropriate area in the Library tab. Lastly, if there is a flap or graft type which we do not have you need to let us know right away so I can add it in before the variable is hidden. No need to panic, we plan to give you roughly 6 months to make the change.
Same Histology In Subsequent Stages Text: The pattern and morphology of the tumor is as described in the first stage.
No Residual Tumor Seen Histology Text: There were no malignant cells seen in the sections examined.
Inflammation Suggestive Of Cancer Camouflage Histology Text: There was a dense lymphocytic infiltrate which prevented adequate histologic evaluation of adjacent structures.
Bcc Histology Text: There were numerous aggregates of basaloid cells.
Bcc Infiltrative Histology Text: There were numerous aggregates of basaloid cells demonstrating an infiltrative pattern.
Mart-1 - Positive Histology Text: MART-1 staining demonstrates areas of higher density and clustering of melanocytes with Pagetoid spread upwards within the epidermis. The surgical margins are positive for tumor cells.
Mart-1 - Negative Histology Text: MART-1 staining demonstrates a normal density and pattern of melanocytes along the dermal-epidermal junction. The surgical margins are negative for tumor cells.
Information: Selecting Yes will display possible errors in your note based on the variables you have selected. This validation is only offered as a suggestion for you. PLEASE NOTE THAT THE VALIDATION TEXT WILL BE REMOVED WHEN YOU FINALIZE YOUR NOTE. IF YOU WANT TO FAX A PRELIMINARY NOTE YOU WILL NEED TO TOGGLE THIS TO 'NO' IF YOU DO NOT WANT IT IN YOUR FAXED NOTE.

## 2023-08-08 ENCOUNTER — APPOINTMENT (RX ONLY)
Dept: URBAN - METROPOLITAN AREA CLINIC 36 | Facility: CLINIC | Age: 83
Setting detail: DERMATOLOGY
End: 2023-08-08

## 2023-08-08 DIAGNOSIS — Z48.02 ENCOUNTER FOR REMOVAL OF SUTURES: ICD-10-CM

## 2023-08-08 PROCEDURE — ? SUTURE REMOVAL (GLOBAL PERIOD)

## 2023-08-08 ASSESSMENT — LOCATION SIMPLE DESCRIPTION DERM: LOCATION SIMPLE: RIGHT FOREHEAD

## 2023-08-08 ASSESSMENT — LOCATION DETAILED DESCRIPTION DERM: LOCATION DETAILED: RIGHT INFERIOR MEDIAL FOREHEAD

## 2023-08-08 ASSESSMENT — LOCATION ZONE DERM: LOCATION ZONE: FACE

## 2023-08-08 NOTE — PROCEDURE: SUTURE REMOVAL (GLOBAL PERIOD)
Detail Level: Detailed
Add 47674 Cpt? (Important Note: In 2017 The Use Of 59116 Is Being Tracked By Cms To Determine Future Global Period Reimbursement For Global Periods): no

## 2023-08-10 ENCOUNTER — APPOINTMENT (OUTPATIENT)
Dept: CARDIOLOGY | Facility: MEDICAL CENTER | Age: 83
End: 2023-08-10
Attending: INTERNAL MEDICINE
Payer: MEDICARE

## 2023-08-17 ENCOUNTER — OFFICE VISIT (OUTPATIENT)
Dept: INTERNAL MEDICINE | Facility: IMAGING CENTER | Age: 83
End: 2023-08-17
Payer: MEDICARE

## 2023-08-17 VITALS
BODY MASS INDEX: 29.57 KG/M2 | SYSTOLIC BLOOD PRESSURE: 122 MMHG | RESPIRATION RATE: 17 BRPM | DIASTOLIC BLOOD PRESSURE: 64 MMHG | HEART RATE: 66 BPM | TEMPERATURE: 98.6 F | WEIGHT: 218.03 LBS | OXYGEN SATURATION: 93 %

## 2023-08-17 DIAGNOSIS — K22.5 ZENKER DIVERTICULUM: ICD-10-CM

## 2023-08-17 DIAGNOSIS — R13.12 OROPHARYNGEAL DYSPHAGIA: ICD-10-CM

## 2023-08-17 DIAGNOSIS — L03.011 PARONYCHIA OF RIGHT MIDDLE FINGER: ICD-10-CM

## 2023-08-17 PROCEDURE — 99214 OFFICE O/P EST MOD 30 MIN: CPT | Performed by: INTERNAL MEDICINE

## 2023-08-17 PROCEDURE — 3078F DIAST BP <80 MM HG: CPT | Performed by: INTERNAL MEDICINE

## 2023-08-17 PROCEDURE — 3074F SYST BP LT 130 MM HG: CPT | Performed by: INTERNAL MEDICINE

## 2023-08-17 RX ORDER — AMOXICILLIN AND CLAVULANATE POTASSIUM 875; 125 MG/1; MG/1
1 TABLET, FILM COATED ORAL 2 TIMES DAILY
Qty: 14 TABLET | Refills: 0 | Status: SHIPPED | OUTPATIENT
Start: 2023-08-17 | End: 2023-08-24

## 2023-08-17 ASSESSMENT — FIBROSIS 4 INDEX: FIB4 SCORE: 2.5

## 2023-08-17 NOTE — PROGRESS NOTES
Chief Complaint   Patient presents with    Open Wound Finger With Complications       HISTORY OF THE PRESENT ILLNESS: Patient is a 83 y.o. male.     Patient comes in with complaint of red, swollen, tender right third digit.  The swelling and redness began approximately 1 week ago.  He was able to squeeze the lateral nailbed shooting pus.  No fever or chills.  No lymphangitis.    Patient also complains of increased difficulty with swallowing.  He has required esophageal dilation in the past.  He also has a history of Zenker diverticulum.  He thinks that he may be retaining more food in the Zenker diverticulum.  He has also noticed a change in his voice.    Allergies: Morphine    Current Outpatient Medications Ordered in Epic   Medication Sig Dispense Refill    amoxicillin-clavulanate (AUGMENTIN) 875-125 MG Tab Take 1 Tablet by mouth 2 times a day for 7 days. Take with food. 14 Tablet 0    losartan (COZAAR) 50 MG Tab TAKE 1 TABLET BY MOUTH EVERY  Tablet 3    atorvastatin (LIPITOR) 40 MG Tab TAKE 1 TABLET BY MOUTH EVERY  Tablet 2    allopurinol (ZYLOPRIM) 100 MG Tab TAKE 1 TABLET BY MOUTH TWICE A  Tablet 3    methylPREDNISolone (MEDROL DOSEPAK) 4 MG Tablet Therapy Pack As directed on the packaging label. 21 Tablet 0    cyclobenzaprine (FLEXERIL) 10 mg Tab TAKE 1 TABLET BY MOUTH 3 TIMES A DAY AS NEEDED FOR MODERATE PAIN (BACK SPASMS). 30 Tablet 0    meloxicam (MOBIC) 15 MG tablet Take 1 Tablet by mouth 1 time a day as needed for Inflammation, Mild Pain or Moderate Pain. 30 Tablet 1    ELIQUIS 5 MG Tab TAKE 1 TABLET BY MOUTH TWICE A  Tablet 2    triamterene/hctz (MAXZIDE-25/DYAZIDE) 37.5-25 MG Cap Take 1 Capsule by mouth every morning. Take 1 capsule by mouth every morning 90 Capsule 3    acetaminophen (TYLENOL) 325 MG Tab Take 650 mg by mouth every four hours as needed for Moderate Pain.       No current Epic-ordered facility-administered medications on file.       Past medical history,  social history and family history were reviewed from chart today    Review of systems: Per HPI.    All others negative.     Exam: /64 (BP Location: Left arm, Patient Position: Sitting, BP Cuff Size: Adult)   Pulse 66   Temp 37 °C (98.6 °F) (Temporal)   Resp 17   Wt 98.9 kg (218 lb 0.6 oz)   SpO2 93%   General: Well-appearing. Well-developed. No signs of distress.  HEENT: Grossly normal. Oral cavity is pink and moist.   Neck: Supple without JVD or bruit.  Pulmonary: Clear with good breath sounds. Normal effort.  Cardiovascular: Regular. Carotid and radial pulses are intact.  Abdomen: Soft, nontender, nondistended. Spleen and liver are not enlarged.  Neurologic: Cranial nerves II through XII are grossly normal, alert and oriented x3  Extremities: Right lateral third digit nailbed and finger are red.  I am unable to extrude any purulent material for culture.    Diagnosis:  1. Paronychia of right middle finger  amoxicillin-clavulanate (AUGMENTIN) 875-125 MG Tab      2. Oropharyngeal dysphagia  Referral to ENT    DX-ESOPHAGUS BARIUM SWALLOW DOUBLE CONTRAST      3. Zenker diverticulum  Referral to ENT    DX-ESOPHAGUS BARIUM SWALLOW DOUBLE CONTRAST        Epsom salt soaks.  Amoxicillin as above.  IV or IM injections if worsens.    Refer back to ENT for evaluation.  Repeat esophageal barium swallow for evaluation of esophageal narrowing or progression of his sinker diverticulum.    My total time spent caring for the patient on the day of the encounter was  greater than 20 minutes.   This includes obtaining history, reviewing chart, physical exam, patient education, reviewing outside records, placing orders, interpreting tests and coordinating care.

## 2023-08-22 ENCOUNTER — APPOINTMENT (RX ONLY)
Dept: URBAN - METROPOLITAN AREA CLINIC 36 | Facility: CLINIC | Age: 83
Setting detail: DERMATOLOGY
End: 2023-08-22

## 2023-08-22 DIAGNOSIS — Z48.817 ENCOUNTER FOR SURGICAL AFTERCARE FOLLOWING SURGERY ON THE SKIN AND SUBCUTANEOUS TISSUE: ICD-10-CM

## 2023-08-22 PROCEDURE — ? POST-OP WOUND CHECK

## 2023-08-22 ASSESSMENT — LOCATION SIMPLE DESCRIPTION DERM: LOCATION SIMPLE: RIGHT FOREHEAD

## 2023-08-22 ASSESSMENT — LOCATION DETAILED DESCRIPTION DERM: LOCATION DETAILED: RIGHT INFERIOR MEDIAL FOREHEAD

## 2023-08-22 ASSESSMENT — LOCATION ZONE DERM: LOCATION ZONE: FACE

## 2023-08-22 NOTE — PROCEDURE: POST-OP WOUND CHECK
Detail Level: Detailed
Add 70307 Cpt? (Important Note: In 2017 The Use Of 29024 Is Being Tracked By Cms To Determine Future Global Period Reimbursement For Global Periods): no

## 2023-08-25 ENCOUNTER — HOSPITAL ENCOUNTER (OUTPATIENT)
Dept: RADIOLOGY | Facility: MEDICAL CENTER | Age: 83
End: 2023-08-25
Attending: INTERNAL MEDICINE
Payer: MEDICARE

## 2023-08-25 DIAGNOSIS — K22.5 ZENKER DIVERTICULUM: ICD-10-CM

## 2023-08-25 DIAGNOSIS — R13.12 OROPHARYNGEAL DYSPHAGIA: ICD-10-CM

## 2023-08-25 PROCEDURE — 74221 X-RAY XM ESOPHAGUS 2CNTRST: CPT

## 2023-08-31 DIAGNOSIS — L60.0 INGROWN TOENAIL OF RIGHT FOOT: ICD-10-CM

## 2023-09-05 ENCOUNTER — OFFICE VISIT (OUTPATIENT)
Dept: CARDIOLOGY | Facility: MEDICAL CENTER | Age: 83
End: 2023-09-05
Attending: INTERNAL MEDICINE
Payer: MEDICARE

## 2023-09-05 VITALS
SYSTOLIC BLOOD PRESSURE: 102 MMHG | RESPIRATION RATE: 16 BRPM | HEIGHT: 72 IN | OXYGEN SATURATION: 93 % | HEART RATE: 50 BPM | WEIGHT: 207 LBS | DIASTOLIC BLOOD PRESSURE: 60 MMHG | BODY MASS INDEX: 28.04 KG/M2

## 2023-09-05 DIAGNOSIS — I10 ESSENTIAL HYPERTENSION: ICD-10-CM

## 2023-09-05 DIAGNOSIS — D68.69 SECONDARY HYPERCOAGULABLE STATE (HCC): ICD-10-CM

## 2023-09-05 DIAGNOSIS — E78.5 DYSLIPIDEMIA: ICD-10-CM

## 2023-09-05 DIAGNOSIS — I25.10 CORONARY ARTERY DISEASE, NON-OCCLUSIVE: ICD-10-CM

## 2023-09-05 DIAGNOSIS — Z79.01 ANTICOAGULATED: ICD-10-CM

## 2023-09-05 DIAGNOSIS — I48.0 PAF (PAROXYSMAL ATRIAL FIBRILLATION) (HCC): ICD-10-CM

## 2023-09-05 DIAGNOSIS — I35.0 AORTIC VALVE STENOSIS, ETIOLOGY OF CARDIAC VALVE DISEASE UNSPECIFIED: ICD-10-CM

## 2023-09-05 PROBLEM — Z86.2 HISTORY OF SECONDARY HYPERCOAGULABLE STATE: Status: RESOLVED | Noted: 2023-09-05 | Resolved: 2023-09-05

## 2023-09-05 PROBLEM — Z86.2 HISTORY OF SECONDARY HYPERCOAGULABLE STATE: Status: ACTIVE | Noted: 2023-09-05

## 2023-09-05 PROBLEM — I48.91 HYPERCOAGULABILITY DUE TO ATRIAL FIBRILLATION (HCC): Status: RESOLVED | Noted: 2023-02-08 | Resolved: 2023-09-05

## 2023-09-05 LAB — EKG IMPRESSION: NORMAL

## 2023-09-05 PROCEDURE — 93010 ELECTROCARDIOGRAM REPORT: CPT | Performed by: INTERNAL MEDICINE

## 2023-09-05 PROCEDURE — 3074F SYST BP LT 130 MM HG: CPT | Performed by: INTERNAL MEDICINE

## 2023-09-05 PROCEDURE — 3078F DIAST BP <80 MM HG: CPT | Performed by: INTERNAL MEDICINE

## 2023-09-05 PROCEDURE — 93005 ELECTROCARDIOGRAM TRACING: CPT | Performed by: INTERNAL MEDICINE

## 2023-09-05 PROCEDURE — 99214 OFFICE O/P EST MOD 30 MIN: CPT | Mod: 25 | Performed by: INTERNAL MEDICINE

## 2023-09-05 PROCEDURE — 99212 OFFICE O/P EST SF 10 MIN: CPT | Performed by: INTERNAL MEDICINE

## 2023-09-05 ASSESSMENT — ENCOUNTER SYMPTOMS
PALPITATIONS: 0
SHORTNESS OF BREATH: 0
BRUISES/BLEEDS EASILY: 0
COUGH: 0
DIZZINESS: 0
LOSS OF CONSCIOUSNESS: 0
MYALGIAS: 0

## 2023-09-05 ASSESSMENT — FIBROSIS 4 INDEX: FIB4 SCORE: 2.5

## 2023-09-05 NOTE — PROGRESS NOTES
"Chief Complaint   Patient presents with    Aortic Stenosis     F/v dx: Aortic valve stenosis, etiology of cardiac valve disease unspecified    Coronary Artery Disease     F/v dx: Coronary artery disease, non-occlusive    Atrial Fibrillation     F/v dx: PAF (paroxysmal atrial fibrillation) ([facility])             Subjective     Chuck Ortiz is a 83 y.o. male who presents today for follow-up cardiac care.    The patient has CAD, PAF, OAC on apixaban, aortic stenosis, HTN, HLD, cryptogenic stroke presumed embolic based on poststroke ILR PAF, LOVE, Zenker's diverticulum    Since 2/8/2023 appointment the patient has had no cardiac symptoms including chest pain, palpitations, shortness of breath.  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    CATARACT PHACO WITH IOL  1/21/2014    Performed by Joni Duarte M.D. at [facility]    " FINGER ARTHROPLASTY  12/17/2012    [facility]formed [facility] Adam Christopher [facility].D. [facility]    INGUINAL HERNIA REPAIR  2/19/2009    Performed by ALEX ALATORRE at SURGERY SAME DAY [facility] ORS    COLONOSCOPY  2004    neg    LAMINOTOMY  1996    lumbar laminectomy, cyst x3    UVULOPHARYNGOPALATOPLASTY  1985     Family History   Problem Relation Age of Onset    Heart Disease Maternal Grandmother     Diabetes Paternal Grandfather     Stroke Sister     Lung Disease Father         black lung     Cancer Neg Hx      Social History     Socioeconomic History    Marital status:      Spouse name: Not on file    Number of children: Not on file    Years of education: Not on file    Highest education level: Not on file   Occupational History    Not on file   Tobacco Use    Smoking status: Never    Smokeless tobacco: Never   Substance and Sexual Activity    Alcohol use: Yes     Comment: 2 drinks per week     Drug use: No    Sexual activity: Not on file     Comment: , retired [employer]    Other Topics Concern    Not on file   Social History Narrative    Not on file     Social Determinants of Health     Financial Resource Strain: Not on file   Food Insecurity: Not on file   Transportation Needs: Not on file   Physical Activity: Not on file   Stress: Not on file   Social Connections: Not on file   Intimate Partner Violence: Not on file   Housing Stability: Not on file     Allergies   Allergen Reactions    Morphine      Bradycardia     Outpatient Encounter Medications as of 9/5/2023   Medication Sig Dispense Refill    atorvastatin (LIPITOR) 40 MG Tab TAKE 1 TABLET BY MOUTH EVERY  Tablet 2    allopurinol (ZYLOPRIM) 100 MG Tab TAKE 1 TABLET BY MOUTH TWICE A  Tablet 3    methylPREDNISolone (MEDROL DOSEPAK) 4 MG Tablet Therapy Pack As directed on the packaging label. 21 Tablet 0    losartan (COZAAR) 50 MG Tab TAKE 1 TABLET BY MOUTH EVERY  Tablet 3    cyclobenzaprine (FLEXERIL) 10 mg  Tab TAKE 1 TABLET BY MOUTH 3 TIMES A DAY AS NEEDED FOR MODERATE PAIN (BACK SPASMS). 30 Tablet 0    meloxicam (MOBIC) 15 MG tablet Take 1 Tablet by mouth 1 time a day as needed for Inflammation, Mild Pain or Moderate Pain. 30 Tablet 1    ELIQUIS 5 MG Tab TAKE 1 TABLET BY MOUTH TWICE A  Tablet 2    triamterene/hctz (MAXZIDE-25/DYAZIDE) 37.5-25 MG Cap Take 1 Capsule by mouth every morning. Take 1 capsule by mouth every morning 90 Capsule 3    acetaminophen (TYLENOL) 325 MG Tab Take 650 mg by mouth every four hours as needed for Moderate Pain.       No facility-administered encounter medications on file as of 9/5/2023.     Review of Systems   Respiratory:  Negative for cough and shortness of breath.    Cardiovascular:  Negative for chest pain and palpitations.   Musculoskeletal:  Negative for myalgias.   Neurological:  Negative for dizziness and loss of consciousness.   Endo/Heme/Allergies:  Does not bruise/bleed easily.              Objective     /60 (BP Location: Left arm, Patient Position: Sitting, BP Cuff Size: Adult)   Pulse (!) 50   Resp 16   Ht 1.829 m (6')   Wt 93.9 kg (207 lb)   SpO2 93%   BMI 28.07 kg/m²     Physical Exam  Constitutional:       Appearance: He is well-developed.   Eyes:      Conjunctiva/sclera: Conjunctivae normal.      Pupils: Pupils are equal, round, and reactive to light.   Neck:      Vascular: No JVD.   Cardiovascular:      Rate and Rhythm: Normal rate. Rhythm regularly irregular.      Heart sounds: Murmur heard.      Comments: Diminished A2  Pulmonary:      Effort: Pulmonary effort is normal. No accessory muscle usage or respiratory distress.      Breath sounds: Normal breath sounds. No wheezing or rales.   Musculoskeletal:      Right lower leg: No edema.      Left lower leg: No edema.   Skin:     General: Skin is warm and dry.      Findings: No rash.      Nails: There is no clubbing.   Neurological:      Mental Status: He is alert and oriented to person, place, and  time.   Psychiatric:         Behavior: Behavior normal.                 08/14/2018 BRAIN MRI  1.  Acute to subacute small sized infarcts in the left posterior frontal lobe.  2.  Mild diffuse cerebral substance loss.  3.  Mild microangiopathic ischemic change versus demyelination or gliosis.  4.  Right maxillary sinus mucous retention cyst or polyp.     ECHOCARDIOGRAM 09/06/2018.  Normal left ventricular systolic function.  Left ventricular ejection fraction is visually estimated to be 60%.  Mild aortic stenosis.  Mild posterior mitral valve leaflet prolapse.  Mild mitral regurgitation.  Mitral annular calcification.  Unable to estimate pulmonary artery pressure due to an inadequate   tricuspid regurgitant jet.  No prior study is available for comparison.    ECHOCARDIOGRAM 5/5/2022  Moderate aortic valve stenosis. Transvalvular gradients are - Peak: 51   mmHg, Mean: 33 mmHg. Vmax is 3.6 m/s.  Normal left ventricular systolic function.  The left ventricular ejection fraction is visually estimated to be 70%.  Mild concentric left ventricular hypertrophy and also Sigmoid septum.  Grade I diastolic dysfunction.  Normal right ventricular size and systolic function.  Mildly dilated left atrium.  Mildly elevated estimated right atrial pressure.   Normal pulmonary artery pressure.    ECHOCARDIOGRAM 3/30/2023  Normal left ventricular systolic function.  The left ventricular ejection fraction is visually estimated to be 65%.  Moderate aortic valve stenosis: Transvalvular gradients are - Peak: 36   mmHg, Mean: 22 mmHg. Vmax is 2.1 m/s. Aortic valve is 1.2 cm2.  Mild mitral regurgitation.  The right ventricle is normal in size and systolic function.  Unable to estimate right ventricular systolic pressure due to an   inadequate tricuspid regurgitant jet.  Compared to the prior study on 5/5/2022, there has been no significant   change; aortic stenosis remains moderate.       MPI 09/06/2018.  There is a predominently fixed basal to  mid inferior septal perfusion defect.   There is a distal inferior wall defect, predominantly reversible, moderate    size, mild intensity.   Stress Image LV EF: 71     %     CARDIAC CATHETERIZATION  09/28/2018  A. Left heart catheterization.  B. Left ventriculography.  C. Selective coronary angiography.  D. Right radial artery approach.  PREPROCEDURE DIAGNOSES:  1.  Abnormal myocardial perfusion scan.  2.  Mild aortic stenosis.  3.  Ventricular ectopy.  4.  Cryptogenic stroke.  5.  Hyperlipidemia.   POSTPROCEDURE DIAGNOSES:  1.  Coronary artery disease, moderate predominantly involving the mid left anterior descending artery.  2.  Left ventricular ejection fraction 77% post PVC with basal inferior wall akinesis.     ILR recording Atrial fibrillation 5/11/2020 and 7/1/2020  Start eliquis 5 MG bid  Discontinue plavix    Echocardiogram 9/5/2023 sinus rhythm, rate 68, first-degree AV block, PVCs, personally interpreted    Assessment & Plan     1. Coronary artery disease, non-occlusive        2. Aortic valve stenosis, etiology of cardiac valve disease unspecified        3. PAF (paroxysmal atrial fibrillation) ([facility])  EKG      4. Secondary hypercoagulable state ([facility])        5. Anticoagulated        6. Essential hypertension        7. Dyslipidemia              Medical Decision Making: Today's Assessment/Status/Plan:   CAD, noncritical.  Aortic stenosis  PAF (5/11/2020, 7/1/2020 on ILR)  OAC on apixaban  Hypertension   Hyperlipidemia   CVA, left posterior frontal.  08/14/2018  Implantable loop recorder 8/28/2018, removed 7/20/2020.  Ventricular ectopy, chronic.  Aortic stenosis.  LOVE.  S/P uvulectomy.  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  S/P back surgery.  Zenker's diverticulum.     Recommendation Discussion  CAD, asymptomatic, continue atorvastatin, apixaban (no aspirin)  PAF: Clinically asymptomatic, continue apixaban.  Aortic stenosis: Asymptomatic, remains moderate, follow-up echocardiogram  3/2024  Hypertension: BP normal, at goal, continue losartan, HCTZ.  Dyslipidemia: LDL 59, at goal, continue atorvastatin.  Ventricular ectopy: Asymptomatic, continue monitoring.  RTC 6 months

## 2023-09-18 RX ORDER — TRIAMTERENE AND HYDROCHLOROTHIAZIDE 37.5; 25 MG/1; MG/1
1 CAPSULE ORAL EVERY MORNING
Qty: 90 CAPSULE | Refills: 3 | Status: SHIPPED | OUTPATIENT
Start: 2023-09-18

## 2023-09-21 ENCOUNTER — HOSPITAL ENCOUNTER (OUTPATIENT)
Facility: MEDICAL CENTER | Age: 83
End: 2023-09-21
Attending: INTERNAL MEDICINE
Payer: MEDICARE

## 2023-09-21 ENCOUNTER — OFFICE VISIT (OUTPATIENT)
Dept: INTERNAL MEDICINE | Facility: IMAGING CENTER | Age: 83
End: 2023-09-21
Payer: MEDICARE

## 2023-09-21 VITALS
HEART RATE: 79 BPM | RESPIRATION RATE: 12 BRPM | TEMPERATURE: 98.1 F | SYSTOLIC BLOOD PRESSURE: 104 MMHG | OXYGEN SATURATION: 95 % | DIASTOLIC BLOOD PRESSURE: 70 MMHG

## 2023-09-21 DIAGNOSIS — Z23 NEED FOR INFLUENZA VACCINATION: ICD-10-CM

## 2023-09-21 DIAGNOSIS — I48.0 PAF (PAROXYSMAL ATRIAL FIBRILLATION) (HCC): ICD-10-CM

## 2023-09-21 DIAGNOSIS — R31.29 MICROHEMATURIA: ICD-10-CM

## 2023-09-21 DIAGNOSIS — K22.5 ZENKER DIVERTICULUM: ICD-10-CM

## 2023-09-21 DIAGNOSIS — I35.0 NONRHEUMATIC AORTIC VALVE STENOSIS: ICD-10-CM

## 2023-09-21 DIAGNOSIS — K44.9 HIATAL HERNIA: ICD-10-CM

## 2023-09-21 DIAGNOSIS — R39.11 URINARY HESITANCY: ICD-10-CM

## 2023-09-21 LAB
APPEARANCE UR: CLEAR
BILIRUB UR STRIP-MCNC: NORMAL MG/DL
COLOR UR AUTO: YELLOW
GLUCOSE UR STRIP.AUTO-MCNC: NORMAL MG/DL
KETONES UR STRIP.AUTO-MCNC: NORMAL MG/DL
LEUKOCYTE ESTERASE UR QL STRIP.AUTO: NORMAL
NITRITE UR QL STRIP.AUTO: NORMAL
PH UR STRIP.AUTO: 5 [PH] (ref 5–8)
PROT UR QL STRIP: NORMAL MG/DL
RBC UR QL AUTO: NORMAL
SP GR UR STRIP.AUTO: 1.02
UROBILINOGEN UR STRIP-MCNC: NORMAL MG/DL

## 2023-09-21 PROCEDURE — 90662 IIV NO PRSV INCREASED AG IM: CPT | Performed by: INTERNAL MEDICINE

## 2023-09-21 PROCEDURE — 3074F SYST BP LT 130 MM HG: CPT | Performed by: INTERNAL MEDICINE

## 2023-09-21 PROCEDURE — 99214 OFFICE O/P EST MOD 30 MIN: CPT | Mod: 25 | Performed by: INTERNAL MEDICINE

## 2023-09-21 PROCEDURE — 81002 URINALYSIS NONAUTO W/O SCOPE: CPT | Performed by: INTERNAL MEDICINE

## 2023-09-21 PROCEDURE — 3078F DIAST BP <80 MM HG: CPT | Performed by: INTERNAL MEDICINE

## 2023-09-21 PROCEDURE — G0008 ADMIN INFLUENZA VIRUS VAC: HCPCS | Performed by: INTERNAL MEDICINE

## 2023-09-21 PROCEDURE — 87086 URINE CULTURE/COLONY COUNT: CPT

## 2023-09-21 NOTE — PROGRESS NOTES
Chief Complaint   Patient presents with    Results     Upper GI       HISTORY OF THE PRESENT ILLNESS: Patient is a 83 y.o. male.     Patient comes in for review of recent upper GI.  We reported that he did not mention more regurgitation symptoms and some issues with swallowing.  His upper GI showed his ongoing moderate size Zenker diverticulum.  He was also noted to have a small sliding hiatal hernia.  He denies any regular reflux symptoms.  He has follow-up with ENT scheduled for December.    We also discussed his recent evaluation with Dr. Gonzalez.  His cardiac issues including atrial fibrillation and aortic stenosis have been stable.  He reports that he is scheduled for echocardiogram.    Patient had urine analysis today.  He had large blood.  He denies any hematuria.  He has a history of kidney stones.  Patient had CT of the abdomen and June 2022 with the following findings:    FINDINGS:  Lower Chest: Unremarkable.     Liver: Normal.     Spleen: Unremarkable.     Pancreas: Unremarkable.     Gallbladder: No calcified stones.     Biliary: Nondilated.     Adrenal glands: Normal.     Kidneys: Unremarkable without hydronephrosis.     Bowel: There is mild induration in the pericolonic fat surrounding the proximal end of the sigmoid colon in the left lower quadrant. Diverticula are noted in the region. No abscess or free air is currently identified..     Lymph nodes: No adenopathy.     Vasculature: Unremarkable. There is calcific atherosclerosis.     Peritoneum: Unremarkable without ascites.     Musculoskeletal: No acute or destructive process.     Pelvis: No adenopathy or free fluid.     The swallow evaluation/upper GI images were reviewed with the patient today.    Allergies: Morphine    Current Outpatient Medications Ordered in Epic   Medication Sig Dispense Refill    triamterene/hctz (MAXZIDE-25/DYAZIDE) 37.5-25 MG Cap TAKE 1 CAPSULE BY MOUTH EVERY MORNING. 90 Capsule 3    atorvastatin (LIPITOR) 40 MG Tab TAKE 1  TABLET BY MOUTH EVERY  Tablet 2    allopurinol (ZYLOPRIM) 100 MG Tab TAKE 1 TABLET BY MOUTH TWICE A  Tablet 3    methylPREDNISolone (MEDROL DOSEPAK) 4 MG Tablet Therapy Pack As directed on the packaging label. 21 Tablet 0    losartan (COZAAR) 50 MG Tab TAKE 1 TABLET BY MOUTH EVERY  Tablet 3    cyclobenzaprine (FLEXERIL) 10 mg Tab TAKE 1 TABLET BY MOUTH 3 TIMES A DAY AS NEEDED FOR MODERATE PAIN (BACK SPASMS). 30 Tablet 0    meloxicam (MOBIC) 15 MG tablet Take 1 Tablet by mouth 1 time a day as needed for Inflammation, Mild Pain or Moderate Pain. 30 Tablet 1    ELIQUIS 5 MG Tab TAKE 1 TABLET BY MOUTH TWICE A  Tablet 2    acetaminophen (TYLENOL) 325 MG Tab Take 650 mg by mouth every four hours as needed for Moderate Pain.       No current Epic-ordered facility-administered medications on file.       Past medical history, social history and family history were reviewed from chart today    Review of systems: Per HPI.    All others negative.     Exam: /70 (BP Location: Left arm, Patient Position: Sitting, BP Cuff Size: Adult)   Pulse 79   Temp 36.7 °C (98.1 °F) (Temporal)   Resp 12   SpO2 95%   General: Well-appearing. Well-developed. No signs of distress.  HEENT: Grossly normal. Oral cavity is pink and moist.   Neck: Supple without JVD or bruit.  Pulmonary: Clear with good breath sounds. Normal effort.  Cardiovascular: Regular.  4/6 systolic murmur heard both on the left and right sternal border.  Carotid and radial pulses are intact.  Abdomen: Soft, nontender, nondistended. Spleen and liver are not enlarged.  Neurologic: Cranial nerves II through XII are grossly normal, alert and oriented x3      Diagnosis:  1. Zenker diverticulum        2. Hiatal hernia        3. Urinary hesitancy  POCT Urinalysis      4. Microhematuria  URINE CULTURE(NEW)      5. Need for influenza vaccination  INFLUENZA VACCINE, HIGH DOSE (65+ ONLY)      6. PAF (paroxysmal atrial fibrillation) (HCC)        7.  Nonrheumatic aortic valve stenosis          Overall issues are stable.  Follow-up with ENT for Zenker diverticulum.  They have previously not been interested in surgical intervention.  It does not appear that he is experiencing much reflux symptoms so I do not recommend PPI or other.  Patient's hematuria is relatively new.  He had 1 previous urine test with hematuria.  He is without urinary complaint but we will send for culture.  Asked him to repeat the urine analysis in 2 weeks.  If it continues to be positive I would recommend ultrasound of the urinary system and if no obvious etiology refer to urology.    Influenza vaccination given today.    Cardiac issues are stable.  I will defer to Dr. Gonzalez regarding echocardiogram and follow-up there.  Murmur is unchanged    My total time spent caring for the patient on the day of the encounter was  greater than 30 minutes.   This includes obtaining history, reviewing chart, physical exam, patient education, reviewing outside records, placing orders, interpreting tests and coordinating care.

## 2023-09-23 LAB
BACTERIA UR CULT: NORMAL
SIGNIFICANT IND 70042: NORMAL
SITE SITE: NORMAL
SOURCE SOURCE: NORMAL

## 2023-09-27 ENCOUNTER — TELEPHONE (OUTPATIENT)
Dept: INTERNAL MEDICINE | Facility: IMAGING CENTER | Age: 83
End: 2023-09-27
Payer: MEDICARE

## 2023-09-27 NOTE — TELEPHONE ENCOUNTER
"Chuck reports he saw urology yesterday.  They rechecked his urine and \"no blood was found.  Also he is not completely emptying his bladder when voiding so they are prescribing a medication to help\"  "

## 2023-10-07 ENCOUNTER — APPOINTMENT (OUTPATIENT)
Dept: RADIOLOGY | Facility: MEDICAL CENTER | Age: 83
End: 2023-10-07
Attending: EMERGENCY MEDICINE
Payer: MEDICARE

## 2023-10-07 ENCOUNTER — HOSPITAL ENCOUNTER (EMERGENCY)
Facility: MEDICAL CENTER | Age: 83
End: 2023-10-07
Attending: EMERGENCY MEDICINE
Payer: MEDICARE

## 2023-10-07 VITALS
RESPIRATION RATE: 20 BRPM | BODY MASS INDEX: 28.55 KG/M2 | DIASTOLIC BLOOD PRESSURE: 87 MMHG | HEIGHT: 72 IN | SYSTOLIC BLOOD PRESSURE: 142 MMHG | TEMPERATURE: 98.7 F | HEART RATE: 80 BPM | WEIGHT: 210.8 LBS | OXYGEN SATURATION: 92 %

## 2023-10-07 DIAGNOSIS — K57.92 DIVERTICULITIS: ICD-10-CM

## 2023-10-07 LAB
ALBUMIN SERPL BCP-MCNC: 3.8 G/DL (ref 3.2–4.9)
ALBUMIN/GLOB SERPL: 1.5 G/DL
ALP SERPL-CCNC: 99 U/L (ref 30–99)
ALT SERPL-CCNC: 16 U/L (ref 2–50)
ANION GAP SERPL CALC-SCNC: 11 MMOL/L (ref 7–16)
AST SERPL-CCNC: 15 U/L (ref 12–45)
BASOPHILS # BLD AUTO: 0.5 % (ref 0–1.8)
BASOPHILS # BLD: 0.05 K/UL (ref 0–0.12)
BILIRUB SERPL-MCNC: 0.6 MG/DL (ref 0.1–1.5)
BUN SERPL-MCNC: 20 MG/DL (ref 8–22)
CALCIUM ALBUM COR SERPL-MCNC: 9.4 MG/DL (ref 8.5–10.5)
CALCIUM SERPL-MCNC: 9.2 MG/DL (ref 8.4–10.2)
CHLORIDE SERPL-SCNC: 100 MMOL/L (ref 96–112)
CO2 SERPL-SCNC: 25 MMOL/L (ref 20–33)
CREAT SERPL-MCNC: 0.99 MG/DL (ref 0.5–1.4)
EOSINOPHIL # BLD AUTO: 0.16 K/UL (ref 0–0.51)
EOSINOPHIL NFR BLD: 1.7 % (ref 0–6.9)
ERYTHROCYTE [DISTWIDTH] IN BLOOD BY AUTOMATED COUNT: 48.5 FL (ref 35.9–50)
GFR SERPLBLD CREATININE-BSD FMLA CKD-EPI: 75 ML/MIN/1.73 M 2
GLOBULIN SER CALC-MCNC: 2.6 G/DL (ref 1.9–3.5)
GLUCOSE SERPL-MCNC: 90 MG/DL (ref 65–99)
HCT VFR BLD AUTO: 53 % (ref 42–52)
HGB BLD-MCNC: 17.6 G/DL (ref 14–18)
IMM GRANULOCYTES # BLD AUTO: 0.04 K/UL (ref 0–0.11)
IMM GRANULOCYTES NFR BLD AUTO: 0.4 % (ref 0–0.9)
LIPASE SERPL-CCNC: 22 U/L (ref 11–82)
LYMPHOCYTES # BLD AUTO: 2.34 K/UL (ref 1–4.8)
LYMPHOCYTES NFR BLD: 24.3 % (ref 22–41)
MCH RBC QN AUTO: 31.9 PG (ref 27–33)
MCHC RBC AUTO-ENTMCNC: 33.2 G/DL (ref 32.3–36.5)
MCV RBC AUTO: 96 FL (ref 81.4–97.8)
MONOCYTES # BLD AUTO: 0.97 K/UL (ref 0–0.85)
MONOCYTES NFR BLD AUTO: 10.1 % (ref 0–13.4)
NEUTROPHILS # BLD AUTO: 6.08 K/UL (ref 1.82–7.42)
NEUTROPHILS NFR BLD: 63 % (ref 44–72)
NRBC # BLD AUTO: 0 K/UL
NRBC BLD-RTO: 0 /100 WBC (ref 0–0.2)
PLATELET # BLD AUTO: 148 K/UL (ref 164–446)
PMV BLD AUTO: 11.8 FL (ref 9–12.9)
POTASSIUM SERPL-SCNC: 4 MMOL/L (ref 3.6–5.5)
PROT SERPL-MCNC: 6.4 G/DL (ref 6–8.2)
RBC # BLD AUTO: 5.52 M/UL (ref 4.7–6.1)
SODIUM SERPL-SCNC: 136 MMOL/L (ref 135–145)
WBC # BLD AUTO: 9.6 K/UL (ref 4.8–10.8)

## 2023-10-07 PROCEDURE — 700105 HCHG RX REV CODE 258: Performed by: EMERGENCY MEDICINE

## 2023-10-07 PROCEDURE — 700117 HCHG RX CONTRAST REV CODE 255: Performed by: EMERGENCY MEDICINE

## 2023-10-07 PROCEDURE — 83690 ASSAY OF LIPASE: CPT

## 2023-10-07 PROCEDURE — 700102 HCHG RX REV CODE 250 W/ 637 OVERRIDE(OP): Performed by: EMERGENCY MEDICINE

## 2023-10-07 PROCEDURE — 99285 EMERGENCY DEPT VISIT HI MDM: CPT

## 2023-10-07 PROCEDURE — 36415 COLL VENOUS BLD VENIPUNCTURE: CPT

## 2023-10-07 PROCEDURE — A9270 NON-COVERED ITEM OR SERVICE: HCPCS | Performed by: EMERGENCY MEDICINE

## 2023-10-07 PROCEDURE — 85025 COMPLETE CBC W/AUTO DIFF WBC: CPT

## 2023-10-07 PROCEDURE — 80053 COMPREHEN METABOLIC PANEL: CPT

## 2023-10-07 PROCEDURE — 74177 CT ABD & PELVIS W/CONTRAST: CPT

## 2023-10-07 RX ORDER — SODIUM CHLORIDE 9 MG/ML
1000 INJECTION, SOLUTION INTRAVENOUS ONCE
Status: COMPLETED | OUTPATIENT
Start: 2023-10-07 | End: 2023-10-07

## 2023-10-07 RX ORDER — AMOXICILLIN AND CLAVULANATE POTASSIUM 875; 125 MG/1; MG/1
1 TABLET, FILM COATED ORAL ONCE
Status: COMPLETED | OUTPATIENT
Start: 2023-10-07 | End: 2023-10-07

## 2023-10-07 RX ORDER — AMOXICILLIN AND CLAVULANATE POTASSIUM 875; 125 MG/1; MG/1
1 TABLET, FILM COATED ORAL 2 TIMES DAILY
Qty: 20 TABLET | Refills: 0 | Status: ACTIVE | OUTPATIENT
Start: 2023-10-07 | End: 2023-10-17

## 2023-10-07 RX ADMIN — SODIUM CHLORIDE 1000 ML: 9 INJECTION, SOLUTION INTRAVENOUS at 10:44

## 2023-10-07 RX ADMIN — IOHEXOL 100 ML: 350 INJECTION, SOLUTION INTRAVENOUS at 11:17

## 2023-10-07 RX ADMIN — AMOXICILLIN AND CLAVULANATE POTASSIUM 1 TABLET: 875; 125 TABLET, FILM COATED ORAL at 11:51

## 2023-10-07 ASSESSMENT — FIBROSIS 4 INDEX: FIB4 SCORE: 2.5

## 2023-10-07 NOTE — ED NOTES
Erp to bedside, poc discussed. Pt aware of npo, pending ct and need for urine spec. Call light in reach, denies needs

## 2023-10-07 NOTE — ED PROVIDER NOTES
ED Provider Note    CHIEF COMPLAINT  Chief Complaint   Patient presents with    Abdominal Pain     Pt ambulated to room with steady gait c/o LLQ pain that developed Thursday. He states it is a dull constant ache.   He has a history of diverticulosis and diverticulitis .  No constipation or diarrhea. No fever, n/v. Had a normal BM this AM.        EXTERNAL RECORDS REVIEWED  Recent outpatient PCP visit, Zenker diverticulum, A-fib anticoagulated    HPI/ROS    Chuck Ortiz is a 83 y.o. male who presents for evaluation of left lower quadrant pain over the past 3 days.  History of diverticulitis.  Denies nausea or vomiting.  Normal bowel movements without blood.  No diarrhea.  He has had diverticulitis in the past although he states that the symptoms are little bit different and that the location is higher up in his abdomen.  He is anticoagulated with a history of atrial fibrillation.  History of TIA, seizure disorder and kidney stones as well as hypertension.    PAST MEDICAL HISTORY   has a past medical history of Arrhythmia, Arthritis, Cardiac murmur, CATARACT, Dental disorder, ED (erectile dysfunction), Essential hypertension (11/12/2015), Facial droop, Hemorrhagic disorder (HCC), High cholesterol, Hypertension, Kidney stones, Seizure (HCC), Sleep apnea, Snoring, Stroke (HCC) (08/07/2018), and Zenker diverticula.    SURGICAL HISTORY   has a past surgical history that includes colonoscopy (2004); inguinal hernia repair (2/19/2009); laminotomy (1996); finger arthroplasty (12/17/2012); cataract phaco with iol (1/21/2014); uvulopharyngopalatoplasty (1985); and [facility] cardiac cath (09/28/2018).    FAMILY HISTORY  Family History   Problem Relation Age of Onset    Heart Disease Maternal Grandmother     Diabetes Paternal Grandfather     Stroke Sister     Lung Disease Father         black lung     Cancer Neg Hx        SOCIAL HISTORY  Social History     Tobacco Use    Smoking status: Never    Smokeless tobacco: Never    Substance and Sexual Activity    Alcohol use: Yes     Comment: 2 drinks per week     Drug use: No    Sexual activity: Not on file     Comment: , retired [employer]        CURRENT MEDICATIONS  Home Medications    **Home medications have not yet been reviewed for this encounter**         ALLERGIES  Allergies   Allergen Reactions    Morphine      Bradycardia       PHYSICAL EXAM  VITAL SIGNS: BP (!) 165/96   Pulse 81   Temp 36.9 °C (98.4 °F) (Temporal)   Resp 20   Ht 1.829 m (6')   Wt 95.6 kg (210 lb 12.8 oz)   SpO2 94%   BMI 28.59 kg/m²    Constitutional: Well appearing patient in no acute distress.  Awake and alert, not toxic nor ill in appearance.  HENT: Left-sided facial droop chronic since birth  Thorax & Lungs: Normal nonlabored respirations. I appreciate no wheezing, rhonchi or rales. There is normal air movement.  Upon cardiac ascultation I appreciate a regular heart rhythm and a normal rate.   Abdomen: The abdomen is not visibly distended.  Upon palpation has focal left lower quadrant tenderness with a small area of localized guarding.  No rebound tenderness.  Skin: The exposed portions of skin reveal no obvious rash or other abnormalities.  Extremities/Musculoskeletal: No obvious sign of acute trauma. No asymmetric calf tenderness or edema.   Neurologic: Alert & oriented.    DIAGNOSTIC STUDIES / PROCEDURES    LABS  Results for orders placed or performed during the hospital encounter of 10/07/23   CBC WITH DIFFERENTIAL   Result Value Ref Range    WBC 9.6 4.8 - 10.8 K/uL    RBC 5.52 4.70 - 6.10 M/uL    Hemoglobin 17.6 14.0 - 18.0 g/dL    Hematocrit 53.0 (H) 42.0 - 52.0 %    MCV 96.0 81.4 - 97.8 fL    MCH 31.9 27.0 - 33.0 pg    MCHC 33.2 32.3 - 36.5 g/dL    RDW 48.5 35.9 - 50.0 fL    Platelet Count 148 (L) 164 - 446 K/uL    MPV 11.8 9.0 - 12.9 fL    Neutrophils-Polys 63.00 44.00 - 72.00 %    Lymphocytes 24.30 22.00 - 41.00 %    Monocytes 10.10 0.00 - 13.40 %    Eosinophils 1.70 0.00 - 6.90 %     Basophils 0.50 0.00 - 1.80 %    Immature Granulocytes 0.40 0.00 - 0.90 %    Nucleated RBC 0.00 0.00 - 0.20 /100 WBC    Neutrophils (Absolute) 6.08 1.82 - 7.42 K/uL    Lymphs (Absolute) 2.34 1.00 - 4.80 K/uL    Monos (Absolute) 0.97 (H) 0.00 - 0.85 K/uL    Eos (Absolute) 0.16 0.00 - 0.51 K/uL    Baso (Absolute) 0.05 0.00 - 0.12 K/uL    Immature Granulocytes (abs) 0.04 0.00 - 0.11 K/uL    NRBC (Absolute) 0.00 K/uL   COMP METABOLIC PANEL   Result Value Ref Range    Sodium 136 135 - 145 mmol/L    Potassium 4.0 3.6 - 5.5 mmol/L    Chloride 100 96 - 112 mmol/L    Co2 25 20 - 33 mmol/L    Anion Gap 11.0 7.0 - 16.0    Glucose 90 65 - 99 mg/dL    Bun 20 8 - 22 mg/dL    Creatinine 0.99 0.50 - 1.40 mg/dL    Calcium 9.2 8.4 - 10.2 mg/dL    Correct Calcium 9.4 8.5 - 10.5 mg/dL    AST(SGOT) 15 12 - 45 U/L    ALT(SGPT) 16 2 - 50 U/L    Alkaline Phosphatase 99 30 - 99 U/L    Total Bilirubin 0.6 0.1 - 1.5 mg/dL    Albumin 3.8 3.2 - 4.9 g/dL    Total Protein 6.4 6.0 - 8.2 g/dL    Globulin 2.6 1.9 - 3.5 g/dL    A-G Ratio 1.5 g/dL   LIPASE   Result Value Ref Range    Lipase 22 11 - 82 U/L   ESTIMATED GFR   Result Value Ref Range    GFR (CKD-EPI) 75 >60 mL/min/1.73 m 2        RADIOLOGY  I have independently interpreted the diagnostic imaging associated with this visit and am waiting the final reading from the radiologist.   My preliminary interpretation is as follows: Diverticulitis, no perforation, no abscess  Radiologist interpretation:   CT-ABDOMEN-PELVIS WITH   Final Result      1.  Sigmoid diverticulitis.      2.  Fatty change of the liver with multiple small hepatic cysts or hemangiomata.      3.  Vascular calcification.            COURSE & MEDICAL DECISION MAKING    ED Observation Status? No; Patient does not meet criteria for ED Observation.     INITIAL ASSESSMENT, COURSE AND PLAN  Care Narrative: 83-year-old male with a history of diverticulitis coming in with 3 days of left lower quadrant abdominal pain with localized guarding  on exam.  Concerning for complicated diverticulitis for which a CT scan will be obtained.  Obtain blood work as well.  He states his pain is well controlled at this point.  His examination is otherwise unremarkable.    Blood work looks great, normal WBC, normal hepatic and renal functions.  CT scan confirms presence of diverticulitis slowdown in the sigmoid colon but no perforation or abscess.  Patient will be treated with Augmentin, first dose here in the emergency department a prescription for the same.  He declines the need for pain medication otherwise.  Discharged home in stable condition with strict return instructions provided  Prescription for Augmentin    DISPOSITION AND DISCUSSIONS    Escalation of care considered, and ultimately not performed: I did consider escalation to inpatient management given his advanced age and findings of diverticulitis on CT although at this time he appears quite well, his work-up is very reassuring and his pain is controlled    FINAL DIAGNOSIS  1. Diverticulitis           Electronically signed by: Luigi Quinn M.D., 10/7/2023 10:38 AM

## 2023-10-07 NOTE — ED NOTES
Dc instructions reviewed with pt. Aware of need to  meds and take as directed, 1 this evening, start 2x/day tomorrow, f/u with pcp, return for worsening s/s

## 2023-10-07 NOTE — ED TRIAGE NOTES
Chief Complaint   Patient presents with    Abdominal Pain     Pt ambulated to room with steady gait c/o LLQ pain that developed Thursday. He states it is a dull constant ache.   He has a history of diverticulosis and diverticulitis .  No constipation or diarrhea. No fever, n/v. Had a normal BM this AM.

## 2023-11-15 DIAGNOSIS — I48.91 ATRIAL FIBRILLATION, UNSPECIFIED TYPE (HCC): ICD-10-CM

## 2023-11-15 RX ORDER — APIXABAN 5 MG/1
TABLET, FILM COATED ORAL
Qty: 180 TABLET | Refills: 2 | Status: SHIPPED | OUTPATIENT
Start: 2023-11-15

## 2023-12-11 ENCOUNTER — APPOINTMENT (RX ONLY)
Dept: URBAN - METROPOLITAN AREA CLINIC 35 | Facility: CLINIC | Age: 83
Setting detail: DERMATOLOGY
End: 2023-12-11

## 2023-12-11 DIAGNOSIS — L57.0 ACTINIC KERATOSIS: ICD-10-CM

## 2023-12-11 DIAGNOSIS — Z71.89 OTHER SPECIFIED COUNSELING: ICD-10-CM

## 2023-12-11 DIAGNOSIS — L82.1 OTHER SEBORRHEIC KERATOSIS: ICD-10-CM

## 2023-12-11 DIAGNOSIS — L81.4 OTHER MELANIN HYPERPIGMENTATION: ICD-10-CM

## 2023-12-11 DIAGNOSIS — Z85.828 PERSONAL HISTORY OF OTHER MALIGNANT NEOPLASM OF SKIN: ICD-10-CM

## 2023-12-11 DIAGNOSIS — D22 MELANOCYTIC NEVI: ICD-10-CM

## 2023-12-11 DIAGNOSIS — L85.3 XEROSIS CUTIS: ICD-10-CM

## 2023-12-11 PROBLEM — D22.5 MELANOCYTIC NEVI OF TRUNK: Status: ACTIVE | Noted: 2023-12-11

## 2023-12-11 PROCEDURE — 17003 DESTRUCT PREMALG LES 2-14: CPT

## 2023-12-11 PROCEDURE — 17000 DESTRUCT PREMALG LESION: CPT

## 2023-12-11 PROCEDURE — 99213 OFFICE O/P EST LOW 20 MIN: CPT | Mod: 25

## 2023-12-11 PROCEDURE — ? LIQUID NITROGEN

## 2023-12-11 PROCEDURE — ? RECOMMENDATIONS

## 2023-12-11 PROCEDURE — ? COUNSELING

## 2023-12-11 ASSESSMENT — LOCATION SIMPLE DESCRIPTION DERM
LOCATION SIMPLE: RIGHT PRETIBIAL REGION
LOCATION SIMPLE: ABDOMEN
LOCATION SIMPLE: RIGHT UPPER BACK
LOCATION SIMPLE: LEFT EAR
LOCATION SIMPLE: LEFT CHEEK
LOCATION SIMPLE: RIGHT FOREHEAD
LOCATION SIMPLE: LEFT PRETIBIAL REGION
LOCATION SIMPLE: SCALP
LOCATION SIMPLE: SUPERIOR FOREHEAD
LOCATION SIMPLE: UPPER BACK

## 2023-12-11 ASSESSMENT — LOCATION ZONE DERM
LOCATION ZONE: TRUNK
LOCATION ZONE: FACE
LOCATION ZONE: LEG
LOCATION ZONE: EAR
LOCATION ZONE: SCALP

## 2023-12-11 ASSESSMENT — LOCATION DETAILED DESCRIPTION DERM
LOCATION DETAILED: RIGHT SUPERIOR UPPER BACK
LOCATION DETAILED: INFERIOR THORACIC SPINE
LOCATION DETAILED: SUPERIOR MID FOREHEAD
LOCATION DETAILED: RIGHT SUPERIOR PARIETAL SCALP
LOCATION DETAILED: LEFT CENTRAL MALAR CHEEK
LOCATION DETAILED: EPIGASTRIC SKIN
LOCATION DETAILED: RIGHT SUPERIOR LATERAL UPPER BACK
LOCATION DETAILED: LEFT INFERIOR CENTRAL MALAR CHEEK
LOCATION DETAILED: LEFT PROXIMAL PRETIBIAL REGION
LOCATION DETAILED: RIGHT PROXIMAL PRETIBIAL REGION
LOCATION DETAILED: LEFT SUPERIOR MEDIAL MALAR CHEEK
LOCATION DETAILED: RIGHT INFERIOR MEDIAL FOREHEAD
LOCATION DETAILED: LEFT POSTERIOR EAR

## 2023-12-11 NOTE — PROCEDURE: LIQUID NITROGEN
Render Note In Bullet Format When Appropriate: No
Post-Care Instructions: I reviewed with the patient in detail post-care instructions. Patient is to wear sunprotection, and avoid picking at any of the treated lesions. Pt may apply Vaseline to crusted or scabbing areas.
Duration Of Freeze Thaw-Cycle (Seconds): 10
Number Of Freeze-Thaw Cycles: 1 freeze-thaw cycle
Consent: The patient's verbal consent was obtained including but not limited to risks of crusting, scabbing, blistering, scarring, darker or lighter pigmentary change, recurrence, incomplete removal and infection.
Detail Level: Detailed
Show Applicator Variable?: Yes

## 2023-12-11 NOTE — PROCEDURE: RECOMMENDATIONS
Recommendations (Free Text): Use VaniCream daily. Consider topical steroids in the future if not improved with moisturizer.
Detail Level: Zone
Recommendation Preamble: The following recommendations were made during the visit:
Render Risk Assessment In Note?: no

## 2023-12-15 ENCOUNTER — HOSPITAL ENCOUNTER (OUTPATIENT)
Facility: MEDICAL CENTER | Age: 83
End: 2023-12-15
Attending: INTERNAL MEDICINE
Payer: MEDICARE

## 2023-12-15 ENCOUNTER — NON-PROVIDER VISIT (OUTPATIENT)
Dept: INTERNAL MEDICINE | Facility: IMAGING CENTER | Age: 83
End: 2023-12-15
Payer: MEDICARE

## 2023-12-15 DIAGNOSIS — N40.1 BENIGN PROSTATIC HYPERPLASIA WITH URINARY FREQUENCY: ICD-10-CM

## 2023-12-15 DIAGNOSIS — R35.0 BENIGN PROSTATIC HYPERPLASIA WITH URINARY FREQUENCY: ICD-10-CM

## 2023-12-15 DIAGNOSIS — I10 ESSENTIAL HYPERTENSION: ICD-10-CM

## 2023-12-15 DIAGNOSIS — N20.0 URIC ACID NEPHROLITHIASIS: ICD-10-CM

## 2023-12-15 DIAGNOSIS — E78.5 HYPERLIPIDEMIA LDL GOAL <70: ICD-10-CM

## 2023-12-15 DIAGNOSIS — I25.10 CORONARY ARTERY DISEASE INVOLVING NATIVE CORONARY ARTERY OF NATIVE HEART WITHOUT ANGINA PECTORIS: ICD-10-CM

## 2023-12-15 DIAGNOSIS — G47.33 OBSTRUCTIVE SLEEP APNEA SYNDROME: ICD-10-CM

## 2023-12-15 DIAGNOSIS — E79.0 HYPERURICEMIA: ICD-10-CM

## 2023-12-15 LAB
ALBUMIN SERPL BCP-MCNC: 4 G/DL (ref 3.2–4.9)
ALBUMIN/GLOB SERPL: 1.6 G/DL
ALP SERPL-CCNC: 112 U/L (ref 30–99)
ALT SERPL-CCNC: 19 U/L (ref 2–50)
ANION GAP SERPL CALC-SCNC: 8 MMOL/L (ref 7–16)
AST SERPL-CCNC: 19 U/L (ref 12–45)
BASOPHILS # BLD AUTO: 0.8 % (ref 0–1.8)
BASOPHILS # BLD: 0.07 K/UL (ref 0–0.12)
BILIRUB SERPL-MCNC: 1 MG/DL (ref 0.1–1.5)
BUN SERPL-MCNC: 18 MG/DL (ref 8–22)
CALCIUM ALBUM COR SERPL-MCNC: 9.4 MG/DL (ref 8.5–10.5)
CALCIUM SERPL-MCNC: 9.4 MG/DL (ref 8.5–10.5)
CHLORIDE SERPL-SCNC: 101 MMOL/L (ref 96–112)
CHOLEST SERPL-MCNC: 115 MG/DL (ref 100–199)
CO2 SERPL-SCNC: 29 MMOL/L (ref 20–33)
CREAT SERPL-MCNC: 0.92 MG/DL (ref 0.5–1.4)
EOSINOPHIL # BLD AUTO: 0.28 K/UL (ref 0–0.51)
EOSINOPHIL NFR BLD: 3.3 % (ref 0–6.9)
ERYTHROCYTE [DISTWIDTH] IN BLOOD BY AUTOMATED COUNT: 50.5 FL (ref 35.9–50)
GFR SERPLBLD CREATININE-BSD FMLA CKD-EPI: 82 ML/MIN/1.73 M 2
GLOBULIN SER CALC-MCNC: 2.5 G/DL (ref 1.9–3.5)
GLUCOSE SERPL-MCNC: 88 MG/DL (ref 65–99)
HCT VFR BLD AUTO: 50.7 % (ref 42–52)
HDLC SERPL-MCNC: 40 MG/DL
HGB BLD-MCNC: 17.5 G/DL (ref 14–18)
IMM GRANULOCYTES # BLD AUTO: 0.03 K/UL (ref 0–0.11)
IMM GRANULOCYTES NFR BLD AUTO: 0.4 % (ref 0–0.9)
LDLC SERPL CALC-MCNC: 58 MG/DL
LYMPHOCYTES # BLD AUTO: 2.79 K/UL (ref 1–4.8)
LYMPHOCYTES NFR BLD: 33.2 % (ref 22–41)
MCH RBC QN AUTO: 34.7 PG (ref 27–33)
MCHC RBC AUTO-ENTMCNC: 34.5 G/DL (ref 32.3–36.5)
MCV RBC AUTO: 100.6 FL (ref 81.4–97.8)
MONOCYTES # BLD AUTO: 0.98 K/UL (ref 0–0.85)
MONOCYTES NFR BLD AUTO: 11.7 % (ref 0–13.4)
NEUTROPHILS # BLD AUTO: 4.25 K/UL (ref 1.82–7.42)
NEUTROPHILS NFR BLD: 50.6 % (ref 44–72)
NRBC # BLD AUTO: 0 K/UL
NRBC BLD-RTO: 0 /100 WBC (ref 0–0.2)
PLATELET # BLD AUTO: 167 K/UL (ref 164–446)
PMV BLD AUTO: 11.9 FL (ref 9–12.9)
POTASSIUM SERPL-SCNC: 4.1 MMOL/L (ref 3.6–5.5)
PROT SERPL-MCNC: 6.5 G/DL (ref 6–8.2)
PSA SERPL-MCNC: 1.7 NG/ML (ref 0–4)
RBC # BLD AUTO: 5.04 M/UL (ref 4.7–6.1)
SODIUM SERPL-SCNC: 138 MMOL/L (ref 135–145)
TRIGL SERPL-MCNC: 85 MG/DL (ref 0–149)
URATE SERPL-MCNC: 4.9 MG/DL (ref 2.5–8.3)
WBC # BLD AUTO: 8.4 K/UL (ref 4.8–10.8)

## 2023-12-15 PROCEDURE — 84153 ASSAY OF PSA TOTAL: CPT

## 2023-12-15 PROCEDURE — 80053 COMPREHEN METABOLIC PANEL: CPT

## 2023-12-15 PROCEDURE — 80061 LIPID PANEL: CPT

## 2023-12-15 PROCEDURE — 85025 COMPLETE CBC W/AUTO DIFF WBC: CPT

## 2023-12-15 PROCEDURE — 99999 PR NO CHARGE: CPT | Mod: MISDOCU

## 2023-12-15 PROCEDURE — 84550 ASSAY OF BLOOD/URIC ACID: CPT

## 2023-12-15 NOTE — PROGRESS NOTES
Chuck Ortiz is a 83 y.o. male here for a non-provider visit for a lab draw on 12/15/2023 at 8:46 AM.    Procedure performed:  Venipuncture     Anatomical site:  Left Antecubital Area    Equipment used:  21 g vacutainer     Labs drawn:  annual labs    Ordering provider:  Reilly Jc MD    Lab draw completed by:  Ana Stearns R.N.

## 2023-12-21 ENCOUNTER — HOSPITAL ENCOUNTER (OUTPATIENT)
Facility: MEDICAL CENTER | Age: 83
End: 2023-12-21
Attending: INTERNAL MEDICINE
Payer: MEDICARE

## 2023-12-21 ENCOUNTER — OFFICE VISIT (OUTPATIENT)
Dept: INTERNAL MEDICINE | Facility: IMAGING CENTER | Age: 83
End: 2023-12-21
Payer: MEDICARE

## 2023-12-21 VITALS
DIASTOLIC BLOOD PRESSURE: 78 MMHG | BODY MASS INDEX: 29.68 KG/M2 | HEIGHT: 71 IN | RESPIRATION RATE: 12 BRPM | SYSTOLIC BLOOD PRESSURE: 128 MMHG | OXYGEN SATURATION: 93 % | TEMPERATURE: 98.2 F | HEART RATE: 58 BPM | WEIGHT: 212 LBS

## 2023-12-21 DIAGNOSIS — I10 ESSENTIAL HYPERTENSION: ICD-10-CM

## 2023-12-21 DIAGNOSIS — I48.0 PAF (PAROXYSMAL ATRIAL FIBRILLATION) (HCC): ICD-10-CM

## 2023-12-21 DIAGNOSIS — E79.0 HYPERURICEMIA: ICD-10-CM

## 2023-12-21 DIAGNOSIS — I77.810 THORACIC AORTIC ECTASIA (HCC): ICD-10-CM

## 2023-12-21 DIAGNOSIS — I35.0 NONRHEUMATIC AORTIC VALVE STENOSIS: ICD-10-CM

## 2023-12-21 DIAGNOSIS — R35.0 BENIGN PROSTATIC HYPERPLASIA WITH URINARY FREQUENCY: ICD-10-CM

## 2023-12-21 DIAGNOSIS — E78.5 HYPERLIPIDEMIA LDL GOAL <70: ICD-10-CM

## 2023-12-21 DIAGNOSIS — I25.10 CORONARY ARTERY DISEASE INVOLVING NATIVE CORONARY ARTERY OF NATIVE HEART WITHOUT ANGINA PECTORIS: ICD-10-CM

## 2023-12-21 DIAGNOSIS — G47.33 OBSTRUCTIVE SLEEP APNEA SYNDROME: ICD-10-CM

## 2023-12-21 DIAGNOSIS — K22.5 ZENKER DIVERTICULUM: ICD-10-CM

## 2023-12-21 DIAGNOSIS — Z00.00 MEDICARE ANNUAL WELLNESS VISIT, SUBSEQUENT: ICD-10-CM

## 2023-12-21 DIAGNOSIS — Z12.11 SCREENING FOR COLON CANCER: ICD-10-CM

## 2023-12-21 DIAGNOSIS — N40.1 BENIGN PROSTATIC HYPERPLASIA WITH URINARY FREQUENCY: ICD-10-CM

## 2023-12-21 LAB
APPEARANCE UR: CLEAR
BILIRUB UR QL STRIP.AUTO: NEGATIVE
COLOR UR: YELLOW
CREAT UR-MCNC: 115.32 MG/DL
GLUCOSE UR STRIP.AUTO-MCNC: NEGATIVE MG/DL
KETONES UR STRIP.AUTO-MCNC: NEGATIVE MG/DL
LEUKOCYTE ESTERASE UR QL STRIP.AUTO: NEGATIVE
MICRO URNS: NORMAL
MICROALBUMIN UR-MCNC: 1.3 MG/DL
MICROALBUMIN/CREAT UR: 11 MG/G (ref 0–30)
NITRITE UR QL STRIP.AUTO: NEGATIVE
PH UR STRIP.AUTO: 5 [PH] (ref 5–8)
PROT UR QL STRIP: NEGATIVE MG/DL
RBC UR QL AUTO: NEGATIVE
SP GR UR STRIP.AUTO: 1.02
UROBILINOGEN UR STRIP.AUTO-MCNC: 0.2 MG/DL

## 2023-12-21 PROCEDURE — 81003 URINALYSIS AUTO W/O SCOPE: CPT

## 2023-12-21 PROCEDURE — 82570 ASSAY OF URINE CREATININE: CPT

## 2023-12-21 PROCEDURE — 82043 UR ALBUMIN QUANTITATIVE: CPT

## 2023-12-21 RX ORDER — CYCLOBENZAPRINE HCL 10 MG
10 TABLET ORAL 3 TIMES DAILY PRN
Qty: 30 TABLET | Refills: 0 | Status: SHIPPED | OUTPATIENT
Start: 2023-12-21

## 2023-12-21 ASSESSMENT — FIBROSIS 4 INDEX: FIB4 SCORE: 2.19

## 2023-12-21 ASSESSMENT — ACTIVITIES OF DAILY LIVING (ADL): BATHING_REQUIRES_ASSISTANCE: 0

## 2023-12-21 ASSESSMENT — ENCOUNTER SYMPTOMS: GENERAL WELL-BEING: GOOD

## 2023-12-21 ASSESSMENT — PATIENT HEALTH QUESTIONNAIRE - PHQ9: CLINICAL INTERPRETATION OF PHQ2 SCORE: 0

## 2023-12-21 NOTE — PROGRESS NOTES
83 y.o. male presents for the following:    Chuck Ortiz    1. Medicare annual wellness visit, subsequent  Patient comes in for annual health risk assessment, physical and review of laboratory.  He considers himself in good health.  No depression.  No balance issues.  No cognitive issues.    2. Essential hypertension  Stable.  Patient remains on losartan and triamterene/hydrochlorothiazide.  Urine microalbumin is undetectable.    3. Obstructive sleep apnea syndrome  Stable on CPAP.  No recent evaluation with pulmonary    4. Hyperlipidemia LDL goal <70  Stable.  Lipids are at goal on Lipitor 40 mg.    5. Coronary artery disease involving native coronary artery of native heart without angina pectoris  Stable.  No chest pain, angina or equivalent.  Blood pressure is at goal.  Lipids are at goal.  He remains on Eliquis due to history of atrial fibrillation    6. Hyperuricemia  Stable.  Goal uric acid level, <6.  He is currently 4.9.  He is on allopurinol    7. Benign prostatic hyperplasia with urinary frequency  Stable.  Followed by urology.  Tamsulosin was prescribed but he did not fill the prescription.  His symptoms resolved with treatment of diverticulitis    8. Zenker diverticulum  Stable but ongoing.  He was seen by ENT who recommended no intervention.  He has a history of esophageal narrowing requiring dilation.    9. Nonrheumatic aortic valve stenosis  Stable.  Moderate disease seen on echocardiogram.  He gets yearly echocardiogram with cardiology.    10. Thoracic aortic ectasia (HCC)  Stable.  Imaging as above    11. PAF (paroxysmal atrial fibrillation) (HCC)  Stable.  Patient remains on Eliquis for anticoagulation.  No chronotropic agents.  Heart rate is appropriate.      Colon cancer screening -occult blood in December 2022.    Laboratory:  CBC-macrocytosis with .6 but otherwise unremarkable.  Comprehensive metabolic-alkaline phosphatase 112.  Previously 99.  Otherwise normal.  Lipids-LDL  "cholesterol 58.  Total cholesterol 115.  PSA-1.17 this is lower than the last previous 2 years.  Urine analysis-pending.    Echocardiogram March 2023:  CONCLUSIONS  Normal left ventricular systolic function.  The left ventricular ejection fraction is visually estimated to be 65%.  Moderate aortic valve stenosis: Transvalvular gradients are - Peak: 36   mmHg, Mean: 22 mmHg. Vmax is 2.1 m/s. Aortic valve is 1.2 cm2.  Mild mitral regurgitation.  The right ventricle is normal in size and systolic function.  Unable to estimate right ventricular systolic pressure due to an   inadequate tricuspid regurgitant jet.  Compared to the prior study on 5/5/2022, there has been no significant   change; aortic stenosis remains moderate.    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    CATARACT PHACO WITH IOL  1/21/2014    Performed by Joni Duarte M.D. at [facility]    FINGER ARTHROPLASTY  12/17/2012    Performed by Adam Christopher M.D. at SURGERY SAME DAY [facility] ORS    INGUINAL HERNIA REPAIR  2/19/2009    Performed by ALEX ALATORRE at SURGERY SAME DAY [facility] ORS    COLONOSCOPY  2004    neg    LAMINOTOMY  1996    lumbar laminectomy, cyst x3    " UVULOPHARYNGOPALATOPLASTY  1985       Family history: relating to possible risk factors for your patient  Family History   Problem Relation Age of Onset    Heart Disease Maternal Grandmother     Diabetes Paternal Grandfather     Stroke Sister     Lung Disease Father         black lung     Cancer Neg Hx        Current Providers (including home care/DME’s):   Colonoscopy 10/9/19 no repeat recommended, 12/20/22 FIT NEG Dexa  PSA  12/15/23  1.70  [facility]    Patient Care Team:  Reilly Jc M.D. as PCP - General (Internal Medicine)  Josefa Keyes M.D. as PCP - [facility] Paneled  Ana Stearns R.N. as Registered Nurse      Medications:   Current Outpatient Medications Ordered in Epic   Medication Sig Dispense Refill    cyclobenzaprine (FLEXERIL) 10 mg Tab Take 1 Tablet by mouth 3 times a day as needed for Moderate Pain (back spasms). 30 Tablet 0    ELIQUIS 5 MG Tab TAKE 1 TABLET BY MOUTH TWICE A  Tablet 2    triamterene/hctz (MAXZIDE-25/DYAZIDE) 37.5-25 MG Cap TAKE 1 CAPSULE BY MOUTH EVERY MORNING. 90 Capsule 3    atorvastatin (LIPITOR) 40 MG Tab TAKE 1 TABLET BY MOUTH EVERY  Tablet 2    allopurinol (ZYLOPRIM) 100 MG Tab TAKE 1 TABLET BY MOUTH TWICE A  Tablet 3    losartan (COZAAR) 50 MG Tab TAKE 1 TABLET BY MOUTH EVERY  Tablet 3     No current Epic-ordered facility-administered medications on file.       Supplements (calcium/vitamins): if not lisited in medications    Chief Complaint   Patient presents with    Medicare Annual Wellness         HPI:  Chuck Ortiz is a 83 y.o. 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    Osteoarthritis of left wrist 01/30/2023    Thoracic aortic ectasia (HCC) 01/11/2023    Aortic stenosis 05/26/2022    Coronary artery disease, non-occlusive 10/21/2021    Dyslipidemia 10/21/2021     H/O: CVA (cerebrovascular accident) 10/21/2021    PAF (paroxysmal atrial fibrillation) ([facility]) 11/09/2020    Anticoagulated 11/09/2020    Abnormal EKG 03/02/2020    Irregular heart rhythm 03/02/2020    Cryptogenic stroke ([facility]) 09/17/2019    Hyperuricemia 09/17/2019    Lumbar radiculopathy 03/26/2019    Zenker diverticulum 11/15/2018    Status post placement of implantable loop recorder 11/15/2018    Hyperlipidemia LDL goal <70 11/15/2018    Coronary artery disease involving native coronary artery of native heart without angina pectoris 11/15/2018    History of arterial ischemic stroke 08/23/2018    Ventricular ectopy 08/23/2018    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 30 Tablet 0    ELIQUIS 5 MG Tab TAKE 1 TABLET BY MOUTH TWICE A  Tablet 2    triamterene/hctz (MAXZIDE-25/DYAZIDE) 37.5-25 MG Cap TAKE 1 CAPSULE BY MOUTH EVERY MORNING. 90 Capsule 3    atorvastatin (LIPITOR) 40 MG Tab TAKE 1 TABLET BY MOUTH EVERY  Tablet 2    allopurinol (ZYLOPRIM) 100 MG Tab TAKE 1 TABLET BY MOUTH TWICE A  Tablet 3    losartan (COZAAR) 50 MG Tab TAKE 1 TABLET BY MOUTH EVERY  Tablet 3     No current facility-administered medications for this visit.            Current supplements as per medication list.       Allergies: Morphine    Current social contact/activities:  Social with friends and family.    He  reports that he has never smoked. He has never used smokeless tobacco. He reports current alcohol use. He reports that he does not use drugs.  Counseling  given: Not Answered        DPA/Advanced Directive: In epic.  Completed      ROS:    Gait: Uses : None  Ostomy: No  Other tubes: no   Amputations: no   Chronic oxygen use: no   Last eye exam: Less than 1 year ago  : Denies any urinary leakage during the last 6 months incontinence.       Screening:  Colonoscopy 10/9/19 no repeat recommended, 12/20/22 FIT NEG Dexa  PSA  12/15/23  1.70  [facility]    Depression Screening  Little interest or pleasure in doing things?  0 - not at all  Feeling down, depressed , or hopeless? 0 - not at all  Patient Health Questionnaire Score: 0     If depressive symptoms identified deferred to follow up visit unless specifically addressed in assessment and plan.    Interpretation of PHQ-9 Total Score   Score Severity   1-4 No Depression   5-9 Mild Depression   10-14 Moderate Depression   15-19 Moderately Severe Depression   20-27 Severe Depression    Screening for Cognitive Impairment  Do you or any of your friends or family members have any concern about your memory? No  Three Minute Recall (Banana, Sunrise, Chair) 3/3    Wil clock face with all 12 numbers and set the hands to show 20 past 8.  Yes    Cognitive concerns identified deferred for follow up unless specifically addressed in assessment and plan.    Fall Risk Assessment  Has the patient had two or more falls in the last year or any fall with injury in the last year?  No    Safety Assessment  Do you always wear your seatbelt?  Yes  Any changes to home needed to function safely? No  Difficulty hearing.  No  Patient counseled about all safety risks that were identified.    Functional Assessment ADLs  Are there any barriers preventing you from cooking for yourself or meeting nutritional needs?  No.    Are there any barriers preventing you from driving safely or obtaining transportation?  No.    Are there any barriers preventing you from using a telephone or calling for help?  No    Are there any barriers  preventing you from shopping?  No.    Are there any barriers preventing you from taking care of your own finances?  No    Are there any barriers preventing you from managing your medications?  No    Are there any barriers preventing you from showering, bathing or dressing yourself? No    Are there any barriers preventing you from doing housework or laundry? No  Are there any barriers preventing you from using the toilet?No  Are you currently engaging in any exercise or physical activity?  No.      Self-Assessment of Health  What is your perception of your health? Good  Do you sleep more than six hours a night? Yes  In the past 7 days, how much did pain keep you from doing your normal work?    Do you spend quality time with family or friends (virtually or in person)? Yes  Do you usually eat a heart healthy diet that constists of a variety of fruits, vegetables, whole grains and fiber? Yes  Do you eat foods high in fat and/or Fast Food more than three times per week? No    Advance Care Planning  Do you have an Advance Directive, Living Will, Durable Power of , or POLST? Yes  Advance Directive       is on file      Health Maintenance Summary            Overdue - Zoster (Shingles) Vaccines (1 of 2) Overdue - never done      No completion history exists for this topic.              COVID-19 Vaccine (6 - 2023-24 season) Next due on 1/2/2024 11/07/2023  Imm Admin: Comirnaty (Covid-19 Vaccine, Mrna, [id number] Formula)    11/08/2022  Imm Admin: PFIZER BIVALENT SARS-COV-2 VACCINE (12+)    10/26/2021  Imm Admin: PFIZER PURPLE CAP SARS-COV-2 VACCINATION (12+)    02/04/2021  Imm Admin: PFIZER PURPLE CAP SARS-COV-2 VACCINATION (12+)    01/14/2021  Imm Admin: PFIZER PURPLE CAP SARS-COV-2 VACCINATION (12+)              Annual Wellness Visit (Every 366 Days) Next due on 12/21/2024 12/21/2023  Visit Dx: Medicare annual wellness visit, subsequent    12/19/2022  Visit Dx: Medicare annual wellness visit, subsequent     11/09/2020  Visit Dx: Medicare annual wellness visit, subsequent    09/17/2019  Subsequent Annual Wellness Visit - Includes PPPS ()    09/17/2019  Visit Dx: Medicare annual wellness visit, subsequent    Only the first 5 history entries have been loaded, but more history exists.              IMM DTaP/Tdap/Td Vaccine (2 - Td or Tdap) Next due on 9/17/2029 09/17/2019  Imm Admin: Tdap Vaccine              Pneumococcal Vaccine: 65+ Years (Series Information) Completed      11/12/2014  Imm Admin: Pneumococcal Conjugate Vaccine (Prevnar/PCV-13)    12/02/2009  Imm Admin: Pneumococcal polysaccharide vaccine (PPSV-23)              Influenza Vaccine (Series Information) Completed      09/21/2023  Imm Admin: Influenza Vaccine Adult HD    10/18/2022  Imm Admin: Influenza Vaccine Adult HD    01/06/2022  Imm Admin: Influenza Vaccine Adult HD    10/12/2020  Imm Admin: Influenza Vaccine Adult HD    10/11/2019  Imm Admin: Influenza Vaccine Adult HD    Only the first 5 history entries have been loaded, but more history exists.              Hepatitis A Vaccine (Hep A) (Series Information) Aged Out      No completion history exists for this topic.              HPV Vaccines (Series Information) Aged Out      No completion history exists for this topic.              Polio Vaccine (Inactivated Polio) (Series Information) Aged Out      No completion history exists for this topic.              Meningococcal Immunization (Series Information) Aged Out      No completion history exists for this topic.              Discontinued - Colorectal Cancer Screening  Ordered on 12/21/2023        Frequency changed to Never automatically (Topic No Longer Applies)    12/20/2022  OCCULT BLOOD FECES IMMUNOASSAY    11/12/2020  OCCULT BLOOD FECES IMMUNOASSAY    07/30/2017  OCCULT BLOOD FECES IMMUNOASSAY    10/01/2014  Colonoscopy (Done)              Discontinued - Hepatitis B Vaccine (Hep B)  Discontinued      No completion history exists for this  "topic.                    Patient Care Team:  Reilly Jc M.D. as PCP - General (Internal Medicine)  Josefa Keyes M.D. as PCP - [facility] Paneled  Ana Stearns R.N. as Registered Nurse        Social History     Tobacco Use    Smoking status: Never    Smokeless tobacco: Never   Substance Use Topics    Alcohol use: Yes     Comment: 2 drinks per week     Drug use: No     Family History   Problem Relation Age of Onset    Heart Disease Maternal Grandmother     Diabetes Paternal Grandfather     Stroke Sister     Lung Disease Father         black lung     Cancer Neg Hx      He  has a past medical history of Arrhythmia, Arthritis, Cardiac murmur, CATARACT, Dental disorder, ED (erectile dysfunction), Essential hypertension (11/12/2015), Facial droop, Hemorrhagic disorder (HCC), High cholesterol, Hypertension, Kidney stones, Seizure (HCC), Sleep apnea, Snoring, Stroke (HCC) (08/07/2018), and Zenker diverticula.   Past Surgical History:   Procedure Laterality Date    ZZZ CARDIAC CATH  09/28/2018    40% LAD other arteries no disease.    CATARACT PHACO WITH IOL  1/21/2014    Performed by Joni Duarte M.D. at [facility]ERY [facility]    FINGER ARTHROPLASTY  12/17/2012    Performed by Adam Christopher M.D. at SURGERY SAME DAY [facility] ORS    INGUINAL HERNIA REPAIR  2/19/2009    Performed by ALEX ALATORRE at SURGERY SAME DAY [facility] ORS    COLONOSCOPY  2004    neg    LAMINOTOMY  1996    lumbar laminectomy, cyst x3    UVULOPHARYNGOPALATOPLASTY  1985       Exam:     /78 (BP Location: Left arm, Patient Position: Sitting, BP Cuff Size: Adult)   Pulse (!) 58   Temp 36.8 °C (98.2 °F) (Temporal)   Resp 12   Ht 1.803 m (5' 11\")   Wt 96.2 kg (212 lb)   SpO2 93%  Body mass index is 29.57 kg/m².    Hearing good.    Dentition good  Alert, oriented in no acute distress.  Eye contact is good, speech goal directed, affect calm  General: Mildly overweight.  No distress.  Normal appearing.  HEENT: Chronic left facial " droop.  Pupils are equal.  Conjunctiva is normal.  Head is normal appearing.  Ears, canals and tympanic membranes are normal.  Oral cavity is pink and moist without lesion.  Neck: Supple without JVD or bruit.  Thyroid is not enlarged.  Pulmonary: Clear with good breath sounds.  Cardiovascular regular rate and rhythm.  No murmur auscultated.  Carotid, radial and pedal pulses are intact.  Abdomen: Soft, nontender, nondistended.  Normal bowel sounds.  Organs are not enlarged.  Neurologic: Cranial nerves intact.  Strength and sensation are normal.  Normal patellar reflex.  Skin: No obvious lesions  Lymph: No cervical, supraclavicular, axillary, abdominal or inguinal adenopathy noted.      Assessment and Plan. The following treatment and monitoring plan is recommended:    1. Medicare annual wellness visit, subsequent        2. Essential hypertension        3. Obstructive sleep apnea syndrome        4. Hyperlipidemia LDL goal <70        5. Coronary artery disease involving native coronary artery of native heart without angina pectoris        6. Hyperuricemia        7. Benign prostatic hyperplasia with urinary frequency        8. Zenker diverticulum        9. Nonrheumatic aortic valve stenosis        10. Thoracic aortic ectasia (HCC)        11. PAF (paroxysmal atrial fibrillation) (HCC)        12. Screening for colon cancer  COLOGUARD (FIT DNA)          83-year-old male in good health.  Discussed colon cancer screening.  We agreed upon Cologuard.  Sleep apnea stable on CPAP.  Discussed routine follow-up with pulmonology.  Lipids and uric acid are at goal.  No change in treatment.  PSA issues have resolved with treatment of diverticulitis.  No new intervention.  Follow-up with GI regarding reflux/diverticulum symptoms.  Possibly needing repeat balloon dilation.  Cardiac issues are stable.  Defer to cardiology regarding further workup and treatment    Services suggested: No services required at this time  Health Care  Screening: Age-appropriate preventive services Medicare covers discussed today and ordered if indicated.  Referrals offered: Community-based lifestyle interventions to reduce health risks and promote self-management and wellness, fall prevention, nutrition, physical activity, tobacco-use cessation, weight loss, and mental health services as per orders if indicated.    Discussion today about general wellness and lifestyle habits:    Prevent falls and reduce trip hazards; Cautioned about securing or removing rugs.  Have a working fire alarm and carbon monoxide detector;   Engage in regular physical activity and social activities       Follow-up: 1 year for HRA.

## 2023-12-27 ENCOUNTER — TELEPHONE (OUTPATIENT)
Dept: INTERNAL MEDICINE | Facility: IMAGING CENTER | Age: 83
End: 2023-12-27
Payer: MEDICARE

## 2023-12-27 RX ORDER — CELECOXIB 200 MG/1
200 CAPSULE ORAL
Qty: 30 CAPSULE | Refills: 1 | Status: SHIPPED | OUTPATIENT
Start: 2023-12-27

## 2023-12-28 NOTE — TELEPHONE ENCOUNTER
Chuck calls reporting left wrist pin and inflammation.  Requesting rx.  Dr Jc aware, rx sent to pharmacy.

## 2024-01-04 ENCOUNTER — HOSPITAL ENCOUNTER (OUTPATIENT)
Dept: RADIOLOGY | Facility: MEDICAL CENTER | Age: 84
End: 2024-01-04
Attending: OTOLARYNGOLOGY
Payer: MEDICARE

## 2024-01-04 DIAGNOSIS — R13.19 ESOPHAGEAL DYSPHAGIA: ICD-10-CM

## 2024-01-04 DIAGNOSIS — G47.33 OBSTRUCTIVE SLEEP APNEA (ADULT) (PEDIATRIC): ICD-10-CM

## 2024-01-04 DIAGNOSIS — R13.12 OROPHARYNGEAL DYSPHAGIA: ICD-10-CM

## 2024-01-04 PROCEDURE — 74230 X-RAY XM SWLNG FUNCJ C+: CPT

## 2024-01-04 PROCEDURE — 92611 MOTION FLUOROSCOPY/SWALLOW: CPT | Performed by: SPEECH-LANGUAGE PATHOLOGIST

## 2024-01-05 NOTE — OP THERAPY EVALUATION
[facility] Therapy Services  Outpatient Modified Barium Swallow Study           Patient Name: Chuck Ortiz  Date of Evaluation: 01/04/2023  Referring Provider: Tiana Sheriff MD  Fax: [phone]        Patient History/Reason for Referral  This os a 82 y/o male who was referred for a Modified Barium Swallow Study (MBSS) due to his c/o continued swallowing problems and that when he coughs/sneezes that food comes up. Pt with a known history of a Zenker's diverticulum, Schatzki's ring, esophageal dysmotility and a hiatal hernia. 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         Oral Mechanism Evaluation  Facial Symmetry: Total L facial droop  Labial Observations: L sided weakness  Dentition: Full dentures  Comments:      Current Method of Nutrition   Oral diet (Regular solids, thin liquids)      Pertinent Information  Affect/Behavior: Appropriate, Cooperative  Oxygen Requirements: Room Air  Secretion Management: WNL      Subjective  Pt was alert and cooperative for exam.       Discussed with the risks, benefits, and alternatives of the MBSS procedure. Patient/family acknowledged and agreed to proceed.    Assessment  Videofluoroscopic Swallow Study was conducted in the lateral and AP projection(s) to evaluate oropharyngeal swallow function. A radiology tech was present to assist with the procedure.       Positioning: seated upright in fluoroscopy chair, standing (AP view)  Anatomic View:  Zenker's diverticulum and Schatzki's ring noted.   Bolus Administration: Patient  PO barium contrast trials: Varibar thin liquid, Varibar nectar (mildly thick) liquid, Varibar pudding, solid coated in Varibar pudding      Consistency PAS Score Timing Comments   Thin Liquid 1 N/A    Mildly Thick Liquid 1 N/A    Pudding 1 N/A    Solid 1 N/A        Penetration-Aspiration Scale (PAS)  1     No contrast enters airway  2     Contrast enters the airway, remains above the  vocal folds, and is ejected from the airway (not seen in the airway at the end of the swallow).  3     Contrast enters the airway, remains above the vocal folds, and is not ejected from the airway (is seen in the airway after the swallow).  4     Contrast enters the airway, contacts the vocal folds, and is ejected from the airway.  5     Contrast enters the airway, contacts the vocal folds, and is not ejected from the airway  6     Contrast enters the airway, crosses the plane of the vocal folds, and is ejected from the airway.  7     Contrast enters the airway, crosses the plane of the vocal folds, and is not ejected from the airway despite effort.  8     Contrast enters the airway, crosses the plane of the vocal folds, is not ejected from the airway and there is no response to aspiration.        Oral phase:  Lip closure was characterized by complete labial seal.   Tongue control during bolus hold was cohesive bolus between tongue to palatal seal  Bolus preparation/mastication was timely and efficient.   Bolus transport/lingual motion was brisk.   Oral clearance was incomplete with residue collection in the left lateral cheek due to paralysis cleared IND with double swallow.   Initiation of pharyngeal swallow was mildly delayed with bolus head in valleculae at first hyoid excursion.       Pharyngeal phase:  Soft palate elevation was complete with no bolus between soft palate (SP)/pharyngeal wall (PW).  Laryngeal elevation was complete superior movement of thyroid cartilage with complete approximation of arytenoids to epiglottic petiole.   Anterior hyoid excursion was complete for anterior movement.  Epiglottic inversion was complete  Laryngeal vestibule closure was complete with no air/contrast in the laryngeal vestibule.  Pharyngeal stripping wave was present and complete.  Pharyngeal contraction was complete.  Pharyngoesophageal Segment Opening was partial for distension/partial duration with partial obstruction  of flow. This was secondary to the Schatzki's ring.   Tongue base retraction was mildly decreased with trace column of contrast or air between TB and PW.  Pharyngeal residue was mild with collection of residue within or on the valleculae and pyriform sinuses cleared with spontaneous double swallow.       Esophageal phase:  Esophageal clearance characterized by esophageal retention with retrograde flow through the pharyngo- esophageal segment (PES). Large diverticulum noted.       DENNIS Level:  Level 6: Within functional limits/modified independence    Dysphagia Outcome and Severity Scale:   DENNIS Level:  Characteristics:   Level 7: Normal in all situations No strategies or extra time needed   Level 6: Within functional limits/modified independence -Patient may have mild oral or pharyngeal delay, retention or trace epiglottal undercoating but independently and spontaneously  compensates/clears  -May need extra time for meal  -Have no aspiration or penetration across consistencies  -Full P.O: Modified diet and/or independence   Level 5: Mild dysphagia: Distant supervision, may need one diet consistency restricted May exhibit one or more of the following:  -Aspiration of thin liquids only but with strong reflexive cough to clear completely  -Airway penetration midway to cords with one or more consistency or to cords with one consistency but clears spontaneously  -Retention in pharynx that is cleared spontaneously  -Mild oral dysphagia with reduced mastication and/or oral retention that is cleared spontaneously   Level 4: Mild-moderate dysphagia: Intermittent supervision/cueing, one or two consistencies restricted May exhibit one or more of the following:  -Retention in pharynx cleared with cue  -Retention in the oral cavity that is cleared with cue  -Aspiration with one consistency, with weak or no reflexive cough  -Or airway penetration to the level of the vocal cords with cough with two consistencies  -Or airway  penetration to the level of the vocal cords without cought with one consistency   Level 3: Moderate dysphagia: Total assist, supervision, or strategies, two or more diet consistencies restricted May exhibit one or more of the following:  -Moderate retention in pharynx, cleared with cue  -Moderate retention in oral cavity, cleared with cue  -Airway penetration to the level of the vocal cords without cough with two or more consistencies  -Or aspiration with two consistencies, with weak or no reflexive cough  -Or aspiration with one consistency, no cough and airway penetration to cords with one, no cough  -Nonoral nutrition necessary   Level 2: Moderately severe dysphagia: Maximum assistance or use of strategies with partial P.O. only (tolerates at least one consistency safely  with total use of strategies) May exhibit one or more of the following:  -Severe retention in pharynx, unable to clear or needs multiple cues  -Severe oral stage bolus loss or retention, unable to clear or needs multiple cues  -Aspiration with two or more consistencies, no reflexive cough, weak volitional cough  -Or aspiration with one or more consistency, no cough and airway penetration to cords with one or more consistency, no cough   Level 1: Severe dysphagia: NPO: Unable to tolerate any P.O. safely May exhibit one or more of the following:  -Severe retention in pharynx, unable to clear  -Severe oral stage bolus loss or retention, unable to clear  -Silent aspiration with two or more consistencies, nonfunctional volitional cough  -Or unable to achieve swallow         Compensatory Strategies:  Double swallow effective to clear oral and pharyngeal residue.       Clinical Impressions  The pt presents with a WFL oropharyngeal dysphagia. He continues to demonstrate esophageal dysphagia secondary to presence of Schatzki's ring and Zenker's diverticulum.       Recommendations  Regular solids, thin liquids  2.  Swallowing Instructions & Precautions:    Supervision: Independent  Positioning: Fully upright and midline during oral intake, Remain upright for 60 minutes after oral intake  Medication: As tolerated  Strategies: Small bites/sips, Alternate bites and sips, Slow rate of intake  Oral Care: BID  3.  No skilled ST intervention is warranted at this time. Please re-refer as needed.         Thank you for the referral. For further questions, please call [phone].  Bernice Watson MS, CCC-SLP

## 2024-01-12 DIAGNOSIS — R19.5 POSITIVE COLORECTAL CANCER SCREENING USING COLOGUARD TEST: ICD-10-CM

## 2024-02-01 ENCOUNTER — TELEPHONE (OUTPATIENT)
Dept: CARDIOLOGY | Facility: MEDICAL CENTER | Age: 84
End: 2024-02-01
Payer: MEDICARE

## 2024-02-01 NOTE — TELEPHONE ENCOUNTER
BORIS    Caller: [facility]     Office Name, phone number, fax number:     [employer]  Phone: [phone]  Fax: [phone]    Clearance was faxed over this morning - 02.01.24    Procedure Name: Colonoscopy     Procedure Scheduled Date: 03.11.24    Callback Number: [phone]    Thank you,     Mariana FOLEY

## 2024-02-01 NOTE — LETTER
PROCEDURE/SURGERY CLEARANCE FORM      Encounter Date: 2/1/2024    Patient: Chuck Ortiz  YOB: 1940    CARDIOLOGIST:  Yair Gonzalez M.D.    REFERRING DOCTOR:  No ref. provider found    The above patient is cleared to have the following procedure/surgery: Colonoscopy                                            Additional comments: The patient may stop his Eliquis 48 hours prior to the colonoscopy and to be resumed by the gastroenterologist when the bleeding risk is felt to be acceptable post procedure       Electronically signed         MD Signature   Yair Gonzalez M.D.

## 2024-02-01 NOTE — TELEPHONE ENCOUNTER
"Last OV: 09/05/2023  Proposed Surgery: Colonoscopy   Surgery Date: 03/11/2024  Requesting Office Name: [facility]   Fax Number: [phone]  Preference of Location (default is surgery center unless specified by Cardiologist or [facility])  Prior Clearance Addressed: No      Anticoags/Antiplatelets: Apixaban   Anticoags/Antiplatelet managed by Cardiology? YES    Outstanding Cardiac Imaging : Yes  Echo.   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   **Scan clearance request letter into [facility].**   "

## 2024-02-03 NOTE — TELEPHONE ENCOUNTER
The patient may stop his Eliquis 48 hours prior to the colonoscopy and to be resumed by the gastroenterologist when the bleeding risk is felt to be acceptable post procedure

## 2024-02-12 ENCOUNTER — HOSPITAL ENCOUNTER (OUTPATIENT)
Dept: CARDIOLOGY | Facility: MEDICAL CENTER | Age: 84
End: 2024-02-12
Attending: INTERNAL MEDICINE
Payer: MEDICARE

## 2024-02-12 DIAGNOSIS — I35.0 AORTIC VALVE STENOSIS, ETIOLOGY OF CARDIAC VALVE DISEASE UNSPECIFIED: ICD-10-CM

## 2024-02-12 PROCEDURE — 93306 TTE W/DOPPLER COMPLETE: CPT

## 2024-02-15 LAB — LV EJECT FRACT  99904: 65

## 2024-02-15 PROCEDURE — 93306 TTE W/DOPPLER COMPLETE: CPT | Mod: 26 | Performed by: INTERNAL MEDICINE

## 2024-02-15 NOTE — PROGRESS NOTES
This patient has been flagged through our echocardiogram surveillance with the following echocardiogram results:    Moderate Aortic Stenosis    Cardiologist: Dr. Gonzalez    Current Plan of Care for Structural Heart Disease: Added to surveillance tracking list    Next Echo date needed by: 2/12/2025    Next Follow up appointment needed by: September 2024

## 2024-02-21 NOTE — RESULT ENCOUNTER NOTE
Spoke with the patient about his echocardiogram which is unchanged showing moderate aortic stenosis and normal LV function.  He is having no cardiac symptoms including shortness of breath  Instructed him to pay attention for the development of any exertional shortness of breath, chest pain or lightheadedness and if they occur to notify us at that time for reevaluation  Otherwise we will make a follow-up appointment

## 2024-04-02 DIAGNOSIS — I10 ESSENTIAL HYPERTENSION: ICD-10-CM

## 2024-04-02 RX ORDER — LOSARTAN POTASSIUM 50 MG/1
TABLET ORAL
Qty: 100 TABLET | Refills: 1 | Status: ON HOLD | OUTPATIENT
Start: 2024-04-02 | End: 2024-04-30

## 2024-04-22 ENCOUNTER — APPOINTMENT (OUTPATIENT)
Dept: RADIOLOGY | Facility: MEDICAL CENTER | Age: 84
DRG: 322 | End: 2024-04-22
Attending: EMERGENCY MEDICINE
Payer: MEDICARE

## 2024-04-22 ENCOUNTER — HOSPITAL ENCOUNTER (INPATIENT)
Facility: MEDICAL CENTER | Age: 84
LOS: 1 days | DRG: 322 | End: 2024-04-24
Attending: EMERGENCY MEDICINE | Admitting: INTERNAL MEDICINE
Payer: MEDICARE

## 2024-04-22 DIAGNOSIS — R07.9 ACUTE CHEST PAIN: ICD-10-CM

## 2024-04-22 DIAGNOSIS — I25.10 CORONARY ARTERY DISEASE INVOLVING NATIVE CORONARY ARTERY OF NATIVE HEART WITHOUT ANGINA PECTORIS: ICD-10-CM

## 2024-04-22 DIAGNOSIS — I35.0 NONRHEUMATIC AORTIC VALVE STENOSIS: ICD-10-CM

## 2024-04-22 DIAGNOSIS — J81.0 ACUTE PULMONARY EDEMA (HCC): ICD-10-CM

## 2024-04-22 DIAGNOSIS — R40.4 TRANSIENT ALTERATION OF AWARENESS: ICD-10-CM

## 2024-04-22 DIAGNOSIS — I21.4 NSTEMI (NON-ST ELEVATED MYOCARDIAL INFARCTION) (HCC): ICD-10-CM

## 2024-04-22 LAB
ALBUMIN SERPL BCP-MCNC: 3.7 G/DL (ref 3.2–4.9)
ALBUMIN/GLOB SERPL: 1.8 G/DL
ALP SERPL-CCNC: 114 U/L (ref 30–99)
ALT SERPL-CCNC: 18 U/L (ref 2–50)
ANION GAP SERPL CALC-SCNC: 14 MMOL/L (ref 7–16)
APTT PPP: 30.4 SEC (ref 24.7–36)
AST SERPL-CCNC: 22 U/L (ref 12–45)
BASOPHILS # BLD AUTO: 0.4 % (ref 0–1.8)
BASOPHILS # BLD: 0.05 K/UL (ref 0–0.12)
BILIRUB SERPL-MCNC: 0.8 MG/DL (ref 0.1–1.5)
BUN SERPL-MCNC: 23 MG/DL (ref 8–22)
CALCIUM ALBUM COR SERPL-MCNC: 8.7 MG/DL (ref 8.5–10.5)
CALCIUM SERPL-MCNC: 8.5 MG/DL (ref 8.5–10.5)
CHLORIDE SERPL-SCNC: 101 MMOL/L (ref 96–112)
CO2 SERPL-SCNC: 21 MMOL/L (ref 20–33)
CREAT SERPL-MCNC: 0.72 MG/DL (ref 0.5–1.4)
EOSINOPHIL # BLD AUTO: 0.16 K/UL (ref 0–0.51)
EOSINOPHIL NFR BLD: 1.4 % (ref 0–6.9)
ERYTHROCYTE [DISTWIDTH] IN BLOOD BY AUTOMATED COUNT: 50.8 FL (ref 35.9–50)
GFR SERPLBLD CREATININE-BSD FMLA CKD-EPI: 89 ML/MIN/1.73 M 2
GLOBULIN SER CALC-MCNC: 2.1 G/DL (ref 1.9–3.5)
GLUCOSE SERPL-MCNC: 108 MG/DL (ref 65–99)
HCT VFR BLD AUTO: 52 % (ref 42–52)
HGB BLD-MCNC: 17.3 G/DL (ref 14–18)
IMM GRANULOCYTES # BLD AUTO: 0.05 K/UL (ref 0–0.11)
IMM GRANULOCYTES NFR BLD AUTO: 0.4 % (ref 0–0.9)
INR PPP: 1.12 (ref 0.87–1.13)
LIPASE SERPL-CCNC: 20 U/L (ref 11–82)
LYMPHOCYTES # BLD AUTO: 3.75 K/UL (ref 1–4.8)
LYMPHOCYTES NFR BLD: 32.7 % (ref 22–41)
MCH RBC QN AUTO: 31.6 PG (ref 27–33)
MCHC RBC AUTO-ENTMCNC: 33.3 G/DL (ref 32.3–36.5)
MCV RBC AUTO: 94.9 FL (ref 81.4–97.8)
MONOCYTES # BLD AUTO: 1.17 K/UL (ref 0–0.85)
MONOCYTES NFR BLD AUTO: 10.2 % (ref 0–13.4)
NEUTROPHILS # BLD AUTO: 6.28 K/UL (ref 1.82–7.42)
NEUTROPHILS NFR BLD: 54.9 % (ref 44–72)
NRBC # BLD AUTO: 0 K/UL
NRBC BLD-RTO: 0 /100 WBC (ref 0–0.2)
PLATELET # BLD AUTO: 165 K/UL (ref 164–446)
PMV BLD AUTO: 12 FL (ref 9–12.9)
POTASSIUM SERPL-SCNC: 3.5 MMOL/L (ref 3.6–5.5)
PROT SERPL-MCNC: 5.8 G/DL (ref 6–8.2)
PROTHROMBIN TIME: 14.5 SEC (ref 12–14.6)
RBC # BLD AUTO: 5.48 M/UL (ref 4.7–6.1)
SODIUM SERPL-SCNC: 136 MMOL/L (ref 135–145)
TROPONIN T SERPL-MCNC: 37 NG/L (ref 6–19)
WBC # BLD AUTO: 11.5 K/UL (ref 4.8–10.8)

## 2024-04-22 PROCEDURE — 93005 ELECTROCARDIOGRAM TRACING: CPT | Performed by: EMERGENCY MEDICINE

## 2024-04-22 PROCEDURE — 71275 CT ANGIOGRAPHY CHEST: CPT

## 2024-04-22 PROCEDURE — 85610 PROTHROMBIN TIME: CPT

## 2024-04-22 PROCEDURE — 83690 ASSAY OF LIPASE: CPT

## 2024-04-22 PROCEDURE — 99222 1ST HOSP IP/OBS MODERATE 55: CPT | Performed by: PSYCHIATRY & NEUROLOGY

## 2024-04-22 PROCEDURE — 99285 EMERGENCY DEPT VISIT HI MDM: CPT

## 2024-04-22 PROCEDURE — G0378 HOSPITAL OBSERVATION PER HR: HCPCS

## 2024-04-22 PROCEDURE — 70496 CT ANGIOGRAPHY HEAD: CPT

## 2024-04-22 PROCEDURE — 36415 COLL VENOUS BLD VENIPUNCTURE: CPT

## 2024-04-22 PROCEDURE — 84484 ASSAY OF TROPONIN QUANT: CPT

## 2024-04-22 PROCEDURE — 80053 COMPREHEN METABOLIC PANEL: CPT

## 2024-04-22 PROCEDURE — 99223 1ST HOSP IP/OBS HIGH 75: CPT | Mod: AI | Performed by: INTERNAL MEDICINE

## 2024-04-22 PROCEDURE — 70498 CT ANGIOGRAPHY NECK: CPT

## 2024-04-22 PROCEDURE — 85730 THROMBOPLASTIN TIME PARTIAL: CPT

## 2024-04-22 PROCEDURE — 700117 HCHG RX CONTRAST REV CODE 255: Performed by: EMERGENCY MEDICINE

## 2024-04-22 PROCEDURE — 0042T CT-CEREBRAL PERFUSION ANALYSIS: CPT

## 2024-04-22 PROCEDURE — 85025 COMPLETE CBC W/AUTO DIFF WBC: CPT

## 2024-04-22 PROCEDURE — 70450 CT HEAD/BRAIN W/O DYE: CPT

## 2024-04-22 PROCEDURE — 96375 TX/PRO/DX INJ NEW DRUG ADDON: CPT

## 2024-04-22 PROCEDURE — 700111 HCHG RX REV CODE 636 W/ 250 OVERRIDE (IP)

## 2024-04-22 PROCEDURE — 700111 HCHG RX REV CODE 636 W/ 250 OVERRIDE (IP): Mod: JZ | Performed by: EMERGENCY MEDICINE

## 2024-04-22 RX ORDER — OMEPRAZOLE 20 MG/1
20 CAPSULE, DELAYED RELEASE ORAL DAILY
Status: DISCONTINUED | OUTPATIENT
Start: 2024-04-23 | End: 2024-04-24 | Stop reason: HOSPADM

## 2024-04-22 RX ORDER — OXYCODONE HYDROCHLORIDE 5 MG/1
2.5 TABLET ORAL
Status: DISCONTINUED | OUTPATIENT
Start: 2024-04-22 | End: 2024-04-24 | Stop reason: HOSPADM

## 2024-04-22 RX ORDER — LORAZEPAM 2 MG/ML
1 INJECTION INTRAMUSCULAR ONCE
Status: COMPLETED | OUTPATIENT
Start: 2024-04-22 | End: 2024-04-22

## 2024-04-22 RX ORDER — ONDANSETRON 4 MG/1
4 TABLET, ORALLY DISINTEGRATING ORAL EVERY 4 HOURS PRN
Status: DISCONTINUED | OUTPATIENT
Start: 2024-04-22 | End: 2024-04-24 | Stop reason: HOSPADM

## 2024-04-22 RX ORDER — ALLOPURINOL 100 MG/1
100 TABLET ORAL 2 TIMES DAILY
Status: DISCONTINUED | OUTPATIENT
Start: 2024-04-23 | End: 2024-04-24 | Stop reason: HOSPADM

## 2024-04-22 RX ORDER — ONDANSETRON 2 MG/ML
4 INJECTION INTRAMUSCULAR; INTRAVENOUS ONCE
Status: COMPLETED | OUTPATIENT
Start: 2024-04-22 | End: 2024-04-22

## 2024-04-22 RX ORDER — ASPIRIN 325 MG
325 TABLET ORAL DAILY
Status: DISCONTINUED | OUTPATIENT
Start: 2024-04-23 | End: 2024-04-23

## 2024-04-22 RX ORDER — TRIAMTERENE AND HYDROCHLOROTHIAZIDE 37.5; 25 MG/1; MG/1
1 CAPSULE ORAL EVERY MORNING
Status: DISCONTINUED | OUTPATIENT
Start: 2024-04-23 | End: 2024-04-24 | Stop reason: HOSPADM

## 2024-04-22 RX ORDER — ONDANSETRON 2 MG/ML
4 INJECTION INTRAMUSCULAR; INTRAVENOUS EVERY 4 HOURS PRN
Status: DISCONTINUED | OUTPATIENT
Start: 2024-04-22 | End: 2024-04-24 | Stop reason: HOSPADM

## 2024-04-22 RX ORDER — HYDRALAZINE HYDROCHLORIDE 20 MG/ML
10 INJECTION INTRAMUSCULAR; INTRAVENOUS EVERY 4 HOURS PRN
Status: DISCONTINUED | OUTPATIENT
Start: 2024-04-22 | End: 2024-04-24 | Stop reason: HOSPADM

## 2024-04-22 RX ORDER — ACETAMINOPHEN 325 MG/1
650 TABLET ORAL EVERY 6 HOURS PRN
Status: DISCONTINUED | OUTPATIENT
Start: 2024-04-22 | End: 2024-04-24 | Stop reason: HOSPADM

## 2024-04-22 RX ORDER — LOSARTAN POTASSIUM 50 MG/1
50 TABLET ORAL DAILY
Status: DISCONTINUED | OUTPATIENT
Start: 2024-04-23 | End: 2024-04-24 | Stop reason: HOSPADM

## 2024-04-22 RX ORDER — ASPIRIN 300 MG/1
300 SUPPOSITORY RECTAL DAILY
Status: DISCONTINUED | OUTPATIENT
Start: 2024-04-23 | End: 2024-04-23

## 2024-04-22 RX ORDER — LORAZEPAM 2 MG/ML
INJECTION INTRAMUSCULAR
Status: COMPLETED
Start: 2024-04-22 | End: 2024-04-22

## 2024-04-22 RX ORDER — CYCLOBENZAPRINE HCL 10 MG
10 TABLET ORAL 3 TIMES DAILY PRN
Status: DISCONTINUED | OUTPATIENT
Start: 2024-04-22 | End: 2024-04-24 | Stop reason: HOSPADM

## 2024-04-22 RX ORDER — ASPIRIN 81 MG/1
324 TABLET, CHEWABLE ORAL DAILY
Status: DISCONTINUED | OUTPATIENT
Start: 2024-04-23 | End: 2024-04-23

## 2024-04-22 RX ORDER — ATORVASTATIN CALCIUM 40 MG/1
40 TABLET, FILM COATED ORAL DAILY
Status: DISCONTINUED | OUTPATIENT
Start: 2024-04-23 | End: 2024-04-24 | Stop reason: HOSPADM

## 2024-04-22 RX ORDER — AMOXICILLIN 250 MG
2 CAPSULE ORAL EVERY EVENING
Status: DISCONTINUED | OUTPATIENT
Start: 2024-04-23 | End: 2024-04-24 | Stop reason: HOSPADM

## 2024-04-22 RX ORDER — OXYCODONE HYDROCHLORIDE 5 MG/1
5 TABLET ORAL
Status: DISCONTINUED | OUTPATIENT
Start: 2024-04-22 | End: 2024-04-24 | Stop reason: HOSPADM

## 2024-04-22 RX ORDER — POLYETHYLENE GLYCOL 3350 17 G/17G
1 POWDER, FOR SOLUTION ORAL
Status: DISCONTINUED | OUTPATIENT
Start: 2024-04-22 | End: 2024-04-24 | Stop reason: HOSPADM

## 2024-04-22 RX ORDER — HYDROMORPHONE HYDROCHLORIDE 1 MG/ML
0.25 INJECTION, SOLUTION INTRAMUSCULAR; INTRAVENOUS; SUBCUTANEOUS
Status: DISCONTINUED | OUTPATIENT
Start: 2024-04-22 | End: 2024-04-24 | Stop reason: HOSPADM

## 2024-04-22 RX ADMIN — IOHEXOL 40 ML: 350 INJECTION, SOLUTION INTRAVENOUS at 21:29

## 2024-04-22 RX ADMIN — IOHEXOL 75 ML: 350 INJECTION, SOLUTION INTRAVENOUS at 21:45

## 2024-04-22 RX ADMIN — ONDANSETRON 4 MG: 2 INJECTION INTRAMUSCULAR; INTRAVENOUS at 22:26

## 2024-04-22 RX ADMIN — LORAZEPAM 1 MG: 2 INJECTION INTRAMUSCULAR at 21:36

## 2024-04-22 RX ADMIN — LORAZEPAM 1 MG: 2 INJECTION INTRAMUSCULAR; INTRAVENOUS at 21:36

## 2024-04-22 RX ADMIN — IOHEXOL 75 ML: 350 INJECTION, SOLUTION INTRAVENOUS at 21:42

## 2024-04-22 ASSESSMENT — CHA2DS2 SCORE
AGE 75 OR GREATER: YES
PRIOR STROKE OR TIA OR THROMBOEMBOLISM: YES
SEX: MALE
CHA2DS2 VASC SCORE: 6
AGE 65 TO 74: NO
VASCULAR DISEASE: YES
HYPERTENSION: YES
CHF OR LEFT VENTRICULAR DYSFUNCTION: NO
DIABETES: NO

## 2024-04-22 ASSESSMENT — PAIN DESCRIPTION - PAIN TYPE: TYPE: ACUTE PAIN

## 2024-04-22 ASSESSMENT — FIBROSIS 4 INDEX: FIB4 SCORE: 2.22

## 2024-04-23 ENCOUNTER — APPOINTMENT (OUTPATIENT)
Dept: CARDIOLOGY | Facility: MEDICAL CENTER | Age: 84
DRG: 322 | End: 2024-04-23
Attending: INTERNAL MEDICINE
Payer: MEDICARE

## 2024-04-23 PROBLEM — G93.40 ENCEPHALOPATHY: Status: ACTIVE | Noted: 2024-04-23

## 2024-04-23 PROBLEM — I21.4 NSTEMI (NON-ST ELEVATED MYOCARDIAL INFARCTION) (HCC): Status: ACTIVE | Noted: 2024-04-23

## 2024-04-23 LAB
ACT BLD: 190 SEC (ref 74–137)
ALBUMIN SERPL BCP-MCNC: 3.9 G/DL (ref 3.2–4.9)
ALBUMIN/GLOB SERPL: 1.5 G/DL
ALP SERPL-CCNC: 126 U/L (ref 30–99)
ALT SERPL-CCNC: 26 U/L (ref 2–50)
ANION GAP SERPL CALC-SCNC: 12 MMOL/L (ref 7–16)
APTT PPP: 29.7 SEC (ref 24.7–36)
AST SERPL-CCNC: 27 U/L (ref 12–45)
BASOPHILS # BLD AUTO: 0.2 % (ref 0–1.8)
BASOPHILS # BLD: 0.03 K/UL (ref 0–0.12)
BILIRUB SERPL-MCNC: 0.7 MG/DL (ref 0.1–1.5)
BUN SERPL-MCNC: 23 MG/DL (ref 8–22)
CALCIUM ALBUM COR SERPL-MCNC: 9.2 MG/DL (ref 8.5–10.5)
CALCIUM SERPL-MCNC: 9.1 MG/DL (ref 8.5–10.5)
CHLORIDE SERPL-SCNC: 103 MMOL/L (ref 96–112)
CHOLEST SERPL-MCNC: 106 MG/DL (ref 100–199)
CO2 SERPL-SCNC: 23 MMOL/L (ref 20–33)
CREAT SERPL-MCNC: 0.86 MG/DL (ref 0.5–1.4)
EKG IMPRESSION: NORMAL
EOSINOPHIL # BLD AUTO: 0.01 K/UL (ref 0–0.51)
EOSINOPHIL NFR BLD: 0.1 % (ref 0–6.9)
ERYTHROCYTE [DISTWIDTH] IN BLOOD BY AUTOMATED COUNT: 50 FL (ref 35.9–50)
GFR SERPLBLD CREATININE-BSD FMLA CKD-EPI: 85 ML/MIN/1.73 M 2
GLOBULIN SER CALC-MCNC: 2.6 G/DL (ref 1.9–3.5)
GLUCOSE SERPL-MCNC: 155 MG/DL (ref 65–99)
HCT VFR BLD AUTO: 51.4 % (ref 42–52)
HDLC SERPL-MCNC: 43 MG/DL
HGB BLD-MCNC: 16.9 G/DL (ref 14–18)
IMM GRANULOCYTES # BLD AUTO: 0.06 K/UL (ref 0–0.11)
IMM GRANULOCYTES NFR BLD AUTO: 0.5 % (ref 0–0.9)
INR PPP: 1.15 (ref 0.87–1.13)
LDLC SERPL CALC-MCNC: 52 MG/DL
LYMPHOCYTES # BLD AUTO: 1.7 K/UL (ref 1–4.8)
LYMPHOCYTES NFR BLD: 14.2 % (ref 22–41)
MAGNESIUM SERPL-MCNC: 1.9 MG/DL (ref 1.5–2.5)
MCH RBC QN AUTO: 31.1 PG (ref 27–33)
MCHC RBC AUTO-ENTMCNC: 32.9 G/DL (ref 32.3–36.5)
MCV RBC AUTO: 94.7 FL (ref 81.4–97.8)
MONOCYTES # BLD AUTO: 0.65 K/UL (ref 0–0.85)
MONOCYTES NFR BLD AUTO: 5.4 % (ref 0–13.4)
NEUTROPHILS # BLD AUTO: 9.56 K/UL (ref 1.82–7.42)
NEUTROPHILS NFR BLD: 79.6 % (ref 44–72)
NRBC # BLD AUTO: 0 K/UL
NRBC BLD-RTO: 0 /100 WBC (ref 0–0.2)
PHOSPHATE SERPL-MCNC: 3 MG/DL (ref 2.5–4.5)
PLATELET # BLD AUTO: 165 K/UL (ref 164–446)
PMV BLD AUTO: 11.3 FL (ref 9–12.9)
POTASSIUM SERPL-SCNC: 4 MMOL/L (ref 3.6–5.5)
PROT SERPL-MCNC: 6.5 G/DL (ref 6–8.2)
PROTHROMBIN TIME: 14.8 SEC (ref 12–14.6)
RBC # BLD AUTO: 5.43 M/UL (ref 4.7–6.1)
SODIUM SERPL-SCNC: 138 MMOL/L (ref 135–145)
TRIGL SERPL-MCNC: 53 MG/DL (ref 0–149)
TROPONIN T SERPL-MCNC: 168 NG/L (ref 6–19)
TROPONIN T SERPL-MCNC: 97 NG/L (ref 6–19)
UFH PPP CHRO-ACNC: 0.99 IU/ML
WBC # BLD AUTO: 12 K/UL (ref 4.8–10.8)

## 2024-04-23 PROCEDURE — 700117 HCHG RX CONTRAST REV CODE 255: Performed by: INTERNAL MEDICINE

## 2024-04-23 PROCEDURE — B240ZZ3 ULTRASONOGRAPHY OF SINGLE CORONARY ARTERY, INTRAVASCULAR: ICD-10-PCS | Performed by: INTERNAL MEDICINE

## 2024-04-23 PROCEDURE — 99152 MOD SED SAME PHYS/QHP 5/>YRS: CPT | Performed by: INTERNAL MEDICINE

## 2024-04-23 PROCEDURE — 93572 IV DOP VEL&/PRESS C FLO EA: CPT | Mod: 26,RI | Performed by: INTERNAL MEDICINE

## 2024-04-23 PROCEDURE — 700111 HCHG RX REV CODE 636 W/ 250 OVERRIDE (IP): Mod: JZ | Performed by: INTERNAL MEDICINE

## 2024-04-23 PROCEDURE — 84484 ASSAY OF TROPONIN QUANT: CPT

## 2024-04-23 PROCEDURE — 92979 ENDOLUMINL IVUS OCT C EA: CPT

## 2024-04-23 PROCEDURE — 92978 ENDOLUMINL IVUS OCT C 1ST: CPT | Mod: 26,LD | Performed by: INTERNAL MEDICINE

## 2024-04-23 PROCEDURE — 700111 HCHG RX REV CODE 636 W/ 250 OVERRIDE (IP)

## 2024-04-23 PROCEDURE — B211YZZ FLUOROSCOPY OF MULTIPLE CORONARY ARTERIES USING OTHER CONTRAST: ICD-10-PCS | Performed by: INTERNAL MEDICINE

## 2024-04-23 PROCEDURE — 85520 HEPARIN ASSAY: CPT

## 2024-04-23 PROCEDURE — 93571 IV DOP VEL&/PRESS C FLO 1ST: CPT | Mod: 26,LD | Performed by: INTERNAL MEDICINE

## 2024-04-23 PROCEDURE — A9270 NON-COVERED ITEM OR SERVICE: HCPCS | Performed by: INTERNAL MEDICINE

## 2024-04-23 PROCEDURE — 96376 TX/PRO/DX INJ SAME DRUG ADON: CPT

## 2024-04-23 PROCEDURE — 83735 ASSAY OF MAGNESIUM: CPT

## 2024-04-23 PROCEDURE — A9270 NON-COVERED ITEM OR SERVICE: HCPCS

## 2024-04-23 PROCEDURE — 84100 ASSAY OF PHOSPHORUS: CPT

## 2024-04-23 PROCEDURE — 96366 THER/PROPH/DIAG IV INF ADDON: CPT

## 2024-04-23 PROCEDURE — 85610 PROTHROMBIN TIME: CPT

## 2024-04-23 PROCEDURE — 700102 HCHG RX REV CODE 250 W/ 637 OVERRIDE(OP): Performed by: INTERNAL MEDICINE

## 2024-04-23 PROCEDURE — 85730 THROMBOPLASTIN TIME PARTIAL: CPT

## 2024-04-23 PROCEDURE — 700105 HCHG RX REV CODE 258: Performed by: INTERNAL MEDICINE

## 2024-04-23 PROCEDURE — 4A033BC MEASUREMENT OF ARTERIAL PRESSURE, CORONARY, PERCUTANEOUS APPROACH: ICD-10-PCS | Performed by: INTERNAL MEDICINE

## 2024-04-23 PROCEDURE — 96375 TX/PRO/DX INJ NEW DRUG ADDON: CPT

## 2024-04-23 PROCEDURE — 700111 HCHG RX REV CODE 636 W/ 250 OVERRIDE (IP): Mod: JZ

## 2024-04-23 PROCEDURE — 36415 COLL VENOUS BLD VENIPUNCTURE: CPT

## 2024-04-23 PROCEDURE — 96365 THER/PROPH/DIAG IV INF INIT: CPT

## 2024-04-23 PROCEDURE — 93010 ELECTROCARDIOGRAM REPORT: CPT | Performed by: INTERNAL MEDICINE

## 2024-04-23 PROCEDURE — 96368 THER/DIAG CONCURRENT INF: CPT

## 2024-04-23 PROCEDURE — 4A023N7 MEASUREMENT OF CARDIAC SAMPLING AND PRESSURE, LEFT HEART, PERCUTANEOUS APPROACH: ICD-10-PCS | Performed by: INTERNAL MEDICINE

## 2024-04-23 PROCEDURE — 770020 HCHG ROOM/CARE - TELE (206)

## 2024-04-23 PROCEDURE — 99233 SBSQ HOSP IP/OBS HIGH 50: CPT | Mod: FS | Performed by: INTERNAL MEDICINE

## 2024-04-23 PROCEDURE — 93458 L HRT ARTERY/VENTRICLE ANGIO: CPT | Mod: 26,59 | Performed by: INTERNAL MEDICINE

## 2024-04-23 PROCEDURE — 027034Z DILATION OF CORONARY ARTERY, ONE ARTERY WITH DRUG-ELUTING INTRALUMINAL DEVICE, PERCUTANEOUS APPROACH: ICD-10-PCS | Performed by: INTERNAL MEDICINE

## 2024-04-23 PROCEDURE — 99223 1ST HOSP IP/OBS HIGH 75: CPT | Mod: 25 | Performed by: INTERNAL MEDICINE

## 2024-04-23 PROCEDURE — 700101 HCHG RX REV CODE 250

## 2024-04-23 PROCEDURE — 85347 COAGULATION TIME ACTIVATED: CPT

## 2024-04-23 PROCEDURE — 92928 PRQ TCAT PLMT NTRAC ST 1 LES: CPT | Mod: LD | Performed by: INTERNAL MEDICINE

## 2024-04-23 PROCEDURE — 700102 HCHG RX REV CODE 250 W/ 637 OVERRIDE(OP)

## 2024-04-23 PROCEDURE — 85025 COMPLETE CBC W/AUTO DIFF WBC: CPT

## 2024-04-23 PROCEDURE — 700105 HCHG RX REV CODE 258

## 2024-04-23 PROCEDURE — 80061 LIPID PANEL: CPT

## 2024-04-23 PROCEDURE — 80053 COMPREHEN METABOLIC PANEL: CPT

## 2024-04-23 PROCEDURE — 93005 ELECTROCARDIOGRAM TRACING: CPT

## 2024-04-23 RX ORDER — SODIUM CHLORIDE 9 MG/ML
INJECTION, SOLUTION INTRAVENOUS CONTINUOUS
Status: DISCONTINUED | OUTPATIENT
Start: 2024-04-23 | End: 2024-04-24

## 2024-04-23 RX ORDER — HEPARIN SODIUM 1000 [USP'U]/ML
40 INJECTION, SOLUTION INTRAVENOUS; SUBCUTANEOUS PRN
Status: DISCONTINUED | OUTPATIENT
Start: 2024-04-23 | End: 2024-04-23

## 2024-04-23 RX ORDER — MIDAZOLAM HYDROCHLORIDE 1 MG/ML
INJECTION INTRAMUSCULAR; INTRAVENOUS
Status: COMPLETED
Start: 2024-04-23 | End: 2024-04-23

## 2024-04-23 RX ORDER — HEPARIN SODIUM 1000 [USP'U]/ML
4000 INJECTION, SOLUTION INTRAVENOUS; SUBCUTANEOUS ONCE
Status: COMPLETED | OUTPATIENT
Start: 2024-04-23 | End: 2024-04-23

## 2024-04-23 RX ORDER — ONDANSETRON 2 MG/ML
INJECTION INTRAMUSCULAR; INTRAVENOUS
Status: DISPENSED
Start: 2024-04-23 | End: 2024-04-23

## 2024-04-23 RX ORDER — LIDOCAINE HYDROCHLORIDE 20 MG/ML
INJECTION, SOLUTION INFILTRATION; PERINEURAL
Status: COMPLETED
Start: 2024-04-23 | End: 2024-04-23

## 2024-04-23 RX ORDER — HEPARIN SODIUM 200 [USP'U]/100ML
INJECTION, SOLUTION INTRAVENOUS
Status: COMPLETED
Start: 2024-04-23 | End: 2024-04-23

## 2024-04-23 RX ORDER — HEPARIN SODIUM 5000 [USP'U]/100ML
0-30 INJECTION, SOLUTION INTRAVENOUS CONTINUOUS
Status: DISCONTINUED | OUTPATIENT
Start: 2024-04-23 | End: 2024-04-23

## 2024-04-23 RX ORDER — CLOPIDOGREL BISULFATE 75 MG/1
75 TABLET ORAL DAILY
Status: DISCONTINUED | OUTPATIENT
Start: 2024-04-24 | End: 2024-04-24 | Stop reason: HOSPADM

## 2024-04-23 RX ORDER — HEPARIN SODIUM 1000 [USP'U]/ML
80 INJECTION, SOLUTION INTRAVENOUS; SUBCUTANEOUS ONCE
Status: DISCONTINUED | OUTPATIENT
Start: 2024-04-23 | End: 2024-04-23

## 2024-04-23 RX ORDER — ASPIRIN 81 MG/1
81 TABLET ORAL DAILY
Status: DISCONTINUED | OUTPATIENT
Start: 2024-04-24 | End: 2024-04-24

## 2024-04-23 RX ORDER — CLOPIDOGREL 300 MG/1
TABLET, FILM COATED ORAL
Status: COMPLETED
Start: 2024-04-23 | End: 2024-04-23

## 2024-04-23 RX ORDER — HEPARIN SODIUM 1000 [USP'U]/ML
2000 INJECTION, SOLUTION INTRAVENOUS; SUBCUTANEOUS PRN
Status: DISCONTINUED | OUTPATIENT
Start: 2024-04-23 | End: 2024-04-23

## 2024-04-23 RX ORDER — SODIUM CHLORIDE 9 MG/ML
INJECTION, SOLUTION INTRAVENOUS CONTINUOUS
Status: ACTIVE | OUTPATIENT
Start: 2024-04-23 | End: 2024-04-23

## 2024-04-23 RX ORDER — MAGNESIUM SULFATE HEPTAHYDRATE 40 MG/ML
2 INJECTION, SOLUTION INTRAVENOUS ONCE
Status: COMPLETED | OUTPATIENT
Start: 2024-04-23 | End: 2024-04-23

## 2024-04-23 RX ORDER — VERAPAMIL HYDROCHLORIDE 2.5 MG/ML
INJECTION, SOLUTION INTRAVENOUS
Status: COMPLETED
Start: 2024-04-23 | End: 2024-04-23

## 2024-04-23 RX ORDER — BIVALIRUDIN 250 MG/5ML
INJECTION, POWDER, LYOPHILIZED, FOR SOLUTION INTRAVENOUS
Status: COMPLETED
Start: 2024-04-23 | End: 2024-04-23

## 2024-04-23 RX ORDER — METOPROLOL SUCCINATE 25 MG/1
25 TABLET, EXTENDED RELEASE ORAL
Status: DISCONTINUED | OUTPATIENT
Start: 2024-04-23 | End: 2024-04-24 | Stop reason: HOSPADM

## 2024-04-23 RX ORDER — HEPARIN SODIUM 1000 [USP'U]/ML
INJECTION, SOLUTION INTRAVENOUS; SUBCUTANEOUS
Status: COMPLETED
Start: 2024-04-23 | End: 2024-04-23

## 2024-04-23 RX ADMIN — MAGNESIUM SULFATE HEPTAHYDRATE 2 G: 2 INJECTION, SOLUTION INTRAVENOUS at 04:18

## 2024-04-23 RX ADMIN — IOHEXOL 80 ML: 350 INJECTION, SOLUTION INTRAVENOUS at 10:51

## 2024-04-23 RX ADMIN — ALLOPURINOL 100 MG: 100 TABLET ORAL at 06:22

## 2024-04-23 RX ADMIN — HEPARIN SODIUM: 1000 INJECTION INTRAVENOUS; SUBCUTANEOUS at 09:57

## 2024-04-23 RX ADMIN — SODIUM CHLORIDE: 9 INJECTION, SOLUTION INTRAVENOUS at 04:18

## 2024-04-23 RX ADMIN — BIVALIRUDIN 71.25 MG: 250 INJECTION, POWDER, LYOPHILIZED, FOR SOLUTION INTRAVENOUS at 10:30

## 2024-04-23 RX ADMIN — CLOPIDOGREL BISULFATE 600 MG: 300 TABLET, FILM COATED ORAL at 10:54

## 2024-04-23 RX ADMIN — SODIUM CHLORIDE: 9 INJECTION, SOLUTION INTRAVENOUS at 11:34

## 2024-04-23 RX ADMIN — HEPARIN SODIUM 2000 UNITS: 200 INJECTION, SOLUTION INTRAVENOUS at 09:57

## 2024-04-23 RX ADMIN — MIDAZOLAM HYDROCHLORIDE 2 MG: 2 INJECTION, SOLUTION INTRAMUSCULAR; INTRAVENOUS at 10:23

## 2024-04-23 RX ADMIN — HEPARIN SODIUM 12 UNITS/KG/HR: 5000 INJECTION, SOLUTION INTRAVENOUS at 06:34

## 2024-04-23 RX ADMIN — VERAPAMIL HYDROCHLORIDE 2.5 MG: 2.5 INJECTION INTRAVENOUS at 09:56

## 2024-04-23 RX ADMIN — ONDANSETRON 4 MG: 2 INJECTION INTRAMUSCULAR; INTRAVENOUS at 14:57

## 2024-04-23 RX ADMIN — SENNOSIDES AND DOCUSATE SODIUM 2 TABLET: 50; 8.6 TABLET ORAL at 17:18

## 2024-04-23 RX ADMIN — LOSARTAN POTASSIUM 50 MG: 50 TABLET, FILM COATED ORAL at 06:12

## 2024-04-23 RX ADMIN — TRIAMTERENE AND HYDROCHLOROTHIAZIDE 1 CAPSULE: 37.5; 25 CAPSULE ORAL at 06:13

## 2024-04-23 RX ADMIN — ASPIRIN 325 MG: 325 TABLET ORAL at 06:13

## 2024-04-23 RX ADMIN — METOPROLOL SUCCINATE 25 MG: 25 TABLET, EXTENDED RELEASE ORAL at 09:35

## 2024-04-23 RX ADMIN — HEPARIN SODIUM 4000 UNITS: 1000 INJECTION, SOLUTION INTRAVENOUS; SUBCUTANEOUS at 06:40

## 2024-04-23 RX ADMIN — BIVALIRUDIN 1.75 MG/KG/HR: 250 INJECTION, POWDER, LYOPHILIZED, FOR SOLUTION INTRAVENOUS at 10:32

## 2024-04-23 RX ADMIN — LIDOCAINE HYDROCHLORIDE: 20 INJECTION, SOLUTION INFILTRATION; PERINEURAL at 09:56

## 2024-04-23 RX ADMIN — NITROGLYCERIN 10 ML: 20 INJECTION INTRAVENOUS at 09:57

## 2024-04-23 RX ADMIN — ATORVASTATIN CALCIUM 40 MG: 40 TABLET, FILM COATED ORAL at 06:13

## 2024-04-23 RX ADMIN — OMEPRAZOLE 20 MG: 20 CAPSULE, DELAYED RELEASE ORAL at 06:13

## 2024-04-23 RX ADMIN — BIVALIRUDIN 0.2 MG/KG/HR: 250 INJECTION, POWDER, LYOPHILIZED, FOR SOLUTION INTRAVENOUS at 11:06

## 2024-04-23 RX ADMIN — FENTANYL CITRATE 100 MCG: 50 INJECTION, SOLUTION INTRAMUSCULAR; INTRAVENOUS at 10:23

## 2024-04-23 RX ADMIN — ONDANSETRON 4 MG: 2 INJECTION INTRAMUSCULAR; INTRAVENOUS at 10:56

## 2024-04-23 RX ADMIN — ALLOPURINOL 100 MG: 100 TABLET ORAL at 17:18

## 2024-04-23 ASSESSMENT — LIFESTYLE VARIABLES
HAVE YOU EVER FELT YOU SHOULD CUT DOWN ON YOUR DRINKING: NO
HOW MANY TIMES IN THE PAST YEAR HAVE YOU HAD 5 OR MORE DRINKS IN A DAY: 0
EVER HAD A DRINK FIRST THING IN THE MORNING TO STEADY YOUR NERVES TO GET RID OF A HANGOVER: NO
HAVE PEOPLE ANNOYED YOU BY CRITICIZING YOUR DRINKING: NO
CONSUMPTION TOTAL: NEGATIVE
TOTAL SCORE: 0
AVERAGE NUMBER OF DAYS PER WEEK YOU HAVE A DRINK CONTAINING ALCOHOL: 1
DOES PATIENT WANT TO STOP DRINKING: NO
TOTAL SCORE: 0
TOTAL SCORE: 0
ON A TYPICAL DAY WHEN YOU DRINK ALCOHOL HOW MANY DRINKS DO YOU HAVE: 1
EVER FELT BAD OR GUILTY ABOUT YOUR DRINKING: NO
ALCOHOL_USE: YES

## 2024-04-23 ASSESSMENT — PATIENT HEALTH QUESTIONNAIRE - PHQ9
2. FEELING DOWN, DEPRESSED, IRRITABLE, OR HOPELESS: NOT AT ALL
SUM OF ALL RESPONSES TO PHQ9 QUESTIONS 1 AND 2: 0
1. LITTLE INTEREST OR PLEASURE IN DOING THINGS: NOT AT ALL

## 2024-04-23 ASSESSMENT — ENCOUNTER SYMPTOMS
MUSCULOSKELETAL NEGATIVE: 1
GASTROINTESTINAL NEGATIVE: 1
RESPIRATORY NEGATIVE: 1
PSYCHIATRIC NEGATIVE: 1
CHILLS: 0
WEAKNESS: 1
EYES NEGATIVE: 1
CARDIOVASCULAR NEGATIVE: 1
FEVER: 0

## 2024-04-23 ASSESSMENT — FIBROSIS 4 INDEX: FIB4 SCORE: 2.67

## 2024-04-23 ASSESSMENT — PAIN DESCRIPTION - PAIN TYPE
TYPE: ACUTE PAIN
TYPE: ACUTE PAIN

## 2024-04-23 NOTE — HOSPITAL COURSE
Mr. Chuck Ortiz is a 84 y.o. male with history of left frontal CVA in 2019, Zenker diverticulum, A-fib, on apixaban, sleep apnea on CPAP at night, chronic left-sided facial droop, hypertension, dyslipidemia, CAD, who presented 4/22/2024 with complaints of chest pain.      Due to sedation with Ativan for CT head without contrast was done, he is a poor historian.  He presented with complaints of chest pain in the middle, several episodes during the day, at rest.  After he was roomed, he developed speech difficulties and confusion, disorientation.      In ER, Stroke workup was done.  On CT head without contrast, CTA head and neck there appears to be no acute findings.  NIH score 5 for expressive aphasia and confusion.  Neurology consulted and did not recommend any intervention or further imaging.  Patient is on apixaban which she was taking.  Troponin came back mildly elevated at 37.  Chest pain resolved.  EKG showed sinus rhythm, frequent PVCs.  No OT/ST acute changes.  Requested admission for chest pain rule out.  Patient admitted to hospital medicine for management of care.    Overnight, patient's troponin trended up from 37, 97, 168.  On examination, patient was asymptomatic with EKG showing sinus rhythm with bigeminy.  Patient was initiated on a heparin drip and cardiology was consulted.

## 2024-04-23 NOTE — PROGRESS NOTES
NOC CROSSCOVER      Notified of up trending troponin's, 37, 97, and 168. The patient was admitted yesterday for a CP w/u and scheduled stress test. However due to his up trending troponin's may need a heart cath. Patient is currently asymptomatic, EKG showing SR W/ bigeminy. I made pt NPO and started him on a heparin drip.    I reached out to the [facility] cardiologist who did not have further recommendations at this time but they will formally consult later in the am.      Joseph MALONE

## 2024-04-23 NOTE — PROGRESS NOTES
Received pt as transfer from CDU post heart cath with stent, right radial access TR band removed prior to arriving to floor. post op vitals continued. VS'S. RA. SR on telemetry monitor. No complaints of pain or SOB at this time.

## 2024-04-23 NOTE — CONSULTS
Neurology STROKE CODE H&P  Neurohospitalist Service, [facility] Neurosciences    Referring Physician: Angela Carreon M.D.    STROKE CODE:   Chief Complaint   Patient presents with    Chest Pain     Pt BIB [facility] from home for CP in the center of chest non-radiating x 1 say with mild SOB. Pt has hx of a-fib.         To obtain the most accurate data regarding the time called, and time patient seen, refer to the stroke run-sheet and chart.  For time of CT, refer to the radiology report. See A&P below for TPA Decision and door to needle time if and when applicable.    HPI: Chuck Ortiz is a 84 year old man with history of L frontal stroke in 2018, congenital L face droop, history of atrial fibrillation, anticoagulated on apixaban.  He came to the ER tonight after experiencing chest pain- described as intense chest pressure.  He was at neurologic baseline, but after being roomed, he was noted to acutely confused with difficulties with speech.  Stroke alert was activated.  On my arrival, NIHSS 5 as documented below- namely for expressive aphasia, slowed responses, disorientation.  On chart review- he is on apixaban therapy.  Wife at bedside reports compliance with last dose likely this morning.  He had a stroke in 2018- left frontal on MRI- that was completed due to transient amnesia experienced on a recent fishing trip.  In addition, he is on statin therapy and anti-HTN.  On ROS, family at bedside denies recent infectious prodrome or constitutional  symptoms.    Review of systems: In addition to what is detailed in the HPI above, all other systems reviewed and are negative.    Past Medical History:    has a past medical history of Arrhythmia, Arthritis, Cardiac murmur, CATARACT, Dental disorder, ED (erectile dysfunction), Essential hypertension (11/12/2015), Facial droop, Hemorrhagic disorder (HCC), High cholesterol, Hypertension, Kidney stones, Seizure (HCC), Sleep apnea, Snoring, Stroke (HCC)  (08/07/2018), and Zenker diverticula.    FHx:  family history includes Diabetes in his paternal grandfather; Heart Disease in his maternal grandmother; Lung Disease in his father; Stroke in his sister.    SHx:   reports that he has never smoked. He has never used smokeless tobacco. He reports current alcohol use. He reports that he does not use drugs.    Allergies:  Allergies   Allergen Reactions    Morphine      Bradycardia       Medications:    Current Facility-Administered Medications:     LORAZEPAM 2 MG/ML INJ SOLN, , , ,     Current Outpatient Medications:     celecoxib (CELEBREX) 200 MG Cap, Take 1 Capsule by mouth 1 time a day as needed for Moderate Pain or Inflammation., Disp: 30 Capsule, Rfl: 1    losartan (COZAAR) 50 MG Tab, TAKE 1 TABLET BY MOUTH EVERY DAY, Disp: 100 Tablet, Rfl: 1    cyclobenzaprine (FLEXERIL) 10 mg Tab, Take 1 Tablet by mouth 3 times a day as needed for Moderate Pain (back spasms)., Disp: 30 Tablet, Rfl: 0    ELIQUIS 5 MG Tab, TAKE 1 TABLET BY MOUTH TWICE A DAY, Disp: 180 Tablet, Rfl: 2    triamterene/hctz (MAXZIDE-25/DYAZIDE) 37.5-25 MG Cap, TAKE 1 CAPSULE BY MOUTH EVERY MORNING., Disp: 90 Capsule, Rfl: 3    atorvastatin (LIPITOR) 40 MG Tab, TAKE 1 TABLET BY MOUTH EVERY DAY, Disp: 100 Tablet, Rfl: 2    allopurinol (ZYLOPRIM) 100 MG Tab, TAKE 1 TABLET BY MOUTH TWICE A DAY, Disp: 180 Tablet, Rfl: 3    Physical Examination:    Vitals:    04/22/24 2014 04/22/24 2015   BP:  (!) 159/85   Pulse:  81   Resp:  16   Temp:  36.1 °C (97 °F)   TempSrc:  Temporal   SpO2:  94%   Weight: 81.6 kg (180 lb)    Height: 1.829 m (6')          General: Patient is awake, appears slightly anxious  Eye: Examination of optic disks not indicated at this time given acuity of consult  Neck: There is normal range of motion  CV: Regular rate   Extremities:  Clear, dry, intact, without peripheral edema    NEUROLOGICAL EXAM:     Mental status: Awake, alert   Speech and language: Speech is clear and fluent.   There is  speech paucity.  There is delay in responses.    Cranial nerve exam:  Visual fields are full. There is no nystagmus. Extraocular muscles are intact. L face droop  Motor exam: There is sustained antigravity with no downward drift in bilateral arms and legs.    Sensory exam:  Reacts to tactile in all 4 distal extremities, there is no neglect to double stim.  Coordination: No ataxia on bilateral finger-to-nose testing.  Gait: Deferred due to patient preference.    NIHSS: National Institutes of Health Stroke Scale    [0] 1a:Level of Consciousness    0-alert 1-drowsy   2-stupor   3-coma  [1] 1b:LOC Questions                  0-both  1-one      2-neither  [0] 1c:LOC Commands                   0-both  1-one      2-neither  [0] 2: Best Gaze                     0-nl    1-partial  2-forced  [0] 3: Visual Fields                   0-nl    1-partial  2-complete 3-bilat  [2] 4: Facial Paresis                0-nl    1-minor    2-partial  3-full  MOTOR                       0-nl  [0] 5: Right Arm           1-drift  [0] 6: Left Arm             2-some effort vs gravity  [0] 7: Right Leg           3-no effort vs gravity  [0] 8: Left Leg             4-no movement                             x-untestable  [0] 9: Limb Ataxia                    0-abs   1-1_limb   2-2+_limbs       x-untestable  [0] 10:Sensory                        0-nl    1-partial  2-dense  [1] 11:Best Language/Aphasia         0-nl    1-mild/mod 2-severe   3-mute  [1] 12:Dysarthria                     0-nl    1-mild/mod 2-severe       x-untestable  [0] 13:Neglect/Inattention            0-none  1-partial  2-complete  [5] TOTAL    Baseline Modified [place] Scale (MRS): 1 = No significant disability, despite symptoms; able to perform all usual duties and activities    Objective Data:    Labs:  Lab Results   Component Value Date/Time    PROTHROMBTM 13.6 09/25/2018 12:57 PM    INR 1.03 09/25/2018 12:57 PM      Lab Results   Component Value Date/Time    WBC 11.5 (H) 04/22/2024  08:15 PM    RBC 5.48 04/22/2024 08:15 PM    HEMOGLOBIN 17.3 04/22/2024 08:15 PM    HEMATOCRIT 52.0 04/22/2024 08:15 PM    MCV 94.9 04/22/2024 08:15 PM    MCH 31.6 04/22/2024 08:15 PM    MCHC 33.3 04/22/2024 08:15 PM    MPV 12.0 04/22/2024 08:15 PM    NEUTSPOLYS 54.90 04/22/2024 08:15 PM    LYMPHOCYTES 32.70 04/22/2024 08:15 PM    MONOCYTES 10.20 04/22/2024 08:15 PM    EOSINOPHILS 1.40 04/22/2024 08:15 PM    BASOPHILS 0.40 04/22/2024 08:15 PM      Lab Results   Component Value Date/Time    SODIUM 138 12/15/2023 08:15 AM    POTASSIUM 4.1 12/15/2023 08:15 AM    CHLORIDE 101 12/15/2023 08:15 AM    CO2 29 12/15/2023 08:15 AM    GLUCOSE 88 12/15/2023 08:15 AM    BUN 18 12/15/2023 08:15 AM    CREATININE 0.92 12/15/2023 08:15 AM    CREATININE 1.10 03/03/2011 11:20 AM    BUNCREATRAT 23 (H) 03/03/2011 11:20 AM    GLOMRATE >59 03/03/2011 11:20 AM      Lab Results   Component Value Date/Time    CHOLSTRLTOT 115 12/15/2023 08:15 AM    LDL 58 12/15/2023 08:15 AM    HDL 40 12/15/2023 08:15 AM    TRIGLYCERIDE 85 12/15/2023 08:15 AM       Lab Results   Component Value Date/Time    ALKPHOSPHAT 112 (H) 12/15/2023 08:15 AM    ASTSGOT 19 12/15/2023 08:15 AM    ALTSGPT 19 12/15/2023 08:15 AM    TBILIRUBIN 1.0 12/15/2023 08:15 AM        Imaging/Testing:    I interpreted and/or reviewed the patient's neuroimaging    CT-HEAD W/O   Final Result         1.  No acute intracranial abnormality is identified, there are nonspecific white matter changes, commonly associated with small vessel ischemic disease.  Associated mild cerebral atrophy is noted.   2.  Atherosclerosis.         CT-CTA HEAD WITH & W/O-POST PROCESS    (Results Pending)   CT-CTA NECK WITH & W/O-POST PROCESSING    (Results Pending)   CT-CTA COMPLETE THORACOABDOMINAL AORTA    (Results Pending)   CT-CEREBRAL PERFUSION ANALYSIS    (Results Pending)       Assessment and Plan:  Chuck Ortiz is a 84 year old man with history of L MCA stroke, atrial fibrillation, on apixaban  therapy.  He presents with acute onset confusion, delayed responses which being evaluated for chest pain.  Stroke protocol CT without evidence of hemorrhage, large territory stroke, LVO, or critical stenoses.  CT perfusion is artifactual.   At this time, in the absence of a clearly identified ischemic penumbra, I do not recommend off label thrombolytic therapy.  May re-institute current  secondary stroke prevention regimen of apixaban, anti-HTN, and atorvastatin when able to do so.  I do not recommend additional stroke diagnostics such as MRI brain, TTE as any findings will not change the recommended medical management.    Problem list:   Speech difficulties   Chest pain  History of stroke  Anticoagulated    Recommendations:   - I do not recommend off-label thrombolytic therapy   - continue current secondary stroke prevention regimen when able- this is apixaban 5mg BID, anti-hypertensives for long-term goal 110-130/60-80, atorvastatin 40mg daily for LDL goal < 70   - I do not recommend additional stroke diagnostics such as MRI brain, ECHO given his known stroke risk factors, and findings will not change the above recommended medical management   - please reconsult stroke Neurology with any additional questions or concerns    Patient discussed with Dr. Carreon, ER attending.    Antonio Tyler MD  Vascular Neurology

## 2024-04-23 NOTE — PROGRESS NOTES
Pt back in room from cath lab. TR band to right radial; site soft, no bleeding noted. Pt educated on precaution, verbalized understanding. Wife at bedside.

## 2024-04-23 NOTE — DISCHARGE PLANNING
HTH/SCP TCN chart review completed. Collaborated with  HAL Nunez prior to meeting with the pt. The most current review of medical record, knowledge of pt's PLOF and social support, LACE+ score of 67 was considered.  No 6 clicks scores.  Per collaboration with CM patient will likely be admitted for possible heart cath.      Pt seen at bedside. Introduced TCN program. Provided education regarding post acute levels of care. Education provided regarding case management policy for blanket SNF referrals. Discussed HTH/SCP plan benefits. Pt verbalizes understanding.     Patient states he lives with his spouse in a 2 story home and was independent with ADL's, IADL's, mobility (no AD) and driving at his baseline.  He states he is at his baseline level of function and has no functional concerns with discharge to home when medically cleared.      TCN will continue to follow and collaborate with discharge planning team as additional post acute needs arise. Thank you.     Completed today:  Choice obtained: None.  See above.    SCP with [facility] PCP.  Patient states he will schedule his own Follow up appointment.

## 2024-04-23 NOTE — ASSESSMENT & PLAN NOTE
Patient was assessed by neurology for possible TIA with acute onset expressive aphasia developed in the emergency department.  No workup or intervention recommended.  Patient is medically optimized with Eliquis, which we will continue  Neurochecks every 4 hours after admission  At this time his aphasia appears to be improved

## 2024-04-23 NOTE — ASSESSMENT & PLAN NOTE
84-year-old male with history of nonocclusive CAD, presented with chest pain and elevated troponin 37  EKG showed sinus rhythm, PACs, no acute T/ST changes.  Plan: Admit to telemetry, trend troponin and EKG  If troponin remains flat, consider stress test.  Escalating troponin overnight: Cardiology consulted - Dr. Kirk; pursue [facility]  Continue outpatient medications and Lipitor, Eliquis, add aspirin  At this moment patient is chest pain-free

## 2024-04-23 NOTE — ED PROVIDER NOTES
ED Provider Note    CHIEF COMPLAINT  Chief Complaint   Patient presents with    Chest Pain     Pt YOHAN HARLEY from home for CP in the center of chest non-radiating x 1 say with mild SOB. Pt has hx of a-fib.         EXTERNAL RECORDS REVIEWED  Outpatient Notes patient was last seen by cardiology Dr. Montana in September 2023 has a history of aortic valve stenosis coronary artery disease that is nonocclusive and atrial fibrillation on Eliquis    HPI/ROS  LIMITATION TO HISTORY   Select: Patient behavior  OUTSIDE HISTORIAN(S):  EMS patient brought in from home for chest pain.  Stating was the center of his chest nonradiating this evening was given 324 of aspirin and 1 nitro    Chuck Bassam Ortiz is a 84 y.o. male who presents to the emerged part with chest pain.  When I came to talk to him he just was kind of speaking slowly he told me he cannot really describe it but it is pressure he denies nausea or vomiting.  He states he cannot quite take a deep breath.  He states he had a history of chest pain before.  And is on a blood thinner but otherwise really cannot give me a good history as to when it started or why it started.  Could not tell me what he was doing.    PAST MEDICAL HISTORY   has a past medical history of Arrhythmia, Arthritis, Cardiac murmur, CATARACT, Dental disorder, ED (erectile dysfunction), Essential hypertension (11/12/2015), Facial droop, Hemorrhagic disorder (HCC), High cholesterol, Hypertension, Kidney stones, Seizure (HCC), Sleep apnea, Snoring, Stroke (HCC) (08/07/2018), and Zenker diverticula.    SURGICAL HISTORY   has a past surgical history that includes colonoscopy (2004); inguinal hernia repair (2/19/2009); laminotomy (1996); finger arthroplasty (12/17/2012); cataract phaco with iol (1/21/2014); uvulopharyngopalatoplasty (1985); and [facility] cardiac cath (09/28/2018).    FAMILY HISTORY  Family History   Problem Relation Age of Onset    Heart Disease Maternal Grandmother     Diabetes Paternal  Grandfather     Stroke Sister     Lung Disease Father         black lung     Cancer Neg Hx        SOCIAL HISTORY  Social History     Tobacco Use    Smoking status: Never    Smokeless tobacco: Never   Substance and Sexual Activity    Alcohol use: Yes     Comment: 2 drinks per week     Drug use: No    Sexual activity: Not on file     Comment: , retired [employer]        CURRENT MEDICATIONS  Home Medications       Reviewed by Marissa Yousif R.N. (Registered Nurse) on 04/22/24 at 2017  Med List Status: Partial     Medication Last Dose Status   allopurinol (ZYLOPRIM) 100 MG Tab  Active   atorvastatin (LIPITOR) 40 MG Tab  Active   celecoxib (CELEBREX) 200 MG Cap  Active   cyclobenzaprine (FLEXERIL) 10 mg Tab  Active   ELIQUIS 5 MG Tab  Active   losartan (COZAAR) 50 MG Tab  Active   triamterene/hctz (MAXZIDE-25/DYAZIDE) 37.5-25 MG Cap  Active                    ALLERGIES  Allergies   Allergen Reactions    Morphine      Bradycardia       PHYSICAL EXAM  VITAL SIGNS: BP (!) 159/85   Pulse 81   Temp 36.1 °C (97 °F) (Temporal)   Resp 16   Ht 1.829 m (6')   Wt 81.6 kg (180 lb)   SpO2 94%   BMI 24.41 kg/m²    Pulse OX: Pulse Oxygen level is within normal limits  Constitutional: Alert in no apparent distress.  HENT: Normocephalic, Atraumatic, MMM left-sided facial droop is chronic  Eyes: PERound. Conjunctiva normal, non-icteric.   Heart: Regular rate and rhythm, intact distal pulses 2 out of 6 systolic murmur  Lungs: Symmetrical movement, no resp distress   Abdomen: Non-tender, non-distended, normal bowel sounds  EXT/Back no edema  Skin: Warm, Dry, No erythema, No rash.   Neurologic: Alert and oriented, Grossly non-focal.       EKG/LABS  Labs Reviewed   CBC WITH DIFFERENTIAL - Abnormal; Notable for the following components:       Result Value    WBC 11.5 (*)     RDW 50.8 (*)     Monos (Absolute) 1.17 (*)     All other components within normal limits   COMP METABOLIC PANEL - Abnormal; Notable for the following  components:    Potassium 3.5 (*)     Glucose 108 (*)     Bun 23 (*)     Alkaline Phosphatase 114 (*)     Total Protein 5.8 (*)     All other components within normal limits   TROPONIN - Abnormal; Notable for the following components:    Troponin T 37 (*)     All other components within normal limits   CBC WITH DIFFERENTIAL - Abnormal; Notable for the following components:    WBC 12.0 (*)     Neutrophils-Polys 79.60 (*)     Lymphocytes 14.20 (*)     Neutrophils (Absolute) 9.56 (*)     All other components within normal limits   TROPONIN - Abnormal; Notable for the following components:    Troponin T 97 (*)     All other components within normal limits   COMP METABOLIC PANEL - Abnormal; Notable for the following components:    Glucose 155 (*)     Bun 23 (*)     Alkaline Phosphatase 126 (*)     All other components within normal limits   PROTHROMBIN TIME   APTT   LIPASE   ESTIMATED GFR   LIPID PROFILE   MAGNESIUM   PHOSPHORUS   ESTIMATED GFR       Results for orders placed or performed during the hospital encounter of 24   EKG (NOW)   Result Value Ref Range    Report       [facility] Emergency Dept.    Test Date:  2024  Pt Name:    HALI TORRE                 Department: ER  MRN:        [id number]                      Room:       [facility]  Gender:     Male                         Technician: [id number]  :        1940                   Requested By:ER TRIAGE PROTOCOL  Order #:    [id number]                    Reading MD: Angela Carreon MD    Measurements  Intervals                                Axis  Rate:       77                           P:          36  MA:         235                          QRS:        -38  QRSD:       121                          T:          11  QT:         429  QTc:        486    Interpretive Statements  Sinus rhythm at a rate of 77 with a type 1 avb no ST elevations or ST  depressions interventricular conduction delay which is unchanged from prior  otherwise  normal intervals normal axis  Compared to ECG 2023 13:08:00  prior pvc's  Electronically Signed On 2024 03:17:13 PDT by SARAH Terrazas     EKG (NOW)   Result Value Ref Range    Report       [facility] Emergency Dept.    Test Date:  2024  Pt Name:    HALI TORRE                 Department: ER  MRN:        [id number]                      Room:       T216  Gender:     Male                         Technician: [id number]  :        1940                   Requested By:SANCHEZ ALCAZAR  Order #:    [id number]                    Reading MD: Sanchez Alcazar MD    Measurements  Intervals                                Axis  Rate:       84                           P:          125  AK:         244                          QRS:        -28  QRSD:       111                          T:          -11  QT:         396  QTc:        469    Interpretive Statements  Sinus rhythm at a rate of 84 no ST elevations or ST depressions no abnormal T  wave inversions lead to failed unfortunately otherwise type I AV block normal  intervals normal axis beyond the interventricular conduction delay which is  unchanged  Compared to ECG 2024 20:20:12  no sig change  Electroni maximus Signed On 2024 03:18:15 PDT by Sanchez Alcazar MD     EKG   Result Value Ref Range    Report       [facility] Emergency Dept.    Test Date:  2024  Pt Name:    HALI TORRE                 Department: ER  MRN:        [id number]                      Room:       T216  Gender:     Male                         Technician: [id number]  :        1940                   Requested By:SANCHEZ ALCAZAR  Order #:    [id number]                    Reading MD: Sanchez Alcazar MD    Measurements  Intervals                                Axis  Rate:       104                          P:          32  AK:         234                          QRS:        -36  QRSD:       120                          T:           142  QT:         391  QTc:        515    Interpretive Statements  Sinus tachycardia rate of 104 in the setting of bigeminy no ST elevations or  ST depressions no abnormal T wave inversions  Compared to ECG 04/22/2024 20:46:40  Bigeminy  Electronically Signed On 04- 03:18:49 PDT by Angela Carreon MD         I have independently interpreted this EKG    RADIOLOGY/PROCEDURES   I have independently interpreted the diagnostic imaging associated with this visit and am waiting the final reading from the radiologist.   My preliminary interpretation is as follows:     CTA head without bleed or occlusion  CTA neck without dissection  CT head Noncon without bleed    CTA aorta with calcifications no evidence of dissection may be some dependent edema in the lungs    Radiologist interpretation:  CT-CTA COMPLETE THORACOABDOMINAL AORTA   Final Result         1.  No aortic aneurysm or dissection identified.   2.  Indeterminate left adrenal nodule, follow-up adrenal protocol CT for further characterization as clinically appropriate.   3.  Slight irregular hepatic contour favoring changes of cirrhosis   4.  Atherosclerosis and atherosclerotic coronary disease   5.  Diverticulosis      CT-CTA NECK WITH & W/O-POST PROCESSING   Final Result         1.  CT angiogram of the neck within normal limits.         CT-CTA HEAD WITH & W/O-POST PROCESS   Final Result         1.  No large vessel occlusion or aneurysm identified.      CT-CEREBRAL PERFUSION ANALYSIS   Final Result         1.  Cerebral blood flow less than 30% likely representing completed infarct = 0 mL.      2.  T Max more than 6 seconds could represent combination of completed infarct and ischemia = 60 mL, nondiagnostic due to multiple vascular territory involvement.      3.  Mismatched volume could represent ischemic brain/penumbra = 60, nondiagnostic due to multiple vascular territory involvement.      4.  Please note that the cerebral perfusion was performed on the limited  brain tissue around the basal ganglia region. Infarct/ischemia outside the CT perfusion sections can be missed in this study.      CT-HEAD W/O   Final Result         1.  No acute intracranial abnormality is identified, there are nonspecific white matter changes, commonly associated with small vessel ischemic disease.  Associated mild cerebral atrophy is noted.   2.  Atherosclerosis.             COURSE & MEDICAL DECISION MAKING    ASSESSMENT, COURSE AND PLAN  Care Narrative:     Patient is a 84-year-old gentleman presented the emerged part with chest pain.  He just kind of slow in his answers to me but he is alert and speaking full sentences he just states it is pressure-like and he just feels uncomfortable.  He is not hypoxic given an EKG is unremarkable for signs of STEMI.  The plan is for repeat EKG in 10 minutes.  Troponin was ordered laboratory and Alysis as well as further imaging.  DISPOSITION AND DISCUSSIONS  8:45 PM  I was called to the bedside by the patient's wife.  She states that now he is not acting like his normal self and I agree he is not making normal clear sentences his speech is mildly slurred.  He just keeps stating he is short of breath but is also hard to understand upper and lower extremities appear to be nonfocal but he is definitely confused acutely.  I initiated a code stroke as well as kind of a code aorta to evaluate the underlying cause of the patient's chest pain altered mental status  His NIH is this time would really only be 2    9:36 PM  Spoke w dr Tyler after he reviewed the patient's imaging.  As the patient is on Eliquis his neurologic exam is nonfocal beyond just this acute confusion he does not feel comfortable using off label use of TNK at this time which I agree with.    30 EKG has some bigeminy but the patient still has no signs of acute STEMI.  His chest pain is improving.    10:07 PM  Reassessed at the bedside with his wife and he started to make more sense or still pending  some laboratory analysis and his chest pain is improving.    11:15 PM  Spoke w Dr South with the medicine service for my concern for chest pain and altered mental status.  Fortunately there is no evidence of aortic dissection or an acute stroke.  He has a history of A-fib but is currently in bigeminy on Eliquis.  He will be hospitalized for my concern for chest pain and altered mental status.  The patient's heart score is 6      CRITICAL CARE  The very real possibilty of a deterioration of this patient's condition required the highest level of my preparedness for sudden, emergent intervention.  I provided critical care services, which included medication orders, frequent reevaluations of the patient's condition and response to treatment, ordering and reviewing test results, and discussing the case with various consultants.  The critical care time associated with the care of the patient was 45 minutes. Review chart for interventions. This time is exclusive of any other billable procedures.         I have discussed management of the patient with the following physicians and NAVNEET's:  Brannon Tyler    Discussion of management with other QHP or appropriate source(s): None         FINAL DIAGNOSIS  1. Acute chest pain    2. Acute pulmonary edema (HCC)    3. Transient alteration of awareness      CCT 45 min        Electronically signed by: Angela Carreon M.D., 4/22/2024 8:34 PM

## 2024-04-23 NOTE — CONSULTS
Cardiology Initial Consult Note    Reason for Consult:  Asked by Dr Krystyna Peoples* to see this patient with  chests pain, ACS/NSTEMI  Patient's PCP: Reilly Jc M.D.    CC:   Chief Complaint   Patient presents with    Chest Pain     Pt YOHAN HARLEY from home for CP in the center of chest non-radiating x 1 say with mild SOB. Pt has hx of a-fib.         HPI:   This is an 84-year-old man with past medical history significant for CVA, paroxysmal atrial fibrillation on Eliquis, hypertension, dyslipidemia, CAD who presented to the hospital with complaints of chest pain.    On presentation he was found to have elevated troponin of 37.  Subsequent troponin continued to rise with most recent level of 168.  EKG demonstrates sinus rhythm with presence of PVCs.  He was originally scheduled for cardiac stress test in the a.m. but due to rising troponin concerning for acute coronary syndrome/NSTEMI, stress test was canceled and he was started on heparin drip.    He was evaluated this morning. He has no major cardiac complaints. Denies having chest pain or dyspnea. No palpitations.     Medications / Drug list prior to admission:  No current facility-administered medications on file prior to encounter.     Current Outpatient Medications on File Prior to Encounter   Medication Sig Dispense Refill    esomeprazole (NEXIUM 24HR) 20 MG capsule Take 20 mg by mouth every morning before breakfast.      losartan (COZAAR) 50 MG Tab TAKE 1 TABLET BY MOUTH EVERY DAY (Patient taking differently: Take 50 mg by mouth every day.) 100 Tablet 1    celecoxib (CELEBREX) 200 MG Cap Take 1 Capsule by mouth 1 time a day as needed for Moderate Pain or Inflammation. 30 Capsule 1    ELIQUIS 5 MG Tab TAKE 1 TABLET BY MOUTH TWICE A DAY (Patient taking differently: Take 5 mg by mouth 2 times a day.) 180 Tablet 2    triamterene/hctz (MAXZIDE-25/DYAZIDE) 37.5-25 MG Cap TAKE 1 CAPSULE BY MOUTH EVERY MORNING. 90 Capsule 3    atorvastatin (LIPITOR) 40 MG  Tab TAKE 1 TABLET BY MOUTH EVERY DAY (Patient taking differently: Take 40 mg by mouth every day.) 100 Tablet 2    allopurinol (ZYLOPRIM) 100 MG Tab TAKE 1 TABLET BY MOUTH TWICE A DAY (Patient taking differently: Take 100 mg by mouth 2 times a day. TAKE 1 TABLET BY MOUTH TWICE A DAY) 180 Tablet 3    cyclobenzaprine (FLEXERIL) 10 mg Tab Take 1 Tablet by mouth 3 times a day as needed for Moderate Pain (back spasms). 30 Tablet 0       Current list of administered Medications:    Current Facility-Administered Medications:     NS infusion, , Intravenous, Continuous, Miguelito Dhaliwal M.D., Last Rate: 30 mL/hr at 04/23/24 0418, New Bag at 04/23/24 0418    heparin infusion 25,000 units in 500 mL 0.45% NACL, 0-30 Units/kg/hr, Intravenous, Continuous, Joseph Harris, A.P.R.N., Last Rate: 23.1 mL/hr at 04/23/24 0729, 12 Units/kg/hr at 04/23/24 0729    heparin injection 2,000 Units, 2,000 Units, Intravenous, PRN, Joseph Harris, A.P.R.N.    senna-docusate (Pericolace Or Senokot S) 8.6-50 MG per tablet 2 Tablet, 2 Tablet, Oral, Q EVENING **AND** polyethylene glycol/lytes (Miralax) Packet 1 Packet, 1 Packet, Oral, QDAY PRN, Miguelito Dhaliwal M.D.    Notify provider if pain remains uncontrolled, , , CONTINUOUS **AND** Use the Numeric Rating Scale (NRS), Barros-Baker Faces (WBF), or FLACC on regular floors and Critical-Care Pain Observation Tool (CPOT) on ICUs/Trauma to assess pain, , , CONTINUOUS **AND** Pulse Ox, , , CONTINUOUS **AND** Pharmacy Consult Request ...Pain Management Review 1 Each, 1 Each, Other, PHARMACY TO DOSE **AND** If patient difficult to arouse and/or has respiratory depression (respiratory rate of 10 or less), stop any opiates that are currently infusing and call a Rapid Response., , , CONTINUOUS, Miguelito Kuharevic, M.D.    oxyCODONE immediate-release (Roxicodone) tablet 2.5 mg, 2.5 mg, Oral, Q3HRS PRN **OR** oxyCODONE immediate-release (Roxicodone) tablet 5 mg, 5 mg, Oral, Q3HRS PRN **OR** HYDROmorphone (Dilaudid)  "injection 0.25 mg, 0.25 mg, Intravenous, Q3HRS PRN, Miguelito Dhaliwal M.D.    ondansetron (Zofran) syringe/vial injection 4 mg, 4 mg, Intravenous, Q4HRS PRN, Miguelito Dhaliwal M.D.    ondansetron (Zofran ODT) dispertab 4 mg, 4 mg, Oral, Q4HRS PRN, Miguelito Dhaliwal M.D.    hydrALAZINE (Apresoline) injection 10 mg, 10 mg, Intravenous, Q4HRS PRN, Miguelito Dhaliwal M.D.    aspirin (Asa) tablet 325 mg, 325 mg, Oral, DAILY, 325 mg at 04/23/24 0613 **OR** aspirin (Asa) chewable tab 324 mg, 324 mg, Oral, DAILY **OR** aspirin (Asa) suppository 300 mg, 300 mg, Rectal, DAILY, Miguelito Dhaliwal M.D.    acetaminophen (Tylenol) tablet 650 mg, 650 mg, Oral, Q6HRS PRN, Miguelito Dhaliwal M.D.    allopurinol (Zyloprim) tablet 100 mg, 100 mg, Oral, BID, Miguelito Dhaliwal M.D., 100 mg at 04/23/24 0622    atorvastatin (Lipitor) tablet 40 mg, 40 mg, Oral, DAILY, Miguelito Dhaliwal M.D., 40 mg at 04/23/24 0613    cyclobenzaprine (Flexeril) tablet 10 mg, 10 mg, Oral, TID PRN, Miguelito Dhaliwal M.D.    losartan (Cozaar) tablet 50 mg, 50 mg, Oral, DAILY, Miguelito Dhaliwal M.D., 50 mg at 04/23/24 0612    triamterene/hctz (Maxzide-25/Dyazide) 37.5-25 MG 1 Capsule, 1 Capsule, Oral, QAM, Miguelito Dhaliwal M.D., 1 Capsule at 04/23/24 0613    omeprazole (PriLOSEC) capsule 20 mg, 20 mg, Oral, DAILY, Miguelito Dhaliwal M.D., 20 mg at 04/23/24 0613    Past Medical History:   Diagnosis Date    Arrhythmia     \"irregular\"     Arthritis     hands     Cardiac murmur     CATARACT     bilat IOL     Dental disorder     partial upper denture     ED (erectile dysfunction)     Essential hypertension 11/12/2015    Facial droop     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    CATARACT PHACO WITH IOL  1/21/2014    Performed by Joni Duarte M.D. at SURGERY [facility] ORS    FINGER ARTHROPLASTY  12/17/2012    Performed by Adam Christopher M.D. at SURGERY SAME DAY [facility] ORS    INGUINAL HERNIA REPAIR  2/19/2009    Performed by ALEX ALATORRE at SURGERY SAME DAY [facility] ORS    COLONOSCOPY  2004    neg    LAMINOTOMY  1996    lumbar laminectomy, cyst x3    UVULOPHARYNGOPALATOPLASTY  1985       Family History   Problem Relation Age of Onset    Heart Disease Maternal Grandmother     Diabetes Paternal Grandfather     Stroke Sister     Lung Disease Father         black lung     Cancer Neg Hx      Patient family history was personally reviewed, no pertinent family history to current presentation    Social History     Tobacco Use    Smoking status: Never    Smokeless tobacco: Never   Substance Use Topics    Alcohol use: Yes     Comment: 2 drinks per week     Drug use: No       ALLERGIES:  Allergies   Allergen Reactions    Morphine      Bradycardia       Review of systems:  A complete review of symptoms was reviewed with the patient. This is reviewed in H&P and PMH. ALL OTHERS reviewed and negative    Physical exam:  Patient Vitals for the past 24 hrs:   BP Temp Temp src Pulse Resp SpO2 Height Weight   04/23/24 0659 132/70 36.8 °C (98.2 °F) Temporal 74 16 95 % -- --   04/23/24 0548 119/64 36.8 °C (98.3 °F) Temporal 60 16 88 % -- --   04/23/24 0400 128/63 36.6 °C (97.8 °F) Temporal 66 16 94 % -- --   04/23/24 0000 (!) 159/89 36.4 °C (97.6 °F) Temporal 84 18 89 % 1.829 m (6') 96.2 kg (212 lb 1.3 oz)   04/22/24 2300 121/65 -- -- 77 12 94 % -- --   04/22/24 2200 (!) 163/83 -- -- 83 20 92 % -- --   04/22/24 2100 (!) 159/85 -- -- 83 20 96 % -- --   04/22/24 2036 (!) 159/85 -- -- 72 14 96 % -- --   04/22/24 2015 (!) 159/85 36.1 °C (97 °F) Temporal 81 16 94 % -- --    04/22/24 2014 -- -- -- -- -- -- 1.829 m (6') 81.6 kg (180 lb)       General: Not in acute distress, lying comfortably in bed  EYES: No jaundice  HEENT: OP clear   Neck:  No carotid bruits, No JVD appreciated  CVS:  RRR, Normal S1, S2. 2/6 ROXANA at RSB  Resp: Normal respiratory effort, lungs CTA bilaterally. No rales or rhonchi  Abdomen: Soft, non-distended, non-tender to palpation  Skin: No obvious rashes  Neurological: Alert and oriented x3, moves all extremities, no focal neurologic deficits  Psychiatric: Appropriate affect  Extremities:   Extremities warm, no lower extremity edema bilaterally, pulses intact      Data:  Laboratory studies personally reviewed by me:  Recent Results (from the past 24 hour(s))   CBC WITH DIFFERENTIAL    Collection Time: 04/22/24  8:15 PM   Result Value Ref Range    WBC 11.5 (H) 4.8 - 10.8 K/uL    RBC 5.48 4.70 - 6.10 M/uL    Hemoglobin 17.3 14.0 - 18.0 g/dL    Hematocrit 52.0 42.0 - 52.0 %    MCV 94.9 81.4 - 97.8 fL    MCH 31.6 27.0 - 33.0 pg    MCHC 33.3 32.3 - 36.5 g/dL    RDW 50.8 (H) 35.9 - 50.0 fL    Platelet Count 165 164 - 446 K/uL    MPV 12.0 9.0 - 12.9 fL    Neutrophils-Polys 54.90 44.00 - 72.00 %    Lymphocytes 32.70 22.00 - 41.00 %    Monocytes 10.20 0.00 - 13.40 %    Eosinophils 1.40 0.00 - 6.90 %    Basophils 0.40 0.00 - 1.80 %    Immature Granulocytes 0.40 0.00 - 0.90 %    Nucleated RBC 0.00 0.00 - 0.20 /100 WBC    Neutrophils (Absolute) 6.28 1.82 - 7.42 K/uL    Lymphs (Absolute) 3.75 1.00 - 4.80 K/uL    Monos (Absolute) 1.17 (H) 0.00 - 0.85 K/uL    Eos (Absolute) 0.16 0.00 - 0.51 K/uL    Baso (Absolute) 0.05 0.00 - 0.12 K/uL    Immature Granulocytes (abs) 0.05 0.00 - 0.11 K/uL    NRBC (Absolute) 0.00 K/uL   PROTHROMBIN TIME (INR)    Collection Time: 04/22/24  8:15 PM   Result Value Ref Range    PT 14.5 12.0 - 14.6 sec    INR 1.12 0.87 - 1.13   APTT    Collection Time: 04/22/24  8:15 PM   Result Value Ref Range    APTT 30.4 24.7 - 36.0 sec   EKG (NOW)    Collection  Time: 24  8:20 PM   Result Value Ref Range    Report       [facility]    Test Date:  2024  Pt Name:    HALI TORRE                 Department: ER  MRN:        [id number]                      Room:       T216  Gender:     Male                         Technician: [id number]  :        1940                   Requested By:ER TRIAGE PROTOCOL  Order #:    [id number]                    Reading MD: Angela Alcazar MD    Measurements  Intervals                                Axis  Rate:       77                           P:          36  VT:         235                          QRS:        -38  QRSD:       121                          T:          11  QT:         429  QTc:        486    Interpretive Statements  Sinus rhythm at a rate of 77 with a type 1 avb no ST elevations or ST  depressions interventricular conduction delay which is unchanged from prior  otherwise normal intervals normal axis  Compared to ECG 2023 13:08:00  prior pvc's  Electronically Signed On 2024 03:17:13 PDT by SARAH eTrrazas     EKG (NOW)    Collection Time: 24  8:46 PM   Result Value Ref Range    Report       [facility]    Test Date:  2024  Pt Name:    HALI TORRE                 Department: ER  MRN:        [id number]                      Room:       T216  Gender:     Male                         Technician: [id number]  :        1940                   Requested By:ANGELA ALCAZAR  Order #:    [id number]                    Reading MD: Angela Alcazar MD    Measurements  Intervals                                Axis  Rate:       84                           P:          125  VT:         244                          QRS:        -28  QRSD:       111                          T:          -11  QT:         396  QTc:        469    Interpretive Statements  Sinus rhythm at a rate of 84 no ST elevations or ST depressions no abnormal T  wave inversions  lead to failed unfortunately otherwise type I AV block normal  intervals normal axis beyond the interventricular conduction delay which is  unchanged  Compared to ECG 2024 20:20:12  no sig change  Patricia stroud Signed On 2024 03:18:15 PDT by Sanchez Alcazar MD     Comp Metabolic Panel    Collection Time: 24  9:53 PM   Result Value Ref Range    Sodium 136 135 - 145 mmol/L    Potassium 3.5 (L) 3.6 - 5.5 mmol/L    Chloride 101 96 - 112 mmol/L    Co2 21 20 - 33 mmol/L    Anion Gap 14.0 7.0 - 16.0    Glucose 108 (H) 65 - 99 mg/dL    Bun 23 (H) 8 - 22 mg/dL    Creatinine 0.72 0.50 - 1.40 mg/dL    Calcium 8.5 8.5 - 10.5 mg/dL    Correct Calcium 8.7 8.5 - 10.5 mg/dL    AST(SGOT) 22 12 - 45 U/L    ALT(SGPT) 18 2 - 50 U/L    Alkaline Phosphatase 114 (H) 30 - 99 U/L    Total Bilirubin 0.8 0.1 - 1.5 mg/dL    Albumin 3.7 3.2 - 4.9 g/dL    Total Protein 5.8 (L) 6.0 - 8.2 g/dL    Globulin 2.1 1.9 - 3.5 g/dL    A-G Ratio 1.8 g/dL   TROPONIN    Collection Time: 24  9:53 PM   Result Value Ref Range    Troponin T 37 (H) 6 - 19 ng/L   LIPASE    Collection Time: 24  9:53 PM   Result Value Ref Range    Lipase 20 11 - 82 U/L   ESTIMATED GFR    Collection Time: 24  9:53 PM   Result Value Ref Range    GFR (CKD-EPI) 89 >60 mL/min/1.73 m 2   EKG    Collection Time: 24 10:02 PM   Result Value Ref Range    Report       [facility]    Test Date:  2024  Pt Name:    HALI TORRE                 Department: ER  MRN:        [id number]                      Room:       [facility]  Gender:     Male                         Technician: [id number]  :        1940                   Requested By:SANCHEZ ALCAZAR  Order #:    [id number]                    Reading MD: Sanchez Velma, MD    Measurements  Intervals                                Axis  Rate:       104                          P:          32  ND:         234                          QRS:        -36  QRSD:       120                           T:          142  QT:         391  QTc:        515    Interpretive Statements  Sinus tachycardia rate of 104 in the setting of bigeminy no ST elevations or  ST depressions no abnormal T wave inversions  Compared to ECG 04/22/2024 20:46:40  Bigeminy  Electronically Signed On 04- 03:18:49 PDT by Angela Carreon MD     CBC with Differential    Collection Time: 04/23/24  1:45 AM   Result Value Ref Range    WBC 12.0 (H) 4.8 - 10.8 K/uL    RBC 5.43 4.70 - 6.10 M/uL    Hemoglobin 16.9 14.0 - 18.0 g/dL    Hematocrit 51.4 42.0 - 52.0 %    MCV 94.7 81.4 - 97.8 fL    MCH 31.1 27.0 - 33.0 pg    MCHC 32.9 32.3 - 36.5 g/dL    RDW 50.0 35.9 - 50.0 fL    Platelet Count 165 164 - 446 K/uL    MPV 11.3 9.0 - 12.9 fL    Neutrophils-Polys 79.60 (H) 44.00 - 72.00 %    Lymphocytes 14.20 (L) 22.00 - 41.00 %    Monocytes 5.40 0.00 - 13.40 %    Eosinophils 0.10 0.00 - 6.90 %    Basophils 0.20 0.00 - 1.80 %    Immature Granulocytes 0.50 0.00 - 0.90 %    Nucleated RBC 0.00 0.00 - 0.20 /100 WBC    Neutrophils (Absolute) 9.56 (H) 1.82 - 7.42 K/uL    Lymphs (Absolute) 1.70 1.00 - 4.80 K/uL    Monos (Absolute) 0.65 0.00 - 0.85 K/uL    Eos (Absolute) 0.01 0.00 - 0.51 K/uL    Baso (Absolute) 0.03 0.00 - 0.12 K/uL    Immature Granulocytes (abs) 0.06 0.00 - 0.11 K/uL    NRBC (Absolute) 0.00 K/uL   TROPONIN    Collection Time: 04/23/24  1:45 AM   Result Value Ref Range    Troponin T 97 (H) 6 - 19 ng/L   Comp Metabolic Panel    Collection Time: 04/23/24  1:45 AM   Result Value Ref Range    Sodium 138 135 - 145 mmol/L    Potassium 4.0 3.6 - 5.5 mmol/L    Chloride 103 96 - 112 mmol/L    Co2 23 20 - 33 mmol/L    Anion Gap 12.0 7.0 - 16.0    Glucose 155 (H) 65 - 99 mg/dL    Bun 23 (H) 8 - 22 mg/dL    Creatinine 0.86 0.50 - 1.40 mg/dL    Calcium 9.1 8.5 - 10.5 mg/dL    Correct Calcium 9.2 8.5 - 10.5 mg/dL    AST(SGOT) 27 12 - 45 U/L    ALT(SGPT) 26 2 - 50 U/L    Alkaline Phosphatase 126 (H) 30 - 99 U/L    Total Bilirubin 0.7 0.1 -  1.5 mg/dL    Albumin 3.9 3.2 - 4.9 g/dL    Total Protein 6.5 6.0 - 8.2 g/dL    Globulin 2.6 1.9 - 3.5 g/dL    A-G Ratio 1.5 g/dL   Lipid Profile    Collection Time: 04/23/24  1:45 AM   Result Value Ref Range    Cholesterol,Tot 106 100 - 199 mg/dL    Triglycerides 53 0 - 149 mg/dL    HDL 43 >=40 mg/dL    LDL 52 <100 mg/dL   MAGNESIUM    Collection Time: 04/23/24  1:45 AM   Result Value Ref Range    Magnesium 1.9 1.5 - 2.5 mg/dL   PHOSPHORUS    Collection Time: 04/23/24  1:45 AM   Result Value Ref Range    Phosphorus 3.0 2.5 - 4.5 mg/dL   ESTIMATED GFR    Collection Time: 04/23/24  1:45 AM   Result Value Ref Range    GFR (CKD-EPI) 85 >60 mL/min/1.73 m 2   TROPONIN    Collection Time: 04/23/24  4:35 AM   Result Value Ref Range    Troponin T 168 (H) 6 - 19 ng/L   aPTT    Collection Time: 04/23/24  6:05 AM   Result Value Ref Range    APTT 29.7 24.7 - 36.0 sec   Prothrombin Time    Collection Time: 04/23/24  6:05 AM   Result Value Ref Range    PT 14.8 (H) 12.0 - 14.6 sec    INR 1.15 (H) 0.87 - 1.13   Heparin Xa (Unfractionated)    Collection Time: 04/23/24  6:05 AM   Result Value Ref Range    Heparin Xa (UFH) 0.99 IU/mL       Imaging:  CT-CTA COMPLETE THORACOABDOMINAL AORTA   Final Result         1.  No aortic aneurysm or dissection identified.   2.  Indeterminate left adrenal nodule, follow-up adrenal protocol CT for further characterization as clinically appropriate.   3.  Slight irregular hepatic contour favoring changes of cirrhosis   4.  Atherosclerosis and atherosclerotic coronary disease   5.  Diverticulosis      CT-CTA NECK WITH & W/O-POST PROCESSING   Final Result         1.  CT angiogram of the neck within normal limits.         CT-CTA HEAD WITH & W/O-POST PROCESS   Final Result         1.  No large vessel occlusion or aneurysm identified.      CT-CEREBRAL PERFUSION ANALYSIS   Final Result         1.  Cerebral blood flow less than 30% likely representing completed infarct = 0 mL.      2.  T Max more than 6  seconds could represent combination of completed infarct and ischemia = 60 mL, nondiagnostic due to multiple vascular territory involvement.      3.  Mismatched volume could represent ischemic brain/penumbra = 60, nondiagnostic due to multiple vascular territory involvement.      4.  Please note that the cerebral perfusion was performed on the limited brain tissue around the basal ganglia region. Infarct/ischemia outside the CT perfusion sections can be missed in this study.      CT-HEAD W/O   Final Result         1.  No acute intracranial abnormality is identified, there are nonspecific white matter changes, commonly associated with small vessel ischemic disease.  Associated mild cerebral atrophy is noted.   2.  Atherosclerosis.                 EKG tracings personally reviewed by me shows sinus rhythm with PVCs    Echo 2/12/2024:  Normal left ventricular systolic function.  The left ventricular ejection fraction is visually estimated to be 65-70%.  Moderate aortic valve stenosis. Peak: 45 mmHg, Mean: 28 mmHg. FAUZIA is 1.2 cm2. Dimensionless index is 0.34.  Normal right ventricular size and systolic function.  The ascending aorta is borderline dilated with a diameter of 3.9 cm.  Compared to the prior study on 3/30/2023, there has been no significant change.     Stress test 2018:   Abnormal nuclear stress test.    There is a predominently fixed basal to mid inferior septal perfusion defect.    There is a distal inferior wall defect, predominantly reversible, moderate size, mild intensity.    Sum difference score is low, 3.    Findings could possible be artifact.    Normal left ventricular wall motion.  LV ejection fraction = 71%.    TID absent.    Low risk scan.       All pertinent features of laboratory and imaging reviewed including primary images where applicable      Principal Problem:    Chest pain (POA: Yes)  Active Problems:    Essential hypertension (POA: Yes)    Hyperlipidemia LDL goal <70 (POA: Yes)    PAF  (paroxysmal atrial fibrillation) (HCC) (POA: Yes)    Encephalopathy (POA: Unknown)  Resolved Problems:    * No resolved hospital problems. *      Assessment / Plan:  This is an 84-year-old man with past medical history significant for CVA, paroxysmal atrial fibrillation on Eliquis, hypertension, dyslipidemia, CAD who was admitted with chest pain, subsequently found to have NSTEMI.    Non-ST elevation myocardial infarction  Hypertension  Dyslipidemia  CAD  PAF on Eliquis  Elevated [id number]  Hx of CVA    Recommendations:  Clinical presentation not consistent with ACS/NSTEMI.  Troponin continues to rise most recently at 168.  Currently without chest pain.  Continue heparin drip for systemic anticoagulation.  Extensive discussion with the patient regarding early invasive strategy with cardiac cath.  Patient is in agreement.  Arrangements will be made for him to undergo cardiac cath later today if schedule allows.  Keep patient NPO.  Continue high intensity atorvastatin and aspirin 81 mg daily  Start low-dose of beta-blocker metoprolol 25 mg daily given ACS and findings of ventricular ectopy.  Cardiology will continue to follow    The risks, benefits, and alternatives to coronary angiography with IV sedation were discussed in great detail. Specific risks mentioned include bleeding, infection, kidney damage, allergic reaction, cardiac perforation with possible tamponade requiring milagro-cardiocentesis or possible open heart surgery. In addition, we discussed that 10% of patients will experience small to moderate bruising at the side of the arterial puncture. Lastly the risks of heart attack, stroke, and death were discussed; the risks of major complications such as heart attack or stroke caused by the angiogram is less than 1%; the risk of death is approximately 1 in 1000. The patient verbalized understanding of these potential complications and wishes to proceed with this procedure.        I personally discussed his case  with  Dr Krystyna Peoples*    Future Appointments   Date Time Provider Department Center   5/29/2024 10:00 AM Yair Gonzalez M.D. [facility] None       It is my pleasure to participate in the care of Mr. Ortiz.  Please do not hesitate to contact me with questions or concerns.    Ayan Kirk MD  Cardiologist, [facility]    4/23/2024    Please note that this dictation was created using voice recognition software. I have made every reasonable attempt to correct obvious errors, but it is possible there are errors of grammar and possibly content that I did not discover before finalizing the note.

## 2024-04-23 NOTE — PROGRESS NOTES
Curtis from Lab called with critical result of troponin 168 at 0544. Critical lab result read back to Curtis.   FAISAL Ruiz notified of critical lab result at 0547.  Critical lab result read back by FAISAL Ruiz.

## 2024-04-23 NOTE — PROGRESS NOTES
Monitor summary:     NV=28  QRS=10  QT=41     Tele Shift Summary:     Rhythm : SR first degree block  Rate : 64-84  Ectopy : freq/sustaining bigem, occasional PVC

## 2024-04-23 NOTE — ED NOTES
Med rec partially complete per patient/Spouse at bedside  Allergies reviewed.   Outpatient antibiotics in the last 30 days? No   Anticoagulants taken in the last 14 days? Yes   Anticoagulant: Eliquis, Last dose: Unk.     Patient reports he took medications this afternoon but unable to confirm which medications he took.    Pharmacy patient utilizes: [facility] [address], [place]

## 2024-04-23 NOTE — PROCEDURES
"CARDIAC CATHETERIZATION REPORT    Referring Provider: Ayan Kirk MD    PROCEDURE PHYSICIAN: Jarod Bingham MD, [facility], [facility]  ASSISTANT: None    IMPRESSIONS:  1. NSTEMI due to severe mid LAD stenosis  2. Successful PCI of the mid LAD using one AHMET, IVUS and iFR  3. Probable severe aortic stenosis - pullback gradient of 70 mmHg    Recommendations:  Dual antiplatelet therapy for 12 months  Reassess AS by echo    Pre-procedure diagnosis: NSTEMI  Post-procedure diagnosis: Same    Procedure performed  Selective coronary angiography  Left heart catheterization  Percutaneous coronary intervention - Stent Placement (LAD)  Intravascular Ultrasound (LAD)  Instantaneous wave free ratio (iFR) (LAD, Ramus)    Conscious sedation was supervised by myself and administered by trained personnel using fentanyl and versed between 1022 and 1049. The patient tolerated sedation without complication.     Procedure Description  1. Access: 5/6 Lao right radial artery Micropuncture technique was utilized following local anesthesia with lidocaine.  A radial cocktail containing verapamil and saline was administered in the radial artery sheath    2. Diagnostic description: The catheter was passed to the central circulation with the aide of J tipped 0.35\" wire. A 5F TIG 4.0 was used to inject the coronary circulation  and enter the left ventricle during invasive hemodynamic monitoring.     3. Description of Intervention: After confirming therapeutic anticoagulation intervention proceeded. A 6F EBU 3.5 guiding catheter was used. The lesion in the LAD was crossed with a 0.014\" Run Through wire. IVUS showed distal reference lumen of 2.5 mm, proximal 3.0, moderate plaque indeterminate severity. iFR was performed showing an initial reading of 0.83, I re normalized and took a second reading a 0.88. I assess the ramus with iFR also, it was recorded at 0.95. I then predilated the LAD with a 2.5. This looked small. I then deployed a 3.0 x 38 AHMET - " synergy  XD AHMET. This was post dilated with a 3.0 distal and a 3.5 proximal.     4. Closing: At completion of the procedure the relevant equipment was removed from the body and hemostasis achieved by Radial band    Findings   Hemodynamics:   Aorta: 106/63 mmHg  LVEDP: 20 mmHg  Aortic valve peak to peak gradient: 70 mmHg    Coronary Anatomy   Left Main: Normal   LAD:  Mid 70% stenosis   Ramus: Proximal 50% stenosis   LCx: Minimal luminal irregularities   RCA: Dominant, Minimal luminal irregularities       Results of intervention to the LAD:  Pre: 70% stenosis and SHANNAN III flow  Post: 0% residual stenosis and SHANNAN III flow. No dissection or distal embolization.    Technical Factors  1. Complications: None  2. Estimated Blood Loss: <50 cc  3. Specimens: None  4. Contrast Volume: 80 ml  5. Medications: Radial cocktail (Verapamil 2.5 mg, Nitroglycerin 100 mcg) Bivalirudin

## 2024-04-23 NOTE — PROGRESS NOTES
"Pt admitted to room T216 from R2 at 0005.  Pt  a&o x3-4, forgetful to situation. Cannot recall his aphasic event in ER. denies pain. Ambulating with 1-2 person assist.  ADAMS.  On 2L o2 nc. States has hx of sleep apnea. NIH score 4. 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 Respirations equal and unlabored. Oriented to room call light and vitals frequency.  Reviewed plan of care: diet, fall precautions, skin care, labs,and pain management with patient. Admit profile done. Passed RN bedside swallow evaluation without s/sx of aspiration. All questions answered. White board updated. Verbalized understanding and agrees. Able to make needs known.    "

## 2024-04-23 NOTE — CARE PLAN
The patient is Watcher - Medium risk of patient condition declining or worsening    Shift Goals  Clinical Goals: tele monitoring, rest, safety  Patient Goals: rest    Progress made toward(s) clinical / shift goals:    No s/sx of aspiration noted.   Resting in between care. Calm. Appropriate.    Patient is not progressing towards the following goals:  Recheck troponin.     Problem: Pain - Standard  Goal: Alleviation of pain or a reduction in pain to the patient’s comfort goal  Outcome: Progressing  Note: Denies pain med needs. Resting and calm.      Problem: Fall Risk  Goal: Patient will remain free from falls  Outcome: Progressing  Note: High risk for fall. Instructed call light use when out of bed. Bed alarm active. Precautions in place.

## 2024-04-23 NOTE — PROGRESS NOTES
Trop was 37, now 97. Denies chest pain, denies shortness of breath. Resting. APRN notified. Order for repeat troponin in an hour.

## 2024-04-23 NOTE — PROGRESS NOTES
1210- removed 3mL of air from TR band.  1215- bleeding noted at TR band, replaced 3mL of air.   1218- no bleeding noted.

## 2024-04-23 NOTE — ED NOTES
Pt was speaking full sentences and A/Ox4 when arrived to red 2 from EMS. Pt started to not make sense while wife was in the room, ERP notified. Pt upgraded to code stroke @ 2049

## 2024-04-23 NOTE — ED TRIAGE NOTES
Chuck Ortiz   84 y.o.     Chief Complaint   Patient presents with    Chest Pain     Pt BIB [facility] from home for CP in the center of chest non-radiating x 1 say with mild SOB. Pt has hx of a-fib.       A&O x 4. Pt states 5/10 chest pain with SOB.     EMS interventions: 324 ASA and 0.4 Nitro. Sinus rhythm with PVCs. /79, P 88 94% RA    Vitals:    04/22/24 2015   BP: (!) 159/85   Pulse: 81   Resp: 16   Temp: 36.1 °C (97 °F)   SpO2: 94%

## 2024-04-23 NOTE — PROGRESS NOTES
[facility] Medicine Daily Progress Note    Date of Service  4/23/2024    Chief Complaint  Chuck Ortiz is a 84 y.o. male admitted 4/22/2024 with chest pain    Hospital Course  Mr. Chuck Ortiz is a 84 y.o. male with history of left frontal CVA in 2019, Zenker diverticulum, A-fib, on apixaban, sleep apnea on CPAP at night, chronic left-sided facial droop, hypertension, dyslipidemia, CAD, who presented 4/22/2024 with complaints of chest pain.      Due to sedation with Ativan for CT head without contrast was done, he is a poor historian.  He presented with complaints of chest pain in the middle, several episodes during the day, at rest.  After he was roomed, he developed speech difficulties and confusion, disorientation.      In ER, Stroke workup was done.  On CT head without contrast, CTA head and neck there appears to be no acute findings.  NIH score 5 for expressive aphasia and confusion.  Neurology consulted and did not recommend any intervention or further imaging.  Patient is on apixaban which she was taking.  Troponin came back mildly elevated at 37.  Chest pain resolved.  EKG showed sinus rhythm, frequent PVCs.  No OT/ST acute changes.  Requested admission for chest pain rule out.  Patient admitted to hospital medicine for management of care.    Overnight, patient's troponin trended up from 37, 97, 168.  On examination, patient was asymptomatic with EKG showing sinus rhythm with bigeminy.  Patient was initiated on a heparin drip and cardiology was consulted.    Interval Problem Update  -Patient seen and examined.  Patient denies any chest pain.  Discussed case with cardiology Dr. Kirk.  At this time, we will discontinue heparin drip and pursue LHC.  Will await further recommendations from cardiology post LHC.  -Plan of care: Continue telemetry monitoring; pursue LHC; await further recommendations from cardiology post LHC  -Disposition: Anticipated to stay overnight until LHC has been done and further  recommendations from cardiology has been made  -Lab work: Reviewed; expected  -VSS at this time    I have discussed this patient's plan of care and discharge plan at IDT rounds today with Case Management, Nursing, Nursing leadership, and other members of the IDT team.    Consultants/Specialty  cardiology    Code Status  Full Code    Disposition  The patient is not medically cleared for discharge to home or a post-acute facility.  Anticipate discharge to: home with close outpatient follow-up    I have placed the appropriate orders for post-discharge needs.    Review of Systems  Review of Systems   Constitutional:  Positive for malaise/fatigue. Negative for chills and fever.   HENT: Negative.     Eyes: Negative.    Respiratory: Negative.     Cardiovascular: Negative.    Gastrointestinal: Negative.    Genitourinary: Negative.    Musculoskeletal: Negative.    Skin: Negative.    Neurological:  Positive for weakness.   Endo/Heme/Allergies: Negative.    Psychiatric/Behavioral: Negative.          Physical Exam  Temp:  [36.1 °C (97 °F)-36.8 °C (98.3 °F)] 36.8 °C (98.2 °F)  Pulse:  [60-84] 74  Resp:  [12-20] 16  BP: (119-163)/(63-89) 132/70  SpO2:  [88 %-96 %] 95 %    Physical Exam  Vitals and nursing note reviewed.   HENT:      Head: Normocephalic.      Nose: Nose normal.      Mouth/Throat:      Mouth: Mucous membranes are moist.      Pharynx: Oropharynx is clear.   Eyes:      Pupils: Pupils are equal, round, and reactive to light.   Cardiovascular:      Rate and Rhythm: Normal rate and regular rhythm.      Pulses: Normal pulses.      Heart sounds: Normal heart sounds.   Pulmonary:      Effort: Pulmonary effort is normal.   Abdominal:      General: Bowel sounds are normal.      Palpations: Abdomen is soft.   Musculoskeletal:         General: Tenderness present.      Cervical back: Normal range of motion and neck supple.   Skin:     General: Skin is dry.      Capillary Refill: Capillary refill takes 2 to 3 seconds.    Neurological:      Mental Status: He is alert. Mental status is at baseline.      Motor: Weakness present.         Fluids    Intake/Output Summary (Last 24 hours) at 4/23/2024 0939  Last data filed at 4/23/2024 0015  Gross per 24 hour   Intake 250 ml   Output 400 ml   Net -150 ml       Laboratory  Recent Labs     04/22/24 2015 04/23/24  0145   WBC 11.5* 12.0*   RBC 5.48 5.43   HEMOGLOBIN 17.3 16.9   HEMATOCRIT 52.0 51.4   MCV 94.9 94.7   MCH 31.6 31.1   MCHC 33.3 32.9   RDW 50.8* 50.0   PLATELETCT 165 165   MPV 12.0 11.3     Recent Labs     04/22/24  2153 04/23/24  0145   SODIUM 136 138   POTASSIUM 3.5* 4.0   CHLORIDE 101 103   CO2 21 23   GLUCOSE 108* 155*   BUN 23* 23*   CREATININE 0.72 0.86   CALCIUM 8.5 9.1     Recent Labs     04/22/24 2015 04/23/24  0605   APTT 30.4 29.7   INR 1.12 1.15*         Recent Labs     04/23/24  0145   TRIGLYCERIDE 53   HDL 43   LDL 52       Imaging  CT-CTA COMPLETE THORACOABDOMINAL AORTA   Final Result         1.  No aortic aneurysm or dissection identified.   2.  Indeterminate left adrenal nodule, follow-up adrenal protocol CT for further characterization as clinically appropriate.   3.  Slight irregular hepatic contour favoring changes of cirrhosis   4.  Atherosclerosis and atherosclerotic coronary disease   5.  Diverticulosis      CT-CTA NECK WITH & W/O-POST PROCESSING   Final Result         1.  CT angiogram of the neck within normal limits.         CT-CTA HEAD WITH & W/O-POST PROCESS   Final Result         1.  No large vessel occlusion or aneurysm identified.      CT-CEREBRAL PERFUSION ANALYSIS   Final Result         1.  Cerebral blood flow less than 30% likely representing completed infarct = 0 mL.      2.  T Max more than 6 seconds could represent combination of completed infarct and ischemia = 60 mL, nondiagnostic due to multiple vascular territory involvement.      3.  Mismatched volume could represent ischemic brain/penumbra = 60, nondiagnostic due to multiple vascular  territory involvement.      4.  Please note that the cerebral perfusion was performed on the limited brain tissue around the basal ganglia region. Infarct/ischemia outside the CT perfusion sections can be missed in this study.      CT-HEAD W/O   Final Result         1.  No acute intracranial abnormality is identified, there are nonspecific white matter changes, commonly associated with small vessel ischemic disease.  Associated mild cerebral atrophy is noted.   2.  Atherosclerosis.         CL-LEFT HEART CATHETERIZATION WITH POSSIBLE INTERVENTION    (Results Pending)        Assessment/Plan  * Chest pain- (present on admission)  Assessment & Plan  84-year-old male with history of nonocclusive CAD, presented with chest pain and elevated troponin 37  EKG showed sinus rhythm, PACs, no acute T/ST changes.  Plan: Admit to telemetry, trend troponin and EKG  If troponin remains flat, consider stress test.  Escalating troponin overnight: Cardiology consulted - Dr. Kirk; pursue [facility]  Continue outpatient medications and Lipitor, Eliquis, add aspirin  At this moment patient is chest pain-free      Encephalopathy  Assessment & Plan  Patient was assessed by neurology for possible TIA with acute onset expressive aphasia developed in the emergency department.  No workup or intervention recommended.  Patient is medically optimized with Eliquis, which we will continue  Neurochecks every 4 hours after admission  At this time his aphasia appears to be improved    PAF (paroxysmal atrial fibrillation) (HCC)- (present on admission)  Assessment & Plan  Currently in sinus rhythm  Resume Eliquis for stroke prevention    Hyperlipidemia LDL goal <70- (present on admission)  Assessment & Plan  Continue Lipitor    Essential hypertension- (present on admission)  Assessment & Plan  Continue losartan, Maxide Dyazide  Monitor blood pressure         VTE prophylaxis:   SCDs/TEDs      I have performed a physical exam and reviewed and updated ROS and  Plan today (4/23/2024). In review of yesterday's note (4/22/2024), there are no changes except as documented above.    IKrystyna DNP performed a substantiated portion of the service face-to-face with same patient on the same date of service INDEPENDENTLY FROM THE MD ON ASSESSMENT, EXAMINATION, AND DISCUSSION AND PLAN OF CARE FOR 19 MINUTES.  I was personally involved in reviewing and conducting the medical decision making, including the information as described above.

## 2024-04-23 NOTE — H&P
Hospital Medicine History & Physical Note    Date of Service  4/22/2024    Primary Care Physician  Reilly Jc M.D.    Consultants  neurology    Specialist Names: Dr Tyler    Code Status  Full Code    Chief Complaint  Chief Complaint   Patient presents with    Chest Pain     Pt BIB [facility] from home for CP in the center of chest non-radiating x 1 say with mild SOB. Pt has hx of a-fib.         History of Presenting Illness  Chuck Ortiz is a 84 y.o. male with history of left frontal CVA in 2019, Zenker diverticulum, A-fib, on apixaban, sleep apnea on CPAP at night, chronic left-sided facial droop, hypertension, dyslipidemia, CAD, who presented 4/22/2024 with complaints of chest pain.  Due to sedation with Ativan for CT head without contrast, he is a poor historian.  He presented with complaints of chest pain in the middle, several episodes during the day, at rest.  After he was roomed, he developed speech difficulties and confusion, disorientation.  Stroke workup was done.  On CT head without contrast, CTA head and neck there appears to be no acute findings.  NIH score 5 for expressive aphasia and confusion.  Neurology consulted and did not recommend any intervention or further imaging.  Patient is on apixaban which she was taking.  Troponin came back mildly elevated at 37.  Chest pain resolved.  EKG showed sinus rhythm, frequent PVCs.  No OT/ST acute changes.  Requested admission for chest pain rule out.    I discussed the plan of care with patient and ERP .    Review of Systems  Review of Systems   Unable to perform ROS: Other       Past Medical History   has a past medical history of Arrhythmia, Arthritis, Cardiac murmur, CATARACT, Dental disorder, ED (erectile dysfunction), Essential hypertension (11/12/2015), Facial droop, Hemorrhagic disorder (HCC), High cholesterol, Hypertension, Kidney stones, Seizure (HCC), Sleep apnea, Snoring, Stroke (HCC) (08/07/2018), and Zenker diverticula.    Surgical History   has  a past surgical history that includes colonoscopy (2004); inguinal hernia repair (2/19/2009); laminotomy (1996); finger arthroplasty (12/17/2012); cataract phaco with iol (1/21/2014); uvulopharyngopalatoplasty (1985); and z cardiac cath (09/28/2018).     Family History  family history includes Diabetes in his paternal grandfather; Heart Disease in his maternal grandmother; Lung Disease in his father; Stroke in his sister.   Family history reviewed with patient. There is no family history that is pertinent to the chief complaint.     Social History   reports that he has never smoked. He has never used smokeless tobacco. He reports current alcohol use. He reports that he does not use drugs.    Allergies  Allergies   Allergen Reactions    Morphine      Bradycardia       Medications  Prior to Admission Medications   Prescriptions Last Dose Informant Patient Reported? Taking?   ELIQUIS 5 MG Tab UNK at UNK  No No   Sig: TAKE 1 TABLET BY MOUTH TWICE A DAY   Patient taking differently: Take 5 mg by mouth 2 times a day.   allopurinol (ZYLOPRIM) 100 MG Tab UNK at UNK  No No   Sig: TAKE 1 TABLET BY MOUTH TWICE A DAY   Patient taking differently: Take 100 mg by mouth 2 times a day. TAKE 1 TABLET BY MOUTH TWICE A DAY   atorvastatin (LIPITOR) 40 MG Tab UNK at UNK  No No   Sig: TAKE 1 TABLET BY MOUTH EVERY DAY   Patient taking differently: Take 40 mg by mouth every day.   celecoxib (CELEBREX) 200 MG Cap   No No   Sig: Take 1 Capsule by mouth 1 time a day as needed for Moderate Pain or Inflammation.   cyclobenzaprine (FLEXERIL) 10 mg Tab   No No   Sig: Take 1 Tablet by mouth 3 times a day as needed for Moderate Pain (back spasms).   esomeprazole (NEXIUM 24HR) 20 MG capsule UNK at UNK  Yes Yes   Sig: Take 20 mg by mouth every morning before breakfast.   losartan (COZAAR) 50 MG Tab UNK at UNK  No No   Sig: TAKE 1 TABLET BY MOUTH EVERY DAY   Patient taking differently: Take 50 mg by mouth every day.   triamterene/hctz  (MAXZIDE-25/DYAZIDE) 37.5-25 MG Cap   No No   Sig: TAKE 1 CAPSULE BY MOUTH EVERY MORNING.      Facility-Administered Medications: None       Physical Exam  Temp:  [36.1 °C (97 °F)] 36.1 °C (97 °F)  Pulse:  [81] 81  Resp:  [16] 16  BP: (159)/(85) 159/85  SpO2:  [94 %] 94 %  Blood Pressure : (!) 159/85   Temperature: 36.1 °C (97 °F)   Pulse: 81   Respiration: 16   Pulse Oximetry: 94 %       Physical Exam  Vitals and nursing note reviewed.   Constitutional:       General: He is not in acute distress.     Appearance: Normal appearance.   HENT:      Head: Normocephalic and atraumatic.      Nose: Nose normal.      Mouth/Throat:      Mouth: Mucous membranes are moist.   Eyes:      Extraocular Movements: Extraocular movements intact.      Pupils: Pupils are equal, round, and reactive to light.   Cardiovascular:      Rate and Rhythm: Normal rate and regular rhythm.   Pulmonary:      Effort: Pulmonary effort is normal.      Breath sounds: Normal breath sounds.   Abdominal:      General: Abdomen is flat. There is no distension.      Tenderness: There is no abdominal tenderness.   Musculoskeletal:         General: No swelling or deformity. Normal range of motion.      Cervical back: Normal range of motion and neck supple.   Skin:     General: Skin is warm and dry.   Neurological:      General: No focal deficit present.      Mental Status: He is alert. He is disoriented.      Comments: Chronic left facial droop.  No gross motor deficit.  Mild expressive aphasia  Disoriented in time   Psychiatric:         Mood and Affect: Mood normal.         Behavior: Behavior normal.         Laboratory:  Recent Labs     04/22/24 2015   WBC 11.5*   RBC 5.48   HEMOGLOBIN 17.3   HEMATOCRIT 52.0   MCV 94.9   MCH 31.6   MCHC 33.3   RDW 50.8*   PLATELETCT 165   MPV 12.0     Recent Labs     04/22/24 2153   SODIUM 136   POTASSIUM 3.5*   CHLORIDE 101   CO2 21   GLUCOSE 108*   BUN 23*   CREATININE 0.72   CALCIUM 8.5     Recent Labs     04/22/24 2153  "  ALTSGPT 18   ASTSGOT 22   ALKPHOSPHAT 114*   TBILIRUBIN 0.8   LIPASE 20   GLUCOSE 108*     Recent Labs     04/22/24 2015   APTT 30.4   INR 1.12     No results for input(s): \"NTPROBNP\" in the last 72 hours.      Recent Labs     04/22/24 2153   TROPONINT 37*       Imaging:  CT-CTA COMPLETE THORACOABDOMINAL AORTA   Final Result         1.  No aortic aneurysm or dissection identified.   2.  Indeterminate left adrenal nodule, follow-up adrenal protocol CT for further characterization as clinically appropriate.   3.  Slight irregular hepatic contour favoring changes of cirrhosis   4.  Atherosclerosis and atherosclerotic coronary disease   5.  Diverticulosis      CT-CTA NECK WITH & W/O-POST PROCESSING   Final Result         1.  CT angiogram of the neck within normal limits.         CT-CTA HEAD WITH & W/O-POST PROCESS   Final Result         1.  No large vessel occlusion or aneurysm identified.      CT-CEREBRAL PERFUSION ANALYSIS   Final Result         1.  Cerebral blood flow less than 30% likely representing completed infarct = 0 mL.      2.  T Max more than 6 seconds could represent combination of completed infarct and ischemia = 60 mL, nondiagnostic due to multiple vascular territory involvement.      3.  Mismatched volume could represent ischemic brain/penumbra = 60, nondiagnostic due to multiple vascular territory involvement.      4.  Please note that the cerebral perfusion was performed on the limited brain tissue around the basal ganglia region. Infarct/ischemia outside the CT perfusion sections can be missed in this study.      CT-HEAD W/O   Final Result         1.  No acute intracranial abnormality is identified, there are nonspecific white matter changes, commonly associated with small vessel ischemic disease.  Associated mild cerebral atrophy is noted.   2.  Atherosclerosis.             X-Ray:  I have personally reviewed the images and compared with prior images.    Assessment/Plan:  Justification for " Admission Status  I anticipate this patient is appropriate for observation status at this time because chest pain rule out    Patient will need a Telemetry bed on MEDICAL service .  The need is secondary to chest pain rule out.    * Chest pain- (present on admission)  Assessment & Plan  84-year-old male with history of nonocclusive CAD, presented with chest pain and elevated troponin 37  EKG showed sinus rhythm, PACs, no acute T/ST changes.  Plan: Admit to telemetry, trend troponin and EKG  If troponin remains flat, consider stress test.  Continue outpatient medications and Lipitor, Eliquis, add aspirin  At this moment patient is chest pain-free      Encephalopathy  Assessment & Plan  Patient was assessed by neurology for possible TIA with acute onset expressive aphasia developed in the emergency department.  No workup or intervention recommended.  Patient is medically optimized with Eliquis, which we will continue  Neurochecks every 4 hours after admission  At this time his aphasia appears to be improved    PAF (paroxysmal atrial fibrillation) (HCC)- (present on admission)  Assessment & Plan  Currently in sinus rhythm  Resume Eliquis for stroke prevention    Hyperlipidemia LDL goal <70- (present on admission)  Assessment & Plan  Continue Lipitor    Essential hypertension- (present on admission)  Assessment & Plan  Continue losartan, Maxide Dyazide  Monitor blood pressure        VTE prophylaxis: therapeutic anticoagulation with apixaban

## 2024-04-23 NOTE — PROGRESS NOTES
Assessment completed.  Pt A&Ox4.  Pt denies any pain at this time. Pt denies any CP, SOB, nausea, palpitations at this time. Sinus rhythm noted on telemetry monitor. POC discussed: NPO for heart cath; communication board updated.    Bed in locked, lowest position.  Call light and belongings within reach.  Needs met.

## 2024-04-24 VITALS
TEMPERATURE: 97.9 F | SYSTOLIC BLOOD PRESSURE: 126 MMHG | BODY MASS INDEX: 28.1 KG/M2 | HEIGHT: 72 IN | HEART RATE: 56 BPM | OXYGEN SATURATION: 96 % | WEIGHT: 207.45 LBS | DIASTOLIC BLOOD PRESSURE: 74 MMHG | RESPIRATION RATE: 16 BRPM

## 2024-04-24 PROBLEM — R07.9 CHEST PAIN: Status: RESOLVED | Noted: 2024-04-22 | Resolved: 2024-04-24

## 2024-04-24 PROBLEM — I21.4 NSTEMI (NON-ST ELEVATED MYOCARDIAL INFARCTION) (HCC): Status: RESOLVED | Noted: 2024-04-23 | Resolved: 2024-04-24

## 2024-04-24 PROBLEM — G93.40 ENCEPHALOPATHY: Status: RESOLVED | Noted: 2024-04-23 | Resolved: 2024-04-24

## 2024-04-24 LAB
ANION GAP SERPL CALC-SCNC: 10 MMOL/L (ref 7–16)
BUN SERPL-MCNC: 20 MG/DL (ref 8–22)
CALCIUM SERPL-MCNC: 8.5 MG/DL (ref 8.5–10.5)
CHLORIDE SERPL-SCNC: 103 MMOL/L (ref 96–112)
CO2 SERPL-SCNC: 24 MMOL/L (ref 20–33)
CREAT SERPL-MCNC: 0.77 MG/DL (ref 0.5–1.4)
ERYTHROCYTE [DISTWIDTH] IN BLOOD BY AUTOMATED COUNT: 52.4 FL (ref 35.9–50)
GFR SERPLBLD CREATININE-BSD FMLA CKD-EPI: 88 ML/MIN/1.73 M 2
GLUCOSE SERPL-MCNC: 91 MG/DL (ref 65–99)
HCT VFR BLD AUTO: 47.1 % (ref 42–52)
HGB BLD-MCNC: 15.3 G/DL (ref 14–18)
MCH RBC QN AUTO: 31.4 PG (ref 27–33)
MCHC RBC AUTO-ENTMCNC: 32.5 G/DL (ref 32.3–36.5)
MCV RBC AUTO: 96.7 FL (ref 81.4–97.8)
PLATELET # BLD AUTO: 161 K/UL (ref 164–446)
PMV BLD AUTO: 11.7 FL (ref 9–12.9)
POTASSIUM SERPL-SCNC: 3.9 MMOL/L (ref 3.6–5.5)
RBC # BLD AUTO: 4.87 M/UL (ref 4.7–6.1)
SODIUM SERPL-SCNC: 137 MMOL/L (ref 135–145)
WBC # BLD AUTO: 10 K/UL (ref 4.8–10.8)

## 2024-04-24 PROCEDURE — A9270 NON-COVERED ITEM OR SERVICE: HCPCS | Performed by: INTERNAL MEDICINE

## 2024-04-24 PROCEDURE — A9270 NON-COVERED ITEM OR SERVICE: HCPCS | Performed by: NURSE PRACTITIONER

## 2024-04-24 PROCEDURE — 94660 CPAP INITIATION&MGMT: CPT

## 2024-04-24 PROCEDURE — A9270 NON-COVERED ITEM OR SERVICE: HCPCS

## 2024-04-24 PROCEDURE — 700102 HCHG RX REV CODE 250 W/ 637 OVERRIDE(OP): Performed by: INTERNAL MEDICINE

## 2024-04-24 PROCEDURE — 700102 HCHG RX REV CODE 250 W/ 637 OVERRIDE(OP): Performed by: NURSE PRACTITIONER

## 2024-04-24 PROCEDURE — 700102 HCHG RX REV CODE 250 W/ 637 OVERRIDE(OP)

## 2024-04-24 PROCEDURE — 99232 SBSQ HOSP IP/OBS MODERATE 35: CPT | Mod: FS | Performed by: INTERNAL MEDICINE

## 2024-04-24 PROCEDURE — 99239 HOSP IP/OBS DSCHRG MGMT >30: CPT | Performed by: HOSPITALIST

## 2024-04-24 PROCEDURE — 36415 COLL VENOUS BLD VENIPUNCTURE: CPT

## 2024-04-24 PROCEDURE — 85027 COMPLETE CBC AUTOMATED: CPT

## 2024-04-24 PROCEDURE — 80048 BASIC METABOLIC PNL TOTAL CA: CPT

## 2024-04-24 RX ORDER — CLOPIDOGREL BISULFATE 75 MG/1
75 TABLET ORAL DAILY
Qty: 30 TABLET | Refills: 1 | Status: SHIPPED | OUTPATIENT
Start: 2024-04-25 | End: 2024-05-22 | Stop reason: SDUPTHER

## 2024-04-24 RX ORDER — METOPROLOL SUCCINATE 25 MG/1
25 TABLET, EXTENDED RELEASE ORAL DAILY
Qty: 30 TABLET | Refills: 1 | Status: SHIPPED | OUTPATIENT
Start: 2024-04-25 | End: 2024-04-29

## 2024-04-24 RX ADMIN — ASPIRIN 81 MG: 81 TABLET, COATED ORAL at 05:34

## 2024-04-24 RX ADMIN — OMEPRAZOLE 20 MG: 20 CAPSULE, DELAYED RELEASE ORAL at 05:34

## 2024-04-24 RX ADMIN — ATORVASTATIN CALCIUM 40 MG: 40 TABLET, FILM COATED ORAL at 05:34

## 2024-04-24 RX ADMIN — TRIAMTERENE AND HYDROCHLOROTHIAZIDE 1 CAPSULE: 37.5; 25 CAPSULE ORAL at 05:34

## 2024-04-24 RX ADMIN — LOSARTAN POTASSIUM 50 MG: 50 TABLET, FILM COATED ORAL at 05:34

## 2024-04-24 RX ADMIN — CLOPIDOGREL BISULFATE 75 MG: 75 TABLET ORAL at 05:34

## 2024-04-24 RX ADMIN — ALLOPURINOL 100 MG: 100 TABLET ORAL at 05:34

## 2024-04-24 RX ADMIN — APIXABAN 5 MG: 5 TABLET, FILM COATED ORAL at 09:02

## 2024-04-24 ASSESSMENT — COGNITIVE AND FUNCTIONAL STATUS - GENERAL
SUGGESTED CMS G CODE MODIFIER MOBILITY: CJ
SUGGESTED CMS G CODE MODIFIER DAILY ACTIVITY: CI
CLIMB 3 TO 5 STEPS WITH RAILING: A LITTLE
DRESSING REGULAR LOWER BODY CLOTHING: A LITTLE
MOBILITY SCORE: 21
MOVING TO AND FROM BED TO CHAIR: A LITTLE
WALKING IN HOSPITAL ROOM: A LITTLE
DAILY ACTIVITIY SCORE: 23

## 2024-04-24 ASSESSMENT — ENCOUNTER SYMPTOMS
TROUBLE SWALLOWING: 0
FEVER: 0
CONFUSION: 0
NERVOUS/ANXIOUS: 0
ABDOMINAL DISTENTION: 0
DIAPHORESIS: 0
NUMBNESS: 0
COUGH: 0
BLOOD IN STOOL: 0
DIZZINESS: 0
PALPITATIONS: 0
SHORTNESS OF BREATH: 0
AGITATION: 0
COLOR CHANGE: 0
CHEST TIGHTNESS: 0
CHILLS: 0
ABDOMINAL PAIN: 0

## 2024-04-24 ASSESSMENT — FIBROSIS 4 INDEX: FIB4 SCORE: 2.8

## 2024-04-24 NOTE — PROGRESS NOTES
Monitor Summary  Rhythm: SB w/BBB/1st degree HB  Rate: 52-56  Ectopy: f PVC, o coup  .28 / .11 / .45

## 2024-04-24 NOTE — DISCHARGE PLANNING
TCN following. HTH/SCP chart reviewed. No new TCN needs identified. Please see prior TCN note from 4/23/24 for most recent discharge planning considerations if indicated.     Completed:  Choice obtained: None.  See above.    SCP with [facility] PCP.  Patient states he will schedule his own Follow up appointment.

## 2024-04-24 NOTE — PROGRESS NOTES
Bedside report received from off going RN/tech: Tari, assumed care of patient.   Overnight Events: No overnight events per night RN. No complaints of pain or SOB   A&O x 4  Oxygen: Room Air  SBP Ranged: 90's-120's  Patient on cardiac monitor: Yes SB 50's   IVF/IV medications: Not Applicable   Fall Risk Score: medium   Fall risk interventions in place: Place yellow fall risk ID band on patient, Provide patient/family education based on risk assessment, Educate patient/family to call staff for assistance when getting out of bed, Place fall precaution signage outside patient door, Place patient in room close to nursing station, Utilize bed/chair fall alarm, Notify charge of high risk for huddle, and Bed alarm connected correctly  Bed type: Regular (Idris Score less than 17 interventions in place)  Bedside sitter: Not Applicable   Isolation: Not applicable

## 2024-04-24 NOTE — PROGRESS NOTES
Assumed care of patient and received report from day shift RN. A&O x4, on 1L O2. VSS. Tele monitor in place. Plan of care discussed with patient and verbalized understanding. Bed locked and in lowest position. Pt educated to fall risk and fall precautions in place. Call light within reach. All questions answered and no other needs indicated at this time.

## 2024-04-24 NOTE — PROGRESS NOTES
Pt. being discharged. Pt. educated on discharge instructions and new prescriptions. Pt verbalizes understanding. Follow up appointment made with PCP and cards . PIV removed, monitor checked in. Pt. Going home with wife

## 2024-04-24 NOTE — DISCHARGE INSTRUCTIONS
Diagnosis:  Acute Coronary Syndrome (ACS) is a diagnosis that encompasses cardiac-related chest pain and heart attack. ACS occurs when the blood flow to the heart muscle is severely reduced or cut off completely due to a slow process called atherosclerosis.  Atherosclerosis is a disease in which the coronary arteries become narrow from a buildup of fat, cholesterol, and other substances that combine to form plaque. If the plaque breaks, a blood clot will form and block the blood flow to the heart muscle. This lack of blood flow can cause damage or death to the heart muscle which is called a heart attack or Myocardial Infarction (MI). There are two different types of MIs:  ST Elevation Myocardial Infarction or STEMI (the most severe type of heart attack) and Non-ST Elevation Myocardial Infarction or NSTEMI.    Treatment Plan:  Cardiac Diet  - Low fat, low salt, low cholesterol   Cardiac Rehab  - Your doctor has ordered you a referral to [facility].  Call [phone] to schedule an appointment.  Attend my follow-up appointment with my Cardiologist.  Take my medications as prescribed by my doctor  Exercise daily  Lower my bad cholesterol and raise my good cholesterol, lower my blood pressure, and Reduce stress    Medications:  Certain medications are used to treat ACS.  Remember to always take medications as prescribed and never stop talking medications unless told by your doctor.    You have been prescribed the following medicatons:    Anti-platelet/blood thinner - Your Anti-platelet/Blood thinning medication is called Clopidogrel/Plavix, and is used in combination with aspirin to prevent clots from forming in your heart and/or around your stent.  Your doctor will determine how long you need to be on this medicine.  Beta-Blocker - Beta-Blocker Metoprolol/Toprol XL is used to lower blood pressure and heart rate, and/or helps your heart heal after a heart attack.  Statin - Statin Atorvastatin/Lipitor is used to lower  cholesterol.  Angiotensin Receptor Blocker (ARB) - Angiotensin Receptor Blocker Losartan/Cozaar is used to lower blood pressure and treat heart failure.

## 2024-04-24 NOTE — PROGRESS NOTES
Cardiology Follow Up Progress Note    Date of Service  4/24/2024    Attending Physician  Shwetha Ramirez M.D.    Chief Complaint   Chest pain     HPI  Chuck Ortiz is a 84 y.o. male admitted 4/22/2024 with past medical history of CVA, paroxysmal atrial fibrillation on Eliquis, hypertension, dyslipidemia, and CAD    Presented with chest pain and sob. Elevated trop[ 160s    Interim Events  No event overnight   Chest pain resolved  Right radial is soft and good peripheral pulse     Review of Systems  Review of Systems   Constitutional:  Negative for chills, diaphoresis and fever.   HENT:  Negative for nosebleeds and trouble swallowing.    Respiratory:  Negative for cough, chest tightness and shortness of breath.    Cardiovascular:  Negative for chest pain, palpitations and leg swelling.   Gastrointestinal:  Negative for abdominal distention, abdominal pain and blood in stool.   Genitourinary:  Negative for hematuria.   Skin:  Negative for color change.   Neurological:  Negative for dizziness, syncope and numbness.   Psychiatric/Behavioral:  Negative for agitation and confusion. The patient is not nervous/anxious.        Vital signs in last 24 hours  Temp:  [36.1 °C (97 °F)-37.2 °C (99 °F)] 36.6 °C (97.9 °F)  Pulse:  [51-62] 56  Resp:  [16-18] 16  BP: ()/(50-79) 126/74  SpO2:  [91 %-98 %] 96 %    Physical Exam  Physical Exam  Vitals and nursing note reviewed.   Constitutional:       Appearance: Normal appearance.   HENT:      Head: Normocephalic and atraumatic.   Eyes:      Pupils: Pupils are equal, round, and reactive to light.   Cardiovascular:      Rate and Rhythm: Normal rate and regular rhythm.      Heart sounds: Normal heart sounds. No murmur heard.  Pulmonary:      Effort: Pulmonary effort is normal.      Breath sounds: Normal breath sounds.   Abdominal:      General: Abdomen is flat.   Musculoskeletal:      Cervical back: Normal range of motion.   Skin:     General: Skin is warm and dry.  "  Neurological:      General: No focal deficit present.      Mental Status: He is alert and oriented to person, place, and time.   Psychiatric:         Mood and Affect: Mood normal.         Behavior: Behavior normal.         Thought Content: Thought content normal.         Judgment: Judgment normal.         Lab Review  Lab Results   Component Value Date/Time    WBC 10.0 04/24/2024 12:41 AM    RBC 4.87 04/24/2024 12:41 AM    HEMOGLOBIN 15.3 04/24/2024 12:41 AM    HEMATOCRIT 47.1 04/24/2024 12:41 AM    MCV 96.7 04/24/2024 12:41 AM    MCH 31.4 04/24/2024 12:41 AM    MCHC 32.5 04/24/2024 12:41 AM    MPV 11.7 04/24/2024 12:41 AM      Lab Results   Component Value Date/Time    SODIUM 137 04/24/2024 12:41 AM    POTASSIUM 3.9 04/24/2024 12:41 AM    CHLORIDE 103 04/24/2024 12:41 AM    CO2 24 04/24/2024 12:41 AM    GLUCOSE 91 04/24/2024 12:41 AM    BUN 20 04/24/2024 12:41 AM    CREATININE 0.77 04/24/2024 12:41 AM    CREATININE 1.10 03/03/2011 11:20 AM    BUNCREATRAT 23 (H) 03/03/2011 11:20 AM    GLOMRATE >59 03/03/2011 11:20 AM      Lab Results   Component Value Date/Time    ASTSGOT 27 04/23/2024 01:45 AM    ALTSGPT 26 04/23/2024 01:45 AM     Lab Results   Component Value Date/Time    CHOLSTRLTOT 106 04/23/2024 01:45 AM    LDL 52 04/23/2024 01:45 AM    HDL 43 04/23/2024 01:45 AM    TRIGLYCERIDE 53 04/23/2024 01:45 AM    TROPONINT 168 (H) 04/23/2024 04:35 AM       No results for input(s): \"NTPROBNP\" in the last 72 hours.    Cardiac Imaging and Procedures Review    Echocardiogram:    2/12/2024  Normal left ventricular systolic function.  The left ventricular ejection fraction is visually estimated to be 65-  70%.  Moderate aortic valve stenosis. Peak: 45 mmHg, Mean: 28 mmHg. FAUZIA is   1.2 cm2. Dimensionless index is 0.34.  Normal right ventricular size and systolic function.  The ascending aorta is borderline dilated with a diameter of 3.9 cm.  Compared to the prior study on 3/30/2023, there has been no significant   change. "     Cardiac Catheterization:    4/23/2024  IMPRESSIONS:  1. NSTEMI due to severe mid LAD stenosis  2. Successful PCI of the mid LAD using one AHMET, IVUS and iFR  3. Probable severe aortic stenosis - pullback gradient of 70 mmHg   Recommendations:  Dual antiplatelet therapy for 12 months  Reassess AS by echo  Coronary Anatomy              Left Main: Normal              LAD:  Mid 70% stenosis              Ramus: Proximal 50% stenosis              LCx: Minimal luminal irregularities              RCA: Dominant, Minimal luminal irregularities      Assessment/Plan  No new Assessment & Plan notes have been filed under this hospital service since the last note was generated.  Service: Cardiology    NSTEMI   - ECHO showed ef 65%  - PCI to mid LAD   - continue atorvastatin 40mg qd, metoprolol XL 25mg qd, and losartan 50mg qd   - continue plavix 75mg qd stopped the asa since patient is on eliquis for PAF  - cardiac rehab as outpatient     2. Paroxysmal atrial fibrillation   - continue bb therapy as noted above  - continue eliquis 5mg BID     3. Hypertension   - stable   - continue bb, arb, and maxzide     I personally spent a total of 15 minutes which includes face-to-face time and non-face-to-face time spent on preparing to see the patient, reviewing hospital notes and tests, obtaining history from the patient, performing a medically appropriate exam, counseling and educating the patient, ordering medications/tests/procedures/referrals as clinically indicated, and documenting information in the electronic medical record.        Thank you for allowing me to participate in the care of this patient.  Cardiology will sign off on this patient    Future Appointments   Date Time Provider Department Center   5/29/2024 10:00 AM Yair Gonzalez M.D. [facility] None         Please contact me with any questions.    JONA Stinson

## 2024-04-24 NOTE — PROGRESS NOTES
Bedside report received from off going RN/tech: Monroe, assumed care of patient.     Fall Risk Score: MODERATE RISK  Fall risk interventions in place: Place yellow fall risk ID band on patient, Provide patient/family education based on risk assessment, Educate patient/family to call staff for assistance when getting out of bed, Place fall precaution signage outside patient door, Place patient in room close to nursing station, Utilize bed/chair fall alarm, and Bed alarm connected correctly  Bed type: Regular (Idris Score less than 17 interventions in place)  Patient on cardiac monitor: Yes  IVF/IV medications: Not Applicable   Oxygen: How many liters 1L  Bedside sitter: Not Applicable   Isolation: Not applicable

## 2024-04-24 NOTE — DISCHARGE SUMMARY
Discharge Summary    CHIEF COMPLAINT ON ADMISSION  Chief Complaint   Patient presents with    Chest Pain     Pt BIB [facility] from home for CP in the center of chest non-radiating x 1 say with mild SOB. Pt has hx of a-fib.         Reason for Admission  Chest pain     Admission Date  4/22/2024    CODE STATUS  Full Code    HPI & HOSPITAL COURSE  Mr. Chuck Ortiz is a 84 y.o. male with history of left frontal CVA in 2019, Zenker diverticulum, A-fib, on apixaban, sleep apnea on CPAP at night, chronic left-sided facial droop, hypertension, dyslipidemia, and CAD, who presented to [facility] on 4/22/2024 with complaints of chest pain.    A CT head without contrast was done, he is a poor historian.  He presented with complaints of chest pain in the middle, several episodes during the day, at rest.  After he was roomed, he developed speech difficulties and confusion, disorientation.       In ER, Stroke workup was done.  On CT head without contrast, CTA head and neck there appeared to be no acute findings.  NIH score 5 for expressive aphasia and confusion.  Neurology was consulted and did not recommend any intervention or further imaging.  Patient is on apixaban which he was taking.  Troponin came back mildly elevated at 37.  Chest pain resolved.  EKG showed sinus rhythm, frequent PVCs.     His troponin then trended up from 37, 97, 168.  On examination, patient was asymptomatic with EKG showing sinus rhythm with bigeminy.  Patient was initiated on a heparin drip and cardiology was consulted.He was diagnosed with an NSTEMI and he underwent a successful PCI of the mid LAD. He was also found to have severe AS. He has returned to his baseline and is doing well on room air. He was cleared by cardiology and agrees to follow up with them in clinic and also to follow up with his pcp (including follow up of his mild thrombocytopenia) and to return to the ER if needed.    Therefore, he is discharged in good and stable condition to home  with close outpatient follow-up.    The patient met 2-midnight criteria for an inpatient stay at the time of discharge.    Discharge Date  4/24/24    FOLLOW UP ITEMS POST DISCHARGE  Cardiology  Pcp    DISCHARGE DIAGNOSES  Principal Problem (Resolved):    Chest pain (POA: Yes)  Active Problems:    Essential hypertension (POA: Yes)    Hyperlipidemia LDL goal <70 (POA: Yes)    PAF (paroxysmal atrial fibrillation) (HCC) (POA: Yes)  Resolved Problems:    Encephalopathy (POA: Unknown)    NSTEMI (non-ST elevated myocardial infarction) (HCC) (POA: Yes)      FOLLOW UP  Future Appointments   Date Time Provider Department Center   5/29/2024 10:00 AM Yair Gonzalez M.D. [facility] None     [facility]  [address]  [address]  [facility] [id number] [phone]  Call  Your doctor has referred you for Cardiac Rehab which is important in your recovery. Please call to make an appointment.      MEDICATIONS ON DISCHARGE     Medication List        START taking these medications        Instructions   clopidogrel 75 MG Tabs  Start taking on: April 25, 2024  Commonly known as: Plavix   Take 1 Tablet by mouth every day.  Dose: 75 mg     metoprolol SR 25 MG Tb24  Start taking on: April 25, 2024  Commonly known as: Toprol XL   Doctor's comments: Hold sbp less 100 or hr less 55  Take 1 Tablet by mouth every day.  Dose: 25 mg            CHANGE how you take these medications        Instructions   allopurinol 100 MG Tabs  What changed:   when to take this  additional instructions  Commonly known as: Zyloprim   TAKE 1 TABLET BY MOUTH TWICE A DAY     atorvastatin 40 MG Tabs  What changed: when to take this  Commonly known as: Lipitor   TAKE 1 TABLET BY MOUTH EVERY DAY     Eliquis 5 MG Tabs  What changed: how much to take  Generic drug: apixaban   TAKE 1 TABLET BY MOUTH TWICE A DAY     losartan 50 MG Tabs  What changed: when to take this  Commonly known as: Cozaar   TAKE 1 TABLET BY MOUTH EVERY DAY            CONTINUE  taking these medications        Instructions   cyclobenzaprine 10 mg Tabs  Commonly known as: Flexeril   Take 1 Tablet by mouth 3 times a day as needed for Moderate Pain (back spasms).  Dose: 10 mg     NexIUM 24HR 20 MG capsule  Generic drug: esomeprazole   Take 20 mg by mouth every morning before breakfast.  Dose: 20 mg     triamterene/hctz 37.5-25 MG Caps  Commonly known as: Maxzide-25/Dyazide   TAKE 1 CAPSULE BY MOUTH EVERY MORNING.  Dose: 1 Capsule            STOP taking these medications      celecoxib 200 MG Caps  Commonly known as: CeleBREX              Allergies  Allergies   Allergen Reactions    Morphine      Bradycardia       DIET  Orders Placed This Encounter   Procedures    Diet Order Diet: Cardiac     Standing Status:   Standing     Number of Occurrences:   1     Order Specific Question:   Diet:     Answer:   Cardiac [6]       ACTIVITY  As tolerated.  Weight bearing as tolerated    CONSULTATIONS  [facility]    PROCEDURES  4/23/24:  PROCEDURE PHYSICIAN: Jarod Bingham MD, [facility], [facility]  ASSISTANT: None     IMPRESSIONS:  1. NSTEMI due to severe mid LAD stenosis  2. Successful PCI of the mid LAD using one AHMET, IVUS and iFR  3. Probable severe aortic stenosis - pullback gradient of 70 mmHg     Recommendations:  Dual antiplatelet therapy for 12 months  Reassess AS by echo     Pre-procedure diagnosis: NSTEMI  Post-procedure diagnosis: Same     Procedure performed  Selective coronary angiography  Left heart catheterization  Percutaneous coronary intervention - Stent Placement (LAD)  Intravascular Ultrasound (LAD)  Instantaneous wave free ratio (iFR) (LAD, Ramus)    CT-CTA COMPLETE THORACOABDOMINAL AORTA   Final Result         1.  No aortic aneurysm or dissection identified.   2.  Indeterminate left adrenal nodule, follow-up adrenal protocol CT for further characterization as clinically appropriate.   3.  Slight irregular hepatic contour favoring changes of cirrhosis   4.  Atherosclerosis and  atherosclerotic coronary disease   5.  Diverticulosis      CT-CTA NECK WITH & W/O-POST PROCESSING   Final Result         1.  CT angiogram of the neck within normal limits.         CT-CTA HEAD WITH & W/O-POST PROCESS   Final Result         1.  No large vessel occlusion or aneurysm identified.      CT-CEREBRAL PERFUSION ANALYSIS   Final Result         1.  Cerebral blood flow less than 30% likely representing completed infarct = 0 mL.      2.  T Max more than 6 seconds could represent combination of completed infarct and ischemia = 60 mL, nondiagnostic due to multiple vascular territory involvement.      3.  Mismatched volume could represent ischemic brain/penumbra = 60, nondiagnostic due to multiple vascular territory involvement.      4.  Please note that the cerebral perfusion was performed on the limited brain tissue around the basal ganglia region. Infarct/ischemia outside the CT perfusion sections can be missed in this study.      CT-HEAD W/O   Final Result         1.  No acute intracranial abnormality is identified, there are nonspecific white matter changes, commonly associated with small vessel ischemic disease.  Associated mild cerebral atrophy is noted.   2.  Atherosclerosis.         CL-LEFT HEART CATHETERIZATION WITH POSSIBLE INTERVENTION    (Results Pending)         LABORATORY  Lab Results   Component Value Date    SODIUM 137 04/24/2024    POTASSIUM 3.9 04/24/2024    CHLORIDE 103 04/24/2024    CO2 24 04/24/2024    GLUCOSE 91 04/24/2024    BUN 20 04/24/2024    CREATININE 0.77 04/24/2024    CREATININE 1.10 03/03/2011    GLOMRATE >59 03/03/2011        Lab Results   Component Value Date    WBC 10.0 04/24/2024    HEMOGLOBIN 15.3 04/24/2024    HEMATOCRIT 47.1 04/24/2024    PLATELETCT 161 (L) 04/24/2024        Total time of the discharge process exceeds 45 minutes.

## 2024-04-24 NOTE — CARE PLAN
The patient is Stable - Low risk of patient condition declining or worsening    Shift Goals  Clinical Goals: monitor radial site, VS, neuro checks, safety  Patient Goals: rest  Family Goals: monica    Progress made toward(s) clinical / shift goals:    Problem: Pain - Standard  Goal: Alleviation of pain or a reduction in pain to the patient’s comfort goal  Outcome: Progressing  Note: Pt educated to pain scale and pain assessed.      Problem: Fall Risk  Goal: Patient will remain free from falls  Outcome: Progressing  Note: Fall risk assessed and precautions in place.        Patient is not progressing towards the following goals:

## 2024-04-25 ENCOUNTER — TELEPHONE (OUTPATIENT)
Dept: CARDIOLOGY | Facility: MEDICAL CENTER | Age: 84
End: 2024-04-25
Payer: MEDICARE

## 2024-04-25 DIAGNOSIS — I35.0 AORTIC VALVE STENOSIS, ETIOLOGY OF CARDIAC VALVE DISEASE UNSPECIFIED: ICD-10-CM

## 2024-04-25 NOTE — TELEPHONE ENCOUNTER
Referral from: Dr. Kirk for moderate vs severe AS. Discussed with Dr. Rowland and he recommends STAT echo to review.     Called and spoke with patient's wife Kezia. Discussed referral, hospital testing, and MD's recommendations. She is agreeable to the plan. Scheduled echo for 4/29. Answered all of Kezia's questions and advised I would reach out to her when the results come back.

## 2024-04-29 ENCOUNTER — HOSPITAL ENCOUNTER (INPATIENT)
Facility: MEDICAL CENTER | Age: 84
LOS: 1 days | DRG: 312 | End: 2024-04-30
Attending: EMERGENCY MEDICINE | Admitting: FAMILY MEDICINE
Payer: MEDICARE

## 2024-04-29 ENCOUNTER — APPOINTMENT (OUTPATIENT)
Dept: RADIOLOGY | Facility: MEDICAL CENTER | Age: 84
DRG: 312 | End: 2024-04-29
Attending: EMERGENCY MEDICINE
Payer: MEDICARE

## 2024-04-29 ENCOUNTER — APPOINTMENT (OUTPATIENT)
Dept: CARDIOLOGY | Facility: MEDICAL CENTER | Age: 84
End: 2024-04-29
Attending: INTERNAL MEDICINE
Payer: MEDICARE

## 2024-04-29 DIAGNOSIS — I35.0 AORTIC VALVE STENOSIS, ETIOLOGY OF CARDIAC VALVE DISEASE UNSPECIFIED: ICD-10-CM

## 2024-04-29 DIAGNOSIS — R55 NEAR SYNCOPE: ICD-10-CM

## 2024-04-29 DIAGNOSIS — R79.89 ELEVATED TROPONIN: ICD-10-CM

## 2024-04-29 DIAGNOSIS — R07.89 OTHER CHEST PAIN: ICD-10-CM

## 2024-04-29 DIAGNOSIS — Z79.01 CHRONIC ANTICOAGULATION: ICD-10-CM

## 2024-04-29 LAB
ALBUMIN SERPL BCP-MCNC: 3.4 G/DL (ref 3.2–4.9)
ALBUMIN/GLOB SERPL: 1.4 G/DL
ALP SERPL-CCNC: 112 U/L (ref 30–99)
ALT SERPL-CCNC: 15 U/L (ref 2–50)
ANION GAP SERPL CALC-SCNC: 11 MMOL/L (ref 7–16)
AST SERPL-CCNC: 22 U/L (ref 12–45)
BASOPHILS # BLD AUTO: 0.5 % (ref 0–1.8)
BASOPHILS # BLD: 0.05 K/UL (ref 0–0.12)
BILIRUB SERPL-MCNC: 0.5 MG/DL (ref 0.1–1.5)
BUN SERPL-MCNC: 30 MG/DL (ref 8–22)
CALCIUM ALBUM COR SERPL-MCNC: 9.5 MG/DL (ref 8.5–10.5)
CALCIUM SERPL-MCNC: 9 MG/DL (ref 8.5–10.5)
CHLORIDE SERPL-SCNC: 103 MMOL/L (ref 96–112)
CO2 SERPL-SCNC: 24 MMOL/L (ref 20–33)
CREAT SERPL-MCNC: 0.82 MG/DL (ref 0.5–1.4)
EKG IMPRESSION: NORMAL
EOSINOPHIL # BLD AUTO: 0.16 K/UL (ref 0–0.51)
EOSINOPHIL NFR BLD: 1.7 % (ref 0–6.9)
ERYTHROCYTE [DISTWIDTH] IN BLOOD BY AUTOMATED COUNT: 50.3 FL (ref 35.9–50)
GFR SERPLBLD CREATININE-BSD FMLA CKD-EPI: 86 ML/MIN/1.73 M 2
GLOBULIN SER CALC-MCNC: 2.5 G/DL (ref 1.9–3.5)
GLUCOSE SERPL-MCNC: 126 MG/DL (ref 65–99)
HCT VFR BLD AUTO: 51.2 % (ref 42–52)
HGB BLD-MCNC: 17.4 G/DL (ref 14–18)
IMM GRANULOCYTES # BLD AUTO: 0.04 K/UL (ref 0–0.11)
IMM GRANULOCYTES NFR BLD AUTO: 0.4 % (ref 0–0.9)
LACTATE SERPL-SCNC: 1.6 MMOL/L (ref 0.5–2)
LYMPHOCYTES # BLD AUTO: 2.38 K/UL (ref 1–4.8)
LYMPHOCYTES NFR BLD: 24.7 % (ref 22–41)
MAGNESIUM SERPL-MCNC: 1.8 MG/DL (ref 1.5–2.5)
MCH RBC QN AUTO: 31.9 PG (ref 27–33)
MCHC RBC AUTO-ENTMCNC: 34 G/DL (ref 32.3–36.5)
MCV RBC AUTO: 93.8 FL (ref 81.4–97.8)
MONOCYTES # BLD AUTO: 1.05 K/UL (ref 0–0.85)
MONOCYTES NFR BLD AUTO: 10.9 % (ref 0–13.4)
NEUTROPHILS # BLD AUTO: 5.95 K/UL (ref 1.82–7.42)
NEUTROPHILS NFR BLD: 61.8 % (ref 44–72)
NRBC # BLD AUTO: 0 K/UL
NRBC BLD-RTO: 0 /100 WBC (ref 0–0.2)
PLATELET # BLD AUTO: 210 K/UL (ref 164–446)
PMV BLD AUTO: 11.6 FL (ref 9–12.9)
POTASSIUM SERPL-SCNC: 4.6 MMOL/L (ref 3.6–5.5)
PROCALCITONIN SERPL-MCNC: <0.05 NG/ML
PROT SERPL-MCNC: 5.9 G/DL (ref 6–8.2)
RBC # BLD AUTO: 5.46 M/UL (ref 4.7–6.1)
SODIUM SERPL-SCNC: 138 MMOL/L (ref 135–145)
TROPONIN T SERPL-MCNC: 161 NG/L (ref 6–19)
TROPONIN T SERPL-MCNC: 176 NG/L (ref 6–19)
WBC # BLD AUTO: 9.6 K/UL (ref 4.8–10.8)

## 2024-04-29 PROCEDURE — A9270 NON-COVERED ITEM OR SERVICE: HCPCS

## 2024-04-29 PROCEDURE — 770020 HCHG ROOM/CARE - TELE (206)

## 2024-04-29 PROCEDURE — 93005 ELECTROCARDIOGRAM TRACING: CPT | Performed by: EMERGENCY MEDICINE

## 2024-04-29 PROCEDURE — 93005 ELECTROCARDIOGRAM TRACING: CPT

## 2024-04-29 PROCEDURE — 85025 COMPLETE CBC W/AUTO DIFF WBC: CPT

## 2024-04-29 PROCEDURE — 99222 1ST HOSP IP/OBS MODERATE 55: CPT | Mod: AI,GC | Performed by: FAMILY MEDICINE

## 2024-04-29 PROCEDURE — 87040 BLOOD CULTURE FOR BACTERIA: CPT | Mod: 91

## 2024-04-29 PROCEDURE — 84484 ASSAY OF TROPONIN QUANT: CPT

## 2024-04-29 PROCEDURE — 700111 HCHG RX REV CODE 636 W/ 250 OVERRIDE (IP): Mod: JZ | Performed by: EMERGENCY MEDICINE

## 2024-04-29 PROCEDURE — 700102 HCHG RX REV CODE 250 W/ 637 OVERRIDE(OP)

## 2024-04-29 PROCEDURE — 84145 PROCALCITONIN (PCT): CPT

## 2024-04-29 PROCEDURE — 80053 COMPREHEN METABOLIC PANEL: CPT

## 2024-04-29 PROCEDURE — 99285 EMERGENCY DEPT VISIT HI MDM: CPT

## 2024-04-29 PROCEDURE — 83605 ASSAY OF LACTIC ACID: CPT

## 2024-04-29 PROCEDURE — 36415 COLL VENOUS BLD VENIPUNCTURE: CPT

## 2024-04-29 PROCEDURE — 83735 ASSAY OF MAGNESIUM: CPT

## 2024-04-29 RX ORDER — METOPROLOL SUCCINATE 25 MG/1
25 TABLET, EXTENDED RELEASE ORAL EVERY EVENING
Status: DISCONTINUED | OUTPATIENT
Start: 2024-04-29 | End: 2024-04-29

## 2024-04-29 RX ORDER — AMOXICILLIN 250 MG
2 CAPSULE ORAL EVERY EVENING
Status: DISCONTINUED | OUTPATIENT
Start: 2024-04-29 | End: 2024-04-30 | Stop reason: HOSPADM

## 2024-04-29 RX ORDER — SODIUM CHLORIDE 9 MG/ML
500 INJECTION, SOLUTION INTRAVENOUS ONCE
Status: DISCONTINUED | OUTPATIENT
Start: 2024-04-29 | End: 2024-04-29

## 2024-04-29 RX ORDER — HYDRALAZINE HYDROCHLORIDE 20 MG/ML
10 INJECTION INTRAMUSCULAR; INTRAVENOUS EVERY 4 HOURS PRN
Status: DISCONTINUED | OUTPATIENT
Start: 2024-04-29 | End: 2024-04-30 | Stop reason: HOSPADM

## 2024-04-29 RX ORDER — TRIAMTERENE AND HYDROCHLOROTHIAZIDE 37.5; 25 MG/1; MG/1
1 CAPSULE ORAL EVERY MORNING
Status: DISCONTINUED | OUTPATIENT
Start: 2024-04-30 | End: 2024-04-30 | Stop reason: HOSPADM

## 2024-04-29 RX ORDER — METOPROLOL SUCCINATE 25 MG/1
12.5 TABLET, EXTENDED RELEASE ORAL EVERY EVENING
Status: DISCONTINUED | OUTPATIENT
Start: 2024-04-29 | End: 2024-04-30 | Stop reason: HOSPADM

## 2024-04-29 RX ORDER — ALLOPURINOL 100 MG/1
100 TABLET ORAL 2 TIMES DAILY
Status: DISCONTINUED | OUTPATIENT
Start: 2024-04-29 | End: 2024-04-30 | Stop reason: HOSPADM

## 2024-04-29 RX ORDER — OMEPRAZOLE 20 MG/1
20 CAPSULE, DELAYED RELEASE ORAL DAILY
Status: DISCONTINUED | OUTPATIENT
Start: 2024-04-30 | End: 2024-04-30 | Stop reason: HOSPADM

## 2024-04-29 RX ORDER — POLYETHYLENE GLYCOL 3350 17 G/17G
1 POWDER, FOR SOLUTION ORAL
Status: DISCONTINUED | OUTPATIENT
Start: 2024-04-29 | End: 2024-04-30 | Stop reason: HOSPADM

## 2024-04-29 RX ORDER — LOSARTAN POTASSIUM 50 MG/1
50 TABLET ORAL DAILY
Status: DISCONTINUED | OUTPATIENT
Start: 2024-04-30 | End: 2024-04-29

## 2024-04-29 RX ORDER — ATORVASTATIN CALCIUM 40 MG/1
40 TABLET, FILM COATED ORAL NIGHTLY
Status: DISCONTINUED | OUTPATIENT
Start: 2024-04-29 | End: 2024-04-30 | Stop reason: HOSPADM

## 2024-04-29 RX ORDER — METOPROLOL SUCCINATE 25 MG/1
25 TABLET, EXTENDED RELEASE ORAL EVERY EVENING
Status: ON HOLD | COMMUNITY
End: 2024-04-30

## 2024-04-29 RX ORDER — DEXAMETHASONE SODIUM PHOSPHATE 10 MG/ML
10 INJECTION, SOLUTION INTRAMUSCULAR; INTRAVENOUS ONCE
Status: COMPLETED | OUTPATIENT
Start: 2024-04-29 | End: 2024-04-29

## 2024-04-29 RX ORDER — ATORVASTATIN CALCIUM 40 MG/1
40 TABLET, FILM COATED ORAL NIGHTLY
COMMUNITY
End: 2024-05-14 | Stop reason: SDUPTHER

## 2024-04-29 RX ORDER — ACETAMINOPHEN 325 MG/1
650 TABLET ORAL EVERY 6 HOURS PRN
Status: DISCONTINUED | OUTPATIENT
Start: 2024-04-29 | End: 2024-04-30 | Stop reason: HOSPADM

## 2024-04-29 RX ORDER — CLOPIDOGREL BISULFATE 75 MG/1
75 TABLET ORAL DAILY
Status: DISCONTINUED | OUTPATIENT
Start: 2024-04-30 | End: 2024-04-30 | Stop reason: HOSPADM

## 2024-04-29 RX ORDER — CYCLOBENZAPRINE HCL 10 MG
10 TABLET ORAL 3 TIMES DAILY PRN
Status: DISCONTINUED | OUTPATIENT
Start: 2024-04-29 | End: 2024-04-30 | Stop reason: HOSPADM

## 2024-04-29 RX ORDER — LOSARTAN POTASSIUM 50 MG/1
25 TABLET ORAL DAILY
Status: DISCONTINUED | OUTPATIENT
Start: 2024-04-30 | End: 2024-04-30 | Stop reason: HOSPADM

## 2024-04-29 RX ADMIN — DEXAMETHASONE SODIUM PHOSPHATE 10 MG: 10 INJECTION, SOLUTION INTRAMUSCULAR; INTRAVENOUS at 11:39

## 2024-04-29 RX ADMIN — METOPROLOL SUCCINATE 12.5 MG: 25 TABLET, FILM COATED, EXTENDED RELEASE ORAL at 17:20

## 2024-04-29 RX ADMIN — ALLOPURINOL 100 MG: 100 TABLET ORAL at 17:20

## 2024-04-29 RX ADMIN — APIXABAN 5 MG: 5 TABLET, FILM COATED ORAL at 17:20

## 2024-04-29 ASSESSMENT — CHA2DS2 SCORE
DIABETES: NO
SEX: MALE
CHF OR LEFT VENTRICULAR DYSFUNCTION: NO
AGE 65 TO 74: NO
PRIOR STROKE OR TIA OR THROMBOEMBOLISM: YES
VASCULAR DISEASE: YES
CHA2DS2 VASC SCORE: 6
AGE 75 OR GREATER: YES
HYPERTENSION: YES

## 2024-04-29 ASSESSMENT — COGNITIVE AND FUNCTIONAL STATUS - GENERAL
DAILY ACTIVITIY SCORE: 24
SUGGESTED CMS G CODE MODIFIER MOBILITY: CH
MOBILITY SCORE: 24
SUGGESTED CMS G CODE MODIFIER DAILY ACTIVITY: CH

## 2024-04-29 ASSESSMENT — PATIENT HEALTH QUESTIONNAIRE - PHQ9
SUM OF ALL RESPONSES TO PHQ9 QUESTIONS 1 AND 2: 0
1. LITTLE INTEREST OR PLEASURE IN DOING THINGS: NOT AT ALL
SUM OF ALL RESPONSES TO PHQ9 QUESTIONS 1 AND 2: 0
1. LITTLE INTEREST OR PLEASURE IN DOING THINGS: NOT AT ALL
2. FEELING DOWN, DEPRESSED, IRRITABLE, OR HOPELESS: NOT AT ALL
2. FEELING DOWN, DEPRESSED, IRRITABLE, OR HOPELESS: NOT AT ALL

## 2024-04-29 ASSESSMENT — LIFESTYLE VARIABLES
HOW MANY TIMES IN THE PAST YEAR HAVE YOU HAD 5 OR MORE DRINKS IN A DAY: 0
HAVE YOU EVER FELT YOU SHOULD CUT DOWN ON YOUR DRINKING: NO
ALCOHOL_USE: YES
EVER HAD A DRINK FIRST THING IN THE MORNING TO STEADY YOUR NERVES TO GET RID OF A HANGOVER: NO
TOTAL SCORE: 0
ON A TYPICAL DAY WHEN YOU DRINK ALCOHOL HOW MANY DRINKS DO YOU HAVE: 1
AVERAGE NUMBER OF DAYS PER WEEK YOU HAVE A DRINK CONTAINING ALCOHOL: 1
TOTAL SCORE: 0
HAVE PEOPLE ANNOYED YOU BY CRITICIZING YOUR DRINKING: NO
CONSUMPTION TOTAL: NEGATIVE
EVER FELT BAD OR GUILTY ABOUT YOUR DRINKING: NO
TOTAL SCORE: 0

## 2024-04-29 ASSESSMENT — FIBROSIS 4 INDEX
FIB4 SCORE: 2.8
FIB4 SCORE: 2.3

## 2024-04-29 NOTE — ED NOTES
Pt transported to receiving unit [address] [place] with RN, pt on tele monitoring, all personal items and family/friends went taken with pt.

## 2024-04-29 NOTE — ASSESSMENT & PLAN NOTE
Patient presented to the ED with initial blood pressure of 122/73, lowest blood pressure 107/70.     Plan  - Cardiology consulted and felt patient's near syncope likely in the setting of overtreatment for hypertension  - Will decrease losartan, metoprolol doses by half  -Hold home Maxide at this time

## 2024-04-29 NOTE — ASSESSMENT & PLAN NOTE
Most recent lipid panel 4/23/2024 shows lipids largely within normal limits.    Plan  - Continue statin

## 2024-04-29 NOTE — ED NOTES
Med Rec complete per spouse at bedside with med list   Allergies reviewed  Antibiotics in the past 30 days:no  Anticoagulant in past 14 days:yes  Anticoagulant:Eliquis 5 mg Last dose:04/29/24  Pharmacy patient utilizes:[facility] on [address]

## 2024-04-29 NOTE — ASSESSMENT & PLAN NOTE
Concern for symptomatic aortic stenosis with symptoms of dizziness and near syncope. S/p PCI of mid LAD 4/23.  Patient did have scheduled echo 4/29.    Plan  - Echo ordered while inpatient  - Cardiology consulted who feels that symptoms are likely in the setting of overtreatment of hypertension and recommended decreasing dose of losartan, metoprolol by half.  - Appreciate and will follow any further cardiology recs  - Will hold Maxide at this time  -cont home plavix and eliquis

## 2024-04-29 NOTE — ED PROVIDER NOTES
ED Provider Note    CHIEF COMPLAINT  Chief Complaint   Patient presents with    Chest Pain     Patient reports 3/10 chest pain/tightness that started approx 0830 this morning. Patient had recent stent placement last week.     Dizziness     Patient reports dizziness that started approx 0830 this morning as well       EXTERNAL RECORDS REVIEWED  Reviewed records and recent hospitalization.  This includes med list, physicians notes, and catheterization report    HPI/ROS  LIMITATION TO HISTORY   Select: : None  OUTSIDE HISTORIAN(S):  None    Chuck Ortiz is a 84 y.o. male who presents to the emergency department complaining of chest pain and dizziness.  Patient woke up this morning was feeling okay.  He took his home medications and then after that time when he stood up he was dizzy.  He describes his dizziness as lightheadedness.  States he had a hard time walking and he was kind of listing to the right.  Denies any focal lack of coordination.  This was assoc with anterior chest pain and chest tightness and some mild shortness of breath.  No nausea.  He is unsure if this is similar to the chest tightness that he was in the hospital when he had a recent stent placed.  He reports doing okay for the fear few days when he was home.  States now he is feeling unwell.  Reports compliance with his medication denies any other medical problems or complaints.    PAST MEDICAL HISTORY   has a past medical history of Arrhythmia, Arthritis, Cardiac murmur, CATARACT, Dental disorder, ED (erectile dysfunction), Essential hypertension (11/12/2015), Facial droop, Hemorrhagic disorder (HCC), High cholesterol, Hypertension, Kidney stones, Seizure (HCC), Sleep apnea, Snoring, Stroke (HCC) (08/07/2018), and Zenker diverticula.    SURGICAL HISTORY   has a past surgical history that includes colonoscopy (2004); inguinal hernia repair (2/19/2009); laminotomy (1996); finger arthroplasty (12/17/2012); cataract phaco with iol (1/21/2014);  uvulopharyngopalatoplasty (1985); and zzz cardiac cath (09/28/2018).    FAMILY HISTORY  Family History   Problem Relation Age of Onset    Heart Disease Maternal Grandmother     Diabetes Paternal Grandfather     Stroke Sister     Lung Disease Father         black lung     Cancer Neg Hx        SOCIAL HISTORY  Social History     Tobacco Use    Smoking status: Never    Smokeless tobacco: Never   Substance and Sexual Activity    Alcohol use: Yes     Comment: 2 drinks per week     Drug use: No    Sexual activity: Not on file     Comment: , retired [employer]        CURRENT MEDICATIONS  Home Medications       Reviewed by Kenzie Price R.N. (Registered Nurse) on 04/29/24 at 0954  Med List Status: Partial     Medication Last Dose Status   allopurinol (ZYLOPRIM) 100 MG Tab  Active   atorvastatin (LIPITOR) 40 MG Tab  Active   clopidogrel (PLAVIX) 75 MG Tab  Active   cyclobenzaprine (FLEXERIL) 10 mg Tab  Active   ELIQUIS 5 MG Tab  Active   esomeprazole (NEXIUM 24HR) 20 MG capsule  Active   losartan (COZAAR) 50 MG Tab  Active   metoprolol SR (TOPROL XL) 25 MG TABLET SR 24 HR  Active   triamterene/hctz (MAXZIDE-25/DYAZIDE) 37.5-25 MG Cap  Active                    ALLERGIES  Allergies   Allergen Reactions    Morphine      Bradycardia       PHYSICAL EXAM  VITAL SIGNS: /61   Pulse 66   Temp 37.1 °C (98.7 °F) (Temporal)   Resp 16   Ht 1.829 m (6')   Wt 93 kg (205 lb)   SpO2 91%   BMI 27.80 kg/m²    Constitutional: Awake alert no acute distress  HENT: Normocephalic, Atraumatic, Bilateral external ears normal, Oropharynx moist, No oral exudates, Nose normal.   Eyes: PERRL, EOMI, Conjunctiva normal, No discharge.   Neck: Normal range of motion, No tenderness, Supple, No stridor.   Cardiovascular: Normal heart rate, Normal rhythm, No murmurs, No rubs, No gallops.   Thorax & Lungs: Normal breath sounds, No respiratory distress, No wheezing, No chest tenderness.   Abdomen: Bowel sounds normal, Soft, No  tenderness  Skin: Warm, Dry, No erythema, No rash.   Musculoskeletal: Good range of motion in all major joints.  Neurologic: Alert,No focal deficits noted.   Psychiatric: Affect normal      EKG/LABS    Results for orders placed or performed during the hospital encounter of 04/29/24   CBC with Differential   Result Value Ref Range    WBC 9.6 4.8 - 10.8 K/uL    RBC 5.46 4.70 - 6.10 M/uL    Hemoglobin 17.4 14.0 - 18.0 g/dL    Hematocrit 51.2 42.0 - 52.0 %    MCV 93.8 81.4 - 97.8 fL    MCH 31.9 27.0 - 33.0 pg    MCHC 34.0 32.3 - 36.5 g/dL    RDW 50.3 (H) 35.9 - 50.0 fL    Platelet Count 210 164 - 446 K/uL    MPV 11.6 9.0 - 12.9 fL    Neutrophils-Polys 61.80 44.00 - 72.00 %    Lymphocytes 24.70 22.00 - 41.00 %    Monocytes 10.90 0.00 - 13.40 %    Eosinophils 1.70 0.00 - 6.90 %    Basophils 0.50 0.00 - 1.80 %    Immature Granulocytes 0.40 0.00 - 0.90 %    Nucleated RBC 0.00 0.00 - 0.20 /100 WBC    Neutrophils (Absolute) 5.95 1.82 - 7.42 K/uL    Lymphs (Absolute) 2.38 1.00 - 4.80 K/uL    Monos (Absolute) 1.05 (H) 0.00 - 0.85 K/uL    Eos (Absolute) 0.16 0.00 - 0.51 K/uL    Baso (Absolute) 0.05 0.00 - 0.12 K/uL    Immature Granulocytes (abs) 0.04 0.00 - 0.11 K/uL    NRBC (Absolute) 0.00 K/uL   Lactic Acid   Result Value Ref Range    Lactic Acid 1.6 0.5 - 2.0 mmol/L   Comp Metabolic Panel   Result Value Ref Range    Sodium 138 135 - 145 mmol/L    Potassium 4.6 3.6 - 5.5 mmol/L    Chloride 103 96 - 112 mmol/L    Co2 24 20 - 33 mmol/L    Anion Gap 11.0 7.0 - 16.0    Glucose 126 (H) 65 - 99 mg/dL    Bun 30 (H) 8 - 22 mg/dL    Creatinine 0.82 0.50 - 1.40 mg/dL    Calcium 9.0 8.5 - 10.5 mg/dL    Correct Calcium 9.5 8.5 - 10.5 mg/dL    AST(SGOT) 22 12 - 45 U/L    ALT(SGPT) 15 2 - 50 U/L    Alkaline Phosphatase 112 (H) 30 - 99 U/L    Total Bilirubin 0.5 0.1 - 1.5 mg/dL    Albumin 3.4 3.2 - 4.9 g/dL    Total Protein 5.9 (L) 6.0 - 8.2 g/dL    Globulin 2.5 1.9 - 3.5 g/dL    A-G Ratio 1.4 g/dL   TROPONIN   Result Value Ref Range     Troponin T 176 (H) 6 - 19 ng/L   PROCALCITONIN   Result Value Ref Range    Procalcitonin <0.05 <0.25 ng/mL   ESTIMATED GFR   Result Value Ref Range    GFR (CKD-EPI) 86 >60 mL/min/1.73 m 2   MAGNESIUM   Result Value Ref Range    Magnesium 1.8 1.5 - 2.5 mg/dL   EKG   Result Value Ref Range    Report       [facility] Emergency Dept.    Test Date:  2024  Pt Name:    HALI TORRE                 Department: ER  MRN:        [id number]                      Room:        02  Gender:     Male                         Technician: [id number]  :        [id number]                   Requested By:ER TRIAGE PROTOCOL  Order #:    [id number]                    Reading MD: CLAUDIA JOHNSON. AMD    Measurements  Intervals                                Axis  Rate:       62                           P:          50  WA:         309                          QRS:        -38  QRSD:       116                          T:          46  QT:         428  QTc:        435    Interpretive Statements  Sinus rhythm  Ventricular trigeminy  Prolonged WA interval  LVH with IVCD, LAD and secondary repol abnrm  Compared to ECG 2024 11:43:47  Ventricular premature complex(es) now present  Intraventricular conduction delay now present  Sinus bradycardia no longer present  T-wave abnormality no longer p resent  Electronically Signed On 2024 10:14:03 PDT by CLAUDIA JOHNSON. AMD        I have independently interpreted this EKG    RADIOLOGY/PROCEDURES   I have independently interpreted the diagnostic imaging associated with this visit and am waiting the final reading from the radiologist.   My preliminary interpretation is as follows: I have reviewed the chest x-ray and I do not see pneumonia I do see some abnormal opacification of the right lower lobe.    Radiologist interpretation:  DX-CHEST-PORTABLE (1 VIEW)   Final Result         Airspace opacity in the right lower lobe, likely pneumonia.      EC-ECHOCARDIOGRAM COMPLETE W/O  CONT    (Results Pending)       COURSE & MEDICAL DECISION MAKING    ASSESSMENT, COURSE AND PLAN  Care Narrative:     Patient presents the emergency department with an episode of chest tightness, dizziness, and feeling unwell.  Complicated recent hospitalization for strokelike symptoms.  Stroke workup was unremarkable.  While here in the the hospital his troponin trended upward and he was taken to the cardiac catheterization lab for an NSTEMI.  Was found to have LAD lesion and had a mid LAD stent placed.  He has been on a combination of Plavix and apixaban since that time.    Doing fairly well at home.  He was newly started on metoprolol.  Denies any bloody or black stools.  Had good intake of food or fluid.  His labs do not suggest bleeding or severe dehydration.  Today he got dizzy when he stood up and he had some chest pain.    This is no evidence of hemorrhage or dehydration this could be polypharmacy related to his medicine.  This could be related to his severe AS or worsening aortic stenosis and could have ACS.    Patient is worked up with labs and imaging.  Considered head CT but his dizziness really sounds like orthostatic lightheadedness I do not think he requires a head CT at this time neurologic examination is nonfocal.  EKG is unchanged.  Troponin is elevated 178 is higher than it was when he was here last, however we really do not know how I have peaked and did not start to trend down with a stop checking it.  Delta troponin will have to be ordered.    On the patient's blood pressure normal.  This could be related to his new medicines causing orthostasis and it could be related to his aortic stenosis.  Difficult to send him home since he had near syncope, he has severe AS and elevated troponin.  Will be hospitalized under the care of the [facility] family medicine team.  They can consult cardiology and trend his troponin levels.    The patient has his questionable right lower lobe infiltrate.  He does not have  a fever, cough, leukocytosis, or an abnormal chest exam.  Furthermore his procalcitonin is unremarkable.  I do not think he requires antibiotics at this time.              ADDITIONAL PROBLEMS MANAGED  Recent cardiac stent  Chronic anticoagulation  Severe aortic stenosis    DISPOSITION AND DISCUSSIONS  I have discussed management of the patient with the following physicians and NAVNEET's: Family medicine resident team.      FINAL DIAGNOSIS  1. Other chest pain    2. Near syncope    3. Aortic valve stenosis, etiology of cardiac valve disease unspecified    4. Chronic anticoagulation    5. Elevated troponin           Electronically signed by: Kip Lopez M.D., 4/29/2024 10:29 AM

## 2024-04-29 NOTE — H&P
"      [facility] MEDICINE H&P        PATIENT ID:  NAME:  Chuck Ortiz  MRN:               [id number]  YOB: 1940    Date of Admission: 4/29/2024     Attending: Christophe Parsons M.d.    Resident: Sara Leavitt D.O.    Primary Care Physician:  Reilly Jc M.D.    CC:  dizziness    HPI: Chuck Ortiz is a 84 y.o. male who presented after syncopal episode today.  He states he was in his normal state of health and feeling well until all of a sudden 830 this morning when he walked outside began feeling very dizzy to the point in which he thought he was going to pass out.  He describes the dizziness as feeling lightheaded. Patient also had 3 out of 10 chest pain that started with the dizziness at 830.  Patient had stent placement last week, and wanted to be sure that he was okay which prompted him to call EMS.  Denies any fever, chills, shortness of breath, or radiation of his pain.    Patient transported by EMS to the ED and received 0.4 nitro and route for chest pain.      ERCourse:  CBC largely within normal limits, CMP largely within normal limits, troponin elevated to 176 up from 6 days ago at 168.    EKG showed  Sinus rhythm  Ventricular trigeminy  Prolonged NC interval  LVH with IVCD, LAD and secondary repol abnrm  Compared to ECG 04/23/2024 11:43:47  Ventricular premature complex(es) now present  Intraventricular conduction delay now present  Sinus bradycardia no longer present  T-wave abnormality no longer present     REVIEW OF SYSTEMS:   Ten systems reviewed and were negative except as noted in the HPI.                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    CATARACT PHACO WITH IOL  1/21/2014    Performed by Joni Duarte M.D. at [facility]    FINGER ARTHROPLASTY  12/17/2012    Performed by Adam Christopher M.D. at [facility]    INGUINAL HERNIA REPAIR  2/19/2009    Performed by ALEX ALATORRE at [facility]    COLONOSCOPY  2004    neg    LAMINOTOMY  1996    lumbar laminectomy, cyst x3    UVULOPHARYNGOPALATOPLASTY  1985       FAMILY HISTORY:  Family History   Problem Relation Age of Onset    Heart Disease Maternal Grandmother     Diabetes Paternal Grandfather     Stroke Sister     Lung Disease Father         black lung     Cancer Neg Hx        SOCIAL HISTORY:   Smoking denies   Etoh use occasional   Drug use denies    DIET:   Orders Placed This Encounter   Procedures    Diet Order Diet: Cardiac     Standing Status:   Standing     Number of Occurrences:   1     Order Specific Question:   Diet:     Answer:   Cardiac [6]       ALLERGIES:  Allergies   Allergen Reactions    Morphine      Bradycardia       OUTPATIENT MEDICATIONS:    Current Facility-Administered Medications:     atorvastatin (Lipitor) tablet 40 mg, 40 mg, Oral, Nightly, OG ChaoO.    [START ON 4/30/2024] clopidogrel (Plavix) tablet 75 mg, 75 mg, Oral, DAILY, BENJAMÍN Chao.O.    apixaban (Eliquis) tablet 5 mg, 5 mg, Oral, BID, OG ChaoO.    cyclobenzaprine (Flexeril) tablet 10 mg, 10 mg, Oral, TID PRN, BENJAMÍN Chao.O.    acetaminophen (Tylenol) tablet 650 mg, 650 mg, Oral, Q6HRS PRN, Sara MARES  BRANDT Leavitt    senna-docusate (Pericolace Or Senokot S) 8.6-50 MG per tablet 2 Tablet, 2 Tablet, Oral, Q EVENING **AND** polyethylene glycol/lytes (Miralax) Packet 1 Packet, 1 Packet, Oral, QDAY PRN, Sara Leavitt D.O.    hydrALAZINE (Apresoline) injection 10 mg, 10 mg, Intravenous, Q4HRS PRN, Sara Leavitt D.O.    [Held by provider] triamterene/hctz (Maxzide-25/Dyazide) 37.5-25 MG 1 Capsule, 1 Capsule, Oral, QAM, OG ChaoOChris    allopurinol (Zyloprim) tablet 100 mg, 100 mg, Oral, BID, Sara Leavitt D.O.    [START ON 2024] omeprazole (PriLOSEC) capsule 20 mg, 20 mg, Oral, DAILY, Christophe Parsons M.D.    [START ON 2024] losartan (Cozaar) tablet 25 mg, 25 mg, Oral, DAILY, Sara Leavitt D.O.    metoprolol SR (Toprol XL) tablet 12.5 mg, 12.5 mg, Oral, Q EVENING, Sara Leavitt D.O.    PHYSICAL EXAM:  Vitals:    24 1000 24 1200 24 1300 24 1400   BP: 131/61 107/70 (!) 156/75 135/65   Pulse: 66 (!) 51 (!) 59 (!) 57   Resp: 16 14 20 20   Temp:       TempSrc:       SpO2: 91% 92% 94% 92%   Weight:       Height:       , Temp (24hrs), Av.1 °C (98.7 °F), Min:37.1 °C (98.7 °F), Max:37.1 °C (98.7 °F)  , Pulse Oximetry: 92 %, O2 (LPM): 0, O2 Delivery Device: None - Room Air    General: Pt resting in NAD, cooperative   Skin:  Pink, warm and dry.  No rashes  HEENT: NC/AT. PERRL. EOMI. MMM. No nasal discharge. Oropharynx nonerythematous without exudate/plaques  Neck:  Supple without lymphadenopathy or rigidity. No JVD   Lungs:  Symmetrical.  CTAB with no W/R/R.  Good air movement   Cardiovascular:  S1/S2 RRR without M/R/G.  Abdomen:  Abdomen is soft NT/ND. +BS. No masses noted.  Extremities:  Full range of motion. No gross deformities noted. 2+ pulses in all extremities. No C/C/E   Spine:  Straight without vertebral anomalies.  CNS:  Muscle tone is normal. Basline cranial nerve XII palsy otherwise, Cranial nerves II-XII grossly intact. 2+ DTRs.          LAB TESTS:   Recent Labs     04/29/24  1000   WBC 9.6   RBC 5.46   HEMOGLOBIN 17.4   HEMATOCRIT 51.2   MCV 93.8   MCH 31.9   RDW 50.3*   PLATELETCT 210   MPV 11.6   NEUTSPOLYS 61.80   LYMPHOCYTES 24.70   MONOCYTES 10.90   EOSINOPHILS 1.70   BASOPHILS 0.50         Recent Labs     04/29/24  1107   SODIUM 138   POTASSIUM 4.6   CHLORIDE 103   CO2 24   BUN 30*   CREATININE 0.82   CALCIUM 9.0   MAGNESIUM 1.8   ALBUMIN 3.4       CULTURES:   Results       Procedure Component Value Units Date/Time    Blood Culture - Draw one from central line and one from peripheral site [[id number]] Collected: 04/29/24 1121    Order Status: Sent Specimen: Blood from Line Updated: 04/29/24 1206    Blood Culture - Draw one from central line and one from peripheral site [[id number]] Collected: 04/29/24 1107    Order Status: Sent Specimen: Blood from Peripheral Updated: 04/29/24 1205            IMAGES:  DX-CHEST-PORTABLE (1 VIEW)   Final Result         Airspace opacity in the right lower lobe, likely pneumonia.      EC-ECHOCARDIOGRAM COMPLETE W/O CONT    (Results Pending)       CONSULTS:   Cardiology     ASSESSMENT/PLAN: 84 y.o. male admitted for syncope work-up.    * Syncope, unspecified syncope type- (present on admission)  Assessment & Plan  Patient with near syncope prior to arrival in the ED.  Feeling very lightheaded and feeling as though he was going to pass out.  History of aortic stenosis.  Recent stent placement 4/23 for NSTEMI.     Plan  - Echo ordered to further evaluate  - Cardiology consulted who feels that symptoms are likely in the setting of overtreatment of hypertension and recommended decreasing dose of losartan, metoprolol by half.  - Will hold Maxide at this time  -Consider fluid treatment if patient becomes symptomatic again/hypotensive  - Fall precautions in place  - PT/OT consult    Aortic stenosis- (present on admission)  Assessment & Plan  Concern for symptomatic aortic stenosis with symptoms of dizziness and near  syncope.  Patient did have scheduled echo 4/29.    Plan  - Echo ordered while inpatient  - Cardiology consulted who feels that symptoms are likely in the setting of overtreatment of hypertension and recommended decreasing dose of losartan, metoprolol by half.  - Appreciate and will follow any further cardiology recs  - Will hold Maxide at this time      Dyslipidemia- (present on admission)  Assessment & Plan  Most recent lipid panel 4/23/2024 shows lipids largely within normal limits.    Plan  - Continue statin    Irregular heart rhythm- (present on admission)  Assessment & Plan  History of A-fib.  Currently in sinus rhythm with normal rate.     Plan  - Resume home Eliquis    Essential hypertension- (present on admission)  Assessment & Plan  Patient presented to the ED with initial blood pressure of 122/73, lowest blood pressure 107/70.     Plan  - Cardiology consulted and felt patient's near syncope likely in the setting of overtreatment for hypertension  - Will decrease losartan, metoprolol doses by half  -Hold home Maxide at this time         Core Measures:  IV Fluids: none  Antbx: none  DVT ppx: therapeutic eliquis, SCD's  Code status: Full code  Dispo: inpatient      Sara Leavitt D.O., PGY-1  [facility]

## 2024-04-29 NOTE — ASSESSMENT & PLAN NOTE
Patient with near syncope prior to arrival in the ED.  Feeling very lightheaded and feeling as though he was going to pass out.  History of aortic stenosis.  Recent PCI of mid LAD 4/23 for NSTEMI.     Plan  - Echo ordered to further evaluate  - Will measure orthostatics  - Cardiology consulted who feels that symptoms are likely in the setting of overtreatment of hypertension and recommended decreasing dose of losartan, metoprolol by half.  - Will hold Maxide at this time  -Consider fluid treatment if patient becomes symptomatic again/hypotensive  -cont home plavix and eliquis  - Fall precautions in place  - PT/OT consult

## 2024-04-29 NOTE — ED TRIAGE NOTES
Chief Complaint   Patient presents with    Chest Pain     Patient reports 3/10 chest pain/tightness that started approx 0830 this morning. Patient had recent stent placement last week.     Dizziness     Patient reports dizziness that started approx 0830 this morning as well     83 yo male bib EMS for above. Patient denies N/V/D. Patient reports taking all medications this morning and then pain starting.     Patient given 0.4 nitro en route, patient reports no relief of symptoms.     EKG complete. Protocol ordered.    /73   Pulse 72   Temp 37.1 °C (98.7 °F) (Temporal)   Resp 19   Ht 1.829 m (6')   Wt 93 kg (205 lb)   SpO2 91%   BMI 27.80 kg/m²

## 2024-04-29 NOTE — PROGRESS NOTES
4 Eyes Skin Assessment Completed by CINDY Ramires and CINDY Spicer.    Head WDL  Ears WDL  Nose WDL  Mouth WDL  Neck Redness and Blanching  Breast/Chest Abrasion and Blanching  Shoulder Blades WDL  Spine WDL  (R) Arm/Elbow/Hand WDL  (L) Arm/Elbow/Hand WDL  Abdomen WDL  Groin WDL  Scrotum/Coccyx/Buttocks WDL  (R) Leg WDL  (L) Leg WDL  (R) Heel/Foot/Toe WDL  (L) Heel/Foot/Toe WDL          Devices In Places Tele Box and Pulse Ox      Interventions In Place Pillows and Pressure Redistribution Mattress    Possible Skin Injury No    Pictures Uploaded Into Epic N/A  Wound Consult Placed N/A  RN Wound Prevention Protocol Ordered No

## 2024-04-30 ENCOUNTER — PHARMACY VISIT (OUTPATIENT)
Dept: PHARMACY | Facility: MEDICAL CENTER | Age: 84
End: 2024-04-30
Payer: COMMERCIAL

## 2024-04-30 ENCOUNTER — APPOINTMENT (OUTPATIENT)
Dept: CARDIOLOGY | Facility: MEDICAL CENTER | Age: 84
DRG: 312 | End: 2024-04-30
Payer: MEDICARE

## 2024-04-30 VITALS
DIASTOLIC BLOOD PRESSURE: 68 MMHG | BODY MASS INDEX: 27.98 KG/M2 | SYSTOLIC BLOOD PRESSURE: 134 MMHG | TEMPERATURE: 97.7 F | HEART RATE: 70 BPM | HEIGHT: 72 IN | OXYGEN SATURATION: 95 % | WEIGHT: 206.57 LBS | RESPIRATION RATE: 18 BRPM

## 2024-04-30 PROBLEM — R55 SYNCOPE, UNSPECIFIED SYNCOPE TYPE: Status: RESOLVED | Noted: 2024-04-29 | Resolved: 2024-04-30

## 2024-04-30 PROBLEM — I49.9 IRREGULAR HEART RHYTHM: Status: RESOLVED | Noted: 2020-03-02 | Resolved: 2024-04-30

## 2024-04-30 LAB
ALBUMIN SERPL BCP-MCNC: 3.6 G/DL (ref 3.2–4.9)
ALBUMIN/GLOB SERPL: 1.3 G/DL
ALP SERPL-CCNC: 126 U/L (ref 30–99)
ALT SERPL-CCNC: 14 U/L (ref 2–50)
ANION GAP SERPL CALC-SCNC: 12 MMOL/L (ref 7–16)
AST SERPL-CCNC: 13 U/L (ref 12–45)
BACTERIA BLD CULT: NORMAL
BACTERIA BLD CULT: NORMAL
BASOPHILS # BLD AUTO: 0.4 % (ref 0–1.8)
BASOPHILS # BLD: 0.04 K/UL (ref 0–0.12)
BILIRUB SERPL-MCNC: 0.6 MG/DL (ref 0.1–1.5)
BUN SERPL-MCNC: 26 MG/DL (ref 8–22)
CALCIUM ALBUM COR SERPL-MCNC: 9.7 MG/DL (ref 8.5–10.5)
CALCIUM SERPL-MCNC: 9.4 MG/DL (ref 8.5–10.5)
CHLORIDE SERPL-SCNC: 102 MMOL/L (ref 96–112)
CO2 SERPL-SCNC: 23 MMOL/L (ref 20–33)
CREAT SERPL-MCNC: 0.78 MG/DL (ref 0.5–1.4)
EOSINOPHIL # BLD AUTO: 0 K/UL (ref 0–0.51)
EOSINOPHIL NFR BLD: 0 % (ref 0–6.9)
ERYTHROCYTE [DISTWIDTH] IN BLOOD BY AUTOMATED COUNT: 50.1 FL (ref 35.9–50)
GFR SERPLBLD CREATININE-BSD FMLA CKD-EPI: 87 ML/MIN/1.73 M 2
GLOBULIN SER CALC-MCNC: 2.7 G/DL (ref 1.9–3.5)
GLUCOSE SERPL-MCNC: 145 MG/DL (ref 65–99)
HCT VFR BLD AUTO: 54.6 % (ref 42–52)
HGB BLD-MCNC: 18 G/DL (ref 14–18)
IMM GRANULOCYTES # BLD AUTO: 0.05 K/UL (ref 0–0.11)
IMM GRANULOCYTES NFR BLD AUTO: 0.5 % (ref 0–0.9)
LV EJECT FRACT  99904: 65
LV EJECT FRACT MOD 2C 99903: 60.3
LV EJECT FRACT MOD 4C 99902: 69.45
LV EJECT FRACT MOD BP 99901: 65.44
LYMPHOCYTES # BLD AUTO: 1.66 K/UL (ref 1–4.8)
LYMPHOCYTES NFR BLD: 15.9 % (ref 22–41)
MCH RBC QN AUTO: 31.4 PG (ref 27–33)
MCHC RBC AUTO-ENTMCNC: 33 G/DL (ref 32.3–36.5)
MCV RBC AUTO: 95.1 FL (ref 81.4–97.8)
MONOCYTES # BLD AUTO: 0.27 K/UL (ref 0–0.85)
MONOCYTES NFR BLD AUTO: 2.6 % (ref 0–13.4)
NEUTROPHILS # BLD AUTO: 8.41 K/UL (ref 1.82–7.42)
NEUTROPHILS NFR BLD: 80.6 % (ref 44–72)
NRBC # BLD AUTO: 0 K/UL
NRBC BLD-RTO: 0 /100 WBC (ref 0–0.2)
PLATELET # BLD AUTO: 209 K/UL (ref 164–446)
PMV BLD AUTO: 11.5 FL (ref 9–12.9)
POTASSIUM SERPL-SCNC: 4.2 MMOL/L (ref 3.6–5.5)
PROT SERPL-MCNC: 6.3 G/DL (ref 6–8.2)
RBC # BLD AUTO: 5.74 M/UL (ref 4.7–6.1)
SIGNIFICANT IND 70042: NORMAL
SIGNIFICANT IND 70042: NORMAL
SITE SITE: NORMAL
SITE SITE: NORMAL
SODIUM SERPL-SCNC: 137 MMOL/L (ref 135–145)
SOURCE SOURCE: NORMAL
SOURCE SOURCE: NORMAL
WBC # BLD AUTO: 10.4 K/UL (ref 4.8–10.8)

## 2024-04-30 PROCEDURE — 97535 SELF CARE MNGMENT TRAINING: CPT

## 2024-04-30 PROCEDURE — A9270 NON-COVERED ITEM OR SERVICE: HCPCS | Performed by: FAMILY MEDICINE

## 2024-04-30 PROCEDURE — 700102 HCHG RX REV CODE 250 W/ 637 OVERRIDE(OP)

## 2024-04-30 PROCEDURE — 93306 TTE W/DOPPLER COMPLETE: CPT | Mod: 26 | Performed by: INTERNAL MEDICINE

## 2024-04-30 PROCEDURE — 97162 PT EVAL MOD COMPLEX 30 MIN: CPT

## 2024-04-30 PROCEDURE — 99238 HOSP IP/OBS DSCHRG MGMT 30/<: CPT | Mod: GC | Performed by: STUDENT IN AN ORGANIZED HEALTH CARE EDUCATION/TRAINING PROGRAM

## 2024-04-30 PROCEDURE — A9270 NON-COVERED ITEM OR SERVICE: HCPCS

## 2024-04-30 PROCEDURE — 700102 HCHG RX REV CODE 250 W/ 637 OVERRIDE(OP): Performed by: FAMILY MEDICINE

## 2024-04-30 PROCEDURE — 97165 OT EVAL LOW COMPLEX 30 MIN: CPT

## 2024-04-30 PROCEDURE — RXMED WILLOW AMBULATORY MEDICATION CHARGE

## 2024-04-30 PROCEDURE — 93306 TTE W/DOPPLER COMPLETE: CPT

## 2024-04-30 RX ORDER — METOPROLOL SUCCINATE 25 MG/1
12.5 TABLET, EXTENDED RELEASE ORAL EVERY EVENING
Qty: 30 TABLET | Refills: 0 | Status: ON HOLD | OUTPATIENT
Start: 2024-04-30 | End: 2024-05-21

## 2024-04-30 RX ORDER — LOSARTAN POTASSIUM 25 MG/1
25 TABLET ORAL DAILY
Qty: 30 TABLET | Refills: 0 | Status: SHIPPED | OUTPATIENT
Start: 2024-05-01 | End: 2024-05-08

## 2024-04-30 RX ADMIN — APIXABAN 5 MG: 5 TABLET, FILM COATED ORAL at 05:54

## 2024-04-30 RX ADMIN — OMEPRAZOLE 20 MG: 20 CAPSULE, DELAYED RELEASE ORAL at 05:54

## 2024-04-30 RX ADMIN — CLOPIDOGREL BISULFATE 75 MG: 75 TABLET ORAL at 05:53

## 2024-04-30 RX ADMIN — ALLOPURINOL 100 MG: 100 TABLET ORAL at 05:54

## 2024-04-30 RX ADMIN — LOSARTAN POTASSIUM 25 MG: 50 TABLET, FILM COATED ORAL at 05:54

## 2024-04-30 ASSESSMENT — COGNITIVE AND FUNCTIONAL STATUS - GENERAL
DAILY ACTIVITIY SCORE: 24
SUGGESTED CMS G CODE MODIFIER DAILY ACTIVITY: CH
SUGGESTED CMS G CODE MODIFIER MOBILITY: CH
MOBILITY SCORE: 24

## 2024-04-30 ASSESSMENT — PAIN DESCRIPTION - PAIN TYPE: TYPE: ACUTE PAIN

## 2024-04-30 ASSESSMENT — GAIT ASSESSMENTS
GAIT LEVEL OF ASSIST: STANDBY ASSIST
DEVIATION: DECREASED TOE OFF;DECREASED HEEL STRIKE
DISTANCE (FEET): 250

## 2024-04-30 ASSESSMENT — FIBROSIS 4 INDEX: FIB4 SCORE: 1.41

## 2024-04-30 ASSESSMENT — ACTIVITIES OF DAILY LIVING (ADL): TOILETING: INDEPENDENT

## 2024-04-30 NOTE — DISCHARGE SUMMARY
FAMILY MEDICINE DISCHARGE SUMMARY  PATIENT ID:  NAME:  Chuck Ortiz  MRN:               [id number]  YOB: 1940    Date of Admission: 4/29/2024     Date of Discharge: 04/30/24    Attending: Austyn Willis M.d.     Resident: Christophe Pelaez M.D.    Primary Care Physician:  Reilly Jc M.D.    HPI & HOSPITAL COURSE  Chuck Ortiz is a 84 y.o. male admitted for syncope and episode of chest pressure.  Patient had a recent cardiac catheterization with stent placement and was reporting he was in his usual state of health prior to taking his medication and then becoming very dizzy and feeling faint outside.  Feel like he was going to pass out.  He was also reporting 3 out of 10 chest pain during this dizziness.    Evaluation emergency department with normal CBC, CMP slight increase in his BUN and slight increase in his troponin from his most recent cardiac catheterization up to 176.  These were trended and was downtrending at time of discharge.  EKG unchanged from prior.  Patient's blood pressure was in the low 100/70s.  Patient had a scheduled echocardiogram to evaluate his aortic stenosis 4/29 which she would miss as he was in the ED.    Patient was admitted for further trending of his troponin and EKG, cardiology consultation and echocardiogram.    Cardiology was consulted and recommending reducing his blood pressure medications as he was likely experiencing syncope secondary to orthostasis and tight blood pressure control.  His metoprolol and losartan was reduced by half and the patient reported improvement over the next 12 hours.  Echocardiogram was conducted showing unchanged aortic stenosis.    Patient has a follow-up appointment with his structural heart team on 5/8 for evaluation of TAVR.  As well as a follow-up with his cardiologist on 5/29.      Therefore, They are discharged in fair and stable condition to home with close outpatient follow-up.    The patient recovered much more quickly than  anticipated on admission.    Discharge Diagnosis:  Principal Problem (Resolved):    Syncope, unspecified syncope type (POA: Yes)  Active Problems:    Essential hypertension (POA: Yes)    Dyslipidemia (POA: Yes)    Aortic stenosis (POA: Yes)  Resolved Problems:    Irregular heart rhythm (POA: Yes)      Follow up:  Yair Gonzalez M.D.  [address]  [address]  [place] [address]  [phone]    Follow up      Reilly Jc M.D.  [address]  [address]  [place] [address]  [phone]    Follow up in 1 week(s)      Josefa Keyes M.D.  [address]  [address]  [place] [address]  [phone]             Future Appointments   Date Time Provider Department Center   2024 10:00 AM Yair Gonzalez M.D. [facility] None       Follow up items Post DC:  Call [facility] cardiology to ensure follow-up with the structural heart team as well as Dr. Montana    Physical exam on day of DC:  Vitals:    24 1000 24 1009 24 1100 24 1154   BP:    134/68   Pulse: 63 70 68 70   Resp:    18   Temp:       TempSrc:    Temporal   SpO2: 88% 92% 91% 95%   Weight:       Height:       , Temp (24hrs), Av.4 °C (97.5 °F), Min:36.2 °C (97.2 °F), Max:36.5 °C (97.7 °F)  , Pulse Oximetry: 95 %, O2 (LPM): 0, O2 Delivery Device: None - Room Air        CONSULTANTS:    Cardiology    PROCEDURES:    Echocardiogram    LAB TESTS:   Recent Labs     24  1000 24  0203   WBC 9.6 10.4   RBC 5.46 5.74   HEMOGLOBIN 17.4 18.0   HEMATOCRIT 51.2 54.6*   MCV 93.8 95.1   MCH 31.9 31.4   MCHC 34.0 33.0   RDW 50.3* 50.1*   PLATELETCT 210 209   MPV 11.6 11.5     Recent Labs     24  1107 24  0305   SODIUM 138 137   POTASSIUM 4.6 4.2   CHLORIDE 103 102   CO2 24 23   GLUCOSE 126* 145*   BUN 30* 26*   CREATININE 0.82 0.78   CALCIUM 9.0 9.4     Recent Labs     24  1107 24  0305   ALTSGPT 15 14   ASTSGOT 22 13   ALKPHOSPHAT 112* 126*   TBILIRUBIN 0.5 0.6   GLUCOSE 126* 145*         No results for  "input(s): \"NTPROBNP\" in the last 72 hours.      Recent Labs     04/29/24  1107 04/29/24  1622   TROPONINT 176* 161*       CULTURES:   Results       Procedure Component Value Units Date/Time    Blood Culture - Draw one from central line and one from peripheral site [[id number]] Collected: 04/29/24 1107    Order Status: Completed Specimen: Blood from Peripheral Updated: 04/30/24 0646     Significant Indicator NEG     Source BLD     Site PERIPHERAL     Culture Result No Growth  Note: Blood cultures are incubated for 5 days and  are monitored continuously.Positive blood cultures  are called to the RN and reported as soon as  they are identified.      Narrative:      Right Forearm/Arm    Blood Culture - Draw one from central line and one from peripheral site [[id number]] Collected: 04/29/24 1121    Order Status: Completed Specimen: Blood from Line Updated: 04/30/24 0646     Significant Indicator NEG     Source BLD     Site Peripheral     Culture Result No Growth  Note: Blood cultures are incubated for 5 days and  are monitored continuously.Positive blood cultures  are called to the RN and reported as soon as  they are identified.      Narrative:      Left AC            IMAGES:  EC-ECHOCARDIOGRAM COMPLETE W/O CONT   Final Result      DX-CHEST-PORTABLE (1 VIEW)   Final Result         Airspace opacity in the right lower lobe, likely pneumonia.            DISCHARGE MEDICATIONS:      Medication List        CHANGE how you take these medications        Instructions   allopurinol 100 MG Tabs  What changed: when to take this  Commonly known as: Zyloprim   TAKE 1 TABLET BY MOUTH TWICE A DAY     Eliquis 5 MG Tabs  What changed: how much to take  Generic drug: apixaban   TAKE 1 TABLET BY MOUTH TWICE A DAY     losartan 25 MG Tabs  Start taking on: May 1, 2024  What changed:   medication strength  how much to take  when to take this  Commonly known as: Cozaar   Take 1 Tablet by mouth every day.  Dose: 25 mg     metoprolol SR 25 MG " Tb24  What changed: how much to take  Commonly known as: Toprol XL   Take one-half (0.5) Tablet by mouth every evening.  Dose: 12.5 mg            CONTINUE taking these medications        Instructions   atorvastatin 40 MG Tabs  Commonly known as: Lipitor   Take 40 mg by mouth every evening.  Dose: 40 mg     clopidogrel 75 MG Tabs  Commonly known as: Plavix   Take 1 Tablet by mouth every day.  Dose: 75 mg     cyclobenzaprine 10 mg Tabs  Commonly known as: Flexeril   Take 1 Tablet by mouth 3 times a day as needed for Moderate Pain (back spasms).  Dose: 10 mg     NexIUM 24HR 20 MG capsule  Generic drug: esomeprazole   Take 20 mg by mouth every morning before breakfast.  Dose: 20 mg            STOP taking these medications      triamterene/hctz 37.5-25 MG Caps  Commonly known as: Maxzide-25/Dyazide              ACTIVITY  As tolerated.  Weight bearing as tolerated    DIET:   DIET ORDERS (From admission to next 24h)       Start     Ordered    04/30/24 1310  Discontinue Diet Tray  ALL MEALS         04/30/24 1309    04/29/24 1310  Diet Order Diet: Cardiac  ALL MEALS        Question:  Diet:  Answer:  Cardiac    04/29/24 1317

## 2024-04-30 NOTE — DISCHARGE PLANNING
HTH/ [facility] TCN chart review completed. Per review, patient noted to be a readmission to [facility] with previous acute hospitalization 4/22-4/24/2024 in the setting of chest pain. Collaborated with HAL Ruiz. Noted LACE 67 and high 6 click scores noted at 24 ADL and 24 mobility. In collaboration with HAL, no acute TCN needs anticipated in patient discharge planning at this time. Should post acute discharge needs arise warranting TCN involvement, please contact TCN team via Voalte. Thank you.

## 2024-04-30 NOTE — CARE PLAN
The patient is Stable - Low risk of patient condition declining or worsening    Shift Goals  Clinical Goals: Monitor for s/s of dizzyness, weakness  Patient Goals: rest, sleep    Progress made toward(s) clinical / shift goals:    Problem: Knowledge Deficit - Standard  Goal: Patient and family/care givers will demonstrate understanding of plan of care, disease process/condition, diagnostic tests and medications  Description: Target End Date:  1-3 days or as soon as patient condition allows    Document in Patient Education    1.  Patient and family/caregiver oriented to unit, equipment, visitation policy and means for communicating concern  2.  Complete/review Learning Assessment  3.  Assess knowledge level of disease process/condition, treatment plan, diagnostic tests and medications  4.  Explain disease process/condition, treatment plan, diagnostic tests and medications  Outcome: Progressing  Note: Pt is updated of plan of care. Pts questions and concerns are addressed. Pt's barriers to discharge are addressed.

## 2024-04-30 NOTE — CARE PLAN
The patient is Stable - Low risk of patient condition declining or worsening    Shift Goals  Clinical Goals: Monitor VS,Monitor labs  Patient Goals: Get breakfast  Family Goals: DELGADO    Progress made toward(s) clinical / shift goals:    Problem: Knowledge Deficit - Standard  Goal: Patient and family/care givers will demonstrate understanding of plan of care, disease process/condition, diagnostic tests and medications  Outcome: Progressing       Patient is not progressing towards the following goals:

## 2024-04-30 NOTE — DISCHARGE INSTRUCTIONS
Take 25 mg losartan daily, 12.5 mg metoprolol daily, and discontinue taking Maxzide until further recs by cardiology.

## 2024-04-30 NOTE — TELEPHONE ENCOUNTER
Discussed with Dr. Rowland who recommends TAVR pathway. Will call and schedule patient when discharged.

## 2024-04-30 NOTE — TELEPHONE ENCOUNTER
Patient admitted to hospital yesterday. Repeat echo done today showing mod AS. Message to Dr. Rowland to advise.

## 2024-04-30 NOTE — PROGRESS NOTES
Report received from NOC shift RN, bedside report completed and eyes on the pt. Lab results and notes have been reviewed. The pt is resting in bed and all needs are met at this time. The bed is locked/lowered, bed alarm is on, and the call light is within reach. Will continue to monitor for any changes.

## 2024-04-30 NOTE — PROGRESS NOTES
Telemetry Report  Rhythm SR  Heart Rate 72  Ectopy    WA 0.251  QRS 0.113  QT 0.468              Per telemetry room monitor

## 2024-04-30 NOTE — THERAPY
"Occupational Therapy   Initial Evaluation     Patient Name: Chuck Ortiz  Age:  84 y.o., Sex:  male  Medical Record #: [id number]  Today's Date: 4/30/2024     Precautions  Precautions: (P) Fall Risk    Assessment  Patient is an 85 y/o male, with near syncope prior to arrival in the ED at home.  Feeling very lightheaded and feeling as though he was going to pass out.  History of aortic stenosis.  Recent PCI of mid LAD 4/23 for NSTEMI, hx facial droop, zenker diverticula, sleep apnea, arthritis.     Pt seen for OT eval. Pt supervised for all mobility and ADL performance, pt continues to feel mildly lightheaded therefore cautious with mobility however no LOB. Orthostatics WFL. Educ pt on home safety, possible use of shower chair in future. Pt with no further inpt OT indicated, safe to dc home when medically stable.     Plan    Occupational Therapy Initial Treatment Plan   Duration: (P) Evaluation only    DC Equipment Recommendations: (P) None  Discharge Recommendations: (P) Anticipate that the patient will have no further occupational therapy needs after discharge from the hospital     Subjective    \"I still feel a little lightheaded\"      Objective       04/30/24 0850   Prior Living Situation   Prior Services Home-Independent   Housing / Facility 2 Story House   Steps Into Home 1   Steps In Home 14   Rail Right Rail  (Steps in Home)   Bathroom Set up Walk In Shower;Grab Bars   Equipment Owned None  (pt reports he may have a SPC or FWW)   Lives with - Patient's Self Care Capacity Spouse   Comments pt uses bathroom downstairs with a walk in shower   Prior Level of ADL Function   Self Feeding Independent   Grooming / Hygiene Independent   Bathing Independent   Dressing Independent   Toileting Independent   Prior Level of IADL Function   Medication Management Independent   Laundry Independent   Kitchen Mobility Independent   Finances Independent   Home Management Independent   Shopping Independent   Prior Level Of " Mobility Independent Without Device in Community   Driving / Transportation Driving Independent   Occupation (Pre-Hospital Vocational) Retired Due To Age   Leisure Interests Gardening   History of Falls   History of Falls No   Precautions   Precautions Fall Risk   Vitals   Pulse 67   Blood Pressure  (!) 133/93   Pulse Oximetry 91 %   O2 Delivery Device None - Room Air   Vitals Comments BP sittin/93, standing 139/84   Pain   Intervention Declines   Pain 0 - 10 Group   Therapist Pain Assessment During Activity;Post Activity Pain Same as Prior to Activity;Nurse Notified;0   Cognition    Cognition / Consciousness WDL   Comments pleasant and cooperative throughout, able to make needs known, demo'd good insight and safety awareness   Passive ROM Upper Body   Passive ROM Upper Body WDL   Active ROM Upper Body   Active ROM Upper Body  WDL   Dominant Hand Right   Strength Upper Body   Upper Body Strength  WDL   Sensation Upper Body   Upper Extremity Sensation  WDL   Upper Body Muscle Tone   Upper Body Muscle Tone  WDL   Neurological Concerns   Neurological Concerns No   Coordination Upper Body   Coordination WDL   Balance Assessment   Sitting Balance (Static) Good   Sitting Balance (Dynamic) Fair +   Standing Balance (Static) Fair +   Standing Balance (Dynamic) Fair   Weight Shift Sitting Good   Weight Shift Standing Fair   Comments no AD   Bed Mobility    Supine to Sit Supervised   Sit to Supine Supervised   Scooting Supervised   Rolling Supervised   Comments HOb slightly elevated   ADL Assessment   Eating Independent   Grooming Supervision;Standing   Upper Body Dressing Supervision   Lower Body Dressing Supervision  (socks, figure four technique)   Toileting   (declined)   Functional Mobility   Sit to Stand Supervised   Bed, Chair, Wheelchair Transfer Supervised   Toilet Transfers Standby Assist   Transfer Method Stand Step   Mobility supine> EOB> in rm and bathroom   Comments no AD   Visual Perception   Visual  Perception  X   Comments wears corrective lenses, reports L eye better than R eye   Edema / Skin Assessment   Edema / Skin  WDL   Activity Tolerance   Sitting Edge of Bed 20 min   Standing 5 min   Education Group   Education Provided Role of Occupational Therapist;Activities of Daily Living;Home Safety   Role of Occupational Therapist Patient Response Patient;Acceptance;Demonstration;Explanation;Action Demonstration;Verbal Demonstration   Home Safety Patient Response Patient;Acceptance;Demonstration;Explanation;Verbal Demonstration;Action Demonstration   ADL Patient Response Patient;Acceptance;Explanation;Demonstration;Verbal Demonstration;Action Demonstration   Occupational Therapy Initial Treatment Plan    Duration Evaluation only   Problem List   Problem List Decreased Activity Tolerance   Anticipated Discharge Equipment and Recommendations   DC Equipment Recommendations None   Discharge Recommendations Anticipate that the patient will have no further occupational therapy needs after discharge from the hospital   Interdisciplinary Plan of Care Collaboration   IDT Collaboration with  Nursing;Physical Therapist   Patient Position at End of Therapy In Bed;Bed Alarm On;Tray Table within Reach;Call Light within Reach   Collaboration Comments RN updated   Session Information   Date / Session Number  4/30, OT eval only

## 2024-04-30 NOTE — PROGRESS NOTES
"    [facility] [place]        Attending:   Austyn Willis MD    Resident:   Sara Leavitt D.O.    PATIENT:   Chuck Ortiz; [id number]; 1940    ID:   84 y.o. male admitted for syncope work-up     SUBJECTIVE:   No acute events overnight, patient states he had a brief episode of chest tightness that resolved with supplemental oxygen later yesterday afternoon.  He also states that this morning after being given his blood pressure medications he felt it sensation of dizziness once again, and \"A-fib acting up\".  Denies any chest pain, shortness of breath, nausea, vomiting, vision changes, slurred speech or weakness associated.    OBJECTIVE:  Vitals:    04/30/24 0400 04/30/24 0500 04/30/24 0710 04/30/24 0800   BP: 129/74 129/74 (!) 146/76    Pulse: 61  65 65   Resp: 18  17    Temp:       TempSrc: Temporal  Temporal    SpO2: 93%  90% 91%   Weight: 93.7 kg (206 lb 9.1 oz)      Height:           Intake/Output Summary (Last 24 hours) at 4/30/2024 0619  Last data filed at 4/30/2024 0400  Gross per 24 hour   Intake 250 ml   Output 400 ml   Net -150 ml       PHYSICAL EXAM:  General: No acute distress, afebrile, resting comfortably  HEENT: NC/AT. EOMI.   Cardiovascular: RRR without murmurs. Normal capillary refill   Respiratory: CTAB  Abdomen: soft, nontender, nondistended, no masses  EXT:  ADAMS, no edema  Skin: No erythema/lesions   Neuro: Basline cranial nerve XII palsy otherwise non-focal    LABS:  Recent Labs     04/29/24  1000 04/30/24  0203   WBC 9.6 10.4   RBC 5.46 5.74   HEMOGLOBIN 17.4 18.0   HEMATOCRIT 51.2 54.6*   MCV 93.8 95.1   MCH 31.9 31.4   RDW 50.3* 50.1*   PLATELETCT 210 209   MPV 11.6 11.5   NEUTSPOLYS 61.80 80.60*   LYMPHOCYTES 24.70 15.90*   MONOCYTES 10.90 2.60   EOSINOPHILS 1.70 0.00   BASOPHILS 0.50 0.40     Recent Labs     04/29/24  1107 04/30/24  0305   SODIUM 138 137   POTASSIUM 4.6 4.2   CHLORIDE 103 102   CO2 24 23   BUN 30* 26*   CREATININE 0.82 0.78   CALCIUM 9.0 9.4 " "  MAGNESIUM 1.8  --    ALBUMIN 3.4 3.6     Estimated GFR/CRCL = Estimated Creatinine Clearance: 82.3 mL/min (by C-G formula based on SCr of 0.78 mg/dL).  Recent Labs     04/29/24  1107 04/30/24  0305   GLUCOSE 126* 145*     Recent Labs     04/29/24  1107 04/30/24  0305   ASTSGOT 22 13   ALTSGPT 15 14   TBILIRUBIN 0.5 0.6   ALKPHOSPHAT 112* 126*   GLOBULIN 2.5 2.7             No results for input(s): \"INR\", \"APTT\", \"FIBRINOGEN\" in the last 72 hours.    Invalid input(s): \"DIMER\"      IMAGING:  DX-CHEST-PORTABLE (1 VIEW)   Final Result         Airspace opacity in the right lower lobe, likely pneumonia.      EC-ECHOCARDIOGRAM COMPLETE W/O CONT    (Results Pending)       MEDS:  Current Facility-Administered Medications   Medication Last Admin    atorvastatin (Lipitor) tablet 40 mg      clopidogrel (Plavix) tablet 75 mg 75 mg at 04/30/24 0553    apixaban (Eliquis) tablet 5 mg 5 mg at 04/30/24 0554    cyclobenzaprine (Flexeril) tablet 10 mg      acetaminophen (Tylenol) tablet 650 mg      senna-docusate (Pericolace Or Senokot S) 8.6-50 MG per tablet 2 Tablet      And    polyethylene glycol/lytes (Miralax) Packet 1 Packet      hydrALAZINE (Apresoline) injection 10 mg      [Held by provider] triamterene/hctz (Maxzide-25/Dyazide) 37.5-25 MG 1 Capsule      allopurinol (Zyloprim) tablet 100 mg 100 mg at 04/30/24 0554    omeprazole (PriLOSEC) capsule 20 mg 20 mg at 04/30/24 0554    losartan (Cozaar) tablet 25 mg 25 mg at 04/30/24 0554    metoprolol SR (Toprol XL) tablet 12.5 mg 12.5 mg at 04/29/24 1720       ASSESSMENT/PLAN:    * Syncope, unspecified syncope type- (present on admission)  Assessment & Plan  Patient with near syncope prior to arrival in the ED.  Feeling very lightheaded and feeling as though he was going to pass out.  History of aortic stenosis.  Recent PCI of mid LAD 4/23 for NSTEMI.     Plan  - Echo ordered to further evaluate  - Will measure orthostatics  - Cardiology consulted who feels that symptoms are likely " in the setting of overtreatment of hypertension and recommended decreasing dose of losartan, metoprolol by half.  - Will hold Maxide at this time  -Consider fluid treatment if patient becomes symptomatic again/hypotensive  -cont home plavix and eliquis  - Fall precautions in place  - PT/OT consult    Aortic stenosis- (present on admission)  Assessment & Plan  Concern for symptomatic aortic stenosis with symptoms of dizziness and near syncope. S/p PCI of mid LAD 4/23.  Patient did have scheduled echo 4/29.    Plan  - Echo ordered while inpatient  - Cardiology consulted who feels that symptoms are likely in the setting of overtreatment of hypertension and recommended decreasing dose of losartan, metoprolol by half.  - Appreciate and will follow any further cardiology recs  - Will hold Maxide at this time  -cont home plavix and eliquis       Dyslipidemia- (present on admission)  Assessment & Plan  Most recent lipid panel 4/23/2024 shows lipids largely within normal limits.    Plan  - Continue statin    Irregular heart rhythm- (present on admission)  Assessment & Plan  History of A-fib.  Currently in sinus rhythm with normal rate.     Plan  - Resume home Eliquis    Essential hypertension- (present on admission)  Assessment & Plan  Patient presented to the ED with initial blood pressure of 122/73, lowest blood pressure 107/70.     Plan  - Cardiology consulted and felt patient's near syncope likely in the setting of overtreatment for hypertension  - Will decrease losartan, metoprolol doses by half  -Hold home Maxide at this time         Core Measures:  IV Fluids: none  Antbx: none  DVT ppx: therapeutic eliquis, SCD's  Code status: Full code  Dispo: inpatient     Sara Leavitt DO  PGY-1, [facility]

## 2024-04-30 NOTE — PROGRESS NOTES
Pt is alert and oriented x4. Pt has no complaints of pain or chest pressure. Pt is ambulatory and got up to the bathroom three times with a walker. Pt is updated of plan of care, pt's questions and concerns are addressed. Pt's call light is within reach and fall alarm is in place. Pt is educated to not get out of bed w/o help.

## 2024-04-30 NOTE — THERAPY
Physical Therapy   Initial Evaluation     Patient Name: Chuck Ortiz  Age:  84 y.o., Sex:  male  Medical Record #: [id number]  Today's Date: 4/30/2024     Precautions  Precautions: Fall Risk    Assessment    Pt is an 84-y.o. male who presents to acute after syncopal episode and dizziness. Of note, patient had stent placement last week. Pt has PMHx of arrhythmia, cardiac murmur, HTN, high cholesterol, seizure, and stroke without residual problems.    Pt presents to acute physical therapy functioning at or near his prior level of function. Pt mobilized as detailed below. Pt did not report symptoms of dizziness during ambulation and took two standing breaks. Pt ascended 20 steps with reciprocal pattern and descended 20 steps with step to pattern while using L hand rail. Anticipate no further physical therapy needs at this time. Patient will not be actively followed for physical therapy services at this time, however may be seen if requested by physician for 1 more visit within 30 days to address any discharge or equipment needs.      Plan    Physical Therapy Initial Treatment Plan   Duration: Evaluation only    DC Equipment Recommendations: None  Discharge Recommendations: Anticipate that the patient will have no further physical therapy needs after discharge from the hospital       Subjective    RN cleared pt for visit. Pt received in bed. Pt agreeable to PT.     Objective       04/30/24 1009   Cosignature   Documentation Review Approved with modifications made by preceptor in flowsheet   Charge Group   PT Evaluation PT Evaluation Mod   Total Time Spent   PT Total Time Yes   PT Evaluation Time Spent (Mins) 16   PT Total Time Spent (Calculated) 16    Services   Is patient using  services for this encounter? No   Initial Contact Note    Initial Contact Note Order Received and Verified, Physical Therapy Evaluation in Progress with Full Report to Follow.   Vitals   Pulse 70   Pulse Oximetry 92 %    O2 (LPM) 0   O2 Delivery Device None - Room Air   Vitals Comments Pt did not report symptoms of dizziness or lightheadedness during standing and walking.   Pain 0 - 10 Group   Therapist Pain Assessment Nurse Notified;Post Activity Pain Same as Prior to Activity;0   Prior Living Situation   Prior Services Home-Independent   Housing / Facility 2 Story House   Steps Into Home 1   Steps In Home 13   Rail Left Rail (Steps in Home)   Bathroom Set up Walk In Shower;Bathtub / Shower Combination  (tub shower on second floor, walk-in shower on first floor)   Equipment Owned None   Lives with - Patient's Self Care Capacity Spouse   Prior Level of Functional Mobility   Bed Mobility Independent   Transfer Status Independent   Ambulation Independent   Ambulation Distance Community   Assistive Devices Used None   Stairs Independent   Comments Pt reports that he gardens. Pt reported ability to drive prior to admission.   Cognition    Cognition / Consciousness WDL   Comments Pleasant; cooperative   Active ROM Upper Body   Active ROM Upper Body  WDL   Comments Pt moved BLE functionally.   Active ROM Lower Body    Active ROM Lower Body  WDL   Strength Lower Body   Lower Body Strength  WDL   Comments BLE Grossly 3+/5 or better   Coordination Lower Body    Coordination Lower Body  WDL   Balance Assessment   Sitting Balance (Static) Good   Sitting Balance (Dynamic) Good   Standing Balance (Static) Good   Standing Balance (Dynamic) Fair +   Weight Shift Sitting Good   Weight Shift Standing Fair   Comments No AD used. No overt LOB.   Bed Mobility    Supine to Sit Supervised   Sit to Supine Supervised   Scooting Supervised   Rolling Supervised   Comments HOB flat. No bed rails used.   Gait Analysis   Gait Level Of Assist Standby Assist   Assistive Device None   Distance (Feet) 250   # of Times Distance was Traveled 1   Deviation Decreased Toe Off;Decreased Heel Strike  (Decreased marly; decreased step/stride length)   # of Stairs  Climbed 20   Level of Assist with Stairs Standby Assist   Weight Bearing Status no restrictions   Vision Deficits Impacting Mobility wears glasses at baseline.   Comments Pt took two standing breaks: before ambulation; after ascending/descending stairs.   Functional Mobility   Sit to Stand Supervised   Bed, Chair, Wheelchair Transfer Supervised   Transfer Method Stand Step   Mobility Supine > EOB > stand > ambulation > ascend/descend stairs > ambulation > bed   6 Clicks Assessment - How much HELP from from another person do you currently need... (If the patient hasn't done an activity recently, how much help from another person do you think he/she would need if he/she tried?)   Turning from your back to your side while in a flat bed without using bedrails? 4   Moving from lying on your back to sitting on the side of a flat bed without using bedrails? 4   Moving to and from a bed to a chair (including a wheelchair)? 4   Standing up from a chair using your arms (e.g., wheelchair, or bedside chair)? 4   Walking in hospital room? 4   Climbing 3-5 steps with a railing? 4   6 clicks Mobility Score 24   Edema / Skin Assessment   Edema / Skin  WDL   Education Group   Education Provided Role of Physical Therapist   Role of Physical Therapist Patient Response Patient;Acceptance;Explanation;Verbal Demonstration   Physical Therapy Initial Treatment Plan    Duration Evaluation only   Anticipated Discharge Equipment and Recommendations   DC Equipment Recommendations None   Discharge Recommendations Anticipate that the patient will have no further physical therapy needs after discharge from the hospital   Interdisciplinary Plan of Care Collaboration   IDT Collaboration with  Nursing   Patient Position at End of Therapy In Bed;Call Light within Reach;Tray Table within Reach;Phone within Reach   Collaboration Comments RN updated.   Session Information   Date / Session Number  4/30 - 1x only

## 2024-05-01 ENCOUNTER — TELEPHONE (OUTPATIENT)
Dept: CARDIOLOGY | Facility: MEDICAL CENTER | Age: 84
End: 2024-05-01
Payer: MEDICARE

## 2024-05-01 DIAGNOSIS — Z01.810 PRE-PROCEDURAL CARDIOVASCULAR EXAMINATION: ICD-10-CM

## 2024-05-01 DIAGNOSIS — I35.0 AORTIC VALVE STENOSIS, ETIOLOGY OF CARDIAC VALVE DISEASE UNSPECIFIED: ICD-10-CM

## 2024-05-01 NOTE — TELEPHONE ENCOUNTER
Referral from: Dr. Kirk for AS. Dr. Rowland recommends full TAVR pathway.     Patient called on 5/1/2024.    Discussed with patient consultation appointments, testing needed, and plan of care.    Patient given dates and times of testing and consultations. Also sent [facility] message with appt details.     All questions answered.    Phone number given to patient for [facility] for any further questions or concerns.

## 2024-05-02 ENCOUNTER — OFFICE VISIT (OUTPATIENT)
Dept: INTERNAL MEDICINE | Facility: IMAGING CENTER | Age: 84
End: 2024-05-02
Payer: MEDICARE

## 2024-05-02 VITALS
TEMPERATURE: 99.2 F | OXYGEN SATURATION: 94 % | WEIGHT: 213 LBS | DIASTOLIC BLOOD PRESSURE: 68 MMHG | SYSTOLIC BLOOD PRESSURE: 130 MMHG | HEART RATE: 61 BPM | RESPIRATION RATE: 14 BRPM | BODY MASS INDEX: 28.89 KG/M2

## 2024-05-02 DIAGNOSIS — I10 ESSENTIAL HYPERTENSION: ICD-10-CM

## 2024-05-02 DIAGNOSIS — I77.810 THORACIC AORTIC ECTASIA (HCC): ICD-10-CM

## 2024-05-02 DIAGNOSIS — E78.5 HYPERLIPIDEMIA LDL GOAL <70: ICD-10-CM

## 2024-05-02 DIAGNOSIS — I21.4 NSTEMI (NON-ST ELEVATED MYOCARDIAL INFARCTION) (HCC): ICD-10-CM

## 2024-05-02 DIAGNOSIS — D68.69 SECONDARY HYPERCOAGULABLE STATE (HCC): ICD-10-CM

## 2024-05-02 DIAGNOSIS — I35.0 NONRHEUMATIC AORTIC VALVE STENOSIS: ICD-10-CM

## 2024-05-02 PROCEDURE — 3075F SYST BP GE 130 - 139MM HG: CPT | Performed by: INTERNAL MEDICINE

## 2024-05-02 PROCEDURE — 3078F DIAST BP <80 MM HG: CPT | Performed by: INTERNAL MEDICINE

## 2024-05-02 PROCEDURE — 99215 OFFICE O/P EST HI 40 MIN: CPT | Performed by: INTERNAL MEDICINE

## 2024-05-02 ASSESSMENT — FIBROSIS 4 INDEX: FIB4 SCORE: 1.41

## 2024-05-03 NOTE — PROGRESS NOTES
Chief Complaint   Patient presents with    Follow-Up       HISTORY OF THE PRESENT ILLNESS: Patient is a 84 y.o. male.     Patient comes in for hospital follow-up. He was recently admitted for non-ST elevation myocardial infarction. He had a stent placed to his LAD. He was discharged on beta blocker, moderate dose statin and Plavix. He is also on losartan. He is on Eliquis due to history of atrial fibrillation. His EKG in the hospital showed bigemini on his original hospitalization and frequent PVC on second. His second issue is due to hypotension likely related to blood pressure medication. He is had one episode where he felt dizzy for approximately two hours since his discharge. No other neurologic or other symptoms. He is scheduled to follow-up with interventional cardiology and thoracic surgery for are stenosis with possible native Aortic valvereplacement    Allergies: Morphine    Current Outpatient Medications Ordered in Epic   Medication Sig Dispense Refill    losartan (COZAAR) 25 MG Tab Take 1 Tablet by mouth every day. 30 Tablet 0    metoprolol SR (TOPROL XL) 25 MG TABLET SR 24 HR Take one-half (0.5) Tablet by mouth every evening. 30 Tablet 0    atorvastatin (LIPITOR) 40 MG Tab Take 40 mg by mouth every evening.      clopidogrel (PLAVIX) 75 MG Tab Take 1 Tablet by mouth every day. 30 Tablet 1    esomeprazole (NEXIUM 24HR) 20 MG capsule Take 20 mg by mouth every morning before breakfast.      ELIQUIS 5 MG Tab TAKE 1 TABLET BY MOUTH TWICE A DAY (Patient taking differently: Take 5 mg by mouth 2 times a day.) 180 Tablet 2    allopurinol (ZYLOPRIM) 100 MG Tab TAKE 1 TABLET BY MOUTH TWICE A DAY (Patient taking differently: Take 100 mg by mouth 2 times a day.) 180 Tablet 3     No current Epic-ordered facility-administered medications on file.       Past medical history, social history and family history were reviewed from chart today    Review of systems: Per HPI.    All others negative.     Exam: /68 (BP  Location: Left arm, Patient Position: Sitting, BP Cuff Size: Adult)   Pulse 61   Temp 37.3 °C (99.2 °F) (Temporal)   Resp 14   Wt 96.6 kg (213 lb)   SpO2 94%   General: Well-appearing. Well-developed. No signs of distress.  HEENT: Grossly normal. Oral cavity is pink and moist.   Neck: Supple without JVD or bruit.  Pulmonary: Clear with good breath sounds. Normal effort.  Cardiovascular: Regular. 3/6 systolic murmur. It is written both right and left lung fields but greater in the right. Hello systolic.Carotid and radial pulses are intact.  Abdomen: Soft, nontender, nondistended. Spleen and liver are not enlarged.  Neurologic: Cranial nerves II through XII are grossly normal, alert and oriented x3      Diagnosis:  1. NSTEMI (non-ST elevated myocardial infarction) (HCC)        2. Nonrheumatic aortic valve stenosis        3. Thoracic aortic ectasia (HCC)        4. Secondary hypercoagulable state (HCC)        5. Hyperlipidemia LDL goal <70        6. Essential hypertension          Patient with recent cardiovascular events including myocardial infarction and progression of his aortic valve stenosis. Medications are appropriate. Currently he is doing well. His murmur has changed from evaluation in December. His blood pressure is at goal today. His lipids are at goal. He remains on Plavix and Eliquis. Discussed that he is currently anticoagulated and should take precautions to avoid trauma or cutting himself.    My total time spent caring for the patient on the day of the encounter was  greater than 40+ minutes.   This includes obtaining history, reviewing chart, physical exam, patient education, reviewing outside records, placing orders, interpreting tests and coordinating care.    Portions of this note were completed using voice recognition software (Dragon Naturally speaking software) . Occasional transcription errors may have escaped proof reading. I have made every reasonable attempt to correct obvious errors,  but I expect that there are errors of grammar and possibly content that I did not discover before finalizing the note.

## 2024-05-04 LAB
BACTERIA BLD CULT: NORMAL
BACTERIA BLD CULT: NORMAL
SIGNIFICANT IND 70042: NORMAL
SIGNIFICANT IND 70042: NORMAL
SITE SITE: NORMAL
SITE SITE: NORMAL
SOURCE SOURCE: NORMAL
SOURCE SOURCE: NORMAL

## 2024-05-06 NOTE — PROGRESS NOTES
REFERRING PHYSICIAN: Reilly Jc MD.     CONSULTING PHYSICIAN: Chantel Stephen MD     CHIEF COMPLAINT: Fatigue    HISTORY OF PRESENT ILLNESS: The patient is a 84 y.o. male with a past medical history significant for hypertension, obstructive sleep apnea, MI s/p PCI to LAD (4/2024), TIA (2018), CVA (2018), left facial palsy, paroxysmal atrial fibrillation, and aortic stenosis, who presents today with progressive fatigue over the last several months. He has associated exertional shortness of breath, and dizziness. He denies chest pain, palpitation, PND, orthopnea, syncope, lower extremity swelling. His wife was present for the entirety of this consultation.    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    Status post placement of implantable loop recorder 11/15/2018    Hyperlipidemia LDL goal <70 11/15/2018    Coronary artery disease involving native coronary artery of native heart without angina pectoris 11/15/2018    Cryptogenic stroke (HCC) 09/17/2019    Hyperuricemia 09/17/2019    Lumbar radiculopathy 03/26/2019    PAF (paroxysmal atrial fibrillation) (HCC) 11/09/2020    Anticoagulated 11/09/2020    Dyslipidemia 10/21/2021    Aortic stenosis 05/26/2022    Thoracic aortic ectasia (HCC) 01/11/2023    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    Osteoarthritis of left wrist 01/30/2023    Secondary hypercoagulable state (HCC) 09/05/2023     Resolved Ambulatory Problems     Diagnosis Date Noted    Ventricular ectopy 08/23/2018    Abnormal EKG 03/02/2020    Irregular heart rhythm 03/02/2020     Atrial fibrillation (HCC) 07/13/2020    Coronary artery disease, non-occlusive 10/21/2021    H/O: CVA (cerebrovascular accident) 10/21/2021    Hypercoagulability due to atrial fibrillation ([facility]) 02/08/2023    History of secondary hypercoagulable state 09/05/2023    Chest pain 04/22/2024    Encephalopathy 04/23/2024    NSTEMI (non-ST elevated myocardial infarction) ([facility]) 04/23/2024    Syncope, unspecified syncope type 04/29/2024     Past Medical History:   Diagnosis Date    Arrhythmia     Arthritis     Cardiac murmur     CATARACT     Dental disorder     Facial droop     Hemorrhagic disorder ([facility])     High cholesterol     Hypertension     Kidney stones     Seizure ([facility])     Sleep apnea     Snoring     Stroke ([facility]) 08/07/2018    Zenker diverticula        PAST SURGICAL HISTORY:   Past Surgical History:   Procedure Laterality Date    Z CARDIAC CATH  09/28/2018    40% LAD other arteries no disease.    CATARACT PHACO WITH IOL  1/21/2014    Performed by Joni Duarte M.D. at SURGERY SURGICAL [facility] ORS    FINGER ARTHROPLASTY  12/17/2012    Performed by Adam Christopher M.D. at SURGERY SAME DAY [facility] ORS    INGUINAL HERNIA REPAIR  2/19/2009    Performed by ALEX ALATORRE at SURGERY SAME DAY [facility] ORS    COLONOSCOPY  2004    neg    LAMINOTOMY  1996    lumbar laminectomy, cyst x3    UVULOPHARYNGOPALATOPLASTY  1985        ALLERGIES:   Allergies   Allergen Reactions    Morphine      Bradycardia        CURRENT MEDICATIONS:   Current Outpatient Medications:     losartan (COZAAR) 25 MG Tab, Take 1 Tablet by mouth 2 times a day., Disp: , Rfl:     metoprolol SR (TOPROL XL) 25 MG TABLET SR 24 HR, Take one-half (0.5) Tablet by mouth every evening., Disp: 30 Tablet, Rfl: 0    atorvastatin (LIPITOR) 40 MG Tab, Take 40 mg by mouth every evening., Disp: , Rfl:     clopidogrel (PLAVIX) 75 MG Tab, Take 1 Tablet by mouth every day., Disp: 30 Tablet, Rfl: 1    esomeprazole (NEXIUM 24HR) 20 MG capsule, Take 20 mg by mouth every  morning before breakfast., Disp: , Rfl:     ELIQUIS 5 MG Tab, TAKE 1 TABLET BY MOUTH TWICE A DAY (Patient taking differently: Take 5 mg by mouth 2 times a day.), Disp: 180 Tablet, Rfl: 2    allopurinol (ZYLOPRIM) 100 MG Tab, TAKE 1 TABLET BY MOUTH TWICE A DAY (Patient taking differently: Take 100 mg by mouth 2 times a day.), Disp: 180 Tablet, Rfl: 3    FAMILY HISTORY:   Family History   Problem Relation Age of Onset    Heart Disease Maternal Grandmother     Diabetes Paternal Grandfather     Stroke Sister     Lung Disease Father         black lung     Cancer Neg Hx         SOCIAL HISTORY:   Social History     Socioeconomic History    Marital status:      Spouse name: Not on file    Number of children: Not on file    Years of education: Not on file    Highest education level: Not on file   Occupational History    Not on file   Tobacco Use    Smoking status: Never    Smokeless tobacco: Never   Vaping Use    Vaping Use: Never used   Substance and Sexual Activity    Alcohol use: Yes     Comment: 2 drinks per week     Drug use: No    Sexual activity: Not on file     Comment: , retired [employer]    Other Topics Concern    Not on file   Social History Narrative    Not on file     Social Determinants of Health     Financial Resource Strain: Not on file   Food Insecurity: Not on file   Transportation Needs: Not on file   Physical Activity: Not on file   Stress: Not on file   Social Connections: Not on file   Intimate Partner Violence: Not on file   Housing Stability: Not on file       REVIEW OF SYSTEMS:  Review of Systems   Constitutional:  Positive for malaise/fatigue. Negative for chills, fever and weight loss.   HENT:  Negative for ear pain, nosebleeds and tinnitus.    Eyes:  Negative for double vision, photophobia and pain.   Respiratory:  Positive for shortness of breath. Negative for cough and hemoptysis.    Cardiovascular:  Negative for chest pain, palpitations, orthopnea, leg swelling and PND.    Gastrointestinal:  Negative for abdominal pain, blood in stool, nausea and vomiting.   Genitourinary:  Negative for frequency, hematuria and urgency.   Musculoskeletal: Negative.    Skin:  Negative for rash.   Neurological:  Positive for dizziness. Negative for tremors, speech change, focal weakness, seizures and headaches.   Endo/Heme/Allergies:  Negative for polydipsia. Does not bruise/bleed easily.   Psychiatric/Behavioral:  Negative for hallucinations and memory loss.          PHYSICAL EXAMINATION:    /84 (BP Location: Left arm, Patient Position: Sitting, BP Cuff Size: Adult)   Pulse 61   Temp 36.1 °C (97 °F) (Temporal)   Ht 1.829 m (6')   Wt 96.6 kg (213 lb)   SpO2 95%   BMI 28.89 kg/m²    Physical Exam  Vitals reviewed.   Constitutional:       General: He is not in acute distress.     Appearance: Normal appearance.   HENT:      Head: Normocephalic and atraumatic.      Right Ear: External ear normal.      Left Ear: External ear normal.      Nose: Nose normal. No congestion.   Eyes:      General: No scleral icterus.     Extraocular Movements: Extraocular movements intact.      Conjunctiva/sclera: Conjunctivae normal.   Cardiovascular:      Rate and Rhythm: Normal rate and regular rhythm.   Pulmonary:      Effort: Pulmonary effort is normal. No respiratory distress.   Abdominal:      General: There is no distension.      Palpations: Abdomen is soft.   Musculoskeletal:         General: Normal range of motion.      Cervical back: Normal range of motion.   Skin:     General: Skin is warm and dry.      Coloration: Skin is not jaundiced.      Findings: No rash.   Neurological:      Mental Status: He is alert and oriented to person, place, and time. Mental status is at baseline.      Comments: Left sided facial palsy   Psychiatric:         Mood and Affect: Mood normal.         Behavior: Behavior normal.           LABS REVIEWED:  Lab Results   Component Value Date/Time    SODIUM 137 04/30/2024 03:05 AM     POTASSIUM 4.2 04/30/2024 03:05 AM    CHLORIDE 102 04/30/2024 03:05 AM    CO2 23 04/30/2024 03:05 AM    GLUCOSE 145 (H) 04/30/2024 03:05 AM    BUN 26 (H) 04/30/2024 03:05 AM    CREATININE 0.78 04/30/2024 03:05 AM    CREATININE 1.10 03/03/2011 11:20 AM    BUNCREATRAT 23 (H) 03/03/2011 11:20 AM    GLOMRATE >59 03/03/2011 11:20 AM      Lab Results   Component Value Date/Time    PROTHROMBTM 14.8 (H) 04/23/2024 06:05 AM    INR 1.15 (H) 04/23/2024 06:05 AM      Lab Results   Component Value Date/Time    WBC 10.4 04/30/2024 02:03 AM    RBC 5.74 04/30/2024 02:03 AM    HEMOGLOBIN 18.0 04/30/2024 02:03 AM    HEMATOCRIT 54.6 (H) 04/30/2024 02:03 AM    MCV 95.1 04/30/2024 02:03 AM    MCH 31.4 04/30/2024 02:03 AM    MCHC 33.0 04/30/2024 02:03 AM    MPV 11.5 04/30/2024 02:03 AM    NEUTSPOLYS 80.60 (H) 04/30/2024 02:03 AM    LYMPHOCYTES 15.90 (L) 04/30/2024 02:03 AM    MONOCYTES 2.60 04/30/2024 02:03 AM    EOSINOPHILS 0.00 04/30/2024 02:03 AM    BASOPHILS 0.40 04/30/2024 02:03 AM        IMAGING REVIEWED AND INTERPRETED:    ECHOCARDIOGRAM 4/30/24 [facility]  Mild concentric left ventricular hypertrophy.  The ejection fraction is measured to be 65 % by Polk's biplane.  Normal right ventricular size and systolic function.  Mildly dilated left atrium.  Moderate mitral annular calcification.  Mild mitral regurgitation.Calcified aortic valve leaflets with poor   leaflet excursion.  Moderate aortic valve stenosis.  The aortic root is dilated with a diameter of 3.8 cm.     Compared to the prior study on 02/12/2024, no significant changes.    Left ventricular systolic function remains preserved.  Severity of   aortic stenosis remains moderate.    CARDIAC CATHETERIZATION 4/22/24 [facility]  Hemodynamics:   Aorta: 106/63 mmHg  LVEDP: 20 mmHg  Aortic valve peak to peak gradient: 70 mmHg     Coronary Anatomy              Left Main: Normal              LAD:  Mid 70% stenosis              Ramus: Proximal 50% stenosis              LCx: Minimal luminal  irregularities              RCA: Dominant, Minimal luminal irregularities                   Results of intervention to the LAD:  Pre: 70% stenosis and SHANNAN III flow  Post: 0% residual stenosis and SHANNAN III flow. No dissection or distal embolization.    CT SCAN CHEST   Scheduled 5/9/24 @ [facility] @ 0900      IMPRESSION:  84 y.o. male with a past medical history significant for hypertension, obstructive sleep apnea, MI s/p PCI to LAD (4/2024), TIA (2018), CVA (2018), left facial palsy, paroxysmal atrial fibrillation, and aortic stenosis      PLAN:  I recommend that the patient is a reasonable TAVR candidate given advanced age, patient preference and medical comorbidities. We will continue to follow workup and discuss in multidisciplinary conference.    The STS mortality risk score is 5.89% and the morbidity and mortality risk score is 16.9%. The scores were discussed with patient.    Thank you for this very challenging consultation and participation in the patient’s care.  I will keep you apprised of all future developments.      Sincerely,     Chantel Stephen MD

## 2024-05-07 PROBLEM — I25.10 CORONARY ARTERY DISEASE, NON-OCCLUSIVE: Status: RESOLVED | Noted: 2021-10-21 | Resolved: 2024-05-07

## 2024-05-07 PROBLEM — Z86.73 H/O: CVA (CEREBROVASCULAR ACCIDENT): Status: RESOLVED | Noted: 2021-10-21 | Resolved: 2024-05-07

## 2024-05-07 PROBLEM — R94.31 ABNORMAL EKG: Status: RESOLVED | Noted: 2020-03-02 | Resolved: 2024-05-07

## 2024-05-07 PROBLEM — I49.3 VENTRICULAR ECTOPY: Status: RESOLVED | Noted: 2018-08-23 | Resolved: 2024-05-07

## 2024-05-08 ENCOUNTER — OFFICE VISIT (OUTPATIENT)
Dept: CARDIOLOGY | Facility: MEDICAL CENTER | Age: 84
End: 2024-05-08
Attending: INTERNAL MEDICINE
Payer: MEDICARE

## 2024-05-08 ENCOUNTER — OFFICE VISIT (OUTPATIENT)
Dept: CARDIOTHORACIC SURGERY | Facility: MEDICAL CENTER | Age: 84
End: 2024-05-08
Payer: MEDICARE

## 2024-05-08 ENCOUNTER — DOCUMENTATION (OUTPATIENT)
Dept: CARDIOLOGY | Facility: MEDICAL CENTER | Age: 84
End: 2024-05-08

## 2024-05-08 VITALS
OXYGEN SATURATION: 95 % | SYSTOLIC BLOOD PRESSURE: 134 MMHG | HEART RATE: 61 BPM | TEMPERATURE: 97 F | WEIGHT: 213 LBS | BODY MASS INDEX: 28.85 KG/M2 | DIASTOLIC BLOOD PRESSURE: 84 MMHG | HEIGHT: 72 IN

## 2024-05-08 VITALS
OXYGEN SATURATION: 98 % | RESPIRATION RATE: 16 BRPM | BODY MASS INDEX: 28.85 KG/M2 | HEART RATE: 59 BPM | DIASTOLIC BLOOD PRESSURE: 88 MMHG | WEIGHT: 213 LBS | SYSTOLIC BLOOD PRESSURE: 146 MMHG | HEIGHT: 72 IN

## 2024-05-08 DIAGNOSIS — I35.0 SEVERE AORTIC STENOSIS: ICD-10-CM

## 2024-05-08 DIAGNOSIS — Z00.6 EXAMINATION OF PARTICIPANT IN CLINICAL TRIAL: ICD-10-CM

## 2024-05-08 DIAGNOSIS — I10 ESSENTIAL HYPERTENSION: ICD-10-CM

## 2024-05-08 PROCEDURE — 99215 OFFICE O/P EST HI 40 MIN: CPT | Mod: 57 | Performed by: INTERNAL MEDICINE

## 2024-05-08 PROCEDURE — 3079F DIAST BP 80-89 MM HG: CPT | Performed by: INTERNAL MEDICINE

## 2024-05-08 PROCEDURE — 3077F SYST BP >= 140 MM HG: CPT | Performed by: INTERNAL MEDICINE

## 2024-05-08 RX ORDER — LOSARTAN POTASSIUM 25 MG/1
25 TABLET ORAL 2 TIMES DAILY
COMMUNITY
Start: 2024-05-08 | End: 2024-05-15

## 2024-05-08 ASSESSMENT — ENCOUNTER SYMPTOMS
ORTHOPNEA: 0
VOMITING: 0
HEADACHES: 0
DOUBLE VISION: 0
BRUISES/BLEEDS EASILY: 0
HALLUCINATIONS: 0
COUGH: 0
SHORTNESS OF BREATH: 1
MEMORY LOSS: 0
PHOTOPHOBIA: 0
WEIGHT LOSS: 0
PND: 0
SEIZURES: 0
SPEECH CHANGE: 0
PALPITATIONS: 0
CHILLS: 0
TREMORS: 0
EYE PAIN: 0
NAUSEA: 0
ABDOMINAL PAIN: 0
HEMOPTYSIS: 0
FEVER: 0
MUSCULOSKELETAL NEGATIVE: 1
BLOOD IN STOOL: 0
DIZZINESS: 1
POLYDIPSIA: 0
FOCAL WEAKNESS: 0

## 2024-05-08 ASSESSMENT — PATIENT HEALTH QUESTIONNAIRE - PHQ9: CLINICAL INTERPRETATION OF PHQ2 SCORE: 0

## 2024-05-08 ASSESSMENT — FIBROSIS 4 INDEX
FIB4 SCORE: 1.41
FIB4 SCORE: 1.41

## 2024-05-08 NOTE — PROGRESS NOTES
Valve Program Consultation: 5/8/24    Mental Status Assessment:  Appearance: normal  Behavior: normal  Mood/Affect: good  Thought process/content: normal  Cognition: good  Functional ability: good, a little weak  Dental Concerns: none, partial dentures, just saw dentist  Further mental assessment needed: na    Post-op Plan of Care:  Support systems: wife Kezia   Patient understands discharge plan: yes  DME: CPAP (will bring home machine)  Home health warranted prior to procedure: na  Social concerns for discharge: na  PCP alerted of social concerns: na  POLST: not warranted  Advance directive: has one on file  Post-op goal: improve symptoms of lightheadedness and chest pain  Medication instructions: HOLD losartan for 24 hours and eliquis for 48 hours.    Concerns prior to procedure: Cath gradients were higher per Dr. Rowland, even though echocardiogram showed moderate disease. Afib controlled. Recent PCI to LAD, remains on plavix. BP has been high, adjusted losartan to 25 mg BID. Follow up with BP at home in 1 week with [facility] RN to assess post conference phone call.    Met with patient during New TAVR consult.     All patient's questions and concerns were addressed during this visit. They understood pre-operative and post-operative plan of care.    Reviewed patient TAVR education packet explaining that information is provided regarding preparation for TAVR the night prior, what to expect during the hospital stay, average LOS, and what to look out for post TAVR discharge. Explained that patient will require SBE prophylactic antibiotic prior to any dental treatment post TAVR. Advised patient not to receive any new vaccinations within 1 week pre- and 1 week post-procedure.     Explained that patient should not eat or drink anything past midnight the day of the procedure. Encouraged patient to wear something clean and comfortable, easy to get on/off to check in Monday morning, time TBD. Patient may have friends and  family in the pre-operational area until patient is taken to the operating room, at which time family and friends will be asked to wait on floor 1 [facility] surgical waiting area. On completion of TAVR, heart team will update family. Once update is given, there is some time before family/friends may visit patient in their assigned room. Length of stay on average is one night, however patient may stay longer depending on specific needs at that time. Patient given printed instructions sheet with all above stated instructions. Patient states understanding of all material and education presented today and has no further questions at this time. Encouraged patient again, to contact me with any questions or concerns during this work-up process. Patient states understanding.

## 2024-05-08 NOTE — PROGRESS NOTES
Valve Program Functional Assessment:     KCCQ12   1a) Showering/bathin  1b) Walking 1 block on ground: 5  1c) Hurrying or joggin  2) Swellin  3) Fatigue: 7  4) Shortness of breath: 6  5) Sleep sitting up: 5  6) Limited enjoyment of life: 4  7) Spend the rest of your life with HF: 4  8a) Hobbies, recreational activities:4  8b) Working or doing household chores:6  8c) Visiting family or friends: 5    5 meter walk test  1) __3.83____ s/5m  2) __4.42____ s/5m  3) __4.35____ s/5m  AVG:_4.2______     Strength   1) _28_____ kg  2) _26_____ kg  3) _26_____ kg  AVG:__26.6____    NOGUERA ADLs  Patient independently preforms...   - Bathing: Yes   - Dressing: Yes   - Toileting: Yes   - Transferring: Yes   - Continence: Yes   - Feeding: Yes   Total Score: _6_/6    Living Situation  Patient lives: with spouse,    Mobility Aids   Patient uses: none      FRAILTY SCORE: _0_/ 4

## 2024-05-08 NOTE — PATIENT INSTRUCTIONS
Take 2 hours after AM medications, roughly lunch time.    INCREASE losartan to 25 mg twice a day.    CONTINUE all other medications until procedure as planned.

## 2024-05-08 NOTE — PROGRESS NOTES
"    Interventional cardiology Initial Consultation Note      Chief Complaint: Aortic stenosis    Chuck Ortiz is a 84 y.o. male  patient referred by Dr. Gonzalez for aortic stenosis.  He was recently seen in the hospital with chest pain, presyncope, disorientation, he had an angiogram done, showed mid LAD stenosis, underwent PCI, found to have high gradient across his aortic valve.  Echocardiogram showed severely calcified aortic valve with decreased excursion, gradient was in the moderate stenosis range.  He had another hospital visit with expressive aphasia, his stroke workup was negative.  He feels tired, decreased energy, needing to take frequent naps.        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)     on Plavix     High cholesterol     Hypertension     Kidney stones     mult uric acid kidney stones    Seizure ([facility])     seizure x1 8/7/18    Sleep apnea     uses CPAP    Snoring     Stroke ([facility]) 08/07/2018    no residual problems    Zenker diverticula              Current Outpatient Medications   Medication Sig Dispense Refill    losartan (COZAAR) 25 MG Tab Take 1 Tablet by mouth 2 times a day.      metoprolol SR (TOPROL XL) 25 MG TABLET SR 24 HR Take one-half (0.5) Tablet by mouth every evening. 30 Tablet 0    atorvastatin (LIPITOR) 40 MG Tab Take 40 mg by mouth every evening.      clopidogrel (PLAVIX) 75 MG Tab Take 1 Tablet by mouth every day. 30 Tablet 1    esomeprazole (NEXIUM 24HR) 20 MG capsule Take 20 mg by mouth every morning before breakfast.      ELIQUIS 5 MG Tab TAKE 1 TABLET BY MOUTH TWICE A DAY (Patient taking differently: Take 5 mg by mouth 2 times a day.) 180 Tablet 2    allopurinol " (ZYLOPRIM) 100 MG Tab TAKE 1 TABLET BY MOUTH TWICE A DAY (Patient taking differently: Take 100 mg by mouth 2 times a day.) 180 Tablet 3     No current facility-administered medications for this visit.             Physical Exam:  Ambulatory Vitals  BP (!) 146/88 (BP Location: Left arm, Patient Position: Sitting, BP Cuff Size: Adult)   Pulse (!) 59   Resp 16   Ht 1.829 m (6')   Wt 96.6 kg (213 lb)   SpO2 98%    Oxygen Therapy:  Pulse Oximetry: 98 %  BP Readings from Last 4 Encounters:   05/08/24 (!) 146/88   05/02/24 130/68   04/30/24 134/68   04/24/24 126/74       Weight/BMI: Body mass index is 28.89 kg/m².  Wt Readings from Last 4 Encounters:   05/08/24 96.6 kg (213 lb)   05/02/24 96.6 kg (213 lb)   04/30/24 93.7 kg (206 lb 9.1 oz)   04/24/24 94.1 kg (207 lb 7.3 oz)           General: Well appearing and in no apparent distress  Neck: carotid bruits absent  Lungs: respiratory sounds  normal  Heart: Regular rhythm,  No palpable thrills on palpation, murmurs present, no rubs,   Lower extremity edema absent.     Coronary angiogram 4/23/2024  Coronary Anatomy              Left Main: Normal              LAD:  Mid 70% stenosis              Ramus: Proximal 50% stenosis              LCx: Minimal luminal irregularities              RCA: Dominant, Minimal luminal irregularities                   Results of intervention to the LAD:  Pre: 70% stenosis and SHANNAN III flow  Post: 0% residual stenosis and SHANNAN III flow. No dissection or distal embolization.    Echocardiogram 4/30/2024 reviewed, independently interpreted  Normal left ventricular systolic function, severely calcified aortic valve with decreased excursion,  Moderate aortic stenosis by gradient        Medical Decision Making:  Problem List Items Addressed This Visit       Essential hypertension    Relevant Medications    losartan (COZAAR) 25 MG Tab     Other Visit Diagnoses       Severe aortic stenosis        Relevant Medications    losartan (COZAAR) 25 MG Tab           Reviewed his echocardiogram, aortic valve is severely calcified only 1 out of 3 leaflets moving minimally.  He has blowing murmur, absent S2 by physical examination.  His fatigue, recent admission with chest pain, presyncope sounds more valve related.  His gradient by echocardiogram is lower, his invasive gradient across the aortic valve consistent with severe aortic stenosis.  Overall I feel echo gradients are underestimated, he does have severe symptomatic aortic stenosis NYHA class III stage C.    Pathophysiology, progression of aortic stenosis, associated mortality, morbidity discussed with the patient. treatment options including conservative management, surgical, transcatheter aortic valve replacement discussed in detail.  Patient would like to proceed with transcatheter aortic valve replacement.  Risk benefits of transcatheter aortic valve replacement discussed in detail.    Plan of care discussed with Dr. Gonzalez    This note was dictated using Dragon speech recognition software.    Riley FLORES  Interventional cardiologist  [facility], [address]  [address] [address]  Phone: [phone]  Fax: [phone]

## 2024-05-09 ENCOUNTER — HOSPITAL ENCOUNTER (OUTPATIENT)
Dept: RADIOLOGY | Facility: MEDICAL CENTER | Age: 84
End: 2024-05-09
Attending: NURSE PRACTITIONER
Payer: MEDICARE

## 2024-05-09 ENCOUNTER — TELEPHONE (OUTPATIENT)
Dept: CARDIOLOGY | Facility: MEDICAL CENTER | Age: 84
End: 2024-05-09
Payer: MEDICARE

## 2024-05-09 DIAGNOSIS — Z01.810 PRE-PROCEDURAL CARDIOVASCULAR EXAMINATION: ICD-10-CM

## 2024-05-09 DIAGNOSIS — I35.0 AORTIC VALVE STENOSIS, ETIOLOGY OF CARDIAC VALVE DISEASE UNSPECIFIED: ICD-10-CM

## 2024-05-09 RX ADMIN — IOHEXOL 100 ML: 350 INJECTION, SOLUTION INTRAVENOUS at 09:00

## 2024-05-09 NOTE — TELEPHONE ENCOUNTER
Received message from patient's spouse Kezia, requesting call back to discuss patient's nose bleed.     Called and spoke to Kezia. She states patient's nose has been bleeding x30 minutes and his BP is currently 191/90. Per Kezia, patient has been holding pressure on his nostrils for at least 15 minutes.    Advised to hold pressure just above the nostrils on the soft portion of the nose with consistent pressure for at least 10-15 minutes. Additionally, advised to lean head forward.    Kezia states since bleeding has not stopped and patient has been holding pressure she is concerned and would like to cancel TAVR CTA and take patient to the ER. Per Kezia, she believes patient's nose is bleeding because he is on Eliquis and has had nose issues in the past.    Advised that Eliquis is not the cause of the nosebleed, but can be a side effect. Discussed that RN would cancel CTA, but that this could postpone TAVR. Kezia verbalizes understanding.

## 2024-05-09 NOTE — TELEPHONE ENCOUNTER
Late Documenation:    Patient called at 1037 and spoke with Keyla MALONE. Patient states his BP is still high. Keyla advised patient to take his BP medication as he had not yet taken it for the day and this could have been the reason for his nose bleed earlier today. Patient verbalized understanding and stated he would take medication.

## 2024-05-09 NOTE — TELEPHONE ENCOUNTER
Late Documentation:    Received call back from Kezia at 0804. She states bleeding has improved and is coming to a stop. She states she would like to reschedule TAVR CTA for today. Advised that appointment time is still valid. Kezia verbalizes understanding.

## 2024-05-10 ENCOUNTER — APPOINTMENT (OUTPATIENT)
Dept: ADMISSIONS | Facility: MEDICAL CENTER | Age: 84
DRG: 266 | End: 2024-05-10
Attending: INTERNAL MEDICINE
Payer: MEDICARE

## 2024-05-10 NOTE — DOCUMENTATION QUERY
This version of the note has been redacted during the course of a chart correction case. If you need access to the original text of this version of the note, please contact the Health Information Management department at [phone].

## 2024-05-13 ENCOUNTER — DOCUMENTATION (OUTPATIENT)
Dept: CARDIOLOGY | Facility: MEDICAL CENTER | Age: 84
End: 2024-05-13
Payer: MEDICARE

## 2024-05-14 ENCOUNTER — TELEPHONE (OUTPATIENT)
Dept: CARDIOLOGY | Facility: MEDICAL CENTER | Age: 84
End: 2024-05-14
Payer: MEDICARE

## 2024-05-14 DIAGNOSIS — E78.5 DYSLIPIDEMIA: ICD-10-CM

## 2024-05-14 DIAGNOSIS — I25.10 CORONARY ARTERY DISEASE, NON-OCCLUSIVE: ICD-10-CM

## 2024-05-14 RX ORDER — ATORVASTATIN CALCIUM 40 MG/1
40 TABLET, FILM COATED ORAL NIGHTLY
Qty: 90 TABLET | Refills: 3 | Status: SHIPPED | OUTPATIENT
Start: 2024-05-14

## 2024-05-14 NOTE — TELEPHONE ENCOUNTER
"Received VM from patient requesting a call back to discuss medication questions.     Called patient back to discuss. The patient states he went to get a refill of his atorvastatin 40mg, and the pharmacy told him they do not have an active prescription on file. Reviewed med rec, and it looks like the status is \"physician outpatient.\" Advised patient I would send in refills for him to his preferred pharmacy and patient verbalizes understanding. He also asked if we need to know his blood type for his upcoming TAVR. Advised him we check his blood type as part of our pre-op orders so we will know his blood type, although we don't anticipate needing to give blood unless in emergency cases. Patient is appreciative of the information.   "

## 2024-05-15 ENCOUNTER — PRE-ADMISSION TESTING (OUTPATIENT)
Dept: ADMISSIONS | Facility: MEDICAL CENTER | Age: 84
DRG: 266 | End: 2024-05-15
Attending: INTERNAL MEDICINE
Payer: MEDICARE

## 2024-05-15 ENCOUNTER — TELEPHONE (OUTPATIENT)
Dept: CARDIOLOGY | Facility: MEDICAL CENTER | Age: 84
End: 2024-05-15
Payer: MEDICARE

## 2024-05-15 DIAGNOSIS — Z01.812 PRE-OPERATIVE LABORATORY EXAMINATION: ICD-10-CM

## 2024-05-15 DIAGNOSIS — Z01.810 PRE-OPERATIVE CARDIOVASCULAR EXAMINATION: ICD-10-CM

## 2024-05-15 DIAGNOSIS — I10 ESSENTIAL HYPERTENSION: ICD-10-CM

## 2024-05-15 RX ORDER — LOSARTAN POTASSIUM 50 MG/1
50 TABLET ORAL 2 TIMES DAILY
Qty: 180 TABLET | Refills: 3 | Status: SHIPPED | OUTPATIENT
Start: 2024-05-15

## 2024-05-15 NOTE — TELEPHONE ENCOUNTER
Patient called back. Discussed patient's recent BPs since Keyla MALONE increased his losartan last week. Patient reports the following readings:    132/65  145/77  137/78  144/79    Advised I would inform Keyla and call him back if there are any further adjustments.    Patient notified of procedure date/time and check in process.     All questions were answered. Patient has direct extension for any further questions/concerns prior to the procedure.

## 2024-05-15 NOTE — TELEPHONE ENCOUNTER
LVM for patient on mobile number. Spoke with patient's wife Kezia who says she will have the patient call me back when he gets a chance.

## 2024-05-15 NOTE — TELEPHONE ENCOUNTER
Valve Conference Plan of Care: 5/15/2024 for TAVR 5/20/2024         TAVR Candidate: yes  Access: right femoral  Valve Size: 26mm+  General Anesthesia or MAC: GA with GOKUL for sizing  Unit: telemetry  Incidental findings to be discussed with PCP: sent to PCP Reilly Jc MD: benign hepatic cyst, colonic diverticulosis, and simple renal cysts.    Clearance needed prior to procedure: no    Check in time of 0700 5/20/2024.     Pre-op appointment completed: scheduled 5/17/2024    NPO after midnight. Hold Eliquis x48hrs, hold losartan x24hrs.    Needs baseline NIHSS in pre-op d/t history of CVA/TIA and left facial palsy.

## 2024-05-15 NOTE — TELEPHONE ENCOUNTER
BRYANT Scott.  You3 hours ago (12:17 PM)     Increase to 50 mg BID. Continue to monitor BP. SC     Called and notified patient. He verbalizes understanding. Sent in updated prescription to [facility] pharmacy.

## 2024-05-16 ENCOUNTER — DOCUMENTATION (OUTPATIENT)
Dept: CARDIOLOGY | Facility: MEDICAL CENTER | Age: 84
End: 2024-05-16
Payer: MEDICARE

## 2024-05-16 ENCOUNTER — TELEPHONE (OUTPATIENT)
Dept: CARDIOLOGY | Facility: MEDICAL CENTER | Age: 84
End: 2024-05-16
Payer: MEDICARE

## 2024-05-16 DIAGNOSIS — I35.0 SEVERE AORTIC STENOSIS: ICD-10-CM

## 2024-05-16 NOTE — TELEPHONE ENCOUNTER
Received VM from patient stating he had a question about one of the post op appts that was scheduled for him.    Called patient back and provided clarification. No further questions or concerns at this time.

## 2024-05-16 NOTE — PROGRESS NOTES
Thank you for the opportunity to participate in your patient's care.     Your patient is scheduled for transcatheter aortic valve replacement (TAVR) on 5/20/2024    Sincerely,    [facility] Structural [facility] Team    JUAN Tran, RN, Structural Heart Program Nurse Coordinator ([phone])  JUAN Santos, RN, Structural Heart Program Nurse Coordinator ([phone])  
Spine appears normal, movement of extremities grossly intact.

## 2024-05-17 ENCOUNTER — PRE-ADMISSION TESTING (OUTPATIENT)
Dept: ADMISSIONS | Facility: MEDICAL CENTER | Age: 84
DRG: 266 | End: 2024-05-17
Attending: INTERNAL MEDICINE
Payer: MEDICARE

## 2024-05-17 DIAGNOSIS — Z01.812 PRE-OPERATIVE LABORATORY EXAMINATION: ICD-10-CM

## 2024-05-17 LAB
ABO GROUP BLD: NORMAL
ALBUMIN SERPL BCP-MCNC: 4 G/DL (ref 3.2–4.9)
ALBUMIN/GLOB SERPL: 2 G/DL
ALP SERPL-CCNC: 120 U/L (ref 30–99)
ALT SERPL-CCNC: 18 U/L (ref 2–50)
ANION GAP SERPL CALC-SCNC: 10 MMOL/L (ref 7–16)
APTT PPP: 31.2 SEC (ref 24.7–36)
AST SERPL-CCNC: 16 U/L (ref 12–45)
BASOPHILS # BLD AUTO: 0.6 % (ref 0–1.8)
BASOPHILS # BLD: 0.05 K/UL (ref 0–0.12)
BILIRUB SERPL-MCNC: 1 MG/DL (ref 0.1–1.5)
BLD GP AB SCN SERPL QL: NORMAL
BUN SERPL-MCNC: 24 MG/DL (ref 8–22)
CALCIUM ALBUM COR SERPL-MCNC: 9.1 MG/DL (ref 8.5–10.5)
CALCIUM SERPL-MCNC: 9.1 MG/DL (ref 8.5–10.5)
CHLORIDE SERPL-SCNC: 105 MMOL/L (ref 96–112)
CO2 SERPL-SCNC: 24 MMOL/L (ref 20–33)
CREAT SERPL-MCNC: 0.83 MG/DL (ref 0.5–1.4)
EOSINOPHIL # BLD AUTO: 0.29 K/UL (ref 0–0.51)
EOSINOPHIL NFR BLD: 3.4 % (ref 0–6.9)
ERYTHROCYTE [DISTWIDTH] IN BLOOD BY AUTOMATED COUNT: 52.4 FL (ref 35.9–50)
GFR SERPLBLD CREATININE-BSD FMLA CKD-EPI: 86 ML/MIN/1.73 M 2
GLOBULIN SER CALC-MCNC: 2 G/DL (ref 1.9–3.5)
GLUCOSE SERPL-MCNC: 85 MG/DL (ref 65–99)
HCT VFR BLD AUTO: 50.5 % (ref 42–52)
HGB BLD-MCNC: 16.4 G/DL (ref 14–18)
IMM GRANULOCYTES # BLD AUTO: 0.04 K/UL (ref 0–0.11)
IMM GRANULOCYTES NFR BLD AUTO: 0.5 % (ref 0–0.9)
INR PPP: 1.22 (ref 0.87–1.13)
LYMPHOCYTES # BLD AUTO: 2.03 K/UL (ref 1–4.8)
LYMPHOCYTES NFR BLD: 23.7 % (ref 22–41)
MCH RBC QN AUTO: 31.2 PG (ref 27–33)
MCHC RBC AUTO-ENTMCNC: 32.5 G/DL (ref 32.3–36.5)
MCV RBC AUTO: 96.2 FL (ref 81.4–97.8)
MONOCYTES # BLD AUTO: 1.09 K/UL (ref 0–0.85)
MONOCYTES NFR BLD AUTO: 12.7 % (ref 0–13.4)
NEUTROPHILS # BLD AUTO: 5.05 K/UL (ref 1.82–7.42)
NEUTROPHILS NFR BLD: 59.1 % (ref 44–72)
NRBC # BLD AUTO: 0 K/UL
NRBC BLD-RTO: 0 /100 WBC (ref 0–0.2)
NT-PROBNP SERPL IA-MCNC: 429 PG/ML (ref 0–125)
PLATELET # BLD AUTO: 154 K/UL (ref 164–446)
PMV BLD AUTO: 11.8 FL (ref 9–12.9)
POTASSIUM SERPL-SCNC: 4.2 MMOL/L (ref 3.6–5.5)
PROT SERPL-MCNC: 6 G/DL (ref 6–8.2)
PROTHROMBIN TIME: 15.5 SEC (ref 12–14.6)
RBC # BLD AUTO: 5.25 M/UL (ref 4.7–6.1)
RH BLD: NORMAL
SODIUM SERPL-SCNC: 139 MMOL/L (ref 135–145)
WBC # BLD AUTO: 8.6 K/UL (ref 4.8–10.8)

## 2024-05-17 PROCEDURE — 85610 PROTHROMBIN TIME: CPT

## 2024-05-17 PROCEDURE — 83880 ASSAY OF NATRIURETIC PEPTIDE: CPT

## 2024-05-17 PROCEDURE — 36415 COLL VENOUS BLD VENIPUNCTURE: CPT

## 2024-05-17 PROCEDURE — 86900 BLOOD TYPING SEROLOGIC ABO: CPT

## 2024-05-17 PROCEDURE — 85730 THROMBOPLASTIN TIME PARTIAL: CPT

## 2024-05-17 PROCEDURE — 86850 RBC ANTIBODY SCREEN: CPT

## 2024-05-17 PROCEDURE — 85025 COMPLETE CBC W/AUTO DIFF WBC: CPT

## 2024-05-17 PROCEDURE — 80053 COMPREHEN METABOLIC PANEL: CPT

## 2024-05-17 PROCEDURE — 86901 BLOOD TYPING SEROLOGIC RH(D): CPT

## 2024-05-20 ENCOUNTER — HOSPITAL ENCOUNTER (INPATIENT)
Facility: MEDICAL CENTER | Age: 84
LOS: 1 days | DRG: 266 | End: 2024-05-21
Attending: INTERNAL MEDICINE | Admitting: INTERNAL MEDICINE
Payer: MEDICARE

## 2024-05-20 ENCOUNTER — ANESTHESIA EVENT (OUTPATIENT)
Dept: SURGERY | Facility: MEDICAL CENTER | Age: 84
DRG: 266 | End: 2024-05-20
Payer: MEDICARE

## 2024-05-20 ENCOUNTER — ANESTHESIA (OUTPATIENT)
Dept: SURGERY | Facility: MEDICAL CENTER | Age: 84
DRG: 266 | End: 2024-05-20
Payer: MEDICARE

## 2024-05-20 ENCOUNTER — APPOINTMENT (OUTPATIENT)
Dept: RADIOLOGY | Facility: MEDICAL CENTER | Age: 84
DRG: 266 | End: 2024-05-20
Attending: NURSE PRACTITIONER
Payer: MEDICARE

## 2024-05-20 ENCOUNTER — APPOINTMENT (OUTPATIENT)
Dept: CARDIOLOGY | Facility: MEDICAL CENTER | Age: 84
DRG: 266 | End: 2024-05-20
Attending: ANESTHESIOLOGY
Payer: MEDICARE

## 2024-05-20 DIAGNOSIS — M54.16 LUMBAR RADICULOPATHY: ICD-10-CM

## 2024-05-20 DIAGNOSIS — I50.33 ACUTE ON CHRONIC DIASTOLIC HEART FAILURE (HCC): ICD-10-CM

## 2024-05-20 DIAGNOSIS — Z95.2 S/P TAVR (TRANSCATHETER AORTIC VALVE REPLACEMENT): ICD-10-CM

## 2024-05-20 DIAGNOSIS — R29.898 RIGHT LEG WEAKNESS: ICD-10-CM

## 2024-05-20 PROBLEM — Z79.01 ANTICOAGULATED: Status: RESOLVED | Noted: 2020-11-09 | Resolved: 2024-05-20

## 2024-05-20 PROBLEM — I44.4 LEFT ANTERIOR FASCICULAR HEMIBLOCK: Status: RESOLVED | Noted: 2018-08-23 | Resolved: 2024-05-20

## 2024-05-20 PROBLEM — I35.0 AORTIC STENOSIS: Status: RESOLVED | Noted: 2022-05-26 | Resolved: 2024-05-20

## 2024-05-20 PROBLEM — E78.5 DYSLIPIDEMIA: Status: RESOLVED | Noted: 2021-10-21 | Resolved: 2024-05-20

## 2024-05-20 PROBLEM — E79.0 HYPERURICEMIA: Status: RESOLVED | Noted: 2019-09-17 | Resolved: 2024-05-20

## 2024-05-20 PROBLEM — I63.9 CRYPTOGENIC STROKE (HCC): Status: RESOLVED | Noted: 2019-09-17 | Resolved: 2024-05-20

## 2024-05-20 PROBLEM — Z95.818 STATUS POST PLACEMENT OF IMPLANTABLE LOOP RECORDER: Status: RESOLVED | Noted: 2018-11-15 | Resolved: 2024-05-20

## 2024-05-20 LAB
ABO + RH BLD: NORMAL
ABO GROUP BLD: NORMAL
ACT BLD: 299 SEC (ref 74–137)
ALBUMIN SERPL BCP-MCNC: 3.4 G/DL (ref 3.2–4.9)
ALBUMIN/GLOB SERPL: 1.5 G/DL
ALP SERPL-CCNC: 108 U/L (ref 30–99)
ALT SERPL-CCNC: 17 U/L (ref 2–50)
ANION GAP SERPL CALC-SCNC: 10 MMOL/L (ref 7–16)
AST SERPL-CCNC: 16 U/L (ref 12–45)
BILIRUB SERPL-MCNC: 0.6 MG/DL (ref 0.1–1.5)
BLD GP AB SCN SERPL QL: NORMAL
BUN SERPL-MCNC: 20 MG/DL (ref 8–22)
CALCIUM ALBUM COR SERPL-MCNC: 9 MG/DL (ref 8.5–10.5)
CALCIUM SERPL-MCNC: 8.5 MG/DL (ref 8.5–10.5)
CHLORIDE SERPL-SCNC: 106 MMOL/L (ref 96–112)
CO2 SERPL-SCNC: 23 MMOL/L (ref 20–33)
CREAT SERPL-MCNC: 0.76 MG/DL (ref 0.5–1.4)
EKG IMPRESSION: NORMAL
ERYTHROCYTE [DISTWIDTH] IN BLOOD BY AUTOMATED COUNT: 53.9 FL (ref 35.9–50)
GFR SERPLBLD CREATININE-BSD FMLA CKD-EPI: 88 ML/MIN/1.73 M 2
GLOBULIN SER CALC-MCNC: 2.3 G/DL (ref 1.9–3.5)
GLUCOSE SERPL-MCNC: 120 MG/DL (ref 65–99)
HCT VFR BLD AUTO: 47.7 % (ref 42–52)
HGB BLD-MCNC: 15.4 G/DL (ref 14–18)
MCH RBC QN AUTO: 31.8 PG (ref 27–33)
MCHC RBC AUTO-ENTMCNC: 32.3 G/DL (ref 32.3–36.5)
MCV RBC AUTO: 98.6 FL (ref 81.4–97.8)
NT-PROBNP SERPL IA-MCNC: 530 PG/ML (ref 0–125)
PLATELET # BLD AUTO: 126 K/UL (ref 164–446)
PMV BLD AUTO: 11.6 FL (ref 9–12.9)
POTASSIUM SERPL-SCNC: 4.3 MMOL/L (ref 3.6–5.5)
PROT SERPL-MCNC: 5.7 G/DL (ref 6–8.2)
RBC # BLD AUTO: 4.84 M/UL (ref 4.7–6.1)
RH BLD: NORMAL
SODIUM SERPL-SCNC: 139 MMOL/L (ref 135–145)
WBC # BLD AUTO: 9.5 K/UL (ref 4.8–10.8)

## 2024-05-20 PROCEDURE — 700117 HCHG RX CONTRAST REV CODE 255: Performed by: INTERNAL MEDICINE

## 2024-05-20 PROCEDURE — 93005 ELECTROCARDIOGRAM TRACING: CPT | Performed by: NURSE PRACTITIONER

## 2024-05-20 PROCEDURE — 160048 HCHG OR STATISTICAL LEVEL 1-5: Performed by: INTERNAL MEDICINE

## 2024-05-20 PROCEDURE — B310YZZ FLUOROSCOPY OF THORACIC AORTA USING OTHER CONTRAST: ICD-10-PCS | Performed by: INTERNAL MEDICINE

## 2024-05-20 PROCEDURE — C1760 CLOSURE DEV, VASC: HCPCS | Performed by: INTERNAL MEDICINE

## 2024-05-20 PROCEDURE — 71045 X-RAY EXAM CHEST 1 VIEW: CPT

## 2024-05-20 PROCEDURE — 160042 HCHG SURGERY MINUTES - EA ADDL 1 MIN LEVEL 5: Performed by: INTERNAL MEDICINE

## 2024-05-20 PROCEDURE — 5A1223Z PERFORMANCE OF CARDIAC PACING, CONTINUOUS: ICD-10-PCS | Performed by: THORACIC SURGERY (CARDIOTHORACIC VASCULAR SURGERY)

## 2024-05-20 PROCEDURE — 700101 HCHG RX REV CODE 250: Performed by: INTERNAL MEDICINE

## 2024-05-20 PROCEDURE — 160009 HCHG ANES TIME/MIN: Performed by: INTERNAL MEDICINE

## 2024-05-20 PROCEDURE — 86901 BLOOD TYPING SEROLOGIC RH(D): CPT

## 2024-05-20 PROCEDURE — 700111 HCHG RX REV CODE 636 W/ 250 OVERRIDE (IP): Performed by: INTERNAL MEDICINE

## 2024-05-20 PROCEDURE — 700105 HCHG RX REV CODE 258: Performed by: NURSE PRACTITIONER

## 2024-05-20 PROCEDURE — 700105 HCHG RX REV CODE 258: Performed by: INTERNAL MEDICINE

## 2024-05-20 PROCEDURE — 770020 HCHG ROOM/CARE - TELE (206)

## 2024-05-20 PROCEDURE — 503001 HCHG PERFUSION: Performed by: INTERNAL MEDICINE

## 2024-05-20 PROCEDURE — 33361 REPLACE AORTIC VALVE PERQ: CPT | Mod: 62,Q0 | Performed by: THORACIC SURGERY (CARDIOTHORACIC VASCULAR SURGERY)

## 2024-05-20 PROCEDURE — 86900 BLOOD TYPING SEROLOGIC ABO: CPT

## 2024-05-20 PROCEDURE — 160002 HCHG RECOVERY MINUTES (STAT): Performed by: INTERNAL MEDICINE

## 2024-05-20 PROCEDURE — 160031 HCHG SURGERY MINUTES - 1ST 30 MINS LEVEL 5: Performed by: INTERNAL MEDICINE

## 2024-05-20 PROCEDURE — 02RF38Z REPLACEMENT OF AORTIC VALVE WITH ZOOPLASTIC TISSUE, PERCUTANEOUS APPROACH: ICD-10-PCS | Performed by: THORACIC SURGERY (CARDIOTHORACIC VASCULAR SURGERY)

## 2024-05-20 PROCEDURE — 36415 COLL VENOUS BLD VENIPUNCTURE: CPT

## 2024-05-20 PROCEDURE — 33361 REPLACE AORTIC VALVE PERQ: CPT | Mod: 62,Q0 | Performed by: INTERNAL MEDICINE

## 2024-05-20 PROCEDURE — 160036 HCHG PACU - EA ADDL 30 MINS PHASE I: Performed by: INTERNAL MEDICINE

## 2024-05-20 PROCEDURE — 93010 ELECTROCARDIOGRAM REPORT: CPT | Mod: 59 | Performed by: STUDENT IN AN ORGANIZED HEALTH CARE EDUCATION/TRAINING PROGRAM

## 2024-05-20 PROCEDURE — 93321 DOPPLER ECHO F-UP/LMTD STD: CPT

## 2024-05-20 PROCEDURE — 80053 COMPREHEN METABOLIC PANEL: CPT

## 2024-05-20 PROCEDURE — A9270 NON-COVERED ITEM OR SERVICE: HCPCS | Performed by: NURSE PRACTITIONER

## 2024-05-20 PROCEDURE — 110372 HCHG SHELL REV 278: Performed by: INTERNAL MEDICINE

## 2024-05-20 PROCEDURE — 85347 COAGULATION TIME ACTIVATED: CPT

## 2024-05-20 PROCEDURE — C1887 CATHETER, GUIDING: HCPCS | Performed by: INTERNAL MEDICINE

## 2024-05-20 PROCEDURE — B24BZZ4 ULTRASONOGRAPHY OF HEART WITH AORTA, TRANSESOPHAGEAL: ICD-10-PCS | Performed by: THORACIC SURGERY (CARDIOTHORACIC VASCULAR SURGERY)

## 2024-05-20 PROCEDURE — C1751 CATH, INF, PER/CENT/MIDLINE: HCPCS | Performed by: INTERNAL MEDICINE

## 2024-05-20 PROCEDURE — 83880 ASSAY OF NATRIURETIC PEPTIDE: CPT

## 2024-05-20 PROCEDURE — C1894 INTRO/SHEATH, NON-LASER: HCPCS | Performed by: INTERNAL MEDICINE

## 2024-05-20 PROCEDURE — 86850 RBC ANTIBODY SCREEN: CPT

## 2024-05-20 PROCEDURE — 85027 COMPLETE CBC AUTOMATED: CPT

## 2024-05-20 PROCEDURE — 700102 HCHG RX REV CODE 250 W/ 637 OVERRIDE(OP): Performed by: NURSE PRACTITIONER

## 2024-05-20 PROCEDURE — 160035 HCHG PACU - 1ST 60 MINS PHASE I: Performed by: INTERNAL MEDICINE

## 2024-05-20 PROCEDURE — C1769 GUIDE WIRE: HCPCS | Performed by: INTERNAL MEDICINE

## 2024-05-20 PROCEDURE — C1883 ADAPT/EXT, PACING/NEURO LEAD: HCPCS | Performed by: INTERNAL MEDICINE

## 2024-05-20 DEVICE — IMPLANTABLE DEVICE: Type: IMPLANTABLE DEVICE | Site: HEART | Status: FUNCTIONAL

## 2024-05-20 RX ORDER — DIPHENHYDRAMINE HYDROCHLORIDE 50 MG/ML
25 INJECTION INTRAMUSCULAR; INTRAVENOUS EVERY 6 HOURS PRN
Status: DISCONTINUED | OUTPATIENT
Start: 2024-05-20 | End: 2024-05-21 | Stop reason: HOSPADM

## 2024-05-20 RX ORDER — DEXAMETHASONE SODIUM PHOSPHATE 4 MG/ML
INJECTION, SOLUTION INTRA-ARTICULAR; INTRALESIONAL; INTRAMUSCULAR; INTRAVENOUS; SOFT TISSUE PRN
Status: DISCONTINUED | OUTPATIENT
Start: 2024-05-20 | End: 2024-05-20 | Stop reason: SURG

## 2024-05-20 RX ORDER — LIDOCAINE HYDROCHLORIDE 20 MG/ML
INJECTION, SOLUTION EPIDURAL; INFILTRATION; INTRACAUDAL; PERINEURAL PRN
Status: DISCONTINUED | OUTPATIENT
Start: 2024-05-20 | End: 2024-05-20 | Stop reason: SURG

## 2024-05-20 RX ORDER — AMOXICILLIN 250 MG
1 CAPSULE ORAL
Status: DISCONTINUED | OUTPATIENT
Start: 2024-05-20 | End: 2024-05-21 | Stop reason: HOSPADM

## 2024-05-20 RX ORDER — HYDRALAZINE HYDROCHLORIDE 20 MG/ML
5 INJECTION INTRAMUSCULAR; INTRAVENOUS
Status: DISCONTINUED | OUTPATIENT
Start: 2024-05-20 | End: 2024-05-20 | Stop reason: HOSPADM

## 2024-05-20 RX ORDER — ALLOPURINOL 100 MG/1
100 TABLET ORAL 2 TIMES DAILY
Status: DISCONTINUED | OUTPATIENT
Start: 2024-05-20 | End: 2024-05-21 | Stop reason: HOSPADM

## 2024-05-20 RX ORDER — CEFAZOLIN SODIUM 1 G/3ML
INJECTION, POWDER, FOR SOLUTION INTRAMUSCULAR; INTRAVENOUS PRN
Status: DISCONTINUED | OUTPATIENT
Start: 2024-05-20 | End: 2024-05-20 | Stop reason: SURG

## 2024-05-20 RX ORDER — OXYCODONE HCL 5 MG/5 ML
5 SOLUTION, ORAL ORAL
Status: DISCONTINUED | OUTPATIENT
Start: 2024-05-20 | End: 2024-05-20 | Stop reason: HOSPADM

## 2024-05-20 RX ORDER — LIDOCAINE HYDROCHLORIDE 20 MG/ML
INJECTION, SOLUTION INFILTRATION; PERINEURAL
Status: DISCONTINUED | OUTPATIENT
Start: 2024-05-20 | End: 2024-05-20 | Stop reason: HOSPADM

## 2024-05-20 RX ORDER — MIDAZOLAM HYDROCHLORIDE 1 MG/ML
INJECTION INTRAMUSCULAR; INTRAVENOUS PRN
Status: DISCONTINUED | OUTPATIENT
Start: 2024-05-20 | End: 2024-05-20 | Stop reason: SURG

## 2024-05-20 RX ORDER — BUPIVACAINE HYDROCHLORIDE 2.5 MG/ML
INJECTION, SOLUTION EPIDURAL; INFILTRATION; INTRACAUDAL
Status: DISCONTINUED | OUTPATIENT
Start: 2024-05-20 | End: 2024-05-20 | Stop reason: HOSPADM

## 2024-05-20 RX ORDER — BISACODYL 10 MG
10 SUPPOSITORY, RECTAL RECTAL
Status: DISCONTINUED | OUTPATIENT
Start: 2024-05-20 | End: 2024-05-21 | Stop reason: HOSPADM

## 2024-05-20 RX ORDER — ATORVASTATIN CALCIUM 40 MG/1
40 TABLET, FILM COATED ORAL NIGHTLY
Status: DISCONTINUED | OUTPATIENT
Start: 2024-05-20 | End: 2024-05-21 | Stop reason: HOSPADM

## 2024-05-20 RX ORDER — HYDROMORPHONE HYDROCHLORIDE 1 MG/ML
0.1 INJECTION, SOLUTION INTRAMUSCULAR; INTRAVENOUS; SUBCUTANEOUS
Status: DISCONTINUED | OUTPATIENT
Start: 2024-05-20 | End: 2024-05-20 | Stop reason: HOSPADM

## 2024-05-20 RX ORDER — FUROSEMIDE 10 MG/ML
40 INJECTION INTRAMUSCULAR; INTRAVENOUS
Status: DISCONTINUED | OUTPATIENT
Start: 2024-05-21 | End: 2024-05-21 | Stop reason: HOSPADM

## 2024-05-20 RX ORDER — OMEPRAZOLE 20 MG/1
20 CAPSULE, DELAYED RELEASE ORAL DAILY
Status: DISCONTINUED | OUTPATIENT
Start: 2024-05-21 | End: 2024-05-21 | Stop reason: HOSPADM

## 2024-05-20 RX ORDER — DIPHENHYDRAMINE HYDROCHLORIDE 50 MG/ML
12.5 INJECTION INTRAMUSCULAR; INTRAVENOUS
Status: DISCONTINUED | OUTPATIENT
Start: 2024-05-20 | End: 2024-05-20 | Stop reason: HOSPADM

## 2024-05-20 RX ORDER — ONDANSETRON 2 MG/ML
4 INJECTION INTRAMUSCULAR; INTRAVENOUS EVERY 4 HOURS PRN
Status: DISCONTINUED | OUTPATIENT
Start: 2024-05-20 | End: 2024-05-21 | Stop reason: HOSPADM

## 2024-05-20 RX ORDER — HYDROMORPHONE HYDROCHLORIDE 1 MG/ML
0.2 INJECTION, SOLUTION INTRAMUSCULAR; INTRAVENOUS; SUBCUTANEOUS
Status: DISCONTINUED | OUTPATIENT
Start: 2024-05-20 | End: 2024-05-20 | Stop reason: HOSPADM

## 2024-05-20 RX ORDER — POTASSIUM CHLORIDE 20 MEQ/1
20 TABLET, EXTENDED RELEASE ORAL DAILY
Status: DISCONTINUED | OUTPATIENT
Start: 2024-05-21 | End: 2024-05-21 | Stop reason: HOSPADM

## 2024-05-20 RX ORDER — LOSARTAN POTASSIUM 50 MG/1
50 TABLET ORAL 2 TIMES DAILY
Status: DISCONTINUED | OUTPATIENT
Start: 2024-05-20 | End: 2024-05-21 | Stop reason: HOSPADM

## 2024-05-20 RX ORDER — HYDRALAZINE HYDROCHLORIDE 20 MG/ML
10 INJECTION INTRAMUSCULAR; INTRAVENOUS
Status: DISCONTINUED | OUTPATIENT
Start: 2024-05-20 | End: 2024-05-21 | Stop reason: HOSPADM

## 2024-05-20 RX ORDER — HYDROMORPHONE HYDROCHLORIDE 1 MG/ML
0.4 INJECTION, SOLUTION INTRAMUSCULAR; INTRAVENOUS; SUBCUTANEOUS
Status: DISCONTINUED | OUTPATIENT
Start: 2024-05-20 | End: 2024-05-20 | Stop reason: HOSPADM

## 2024-05-20 RX ORDER — HALOPERIDOL 5 MG/ML
1 INJECTION INTRAMUSCULAR
Status: DISCONTINUED | OUTPATIENT
Start: 2024-05-20 | End: 2024-05-20 | Stop reason: HOSPADM

## 2024-05-20 RX ORDER — DOCUSATE SODIUM 100 MG/1
100 CAPSULE, LIQUID FILLED ORAL 2 TIMES DAILY
Status: DISCONTINUED | OUTPATIENT
Start: 2024-05-20 | End: 2024-05-21 | Stop reason: HOSPADM

## 2024-05-20 RX ORDER — ENEMA 19; 7 G/133ML; G/133ML
1 ENEMA RECTAL
Status: DISCONTINUED | OUTPATIENT
Start: 2024-05-20 | End: 2024-05-21 | Stop reason: HOSPADM

## 2024-05-20 RX ORDER — ACETAMINOPHEN 325 MG/1
650 TABLET ORAL EVERY 6 HOURS PRN
Status: DISCONTINUED | OUTPATIENT
Start: 2024-05-20 | End: 2024-05-21 | Stop reason: HOSPADM

## 2024-05-20 RX ORDER — SODIUM CHLORIDE, SODIUM LACTATE, POTASSIUM CHLORIDE, CALCIUM CHLORIDE 600; 310; 30; 20 MG/100ML; MG/100ML; MG/100ML; MG/100ML
INJECTION, SOLUTION INTRAVENOUS CONTINUOUS
Status: DISCONTINUED | OUTPATIENT
Start: 2024-05-20 | End: 2024-05-20

## 2024-05-20 RX ORDER — DIPHENHYDRAMINE HCL 25 MG
25 TABLET ORAL NIGHTLY PRN
Status: DISCONTINUED | OUTPATIENT
Start: 2024-05-20 | End: 2024-05-21 | Stop reason: HOSPADM

## 2024-05-20 RX ORDER — AMOXICILLIN 250 MG
1 CAPSULE ORAL NIGHTLY
Status: DISCONTINUED | OUTPATIENT
Start: 2024-05-20 | End: 2024-05-21 | Stop reason: HOSPADM

## 2024-05-20 RX ORDER — SODIUM CHLORIDE, SODIUM LACTATE, POTASSIUM CHLORIDE, CALCIUM CHLORIDE 600; 310; 30; 20 MG/100ML; MG/100ML; MG/100ML; MG/100ML
INJECTION, SOLUTION INTRAVENOUS
Status: DISCONTINUED | OUTPATIENT
Start: 2024-05-20 | End: 2024-05-20 | Stop reason: SURG

## 2024-05-20 RX ORDER — MEPERIDINE HYDROCHLORIDE 25 MG/ML
12.5 INJECTION INTRAMUSCULAR; INTRAVENOUS; SUBCUTANEOUS
Status: DISCONTINUED | OUTPATIENT
Start: 2024-05-20 | End: 2024-05-20 | Stop reason: HOSPADM

## 2024-05-20 RX ORDER — ONDANSETRON 2 MG/ML
INJECTION INTRAMUSCULAR; INTRAVENOUS PRN
Status: DISCONTINUED | OUTPATIENT
Start: 2024-05-20 | End: 2024-05-20 | Stop reason: SURG

## 2024-05-20 RX ORDER — OXYCODONE HCL 5 MG/5 ML
10 SOLUTION, ORAL ORAL
Status: DISCONTINUED | OUTPATIENT
Start: 2024-05-20 | End: 2024-05-20 | Stop reason: HOSPADM

## 2024-05-20 RX ORDER — PROTAMINE SULFATE 10 MG/ML
INJECTION, SOLUTION INTRAVENOUS PRN
Status: DISCONTINUED | OUTPATIENT
Start: 2024-05-20 | End: 2024-05-20 | Stop reason: SURG

## 2024-05-20 RX ORDER — SODIUM CHLORIDE 9 MG/ML
INJECTION, SOLUTION INTRAVENOUS CONTINUOUS
Status: ACTIVE | OUTPATIENT
Start: 2024-05-20 | End: 2024-05-20

## 2024-05-20 RX ORDER — CLOPIDOGREL BISULFATE 75 MG/1
75 TABLET ORAL EVERY EVENING
Status: DISCONTINUED | OUTPATIENT
Start: 2024-05-20 | End: 2024-05-21 | Stop reason: HOSPADM

## 2024-05-20 RX ORDER — SODIUM CHLORIDE, SODIUM LACTATE, POTASSIUM CHLORIDE, CALCIUM CHLORIDE 600; 310; 30; 20 MG/100ML; MG/100ML; MG/100ML; MG/100ML
INJECTION, SOLUTION INTRAVENOUS CONTINUOUS
Status: DISCONTINUED | OUTPATIENT
Start: 2024-05-20 | End: 2024-05-20 | Stop reason: HOSPADM

## 2024-05-20 RX ORDER — POLYETHYLENE GLYCOL 3350 17 G/17G
1 POWDER, FOR SOLUTION ORAL 2 TIMES DAILY PRN
Status: DISCONTINUED | OUTPATIENT
Start: 2024-05-20 | End: 2024-05-21 | Stop reason: HOSPADM

## 2024-05-20 RX ADMIN — FENTANYL CITRATE 50 MCG: 50 INJECTION, SOLUTION INTRAMUSCULAR; INTRAVENOUS at 09:57

## 2024-05-20 RX ADMIN — PROPOFOL 150 MG: 10 INJECTION, EMULSION INTRAVENOUS at 09:02

## 2024-05-20 RX ADMIN — ALLOPURINOL 100 MG: 100 TABLET ORAL at 17:41

## 2024-05-20 RX ADMIN — FENTANYL CITRATE 50 MCG: 50 INJECTION, SOLUTION INTRAMUSCULAR; INTRAVENOUS at 09:10

## 2024-05-20 RX ADMIN — ATORVASTATIN CALCIUM 40 MG: 40 TABLET, FILM COATED ORAL at 20:46

## 2024-05-20 RX ADMIN — LOSARTAN POTASSIUM 50 MG: 50 TABLET, FILM COATED ORAL at 17:40

## 2024-05-20 RX ADMIN — ROCURONIUM BROMIDE 50 MG: 10 INJECTION, SOLUTION INTRAVENOUS at 09:02

## 2024-05-20 RX ADMIN — MIDAZOLAM HYDROCHLORIDE 2 MG: 1 INJECTION, SOLUTION INTRAMUSCULAR; INTRAVENOUS at 08:57

## 2024-05-20 RX ADMIN — APIXABAN 5 MG: 5 TABLET, FILM COATED ORAL at 17:41

## 2024-05-20 RX ADMIN — ACETAMINOPHEN 650 MG: 325 TABLET, FILM COATED ORAL at 14:42

## 2024-05-20 RX ADMIN — DEXAMETHASONE SODIUM PHOSPHATE 8 MG: 4 INJECTION INTRA-ARTICULAR; INTRALESIONAL; INTRAMUSCULAR; INTRAVENOUS; SOFT TISSUE at 09:02

## 2024-05-20 RX ADMIN — SODIUM CHLORIDE, POTASSIUM CHLORIDE, SODIUM LACTATE AND CALCIUM CHLORIDE: 600; 310; 30; 20 INJECTION, SOLUTION INTRAVENOUS at 08:57

## 2024-05-20 RX ADMIN — BENZOCAINE AND MENTHOL 1 LOZENGE: 15; 3.6 LOZENGE ORAL at 11:05

## 2024-05-20 RX ADMIN — SODIUM CHLORIDE, POTASSIUM CHLORIDE, SODIUM LACTATE AND CALCIUM CHLORIDE: 600; 310; 30; 20 INJECTION, SOLUTION INTRAVENOUS at 07:41

## 2024-05-20 RX ADMIN — ONDANSETRON 8 MG: 2 INJECTION INTRAMUSCULAR; INTRAVENOUS at 09:54

## 2024-05-20 RX ADMIN — LIDOCAINE HYDROCHLORIDE 100 MG: 20 INJECTION, SOLUTION EPIDURAL; INFILTRATION; INTRACAUDAL at 09:02

## 2024-05-20 RX ADMIN — PROTAMINE SULFATE 50 MG: 10 INJECTION, SOLUTION INTRAVENOUS at 09:55

## 2024-05-20 RX ADMIN — SUGAMMADEX 200 MG: 100 INJECTION, SOLUTION INTRAVENOUS at 10:06

## 2024-05-20 RX ADMIN — SODIUM CHLORIDE: 9 INJECTION, SOLUTION INTRAVENOUS at 13:20

## 2024-05-20 RX ADMIN — CLOPIDOGREL BISULFATE 75 MG: 75 TABLET ORAL at 17:41

## 2024-05-20 RX ADMIN — BENZOCAINE AND MENTHOL 1 LOZENGE: 15; 3.6 LOZENGE ORAL at 13:39

## 2024-05-20 RX ADMIN — CEFAZOLIN 2 G: 1 INJECTION, POWDER, FOR SOLUTION INTRAMUSCULAR; INTRAVENOUS at 09:02

## 2024-05-20 SDOH — ECONOMIC STABILITY: TRANSPORTATION INSECURITY
IN THE PAST 12 MONTHS, HAS THE LACK OF TRANSPORTATION KEPT YOU FROM MEDICAL APPOINTMENTS OR FROM GETTING MEDICATIONS?: PATIENT DECLINED

## 2024-05-20 SDOH — ECONOMIC STABILITY: TRANSPORTATION INSECURITY
IN THE PAST 12 MONTHS, HAS LACK OF RELIABLE TRANSPORTATION KEPT YOU FROM MEDICAL APPOINTMENTS, MEETINGS, WORK OR FROM GETTING THINGS NEEDED FOR DAILY LIVING?: PATIENT DECLINED

## 2024-05-20 ASSESSMENT — SOCIAL DETERMINANTS OF HEALTH (SDOH)
WITHIN THE LAST YEAR, HAVE YOU BEEN HUMILIATED OR EMOTIONALLY ABUSED IN OTHER WAYS BY YOUR PARTNER OR EX-PARTNER?: NO
WITHIN THE LAST YEAR, HAVE TO BEEN RAPED OR FORCED TO HAVE ANY KIND OF SEXUAL ACTIVITY BY YOUR PARTNER OR EX-PARTNER?: NO
WITHIN THE LAST YEAR, HAVE YOU BEEN AFRAID OF YOUR PARTNER OR EX-PARTNER?: NO
WITHIN THE PAST 12 MONTHS, THE FOOD YOU BOUGHT JUST DIDN'T LAST AND YOU DIDN'T HAVE MONEY TO GET MORE: PATIENT DECLINED
IN THE PAST 12 MONTHS, HAS THE ELECTRIC, GAS, OIL, OR WATER COMPANY THREATENED TO SHUT OFF SERVICE IN YOUR HOME?: PATIENT DECLINED
WITHIN THE PAST 12 MONTHS, YOU WORRIED THAT YOUR FOOD WOULD RUN OUT BEFORE YOU GOT THE MONEY TO BUY MORE: PATIENT DECLINED
WITHIN THE LAST YEAR, HAVE YOU BEEN KICKED, HIT, SLAPPED, OR OTHERWISE PHYSICALLY HURT BY YOUR PARTNER OR EX-PARTNER?: NO

## 2024-05-20 ASSESSMENT — FIBROSIS 4 INDEX: FIB4 SCORE: 2.08

## 2024-05-20 ASSESSMENT — LIFESTYLE VARIABLES
HOW MANY TIMES IN THE PAST YEAR HAVE YOU HAD 5 OR MORE DRINKS IN A DAY: 0
HAVE YOU EVER FELT YOU SHOULD CUT DOWN ON YOUR DRINKING: NO
ON A TYPICAL DAY WHEN YOU DRINK ALCOHOL HOW MANY DRINKS DO YOU HAVE: 1
TOTAL SCORE: 0
HAVE PEOPLE ANNOYED YOU BY CRITICIZING YOUR DRINKING: NO
CONSUMPTION TOTAL: NEGATIVE
TOTAL SCORE: 0
ALCOHOL_USE: NO
TOTAL SCORE: 0
EVER FELT BAD OR GUILTY ABOUT YOUR DRINKING: NO
AVERAGE NUMBER OF DAYS PER WEEK YOU HAVE A DRINK CONTAINING ALCOHOL: 1
DOES PATIENT WANT TO STOP DRINKING: NO
EVER HAD A DRINK FIRST THING IN THE MORNING TO STEADY YOUR NERVES TO GET RID OF A HANGOVER: NO

## 2024-05-20 ASSESSMENT — PATIENT HEALTH QUESTIONNAIRE - PHQ9
SUM OF ALL RESPONSES TO PHQ9 QUESTIONS 1 AND 2: 0
2. FEELING DOWN, DEPRESSED, IRRITABLE, OR HOPELESS: NOT AT ALL
1. LITTLE INTEREST OR PLEASURE IN DOING THINGS: NOT AT ALL

## 2024-05-20 ASSESSMENT — PAIN DESCRIPTION - PAIN TYPE: TYPE: ACUTE PAIN;CHRONIC PAIN

## 2024-05-20 NOTE — ANESTHESIA TIME REPORT
Anesthesia Start and Stop Event Times       Date Time Event    5/20/2024 0839 Ready for Procedure     0857 Anesthesia Start     1017 Anesthesia Stop          Responsible Staff  05/20/24      Name Role Begin End    Ruben Price M.D. Anesth 0857 1017          Overtime Reason:  no overtime (within assigned shift)    Comments:

## 2024-05-20 NOTE — ANESTHESIA PROCEDURE NOTES
Airway    Date/Time: 5/20/2024 9:03 AM    Performed by: Ruben Price M.D.  Authorized by: Ruben Price M.D.    Location:  OR  Urgency:  Elective  Difficult Airway: No    Indications for Airway Management:  Anesthesia      Spontaneous Ventilation: absent    Sedation Level:  Deep  Preoxygenated: Yes    Patient Position:  Sniffing  Final Airway Type:  Endotracheal airway  Final Endotracheal Airway:  ETT  Cuffed: Yes    Technique Used for Successful ETT Placement:  Direct laryngoscopy  Devices/Methods Used in Placement:  Intubating stylet    Insertion Site:  Oral  Blade Type:  Lindsey  Laryngoscope Blade/Videolaryngoscope Blade Size:  4  ETT Size (mm):  7.5  Measured from:  Teeth  ETT to Teeth (cm):  23  Placement Verified by: auscultation and capnometry    Cormack-Lehane Classification:  Grade I - full view of glottis  Number of Attempts at Approach:  1

## 2024-05-20 NOTE — CARE PLAN
The patient is Stable - Low risk of patient condition declining or worsening    Shift Goals  Clinical Goals: cath sites and blood pressure, NIH  Patient Goals: rest and sore throat.    Progress made toward(s) clinical / shift goals:  NIH, neuro checks    Patient is not progressing towards the following goals:        Problem: Knowledge Deficit - Standard  Goal: Patient and family/care givers will demonstrate understanding of plan of care, disease process/condition, diagnostic tests and medications  Outcome: Progressing     Problem: Pain - Standard  Goal: Alleviation of pain or a reduction in pain to the patient’s comfort goal  Outcome: Progressing        Problem: Day of surgery post CABG/Heart valve replacement  Goal: Stabilization in immediate post op period  5/20/2024 1553 by Jacques Frias R.N.  Outcome: Progressing  5/20/2024 1551 by Jacques Frias R.N.  Outcome: Progressing  Intervention: Clear liquids post extubation, order carbohydrate free (post cardiac surgery) diet, advance as tolerated  Note: Pt ok to take fluids and is swallowing fine after [place] test. Pt passed and is taking in oral.Neuro checks are unchanged and pt has  KARIN yepez in place.

## 2024-05-20 NOTE — PROGRESS NOTES
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

## 2024-05-20 NOTE — ANESTHESIA PREPROCEDURE EVALUATION
Case: [id number] Anesthesia Start Date/Time: 05/20/24 0857    Procedures:       TRANSCATHETER AORTIC VALVE REPLACEMENT (Bilateral: Groin) - Site also includes right wrist, site clean, closure N/A      ECHOCARDIOGRAM, TRANSESOPHAGEAL, INTRAOPERATIVE - ATTEMPTED (Throat)      ECHOCARDIOGRAM, TRANSTHORACIC (Chest)    Anesthesia type: general    Pre-op diagnosis: SEVERE AORTIC STENOSIS    Location: [place] [address] / SURGERY [facility]    Surgeons: Riley Rowland M.D.          Severe Aortic Stenosis    Relevant Problems   ANESTHESIA   (positive) Obstructive sleep apnea syndrome      NEURO   (positive) Cryptogenic stroke (HCC)      CARDIAC   (positive) Aortic stenosis   (positive) Coronary artery disease involving native coronary artery of native heart without angina pectoris   (positive) Essential hypertension   (positive) Left anterior fascicular hemiblock   (positive) PAF (paroxysmal atrial fibrillation) (HCC)   (positive) Thoracic aortic ectasia (HCC)      Other   (positive) Anticoagulated   (positive) Dyslipidemia   (positive) History of arterial ischemic stroke   (positive) Hyperlipidemia LDL goal <70   (positive) Secondary hypercoagulable state (HCC)   (positive) Zenker diverticulum       Physical Exam    Airway   Mallampati: II  TM distance: >3 FB  Neck ROM: full       Cardiovascular - normal exam  Rhythm: regular  Rate: normal  (-) murmur     Dental   (+) upper dentures           Pulmonary - normal exam  Breath sounds clear to auscultation     Abdominal    Neurological - normal exam                   Anesthesia Plan    ASA 3   ASA physical status 3 criteria: a thrombophilic disease requiring anticoagulation    Plan - general       Airway plan will be ETT  GOKUL Planned        Induction: intravenous    Postoperative Plan: Postoperative administration of opioids is intended.    Pertinent diagnostic labs and testing reviewed    Informed Consent:    Anesthetic plan and risks discussed with patient and spouse.    Use  of blood products discussed with: patient and spouse whom.

## 2024-05-20 NOTE — OR NURSING
Patient received from OR at 1014, bedside report received from anesthesia/OR RN, 2 patient identifiers confirmed . Patient connected to monitors, assessed for general head to toe and pain/nausea. Ordered reviewed and checked. Wife updated via telephone on patient status.  At this time patient meets criteria for D/C to phase II/room. VSS, awake and appropriate, pain minimal or at a tolerable level per patient. Report called to Jacques WHITE and patient taken to Tele 8 on monitor.

## 2024-05-20 NOTE — OR SURGEON
OPERATIVE REPORT    Operation date: 5/20/2024    PreOp Diagnosis: Severe symptomatic aortic stenosis    PostOp Diagnosis: Severe symptomatic aortic stenosis    Procedure(s):  TRANSCATHETER AORTIC VALVE REPLACEMENT (TAVR 26 mm S3 Ultra Resilia Crawford bovine pericardial valve), transthoracic echocardiography    Surgeon(s):  NILO Abbott M.D.    Anesthesiologist/Type of Anesthesia:  Anesthesiologist: Ruben Price M.D.  Perfusionist: Mirna Oh/General    Surgical Staff:  Circulator: Jose M Marquez R.N.; Chace Navarro R.N.  Scrub Person: Roberta Marcus  Radiology Technologist: Lucinda Coyne; Oscar Arguello  Cardiology Nurse: Peace Davis R.N.    Specimens removed if any: None    Estimated Blood Loss: Minimal    Findings: Severe aortic stenosis    Complications: None    Indications:  Mr. Ortiz is an 84-year-old male with severe symptomatic aortic stenosis.    Procedure:  The patient was brought to the operating room and placed on the operating room table in the supine position.  After successful induction of general anesthesia endotracheal intubation, the patient was prepped and draped in the usual sterile fashion.  Ultrasound-guided right radial arterial, right femoral arterial and left femoral venous access was obtained.  A temporary transvenous pacemaker was placed in the right ventricle and a pigtail catheter in the right coronary sinus.  The deployment angle was determined.  A 1 cm incision was made in the right groin and 2 Perclose sutures were deployed.  A 14 Arabic eSheath was then placed in the right common femoral artery and its tip was positioned in the abdominal aorta.  The aortic valve was crossed with a wire.  A deployment catheter with a 26 mm S3 Ultra Resilia Crawford bovine pericardial valve at its tip was positioned across the aortic annulus.  The implantation balloon was inflated to a peak pressure of 7 damien.  Wires and catheters were removed.   Transthoracic echocardiography did not show any paravalvular or central leaks.  The right femoral eSheath was removed and the Perclose sutures were tightened down.  The remainder of the operation will be dictated by Dr. Rowland.  There were no apparent complications.  The patient tolerated the procedure well and left the operating room in stable condition.      5/20/2024 10:08 AM Calista Watts MD, FACS

## 2024-05-20 NOTE — PROGRESS NOTES
Pt arrived to floor, vitals taken, pulses checked and neuro perferomed. Pt does have right facial droop and cannot raise eybrow. When discussed the Ema Easton, she described this was seen previously.   Pt resting flat and will remain flat until 3pm. No new deficits.

## 2024-05-20 NOTE — ANESTHESIA POSTPROCEDURE EVALUATION
Patient: Chuck Ortiz    Procedure Summary       Date: 05/20/24 Room / Location: [place] [address] / SURGERY [facility]    Anesthesia Start: 0857 Anesthesia Stop: 1017    Procedures:       TRANSCATHETER AORTIC VALVE REPLACEMENT (Bilateral: Groin)      ECHOCARDIOGRAM, TRANSESOPHAGEAL, INTRAOPERATIVE - ATTEMPTED (Throat)      ECHOCARDIOGRAM, TRANSTHORACIC (Chest) Diagnosis: (SEVERE AORTIC STENOSIS)    Surgeons: Riley Rowland M.D. Responsible Provider: Ruben Price M.D.    Anesthesia Type: general ASA Status: 3            Final Anesthesia Type: general  Last vitals  BP   Blood Pressure : (!) 143/78 (RN notified)    Temp   36.5 °C (97.7 °F)    Pulse   70   Resp   (!) 22    SpO2   92 %      Anesthesia Post Evaluation    Patient location during evaluation: PACU  Level of consciousness: awake and alert    Airway patency: patent  Anesthetic complications: no  Cardiovascular status: hemodynamically stable  Respiratory status: acceptable  Hydration status: euvolemic    PONV: none          There were no known notable events for this encounter.     Nurse Pain Score: 0 (NPRS)

## 2024-05-20 NOTE — PROCEDURES
DATE OF PROCEDURE: 5/20/24    REFERRING PHYSICIAN:     PROCEDURES performed:  1. Transcatheter aortic valve replacement.  2. Insertion and removal of temporary pacer wire    PRE PROCEDURAL DIAGNOSIS:  1. Severe symptomatic aortic stenosis, NYHA III.  2. Acute on chronic diastolic heart failure    Pot procedure diagnosis:  Successful transcatheter aortic valve replacement  Acute on chronic diastolic heart failure, as evident by elevated LVEDP 24 mmHg.    Operators:  CT Surgeon:   Interventional cardiologist: Dr. Rowland    DESCRIPTION OF PROCEDURE:  After informed consent was signed by the   patient, the patient was brought into the operating room, was prepped and draped in   usual sterile manner.  Timeout was performed.   General anesthesia and   transthoracicechocardiogram was provided by . GOKUL probe could not be advanced per .    Primary Arterial access:  Right femoral artery was cannulated using modified Seldinger technique under ultrasound guidance, a 6 Honduran sheath was inserted.  2 Perclose sutures were placed, arteriotomy was serially dilated,14 Fr sheath was inserted in the femoral artery over a stiff Lunderquist wire.    Secondary arterial access:  Right radial artery was cannulated with 6 Honduran sheath.  A pigtail catheter was advanced through 6 Honduran sheath, aortic root angiogram was performed to determine coplanar view.    Venous Access:  Left femoral vein was cannulated with 6 Honduran sheath, a temporary pacer wire was advanced to RV apex in usual fashion.    Through Crawford self-expandable sheath, AL-1 catheter was advanced to aortic root, straight wire was used to cross the aortic valve, a stiff Amplatz wire was placed in the LV apex and AL-1 catheter was withdrawn. 26 mm Crawford Erich 3 zina was prepped in usual fashion, orientation was confirmed.  Sapient 3 valve was slowly advanced over a stiff Amplatz wire to aortic position.  Rapid pacing was achieved  using temporary pacer wire, aortic root angiogram was performed and after confirming good positioning, valve was slowly deployed under fluoroscopic guidance.Post procedure echocardiogram showed no significant paravalvular leak, good valve positioning. LVEDP was measured after valve deployment, which was 24 mmHg.  The patient tolerated the procedure well. At the end of procedure, all pacemaker wire,   pigtail catheters and sheaths were removed.  The primary arterial access site was closed with the PreClose system.  The secondary arterial access was closed via vasc-band. The patient was then extubated and transferred to PACU in stable condition.    Complications: none  Specimens: none  Estimated blood loss: <50cc      IMPRESSION:  Successful transcatheter aortic valve replacement using 26 mm Crawford jorge 3 Ultra resilia valve    RECOMMENDATION:   Guideline directed medical therapy.    Riley Rowland  Interventional cardiologist

## 2024-05-20 NOTE — PROGRESS NOTES
Med Rec complete per pt  Allergies Reviewed    Pt takes ELIQUIS and PLAVIX  Last doses of ELIQUIS 5/17, last dose of PLAVIX 5/19

## 2024-05-20 NOTE — ANESTHESIA PROCEDURE NOTES
Arterial Line    Performed by: Ruben Price M.D.  Authorized by: Ruben Price M.D.    Start Time:  5/20/2024 9:07 AM  End Time:  5/20/2024 9:10 AM  Localization: ultrasound guidance  Image captured, interpreted and electronically stored.  Patient Location:  OR  Indication: continuous blood pressure monitoring and blood sampling needed        Catheter Size:  20 G  Seldinger Technique?: Yes    Laterality:  Left  Site:  Radial artery  Line Secured:  Antimicrobial disc, tape and transparent dressing  Events: patient tolerated procedure well with no complications

## 2024-05-21 ENCOUNTER — APPOINTMENT (OUTPATIENT)
Dept: RADIOLOGY | Facility: MEDICAL CENTER | Age: 84
DRG: 266 | End: 2024-05-21
Attending: NURSE PRACTITIONER
Payer: MEDICARE

## 2024-05-21 VITALS
DIASTOLIC BLOOD PRESSURE: 63 MMHG | OXYGEN SATURATION: 89 % | WEIGHT: 207.67 LBS | BODY MASS INDEX: 28.13 KG/M2 | HEART RATE: 67 BPM | RESPIRATION RATE: 18 BRPM | SYSTOLIC BLOOD PRESSURE: 119 MMHG | HEIGHT: 72 IN | TEMPERATURE: 97.9 F

## 2024-05-21 LAB
ALBUMIN SERPL BCP-MCNC: 3.3 G/DL (ref 3.2–4.9)
ALBUMIN/GLOB SERPL: 1.5 G/DL
ALP SERPL-CCNC: 105 U/L (ref 30–99)
ALT SERPL-CCNC: 17 U/L (ref 2–50)
ANION GAP SERPL CALC-SCNC: 10 MMOL/L (ref 7–16)
AST SERPL-CCNC: 18 U/L (ref 12–45)
BILIRUB SERPL-MCNC: 0.6 MG/DL (ref 0.1–1.5)
BUN SERPL-MCNC: 24 MG/DL (ref 8–22)
CALCIUM ALBUM COR SERPL-MCNC: 9.3 MG/DL (ref 8.5–10.5)
CALCIUM SERPL-MCNC: 8.7 MG/DL (ref 8.5–10.5)
CHLORIDE SERPL-SCNC: 105 MMOL/L (ref 96–112)
CO2 SERPL-SCNC: 25 MMOL/L (ref 20–33)
CREAT SERPL-MCNC: 0.75 MG/DL (ref 0.5–1.4)
EKG IMPRESSION: NORMAL
ERYTHROCYTE [DISTWIDTH] IN BLOOD BY AUTOMATED COUNT: 50.4 FL (ref 35.9–50)
GFR SERPLBLD CREATININE-BSD FMLA CKD-EPI: 88 ML/MIN/1.73 M 2
GLOBULIN SER CALC-MCNC: 2.2 G/DL (ref 1.9–3.5)
GLUCOSE SERPL-MCNC: 126 MG/DL (ref 65–99)
HCT VFR BLD AUTO: 46.4 % (ref 42–52)
HGB BLD-MCNC: 15.2 G/DL (ref 14–18)
MCH RBC QN AUTO: 31.3 PG (ref 27–33)
MCHC RBC AUTO-ENTMCNC: 32.8 G/DL (ref 32.3–36.5)
MCV RBC AUTO: 95.7 FL (ref 81.4–97.8)
NT-PROBNP SERPL IA-MCNC: 739 PG/ML (ref 0–125)
PLATELET # BLD AUTO: 141 K/UL (ref 164–446)
PMV BLD AUTO: 11.6 FL (ref 9–12.9)
POTASSIUM SERPL-SCNC: 4.5 MMOL/L (ref 3.6–5.5)
PROT SERPL-MCNC: 5.5 G/DL (ref 6–8.2)
RBC # BLD AUTO: 4.85 M/UL (ref 4.7–6.1)
SODIUM SERPL-SCNC: 140 MMOL/L (ref 135–145)
WBC # BLD AUTO: 12.8 K/UL (ref 4.8–10.8)

## 2024-05-21 PROCEDURE — 71045 X-RAY EXAM CHEST 1 VIEW: CPT

## 2024-05-21 PROCEDURE — 99239 HOSP IP/OBS DSCHRG MGMT >30: CPT | Mod: FS | Performed by: INTERNAL MEDICINE

## 2024-05-21 PROCEDURE — 97162 PT EVAL MOD COMPLEX 30 MIN: CPT

## 2024-05-21 PROCEDURE — 83880 ASSAY OF NATRIURETIC PEPTIDE: CPT

## 2024-05-21 PROCEDURE — 93005 ELECTROCARDIOGRAM TRACING: CPT | Performed by: NURSE PRACTITIONER

## 2024-05-21 PROCEDURE — 700102 HCHG RX REV CODE 250 W/ 637 OVERRIDE(OP): Performed by: NURSE PRACTITIONER

## 2024-05-21 PROCEDURE — A9270 NON-COVERED ITEM OR SERVICE: HCPCS | Performed by: NURSE PRACTITIONER

## 2024-05-21 PROCEDURE — 700111 HCHG RX REV CODE 636 W/ 250 OVERRIDE (IP): Mod: JZ | Performed by: NURSE PRACTITIONER

## 2024-05-21 PROCEDURE — 36415 COLL VENOUS BLD VENIPUNCTURE: CPT

## 2024-05-21 PROCEDURE — 97535 SELF CARE MNGMENT TRAINING: CPT

## 2024-05-21 PROCEDURE — 93010 ELECTROCARDIOGRAM REPORT: CPT | Performed by: INTERNAL MEDICINE

## 2024-05-21 PROCEDURE — 80053 COMPREHEN METABOLIC PANEL: CPT

## 2024-05-21 PROCEDURE — 85027 COMPLETE CBC AUTOMATED: CPT

## 2024-05-21 RX ADMIN — OMEPRAZOLE 20 MG: 20 CAPSULE, DELAYED RELEASE ORAL at 05:19

## 2024-05-21 RX ADMIN — ALLOPURINOL 100 MG: 100 TABLET ORAL at 05:19

## 2024-05-21 RX ADMIN — POTASSIUM CHLORIDE 20 MEQ: 1500 TABLET, EXTENDED RELEASE ORAL at 05:19

## 2024-05-21 RX ADMIN — APIXABAN 5 MG: 5 TABLET, FILM COATED ORAL at 05:19

## 2024-05-21 RX ADMIN — FUROSEMIDE 40 MG: 10 INJECTION INTRAMUSCULAR; INTRAVENOUS at 05:19

## 2024-05-21 RX ADMIN — LOSARTAN POTASSIUM 50 MG: 50 TABLET, FILM COATED ORAL at 05:19

## 2024-05-21 ASSESSMENT — COGNITIVE AND FUNCTIONAL STATUS - GENERAL
MOVING TO AND FROM BED TO CHAIR: A LITTLE
DRESSING REGULAR LOWER BODY CLOTHING: A LITTLE
SUGGESTED CMS G CODE MODIFIER DAILY ACTIVITY: CJ
WALKING IN HOSPITAL ROOM: A LITTLE
DAILY ACTIVITIY SCORE: 22
SUGGESTED CMS G CODE MODIFIER MOBILITY: CJ
MOBILITY SCORE: 21
MOBILITY SCORE: 24
HELP NEEDED FOR BATHING: A LITTLE
STANDING UP FROM CHAIR USING ARMS: A LITTLE
SUGGESTED CMS G CODE MODIFIER MOBILITY: CH

## 2024-05-21 ASSESSMENT — GAIT ASSESSMENTS
GAIT LEVEL OF ASSIST: SUPERVISED
DISTANCE (FEET): 200
DEVIATION: INCREASED BASE OF SUPPORT;BRADYKINETIC;DECREASED HEEL STRIKE;DECREASED TOE OFF

## 2024-05-21 ASSESSMENT — FIBROSIS 4 INDEX: FIB4 SCORE: 2.63

## 2024-05-21 NOTE — CARE PLAN
The patient is Stable - Low risk of patient condition declining or worsening    Shift Goals  Clinical Goals: Cath site assessment  Patient Goals: Sleep    Progress made toward(s) clinical / shift goals:    Problem: Knowledge Deficit - Standard  Goal: Patient and family/care givers will demonstrate understanding of plan of care, disease process/condition, diagnostic tests and medications  Outcome: Progressing     Problem: Pain - Standard  Goal: Alleviation of pain or a reduction in pain to the patient’s comfort goal  Outcome: Progressing  Flowsheets (Taken 5/20/2024 2249)  Pain Rating Scale (NPRS): 0  Note: Pt denies pain at this time.      Problem: Day of surgery post CABG/Heart valve replacement  Goal: Stabilization in immediate post op period  Outcome: Progressing  Note: All cath sites are clean, dry and intact with no hematomas present. Pt is not using wrists as educated by RN.        Patient is not progressing towards the following goals:

## 2024-05-21 NOTE — PROGRESS NOTES
Monitor Summary  Rhythm: Sinus Rhythm 1st degree HB  Rate: 70-80  Ectopy: PVC  .35 / .10 / .44

## 2024-05-21 NOTE — DISCHARGE SUMMARY
PRIMARY DISCHARGE DIAGNOSIS: Status post transcatheter aortic valve replacement.    DISCHARGE DIAGNOSIS:  S/P TAVR    PROCEDURES/TESTIN. Successful transcatheter aortic valve replacement (TAVR) with #26 mm Crawford Erich 3 ultra Resilia valve, transfemoral approach under general anesthesia on 24  2. Intraoperative transesophageal echocardiogram showing results pending  3. CXR on 24 showing   1.  Pulmonary edema and/or infiltrates  2.  Trace bilateral pleural effusion  3.  Cardiomegaly  4.  Atherosclerosis  4. EKG on 24 showing SR rate of 64 bpm with first degree AV block   5. Labs   Lab Results   Component Value Date/Time    SODIUM 140 2024 02:01 AM    POTASSIUM 4.5 2024 02:01 AM    CHLORIDE 105 2024 02:01 AM    CO2 25 2024 02:01 AM    GLUCOSE 126 (H) 2024 02:01 AM    BUN 24 (H) 2024 02:01 AM    CREATININE 0.75 2024 02:01 AM    CREATININE 1.10 2011 11:20 AM    BUNCREATRAT 23 (H) 2011 11:20 AM    GLOMRATE >59 2011 11:20 AM       Lab Results   Component Value Date/Time    PROTHROMBTM 15.5 (H) 2024 10:19 AM    INR 1.22 (H) 2024 10:19 AM       Lab Results   Component Value Date/Time    WBC 12.8 (H) 2024 02:01 AM    RBC 4.85 2024 02:01 AM    HEMOGLOBIN 15.2 2024 02:01 AM    HEMATOCRIT 46.4 2024 02:01 AM    MCV 95.7 2024 02:01 AM    MCH 31.3 2024 02:01 AM    MCHC 32.8 2024 02:01 AM    MPV 11.6 2024 02:01 AM    NEUTSPOLYS 59.10 2024 10:19 AM    LYMPHOCYTES 23.70 2024 10:19 AM    MONOCYTES 12.70 2024 10:19 AM    EOSINOPHILS 3.40 2024 10:19 AM    BASOPHILS 0.60 2024 10:19 AM       HOSPITAL COURSE: The patient is a pleasant 84 year old male with severe symptomatic aortic stenosis, acute on chronic diastolic heart failure due to valvular heart disease, HLD with CAD with recent PCI to LAD (24), PAF on chronic anticoagulation, thoracic aortic ectasia, LOVE  on CPAP, HTN, and prior arterial ischemic stroke in '18 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 Due to the patient's symptoms, the patient underwent successful TAVR described as above. Post-procedure, the patient did well. They did require IV diuresis during their stay and but will not continue on oral diuretics post-operatively due to weakness related to dehydration. [facility] documentation/ Acute heart failure exacerbation as evidenced by: signs and symptoms of PAL, weakness/fatigue, bilateral lower extremity edema; Echocardiogram showing moderate valvular stenosis; corroborated by elevated BNP of 739, pulmonary edema and trace pleural effusion on chest x-ray, and elevated intra-operative LVEDP of 26 mm. They were able to ambulate with PT with no concerns other than his pre-existing right sided deficits requiring need for front wheel walker for long distances. No further events were noted during their stay. They are now off oxygen and are to be discharged to home with his wife Kezia.    DISCHARGE MEDICATIONS:      Medication List        CHANGE how you take these medications        Instructions   allopurinol 100 MG Tabs  What changed: when to take this  Commonly known as: Zyloprim   TAKE 1 TABLET BY MOUTH TWICE A DAY     Eliquis 5 MG Tabs  What changed: how much to take  Generic drug: apixaban   TAKE 1 TABLET BY MOUTH TWICE A DAY            CONTINUE taking these medications        Instructions   atorvastatin 40 MG Tabs  Commonly known as: Lipitor   Take 1 Tablet by mouth every evening.  Dose: 40 mg     clopidogrel 75 MG Tabs  Commonly known as: Plavix   Take 1 Tablet by mouth every day.  Dose: 75 mg     losartan 50 MG Tabs  Commonly known as: Cozaar   Take 1 Tablet by mouth 2 times a day.  Dose: 50 mg     NexIUM 24HR 20 MG capsule  Generic drug: esomeprazole   Take 20 mg by mouth every morning before breakfast.  Dose: 20 mg            STOP taking these medications      metoprolol SR 25 MG Tb24  Commonly  known as: Toprol XL            DISCHARGE INSTRUCTIONS: They are given discharge instructions on potential post-operative complications and symptoms to watch out for. Their groin sites were checked and were clean, dry, and intact. Patient or family to notify us for any complications noted on the discharge instructions. They will follow up with myself, Keyla Escamilla DNP, APRN, on Tuesday in our cardiology office. They will not get labs before their follow up appointment. They will then follow up with a repeat echocardiogram in one month for post TAVR assessment.      Future Appointments   Date Time Provider Department Center   5/29/2024  8:45 AM JONA Scott None   5/29/2024 10:00 AM NILO Laureano None   6/18/2024 12:15 PM [facility] EXAM 10 ECHO [facility]   6/26/2024  9:45 AM JONA Scott None   5/20/2025 10:15 AM V EXAM 10 [facility]     Discharge time spent with patient was 25 minutes.

## 2024-05-21 NOTE — PROGRESS NOTES
Bedside report received from off going RN/tech: Jacques, assumed care of patient. POC updated. Pt denies pain or any other needs at this time.    Fall Risk Score: LOW RISK  Fall risk interventions in place: Place yellow fall risk ID band on patient, Provide patient/family education based on risk assessment, Educate patient/family to call staff for assistance when getting out of bed, Place fall precaution signage outside patient door, Place patient in room close to nursing station, Utilize bed/chair fall alarm, Notify charge of high risk for huddle, and Bed alarm connected correctly  Bed type: Regular (Idris Score less than 17 interventions in place)  Patient on cardiac monitor: Yes  IVF/IV medications: Not Applicable   Oxygen: How many liters 3L  Bedside sitter: Not Applicable   Isolation: Not applicable

## 2024-05-21 NOTE — PROGRESS NOTES
Discussed the discharge instructions and discussed limitations on the groin sites. He was educated on the s/s of MI, CVA, and monitoring groin sites for any bleeding or hematoma. We discussed the activity levels involving the handout the PT provided during their assessments. Pt eager to learn and placed in wheelchair, IV pulled and tele box off. Follow-up appointments discussed and not further questions by wife and patient.

## 2024-05-21 NOTE — PROGRESS NOTES
4 Eyes Skin Assessment Completed by CINDY ayala and CINDY Nuñez.    Head WDL  Ears WDL  Nose WDL  Mouth WDL  Neck WDL  Breast/Chest WDL  Shoulder Blades WDL  Spine WDL  (R) Arm/Elbow/Hand Redness and Blanching, right radial site.  (L) Arm/Elbow/Hand Redness and Blanching left art line site.  Abdomen WDL- minor distention  Groin Incision groin site   Scrotum/Coccyx/Buttocks WDL  (R) Leg Edema  (L) Leg Edema  (R) Heel/Foot/Toe Redness and Blanching  (L) Heel/Foot/Toe Redness and Blanching          Devices In Places Tele Box, Pulse Ox, and Nasal Cannula      Interventions In Place pillows and frequent position changes, walking, sitting laying.    Possible Skin Injury No    Pictures Uploaded Into Epic N/A  Wound Consult Placed N/A  RN Wound Prevention Protocol Ordered No

## 2024-05-21 NOTE — DISCHARGE PLANNING
HTH/SCP TCN chart review completed. Collaborated with HAL Leonard. The most current review of medical record, knowledge of pt's PLOF and social support, LACE+ score of 61 was considered.  No 6 clicks scores.      Pt seen at bedside. Introduced TCN program. Provided education regarding post acute levels of care. Education provided regarding case management policy for blanket SNF referrals. Discussed HTH/SCP plan benefits. Pt verbalizes understanding.     Patient states he lives with his spouse Kezia in a 2 story home with 13 stairs to main bedroom, has bathroom downstairs.  He states he was independent with ADL's, IADL's, mobility (no AD) and driving at his baseline.  He denies any concerns with food, housing or transportation and states his spouse can provide transportation home.  He wears a CPAP at night and is currently on RA.  He states he feels close to his baseline level of function but states he has 13 stairs and must be able to do stairs before going home.  Per PT Marion, RN will attempt stairs following his breakfast.       Choice proactively obtained for DME (FWW) if indicated, faxed to [facility] and given to HAL.  TCN will continue to follow and collaborate with discharge planning team as additional post acute needs arise. Thank you.     Completed today:  PT recommends no needs and a FWW (for longer distances) per Marion PT via Voalte message.   Choice obtained: DME (FWW) if indicated.   SCP with [facility] PCP. Discussed possible outpatient transitional PCP follow up if indicated.  Patient states he will schedule his own PCP f/u appointment.     Addendum:  1022- Per Marion PT Voalte message patient completed stairs with no needs.     Addendum:  1138- Per chart review, patient has a discharge order.  This TCN sent Voalte message to Dr. Rowland requesting DME order for FWW as per PT recommendations.

## 2024-05-21 NOTE — THERAPY
"Physical Therapy   Initial Evaluation     Patient Name: Chuck Ortiz  Age:  84 y.o., Sex:  male  Medical Record #: [id number]  Today's Date: 5/21/2024     Precautions  Precautions: Cardiac Precautions (See Comments)    Assessment  Patient is 84 y.o. male seen s/p elective TAVR.  PMH includes HTN, LOVE, CAD, PAF, acute on chronic diastolic heart failure, diverticulitis, lumbar radiculopathy.     Patient received in bed and agreeable to PT session. Extensive time taken at beginning of session discussing TAVR cardiac rehab handout including home walking program, warm up/ cool down, activity pacing, rest breaks, RPE scale, talk test, modifiable/non-modifiable risk factors, and follow up with outpatient cardiac rehab; all of pt's questions answered. Pt was able to mobilize with SPV and no AD, however pt may benefit from use of FWW for further distances. Pt able to ambulate in hallway and perform stair navigation with no difficulty. Anticipate pt will be able to return home and follow up with outpatient cardiac rehab as appropriate. No further acute care PT needs.    Plan    Physical Therapy Initial Treatment Plan   Duration: Evaluation only    DC Equipment Recommendations: Front-Wheel Walker (pt may benefit from use of a FWW for longer distances)  Discharge Recommendations: Other - (outpatient cardiac rehab)       Subjective    \"I need to make sure I can do stairs\".      Objective       05/21/24 0847   Initial Contact Note    Initial Contact Note Order Received and Verified, Physical Therapy Evaluation in Progress with Full Report to Follow.   Precautions   Precautions Cardiac Precautions (See Comments)   Vitals   O2 (LPM) 0   O2 Delivery Device None - Room Air   Vitals Comments pt declines dizziness, able to maintain SpO2>90%   Pain 0 - 10 Group   Therapist Pain Assessment Post Activity Pain Same as Prior to Activity;Nurse Notified  (pain not rated)   Prior Living Situation   Prior Services Home-Independent   Housing " / Facility 2 Story House   Equipment Owned   (possibly a FWW, pt unsure)   Lives with - Patient's Self Care Capacity Spouse   Prior Level of Functional Mobility   Bed Mobility Independent   Transfer Status Independent   Ambulation Independent   Ambulation Distance community   Assistive Devices Used None   Stairs Independent   Cognition    Cognition / Consciousness WDL   Level of Consciousness Alert   Comments pleasant and cooperative, receptive to education   Active ROM Upper Body   Active ROM Upper Body  WDL   Strength Upper Body   Upper Body Strength  WDL   Sensation Upper Body   Comments pt declines tingling/numbness in BUE   Upper Body Muscle Tone   Upper Body Muscle Tone  WDL   Active ROM Lower Body    Active ROM Lower Body  WDL   Strength Lower Body   Lower Body Strength  WDL   Sensation Lower Body   Comments pt denies tingling/numbness   Lower Body Muscle Tone   Lower Body Muscle Tone  WDL   Neurological Concerns   Comments hx CVA   Balance Assessment   Sitting Balance (Static) Fair +   Sitting Balance (Dynamic) Fair +   Standing Balance (Static) Fair   Standing Balance (Dynamic) Fair   Weight Shift Sitting Good   Weight Shift Standing Good   Comments no AD   Bed Mobility    Supine to Sit Supervised   Sit to Supine Supervised   Scooting Supervised   Rolling Supervised   Comments HOB slightly elevated   Gait Analysis   Gait Level Of Assist Supervised   Assistive Device None   Distance (Feet) 200   # of Times Distance was Traveled 2   Deviation Increased Base Of Support;Bradykinetic;Decreased Heel Strike;Decreased Toe Off   # of Stairs Climbed 15  (performed small stair set of 3 steps 5x)   Level of Assist with Stairs Standby Assist   Weight Bearing Status no restrictions   Comments pt may  benefit from use of FWW for longer distances; pt reporting minimal unsteadiness, no LOB observed   Functional Mobility   Sit to Stand Supervised   Bed, Chair, Wheelchair Transfer Supervised   Mobility bed <> amb in hallway    6 Clicks Assessment - How much HELP from from another person do you currently need... (If the patient hasn't done an activity recently, how much help from another person do you think he/she would need if he/she tried?)   Turning from your back to your side while in a flat bed without using bedrails? 4   Moving from lying on your back to sitting on the side of a flat bed without using bedrails? 4   Moving to and from a bed to a chair (including a wheelchair)? 4   Standing up from a chair using your arms (e.g., wheelchair, or bedside chair)? 4   Walking in hospital room? 4   Climbing 3-5 steps with a railing? 4   6 clicks Mobility Score 24   Education Group   Education Provided Role of Physical Therapist;Cardiac Precautions   Cardiac Precautions Patient Response Patient;Acceptance;Explanation;Handout;Verbal Demonstration   Role of Physical Therapist Patient Response Patient;Acceptance;Explanation;Verbal Demonstration   Additional Comments provided TAVR cardiac rehab handout   Physical Therapy Initial Treatment Plan    Duration Evaluation only   Problem List    Problems Decreased Activity Tolerance   Anticipated Discharge Equipment and Recommendations   DC Equipment Recommendations Front-Wheel Walker  (pt may benefit from use of a FWW for longer distances)   Discharge Recommendations Other -  (outpatient cardiac rehab)   Interdisciplinary Plan of Care Collaboration   IDT Collaboration with  Nursing   Patient Position at End of Therapy In Bed;Bed Alarm On;Call Light within Reach;Tray Table within Reach;Phone within Reach   Collaboration Comments RN updated   Session Information   Date / Session Number  5/21 - eval only

## 2024-05-22 ENCOUNTER — OFFICE VISIT (OUTPATIENT)
Dept: INTERNAL MEDICINE | Facility: IMAGING CENTER | Age: 84
End: 2024-05-22
Payer: MEDICARE

## 2024-05-22 VITALS
OXYGEN SATURATION: 96 % | SYSTOLIC BLOOD PRESSURE: 110 MMHG | WEIGHT: 208 LBS | BODY MASS INDEX: 28.21 KG/M2 | RESPIRATION RATE: 16 BRPM | TEMPERATURE: 98.2 F | HEART RATE: 78 BPM | DIASTOLIC BLOOD PRESSURE: 70 MMHG

## 2024-05-22 DIAGNOSIS — Z95.820 STATUS POST ANGIOPLASTY WITH STENT: ICD-10-CM

## 2024-05-22 DIAGNOSIS — I25.10 CORONARY ARTERY DISEASE DUE TO CALCIFIED CORONARY LESION: ICD-10-CM

## 2024-05-22 DIAGNOSIS — I25.84 CORONARY ARTERY DISEASE DUE TO CALCIFIED CORONARY LESION: ICD-10-CM

## 2024-05-22 DIAGNOSIS — I48.0 PAF (PAROXYSMAL ATRIAL FIBRILLATION) (HCC): ICD-10-CM

## 2024-05-22 DIAGNOSIS — Z95.3 STATUS POST TRANSCATHETER AORTIC VALVE REPLACEMENT (TAVR) USING BIOPROSTHESIS: ICD-10-CM

## 2024-05-22 PROCEDURE — 3078F DIAST BP <80 MM HG: CPT | Performed by: INTERNAL MEDICINE

## 2024-05-22 PROCEDURE — 3074F SYST BP LT 130 MM HG: CPT | Performed by: INTERNAL MEDICINE

## 2024-05-22 PROCEDURE — 99214 OFFICE O/P EST MOD 30 MIN: CPT | Performed by: INTERNAL MEDICINE

## 2024-05-22 RX ORDER — CLOPIDOGREL BISULFATE 75 MG/1
75 TABLET ORAL DAILY
Qty: 30 TABLET | Refills: 3 | Status: SHIPPED | OUTPATIENT
Start: 2024-05-22

## 2024-05-22 ASSESSMENT — FIBROSIS 4 INDEX: FIB4 SCORE: 2.63

## 2024-05-23 NOTE — PROGRESS NOTES
Chief Complaint   Patient presents with    Hospital Follow-up       HISTORY OF THE PRESENT ILLNESS: Patient is a 84 y.o. male.     Patient comes in for follow-up.  He is post TAVR for aortic stenosis.  He has noticed no appreciable change since the procedure.  He has moderate bruising in the groin.  He is also post MI with stent.  He is currently on Eliquis for atrial fibrillation and Plavix because of stent placement.    Allergies: Morphine    Current Outpatient Medications Ordered in Epic   Medication Sig Dispense Refill    clopidogrel (PLAVIX) 75 MG Tab Take 1 Tablet by mouth every day. 30 Tablet 3    losartan (COZAAR) 50 MG Tab Take 1 Tablet by mouth 2 times a day. 180 Tablet 3    atorvastatin (LIPITOR) 40 MG Tab Take 1 Tablet by mouth every evening. 90 Tablet 3    esomeprazole (NEXIUM 24HR) 20 MG capsule Take 20 mg by mouth every morning before breakfast.      ELIQUIS 5 MG Tab TAKE 1 TABLET BY MOUTH TWICE A DAY (Patient taking differently: Take 5 mg by mouth 2 times a day.) 180 Tablet 2    allopurinol (ZYLOPRIM) 100 MG Tab TAKE 1 TABLET BY MOUTH TWICE A DAY (Patient taking differently: Take 100 mg by mouth 2 times a day.) 180 Tablet 3     No current Epic-ordered facility-administered medications on file.       Past medical history, social history and family history were reviewed from chart today    Review of systems: Per HPI.    All others negative.     Exam: /70 (BP Location: Left arm, Patient Position: Sitting, BP Cuff Size: Adult)   Pulse 78   Temp 36.8 °C (98.2 °F) (Temporal)   Resp 16   Wt 94.3 kg (208 lb)   SpO2 96%   General: Well-nourished, well-developed. No change in appearance. No distress.  HEENT: Normocephalic.  Pulmonary: Clear. Normal effort.  Cardiovascular: Regular.  I do not appreciate a significant murmur today  Abdomen: Normal appearing. Soft, nontender, nondistended.   Neurology: Chronic facial droop  Extremities: Right groin with postprocedural bruising but no sign of infection  or other complication.  Left groin with small incision site.  Minimal bruising and no sign of infection.    Diagnosis:  1. Status post transcatheter aortic valve replacement (TAVR) using bioprosthesis        2. Coronary artery disease due to calcified coronary lesion  clopidogrel (PLAVIX) 75 MG Tab      3. Status post angioplasty with stent  clopidogrel (PLAVIX) 75 MG Tab        Doing well post MI, stent and now TAVR.  Currently on significant anticoagulation including Eliquis and Plavix.  I will defer to cardiology regarding discontinuing Plavix.  Appears to be in sinus rhythm today.  3-month follow-up    My total time spent caring for the patient on the day of the encounter was  greater than 30 minutes.   This includes obtaining history, reviewing chart, physical exam, patient education, reviewing outside records, placing orders, interpreting tests and coordinating care.    Portions of this note were completed using voice recognition software (Dragon Naturally speaking software) . Occasional transcription errors may have escaped proof reading. I have made every reasonable attempt to correct obvious errors, but I expect that there are errors of grammar and possibly content that I did not discover before finalizing the note.

## 2024-05-27 PROCEDURE — 93308 TTE F-UP OR LMTD: CPT | Mod: 26 | Performed by: INTERNAL MEDICINE

## 2024-05-27 PROCEDURE — 93325 DOPPLER ECHO COLOR FLOW MAPG: CPT | Mod: 26 | Performed by: INTERNAL MEDICINE

## 2024-05-29 ENCOUNTER — OFFICE VISIT (OUTPATIENT)
Dept: CARDIOLOGY | Facility: MEDICAL CENTER | Age: 84
End: 2024-05-29
Attending: INTERNAL MEDICINE
Payer: MEDICARE

## 2024-05-29 ENCOUNTER — TELEPHONE (OUTPATIENT)
Dept: CARDIOLOGY | Facility: MEDICAL CENTER | Age: 84
End: 2024-05-29
Payer: MEDICARE

## 2024-05-29 ENCOUNTER — NON-PROVIDER VISIT (OUTPATIENT)
Dept: CARDIOLOGY | Facility: MEDICAL CENTER | Age: 84
End: 2024-05-29
Attending: NURSE PRACTITIONER
Payer: MEDICARE

## 2024-05-29 VITALS
SYSTOLIC BLOOD PRESSURE: 132 MMHG | WEIGHT: 210 LBS | HEIGHT: 72 IN | RESPIRATION RATE: 18 BRPM | OXYGEN SATURATION: 94 % | HEART RATE: 83 BPM | BODY MASS INDEX: 28.44 KG/M2 | DIASTOLIC BLOOD PRESSURE: 76 MMHG

## 2024-05-29 VITALS
WEIGHT: 210 LBS | OXYGEN SATURATION: 93 % | DIASTOLIC BLOOD PRESSURE: 80 MMHG | SYSTOLIC BLOOD PRESSURE: 128 MMHG | HEART RATE: 68 BPM | BODY MASS INDEX: 28.44 KG/M2 | RESPIRATION RATE: 17 BRPM | HEIGHT: 72 IN

## 2024-05-29 DIAGNOSIS — I48.0 PAF (PAROXYSMAL ATRIAL FIBRILLATION) (HCC): ICD-10-CM

## 2024-05-29 DIAGNOSIS — I10 ESSENTIAL HYPERTENSION: ICD-10-CM

## 2024-05-29 DIAGNOSIS — R42 DIZZINESS: ICD-10-CM

## 2024-05-29 DIAGNOSIS — Z95.2 S/P TAVR (TRANSCATHETER AORTIC VALVE REPLACEMENT): ICD-10-CM

## 2024-05-29 DIAGNOSIS — I49.3 PVCS (PREMATURE VENTRICULAR CONTRACTIONS): ICD-10-CM

## 2024-05-29 DIAGNOSIS — Z95.5 S/P DRUG ELUTING CORONARY STENT PLACEMENT: ICD-10-CM

## 2024-05-29 DIAGNOSIS — D68.69 SECONDARY HYPERCOAGULABLE STATE (HCC): ICD-10-CM

## 2024-05-29 DIAGNOSIS — E78.5 HYPERLIPIDEMIA LDL GOAL <70: ICD-10-CM

## 2024-05-29 DIAGNOSIS — Z79.01 ANTICOAGULATED: ICD-10-CM

## 2024-05-29 DIAGNOSIS — I25.10 CORONARY ARTERY DISEASE INVOLVING NATIVE CORONARY ARTERY OF NATIVE HEART WITHOUT ANGINA PECTORIS: ICD-10-CM

## 2024-05-29 DIAGNOSIS — I25.10 CORONARY ARTERY DISEASE, OCCLUSIVE: ICD-10-CM

## 2024-05-29 DIAGNOSIS — E78.5 DYSLIPIDEMIA: ICD-10-CM

## 2024-05-29 DIAGNOSIS — G47.33 OBSTRUCTIVE SLEEP APNEA SYNDROME: ICD-10-CM

## 2024-05-29 DIAGNOSIS — I77.810 THORACIC AORTIC ECTASIA (HCC): ICD-10-CM

## 2024-05-29 DIAGNOSIS — Z86.73 HISTORY OF ARTERIAL ISCHEMIC STROKE: ICD-10-CM

## 2024-05-29 PROBLEM — I50.33 ACUTE ON CHRONIC DIASTOLIC HEART FAILURE (HCC): Status: RESOLVED | Noted: 2024-05-20 | Resolved: 2024-05-29

## 2024-05-29 LAB
EKG IMPRESSION: NORMAL
EKG IMPRESSION: NORMAL

## 2024-05-29 ASSESSMENT — ENCOUNTER SYMPTOMS
SORE THROAT: 1
ORTHOPNEA: 0
CLAUDICATION: 0
MYALGIAS: 0
MYALGIAS: 0
LOSS OF CONSCIOUSNESS: 0
COUGH: 0
BRUISES/BLEEDS EASILY: 0
COUGH: 0
SHORTNESS OF BREATH: 0
DIZZINESS: 0
FEVER: 0
PND: 0
SHORTNESS OF BREATH: 0
PALPITATIONS: 0
ROS GI COMMENTS: DYSPHAGIA
ABDOMINAL PAIN: 0
PALPITATIONS: 0
DIZZINESS: 1

## 2024-05-29 ASSESSMENT — FIBROSIS 4 INDEX
FIB4 SCORE: 2.63
FIB4 SCORE: 2.63

## 2024-05-29 NOTE — TELEPHONE ENCOUNTER
----- Message from JONA Scott sent at 5/29/2024 12:04 PM PDT -----  Regarding: obtain BP non provider check in 1 week to check BP cuff please

## 2024-05-29 NOTE — PROGRESS NOTES
Chief Complaint   Patient presents with    Atrial Fibrillation     F/v Dx: PAF (paroxysmal atrial fibrillation) (HCC)      Hypertension     F/v Dx: Essential hypertension    Coronary Artery Disease     F/v Dx: Coronary artery disease, non-occlusive     Subjective     Chuck Ortiz is a 84 y.o. male who presents today for 1 week post TAVR.    He is a patient of Dr. Gonzalez in our office.  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    He presents today with his wife Kezia.    Since the procedure, Chuck has had good days and bad days. He is about 90% back to normal per his viewpoint.    He has had two lightheadedness events, both standing up, he doesn't hydrate well. He will work on this.    He had one bout of central chest pain radiating to both arms while sleeping that awoke him, lasted 30 minutes and then went away. No recurrence and nothing with exertional activity.    He has noted some elevated BP readings at home.    He has chronic mild lower leg swelling with varicose veins.    He also has right sided facial weakness, pre-existing as well as right leg numbness/pain at times with his chronic back pain.    He was very frustrated with the GOKUL and that his Zenker's diverticulum was not taken into consideration, we discussed this in detail that there was awareness of this diagnosis but an attempt was made to pass our smallest pediatric probe without success. He was thankful for the explanation.    His wife also expressed frustration about not seeing the physician post procedure, I thanked her for the feedback and will bring this back to our team for review.    He has no shortness of breath or  "palpitations.    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    Hemorrhagic disorder ([facility])     on Plavix     High cholesterol     Hypertension     Kidney stones     mult uric acid kidney stones    MI (myocardial infarction) ([facility]) 04/24/2024    Seizure ([facility])     seizure x1 8/7/18    Sleep apnea     uses CPAP    Snoring     Stroke ([facility]) 08/07/2018    no residual problems    Zenker diverticula      Past Surgical History:   Procedure Laterality Date    TRANSCATHETER AORTIC VALVE REPLACEMENT Bilateral 5/20/2024    Procedure: TRANSCATHETER AORTIC VALVE REPLACEMENT;  Surgeon: Riley Rowland M.D.;  Location: SURGERY [facility];  Service: Cardiac    ECHOCARDIOGRAM, TRANSESOPHAGEAL, INTRAOPERATIVE N/A 5/20/2024    Procedure: ECHOCARDIOGRAM, TRANSESOPHAGEAL, INTRAOPERATIVE - ATTEMPTED;  Surgeon: Riley Rowland M.D.;  Location: SURGERY [facility];  Service: Cardiac    GOKUL N/A 5/20/2024    Procedure: ECHOCARDIOGRAM, TRANSTHORACIC;  Surgeon: Riley Rowland M.D.;  Location: SURGERY [facility];  Service: Cardiac    ZZZ CARDIAC CATH  09/28/2018    40% LAD other arteries no disease.    CATARACT PHACO WITH IOL  1/21/2014    Performed by Joni Duarte M.D. at SURGERY [facility] ORS    FINGER ARTHROPLASTY  12/17/2012    Performed by Adam Christopher M.D. at SURGERY SAME DAY [facility] ORS    INGUINAL HERNIA REPAIR  2/19/2009    Performed by ALEX ALATORRE at SURGERY SAME DAY [facility] ORS    COLONOSCOPY  2004    neg    LAMINOTOMY  1996    lumbar laminectomy, cyst x3    UVULOPHARYNGOPALATOPLASTY  1985     Family History   Problem Relation Age of Onset    Heart Disease Maternal " Grandmother     Diabetes Paternal Grandfather     Stroke Sister     Lung Disease Father         black lung     Cancer Neg Hx      Social History     Socioeconomic History    Marital status:      Spouse name: Not on file    Number of children: Not on file    Years of education: Not on file    Highest education level: Not on file   Occupational History    Not on file   Tobacco Use    Smoking status: Never    Smokeless tobacco: Never   Vaping Use    Vaping status: Never Used   Substance and Sexual Activity    Alcohol use: Yes     Comment: 2 drinks per week     Drug use: No    Sexual activity: Not on file     Comment: , retired [employer]    Other Topics Concern    Not on file   Social History Narrative    Not on file     Social Determinants of Health     Financial Resource Strain: Not on file   Food Insecurity: Patient Declined (5/20/2024)    Hunger Vital Sign     Worried About Running Out of Food in the Last Year: Patient declined     Ran Out of Food in the Last Year: Patient declined   Transportation Needs: Patient Declined (5/20/2024)    PRAPARE - Transportation     Lack of Transportation (Medical): Patient declined     Lack of Transportation (Non-Medical): Patient declined   Physical Activity: Not on file   Stress: Not on file   Social Connections: Not on file   Intimate Partner Violence: Not At Risk (5/20/2024)    Humiliation, Afraid, Rape, and Kick questionnaire     Fear of Current or Ex-Partner: No     Emotionally Abused: No     Physically Abused: No     Sexually Abused: No   Housing Stability: Patient Declined (5/20/2024)    Housing Stability Vital Sign     Unable to Pay for Housing in the Last Year: Patient declined     Number of Places Lived in the Last Year: Not on file     Unstable Housing in the Last Year: Patient declined     Allergies   Allergen Reactions    Morphine      Bradycardia     Outpatient Encounter Medications as of 5/29/2024   Medication Sig Dispense Refill    clopidogrel  (PLAVIX) 75 MG Tab Take 1 Tablet by mouth every day. 30 Tablet 3    losartan (COZAAR) 50 MG Tab Take 1 Tablet by mouth 2 times a day. 180 Tablet 3    atorvastatin (LIPITOR) 40 MG Tab Take 1 Tablet by mouth every evening. 90 Tablet 3    esomeprazole (NEXIUM 24HR) 20 MG capsule Take 20 mg by mouth every morning before breakfast.      ELIQUIS 5 MG Tab TAKE 1 TABLET BY MOUTH TWICE A DAY (Patient taking differently: Take 5 mg by mouth 2 times a day.) 180 Tablet 2    allopurinol (ZYLOPRIM) 100 MG Tab TAKE 1 TABLET BY MOUTH TWICE A DAY (Patient taking differently: Take 100 mg by mouth 2 times a day.) 180 Tablet 3     No facility-administered encounter medications on file as of 5/29/2024.     Review of Systems   Constitutional:  Negative for fever and malaise/fatigue.   HENT:  Positive for sore throat.    Respiratory:  Negative for cough and shortness of breath.    Cardiovascular:  Positive for chest pain and leg swelling. Negative for palpitations, orthopnea, claudication and PND.   Gastrointestinal:  Negative for abdominal pain.        Dysphagia   Musculoskeletal:  Negative for myalgias.        Right sided facial weakness and right leg numbness/tingling   Neurological:  Positive for dizziness.              Objective     /76 (BP Location: Left arm, Patient Position: Sitting, BP Cuff Size: Adult)   Pulse 83   Resp 18   Ht 1.829 m (6')   Wt 95.3 kg (210 lb)   SpO2 94%   BMI 28.48 kg/m²     Physical Exam  Vitals and nursing note reviewed.   Constitutional:       Appearance: Normal appearance. He is well-developed and normal weight.   HENT:      Head: Normocephalic and atraumatic.   Neck:      Vascular: No JVD.   Cardiovascular:      Rate and Rhythm: Normal rate and regular rhythm.      Pulses: Normal pulses.      Heart sounds: Normal heart sounds.   Pulmonary:      Effort: Pulmonary effort is normal.      Breath sounds: Normal breath sounds.   Musculoskeletal:         General: Normal range of motion.      Right  lower leg: Edema present.      Left lower leg: Edema present.      Comments: Bilateral LE edema   Skin:     General: Skin is warm and dry.      Capillary Refill: Capillary refill takes less than 2 seconds.   Neurological:      General: No focal deficit present.      Mental Status: He is alert and oriented to person, place, and time. Mental status is at baseline.      Comments: Right sided facial palsy   Psychiatric:         Mood and Affect: Mood normal.         Behavior: Behavior normal.         Thought Content: Thought content normal.         Judgment: Judgment normal.                Assessment & Plan     1. PAF (paroxysmal atrial fibrillation) (HCC)  EKG    Holter Monitor Study      2. Anticoagulated        3. Coronary artery disease involving native coronary artery of native heart without angina pectoris        4. Dyslipidemia        5. Essential hypertension        6. History of arterial ischemic stroke        7. Hyperlipidemia LDL goal <70        8. Obstructive sleep apnea syndrome        9. Secondary hypercoagulable state (HCC)        10. Thoracic aortic ectasia (HCC)        11. S/P TAVR (transcatheter aortic valve replacement)          Medical Decision Making: Today's Assessment/Status/Plan:      1. S/P TAVR, NYHA II  -doing well post-op  -cont eliquis only, no aspirin  -groins CDI with mild bruising and small nodule   -SBE prophylaxis understands  -echo 1 month with structural heart follow up  -reviewed hospital imaging, labs, and EKG  -cardiac rehab referral placed  -EKG shows afib, rate controlled    2. PAF on chronic anticoagulation  -rate controlled, asymptomatic  -recent change back into afib post procedure, recommend 7 day zio live for afib burden  -if remains in afib, consider DCCV v. Amiodarone initiation  -follow up with general cardiologist on this post monitor  -cont eliquis, tolerating well    3. HLD with prior CVA, CAD  -no angina or PAL  -cont statin  -LDL goal <70 with CAD/CVA  -no aspirin  with eliquis  -R sided deficits with stroke in '18, facial palsy R side    4. HTN  -good control, labile at home, mostly high  -cont losartan 50 mg BID  -BP goal <130/80  -BP check in office in 1 week and bring BP cuff in    5. LOVE on CPAP  -tolerating and compliant with CPAP    Patient is to follow up with Keyla MALONE in 1 month with echo and heart monitor for afib burden.

## 2024-05-29 NOTE — PATIENT INSTRUCTIONS
HYDRATE MORE to help with lightheadedness.    BP check in office, please bring your home cuff.    Heart monitor for 7 days to check your afib burden.    Call for symptoms of concern.

## 2024-05-29 NOTE — PROGRESS NOTES
Patient enrolled in the 14 day Zio AT Live Monitor per FAISAL Grimaldo. In clinic hook up, monitor s/n [id number]. Baseline Transmitted  Pending EOS.   Statement Selected

## 2024-05-29 NOTE — PROGRESS NOTES
"Chief Complaint   Patient presents with    Coronary Artery Disease     F/V DX:Coronary artery disease, non-occlusive        Atrial Fibrillation     F/V DX:PAF (paroxysmal atrial fibrillation) (HCC)        Hypertension     F/V DX:Essential hypertension           Subjective     Chuck Ortiz is a 83 y.o. male who presents today for follow-up cardiac care.    The patient has CAD, PAF, OAC on apixaban, aortic stenosis, HTN, HLD, cryptogenic stroke presumed embolic based on poststroke ILR PAF, LOVE, Zenker's diverticulum    Since 9/5/2023 appointment the patient underwent TAVR 5/20/2024 prior to where she underwent PCI of mid LAD (3.0 x 38 mm AHMET 4/23/2024.  Since his TAVR he has had 2 brief episodes dizziness, lightheadedness, 1 sitting watching TV, a second while walking. No fall or faint. He has had no chest pain, palpitations, shortness of breath.     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    Hemorrhagic disorder (HCC)     on Plavix     High cholesterol     Hypertension     Kidney stones     mult uric acid kidney stones    MI (myocardial infarction) ([facility]) 04/24/2024    Seizure ([facility])     seizure x1 8/7/18    Sleep apnea     uses CPAP    Snoring     Stroke ([facility]) 08/07/2018    no residual problems    Zenker " diverticula      Past Surgical History:   Procedure Laterality Date    TRANSCATHETER AORTIC VALVE REPLACEMENT Bilateral 5/20/2024    Procedure: TRANSCATHETER AORTIC VALVE REPLACEMENT;  Surgeon: Riley Rowland M.D.;  Location: SURGERY [facility];  Service: Cardiac    ECHOCARDIOGRAM, TRANSESOPHAGEAL, INTRAOPERATIVE N/A 5/20/2024    Procedure: ECHOCARDIOGRAM, TRANSESOPHAGEAL, INTRAOPERATIVE - ATTEMPTED;  Surgeon: Riley Rowland M.D.;  Location: SURGERY [facility];  Service: Cardiac    GOKUL N/A 5/20/2024    Procedure: ECHOCARDIOGRAM, TRANSTHORACIC;  Surgeon: Riley Rowland M.D.;  Location: SURGERY [facility];  Service: Cardiac    ZZZ CARDIAC CATH  09/28/2018    40% LAD other arteries no disease.    CATARACT PHACO WITH IOL  1/21/2014    Performed by Joni Duarte M.D. at SURGERY [facility]    FINGER ARTHROPLASTY  12/17/2012    Performed by Adam Christopher M.D. at SURGERY SAME DAY [facility] ORS    INGUINAL HERNIA REPAIR  2/19/2009    Performed by ALEX ALATORRE at SURGERY SAME DAY [facility] ORS    COLONOSCOPY  2004    neg    LAMINOTOMY  1996    lumbar laminectomy, cyst x3    UVULOPHARYNGOPALATOPLASTY  1985     Family History   Problem Relation Age of Onset    Heart Disease Maternal Grandmother     Diabetes Paternal Grandfather     Stroke Sister     Lung Disease Father         black lung     Cancer Neg Hx      Social History     Socioeconomic History    Marital status:      Spouse name: Not on file    Number of children: Not on file    Years of education: Not on file    Highest education level: Not on file   Occupational History    Not on file   Tobacco Use    Smoking status: Never    Smokeless tobacco: Never   Vaping Use    Vaping status: Never Used   Substance and Sexual Activity    Alcohol use: Yes     Comment: 2 drinks per week     Drug use: No    Sexual activity: Not on file     Comment: , retired [employer]    Other Topics Concern    Not on file   Social History  Narrative    Not on file     Social Determinants of Health     Financial Resource Strain: Not on file   Food Insecurity: Patient Declined (5/20/2024)    Hunger Vital Sign     Worried About Running Out of Food in the Last Year: Patient declined     Ran Out of Food in the Last Year: Patient declined   Transportation Needs: Patient Declined (5/20/2024)    PRAPARE - Transportation     Lack of Transportation (Medical): Patient declined     Lack of Transportation (Non-Medical): Patient declined   Physical Activity: Not on file   Stress: Not on file   Social Connections: Not on file   Intimate Partner Violence: Not At Risk (5/20/2024)    Humiliation, Afraid, Rape, and Kick questionnaire     Fear of Current or Ex-Partner: No     Emotionally Abused: No     Physically Abused: No     Sexually Abused: No   Housing Stability: Patient Declined (5/20/2024)    Housing Stability Vital Sign     Unable to Pay for Housing in the Last Year: Patient declined     Number of Places Lived in the Last Year: Not on file     Unstable Housing in the Last Year: Patient declined     Allergies   Allergen Reactions    Morphine      Bradycardia     Outpatient Encounter Medications as of 5/29/2024   Medication Sig Dispense Refill    clopidogrel (PLAVIX) 75 MG Tab Take 1 Tablet by mouth every day. 30 Tablet 3    losartan (COZAAR) 50 MG Tab Take 1 Tablet by mouth 2 times a day. 180 Tablet 3    atorvastatin (LIPITOR) 40 MG Tab Take 1 Tablet by mouth every evening. 90 Tablet 3    esomeprazole (NEXIUM 24HR) 20 MG capsule Take 20 mg by mouth every morning before breakfast.      ELIQUIS 5 MG Tab TAKE 1 TABLET BY MOUTH TWICE A DAY (Patient taking differently: Take 5 mg by mouth 2 times a day.) 180 Tablet 2    allopurinol (ZYLOPRIM) 100 MG Tab TAKE 1 TABLET BY MOUTH TWICE A DAY (Patient taking differently: Take 100 mg by mouth 2 times a day.) 180 Tablet 3     No facility-administered encounter medications on file as of 5/29/2024.     Review of Systems    Respiratory:  Negative for cough and shortness of breath.    Cardiovascular:  Negative for chest pain and palpitations.   Musculoskeletal:  Negative for myalgias.   Neurological:  Negative for dizziness and loss of consciousness.   Endo/Heme/Allergies:  Does not bruise/bleed easily.              Objective     /80 (BP Location: Left arm, Patient Position: Sitting, BP Cuff Size: Adult)   Pulse 68   Resp 17   Ht 1.829 m (6')   Wt 95.3 kg (210 lb)   SpO2 93%   BMI 28.48 kg/m²     Physical Exam  Constitutional:       Appearance: He is well-developed.   Eyes:      Conjunctiva/sclera: Conjunctivae normal.      Pupils: Pupils are equal, round, and reactive to light.   Neck:      Vascular: No JVD.   Cardiovascular:      Rate and Rhythm: Normal rate. Rhythm regularly irregular.      Heart sounds: No murmur heard.     Comments: Diminished A2  Pulmonary:      Effort: Pulmonary effort is normal. No accessory muscle usage or respiratory distress.      Breath sounds: Normal breath sounds. No wheezing or rales.   Musculoskeletal:      Right lower leg: No edema.      Left lower leg: No edema.   Skin:     General: Skin is warm and dry.      Findings: No rash.      Nails: There is no clubbing.   Neurological:      Mental Status: He is alert and oriented to person, place, and time.   Psychiatric:         Behavior: Behavior normal.                 08/14/2018 BRAIN MRI  1.  Acute to subacute small sized infarcts in the left posterior frontal lobe.  2.  Mild diffuse cerebral substance loss.  3.  Mild microangiopathic ischemic change versus demyelination or gliosis.  4.  Right maxillary sinus mucous retention cyst or polyp.     ECHOCARDIOGRAM 09/06/2018.  Normal left ventricular systolic function.  Left ventricular ejection fraction is visually estimated to be 60%.  Mild aortic stenosis.  Mild posterior mitral valve leaflet prolapse.  Mild mitral regurgitation.  Mitral annular calcification.  Unable to estimate pulmonary  artery pressure due to an inadequate   tricuspid regurgitant jet.  No prior study is available for comparison.    ECHOCARDIOGRAM 5/5/2022  Moderate aortic valve stenosis. Transvalvular gradients are - Peak: 51   mmHg, Mean: 33 mmHg. Vmax is 3.6 m/s.  Normal left ventricular systolic function.  The left ventricular ejection fraction is visually estimated to be 70%.  Mild concentric left ventricular hypertrophy and also Sigmoid septum.  Grade I diastolic dysfunction.  Normal right ventricular size and systolic function.  Mildly dilated left atrium.  Mildly elevated estimated right atrial pressure.   Normal pulmonary artery pressure.    ECHOCARDIOGRAM 3/30/2023  Normal left ventricular systolic function.  The left ventricular ejection fraction is visually estimated to be 65%.  Moderate aortic valve stenosis: Transvalvular gradients are - Peak: 36   mmHg, Mean: 22 mmHg. Vmax is 2.1 m/s. Aortic valve is 1.2 cm2.  Mild mitral regurgitation.  The right ventricle is normal in size and systolic function.  Unable to estimate right ventricular systolic pressure due to an   inadequate tricuspid regurgitant jet.  Compared to the prior study on 5/5/2022, there has been no significant   change; aortic stenosis remains moderate.      ECHOCARDIOGRAM 5/20/2024  Normal LV systolic function  Normally functioning prosthetic aortic valve - mean gradient of 2 mmHg,   no AI  Mild MR   MPI 09/06/2018.  There is a predominently fixed basal to mid inferior septal perfusion defect.   There is a distal inferior wall defect, predominantly reversible, moderate    size, mild intensity.   Stress Image LV EF: 71     %     CARDIAC CATHETERIZATION  09/28/2018  A. Left heart catheterization.  B. Left ventriculography.  C. Selective coronary angiography.  D. Right radial artery approach.  PREPROCEDURE DIAGNOSES:  1.  Abnormal myocardial perfusion scan.  2.  Mild aortic stenosis.  3.  Ventricular ectopy.  4.  Cryptogenic stroke.  5.  Hyperlipidemia.    POSTPROCEDURE DIAGNOSES:  1.  Coronary artery disease, moderate predominantly involving the mid left anterior descending artery.  2.  Left ventricular ejection fraction 77% post PVC with basal inferior wall akinesis.    CARDIAC CATHETERIZATION 4/23/2024  Hemodynamics:   Aorta: 106/63 mmHg  LVEDP: 20 mmHg  Aortic valve peak to peak gradient: 70 mmHg  Coronary Anatomy              Left Main: Normal              LAD:  Mid 70% stenosis              Ramus: Proximal 50% stenosis              LCx: Minimal luminal irregularities              RCA: Dominant, Minimal luminal irregularities  PCI: Mid LAD 3.0 x 38 mm AHMET    ILR recording Atrial fibrillation 5/11/2020 and 7/1/2020  Start eliquis 5 MG bid  Discontinue plavix    Echocardiogram 9/5/2023 sinus rhythm, rate 68, first-degree AV block, PVCs, personally interpreted    EKGs today show sinus rhythm, first degree AV block, no atrial fibrillation, personally reviewed    Assessment & Plan     1. Dizziness        2. Coronary artery disease, occlusive        3. S/P drug eluting coronary stent placement        4. S/P TAVR (transcatheter aortic valve replacement)        5. PAF (paroxysmal atrial fibrillation) ([facility])  EKG      6. Secondary hypercoagulable state ([facility])        7. Anticoagulated        8. Essential hypertension        9. Dyslipidemia        10. PVCs (premature ventricular contractions)                Medical Decision Making: Today's Assessment/Status/Plan:   Dizziness, lightheadedness post TAVR  CAD  PCI mid LAD (3.0 x 38 mm AHMET) 4/23/2024  TAVR 5/20/2024  PAF (5/11/2020, 7/1/2020 on ILR)  OAC on apixaban  Hypertension   Hyperlipidemia   Ventricular ectopy, chronic.  CVA, left posterior frontal.  08/14/2018  Implantable loop recorder 8/28/2018, removed 7/20/2020.  LOVE.  S/P uvulectomy.  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  S/P back surgery.  Zenker's diverticulum.     Recommendation Discussion  Dizziness, lightheadedness post TAVR with longstanding first  degree AV block, EKGs today show sinus with 1st degree AV block, cardiac monitor ordered to rule out significant arrhythmia, bradycardia  CAD, PCI asymptomatic, continue atorvastatin, apixaban, plavix  PAF: Clinically asymptomatic, continue apixaban.  TAVR: functioning normal  Hypertension: BP normal, at goal, continue losartan   Dyslipidemia: LDL 52, at goal, continue atorvastatin.  Ventricular ectopy: Asymptomatic, continue monitoring.  RTC 6 months

## 2024-05-29 NOTE — TELEPHONE ENCOUNTER
To schedulers: please call patient to set up a non-provider BP check for end of next week, thank you!

## 2024-05-30 ENCOUNTER — TELEPHONE (OUTPATIENT)
Dept: CARDIOLOGY | Facility: MEDICAL CENTER | Age: 84
End: 2024-05-30
Payer: MEDICARE

## 2024-05-30 ENCOUNTER — DOCUMENTATION (OUTPATIENT)
Dept: CARDIOLOGY | Facility: MEDICAL CENTER | Age: 84
End: 2024-05-30
Payer: MEDICARE

## 2024-05-30 NOTE — PROGRESS NOTES
On behalf of [facility]'s Structural Heart Program, we would like to thank you for allowing us to participate in the care of your patient.     He underwent a successful transcatheter aortic valve replacement (TAVR) on 5/20/2024.     Your patient is scheduled to follow up with our Structural Heart Program at one month and one year post procedure.     Again, thank you for allowing us to participate in the care of your patient. If you have any questions, please do not hesitate to contact our Structural Heart team.     Sincerely,    [facility] Structural Heart Team    Peace Hawthorne, ELDAN, RN, Structural Heart Program Nurse Coordinator ([phone])  JUAN Santos, RN, Structural Heart Program Nurse Coordinator ([phone])

## 2024-05-30 NOTE — TELEPHONE ENCOUNTER
Caller: Xiomara with [employer]    Topic/issue: Their office was calling to report an urgent reading for this patient that occurred 5:07 pm on 5/29/24.  Ref #[id number]    Callback Number: [phone]        Thank you    -Jacques VERDUZCO

## 2024-06-04 ENCOUNTER — TELEPHONE (OUTPATIENT)
Dept: CARDIOLOGY | Facility: MEDICAL CENTER | Age: 84
End: 2024-06-04

## 2024-06-04 ENCOUNTER — NON-PROVIDER VISIT (OUTPATIENT)
Dept: CARDIOLOGY | Facility: MEDICAL CENTER | Age: 84
End: 2024-06-04
Attending: NURSE PRACTITIONER
Payer: MEDICARE

## 2024-06-04 VITALS — SYSTOLIC BLOOD PRESSURE: 187 MMHG | DIASTOLIC BLOOD PRESSURE: 93 MMHG | HEART RATE: 66 BPM

## 2024-06-04 DIAGNOSIS — I10 ESSENTIAL HYPERTENSION: ICD-10-CM

## 2024-06-04 RX ORDER — AMLODIPINE BESYLATE 5 MG/1
5 TABLET ORAL DAILY
Qty: 90 TABLET | Refills: 3 | Status: SHIPPED | OUTPATIENT
Start: 2024-06-04 | End: 2024-06-05

## 2024-06-04 NOTE — TELEPHONE ENCOUNTER
BRYANT Scott.  You1 hour ago (11:11 AM)     Add amlodipine 5 mg QPM alongside losartan. Follow up with BP in 1 week with this change. SC     Phone Number Called: [phone]    Call outcome: Spoke to patient regarding message below.    Message: Called to inform patient of SC recommendations. Patient agreeable to plan at this time. All questions answered at this time. Advised to call back with any further questions or concerns.

## 2024-06-04 NOTE — PROGRESS NOTES
Patient was here today for BP check. BP readings located in vital sign section. Informed patient we will forward readings to nurse and they will receive a call with recommendations.  Did patient present with home cuff? Yes  Is patient reporting any symptoms? No  If Yes, RN to visit exam room

## 2024-06-04 NOTE — TELEPHONE ENCOUNTER
Agree with recommendations, starting amlodipine 5 mg daily  I believe he is still completing a full 14-day Zio patch

## 2024-06-04 NOTE — TELEPHONE ENCOUNTER
Phone Number Called: [phone]    Call outcome: Spoke to patient regarding message below.    Message: Called to discuss symptoms, verify medications and obtain vitals. Lightheadedness yesterday when walking upstairs with laundry, lasted a few seconds. Denies any other symptoms. Still taking Eliquis. Has non-provider visit today for BP cuff calibration. Advised will notify SW after appointment with vitals. Answered all questions and concerns, appreciative of call.

## 2024-06-05 ENCOUNTER — PATIENT MESSAGE (OUTPATIENT)
Dept: CARDIOLOGY | Facility: MEDICAL CENTER | Age: 84
End: 2024-06-05
Payer: MEDICARE

## 2024-06-05 DIAGNOSIS — I10 ESSENTIAL HYPERTENSION: ICD-10-CM

## 2024-06-05 RX ORDER — AMLODIPINE 1 MG/ML
SUSPENSION ORAL
Qty: 150 ML | Refills: 11 | Status: SHIPPED | OUTPATIENT
Start: 2024-06-05 | End: 2024-06-10

## 2024-06-06 ENCOUNTER — PATIENT MESSAGE (OUTPATIENT)
Dept: CARDIOLOGY | Facility: MEDICAL CENTER | Age: 84
End: 2024-06-06
Payer: MEDICARE

## 2024-06-06 DIAGNOSIS — I10 ESSENTIAL HYPERTENSION: ICD-10-CM

## 2024-06-10 RX ORDER — HYDROCHLOROTHIAZIDE 25 MG/1
25 TABLET ORAL DAILY
Qty: 90 TABLET | Refills: 1 | Status: SHIPPED | OUTPATIENT
Start: 2024-06-10

## 2024-06-10 NOTE — PATIENT COMMUNICATION
Called pharmacy, they received the above prescription and it was denied by insurance.    [facility] ([place]STA Brown)  [phone]    To SC: please advise on alternative to amlodipine tablets (oral suspencion above not covered by insurance), pt unable to crush tablets. Thank you!

## 2024-06-10 NOTE — PATIENT COMMUNICATION
BRYANT Scott.  You28 minutes ago (11:13 AM)     Switch from amlodipine to HCTZ 25 mg QD in AM. Repeat bmp in 2 weeks after starting please and monitor BP as mentioned previously. SC       Orders placed, MyChart response sent to pt.

## 2024-06-11 ENCOUNTER — APPOINTMENT (RX ONLY)
Dept: URBAN - METROPOLITAN AREA CLINIC 35 | Facility: CLINIC | Age: 84
Setting detail: DERMATOLOGY
End: 2024-06-11

## 2024-06-11 DIAGNOSIS — Z71.89 OTHER SPECIFIED COUNSELING: ICD-10-CM

## 2024-06-11 DIAGNOSIS — L85.3 XEROSIS CUTIS: ICD-10-CM

## 2024-06-11 DIAGNOSIS — L57.0 ACTINIC KERATOSIS: ICD-10-CM

## 2024-06-11 DIAGNOSIS — D22 MELANOCYTIC NEVI: ICD-10-CM

## 2024-06-11 DIAGNOSIS — Z85.828 PERSONAL HISTORY OF OTHER MALIGNANT NEOPLASM OF SKIN: ICD-10-CM

## 2024-06-11 DIAGNOSIS — L81.4 OTHER MELANIN HYPERPIGMENTATION: ICD-10-CM

## 2024-06-11 DIAGNOSIS — L82.1 OTHER SEBORRHEIC KERATOSIS: ICD-10-CM

## 2024-06-11 DIAGNOSIS — D485 NEOPLASM OF UNCERTAIN BEHAVIOR OF SKIN: ICD-10-CM

## 2024-06-11 PROBLEM — D48.5 NEOPLASM OF UNCERTAIN BEHAVIOR OF SKIN: Status: ACTIVE | Noted: 2024-06-11

## 2024-06-11 PROBLEM — D22.5 MELANOCYTIC NEVI OF TRUNK: Status: ACTIVE | Noted: 2024-06-11

## 2024-06-11 PROCEDURE — ? RECOMMENDATIONS

## 2024-06-11 PROCEDURE — 17000 DESTRUCT PREMALG LESION: CPT | Mod: 59

## 2024-06-11 PROCEDURE — ? BIOPSY BY SHAVE METHOD

## 2024-06-11 PROCEDURE — ? LIQUID NITROGEN

## 2024-06-11 PROCEDURE — 99213 OFFICE O/P EST LOW 20 MIN: CPT | Mod: 25

## 2024-06-11 PROCEDURE — 11102 TANGNTL BX SKIN SINGLE LES: CPT

## 2024-06-11 PROCEDURE — 17003 DESTRUCT PREMALG LES 2-14: CPT

## 2024-06-11 PROCEDURE — ? COUNSELING

## 2024-06-11 ASSESSMENT — LOCATION SIMPLE DESCRIPTION DERM
LOCATION SIMPLE: UPPER BACK
LOCATION SIMPLE: RIGHT PRETIBIAL REGION
LOCATION SIMPLE: LEFT PRETIBIAL REGION
LOCATION SIMPLE: ABDOMEN
LOCATION SIMPLE: LEFT FOREHEAD
LOCATION SIMPLE: RIGHT FOREHEAD
LOCATION SIMPLE: RIGHT CHEEK
LOCATION SIMPLE: RIGHT UPPER BACK
LOCATION SIMPLE: SUPERIOR FOREHEAD
LOCATION SIMPLE: LEFT EAR
LOCATION SIMPLE: SCALP
LOCATION SIMPLE: LEFT CHEEK

## 2024-06-11 ASSESSMENT — LOCATION DETAILED DESCRIPTION DERM
LOCATION DETAILED: RIGHT PROXIMAL PRETIBIAL REGION
LOCATION DETAILED: LEFT SUPERIOR PARIETAL SCALP
LOCATION DETAILED: LEFT INFERIOR MEDIAL FOREHEAD
LOCATION DETAILED: LEFT PROXIMAL PRETIBIAL REGION
LOCATION DETAILED: RIGHT INFERIOR MEDIAL FOREHEAD
LOCATION DETAILED: EPIGASTRIC SKIN
LOCATION DETAILED: RIGHT SUPERIOR PREAURICULAR CHEEK
LOCATION DETAILED: RIGHT SUPERIOR UPPER BACK
LOCATION DETAILED: RIGHT SUPERIOR LATERAL UPPER BACK
LOCATION DETAILED: LEFT INFERIOR POSTAURICULAR SKIN
LOCATION DETAILED: LEFT POSTERIOR EAR
LOCATION DETAILED: SUPERIOR MID FOREHEAD
LOCATION DETAILED: INFERIOR THORACIC SPINE
LOCATION DETAILED: LEFT MEDIAL MALAR CHEEK
LOCATION DETAILED: LEFT INFERIOR CENTRAL MALAR CHEEK

## 2024-06-11 ASSESSMENT — LOCATION ZONE DERM
LOCATION ZONE: FACE
LOCATION ZONE: SCALP
LOCATION ZONE: EAR
LOCATION ZONE: TRUNK
LOCATION ZONE: LEG

## 2024-06-11 NOTE — PROCEDURE: BIOPSY BY SHAVE METHOD

## 2024-06-11 NOTE — HPI: FULL BODY SKIN EXAMINATION
How Severe Are Your Spot(S)?: mild
What Type Of Note Output Would You Prefer (Optional)?: Standard Output
What Is The Reason For Today's Visit?: Full Body Skin Examination
What Is The Reason For Today's Visit? (Being Monitored For X): concerning skin lesions on a periodic basis
Additional History: Patient states two bumps located right tragus, that occasionally becomes sore and swell slightly and oozes liquid. He states this has been going on for two years.

## 2024-06-11 NOTE — PROCEDURE: LIQUID NITROGEN
Number Of Freeze-Thaw Cycles: 1 freeze-thaw cycle
Detail Level: Detailed
Consent: The patient's consent was obtained including but not limited to risks of crusting, scabbing, blistering, scarring, darker or lighter pigmentary change, recurrence, incomplete removal and infection.
Application Tool (Optional): Cry-AC
Post-Care Instructions: I reviewed with the patient in detail post-care instructions. Patient is to wear sunprotection, and avoid picking at any of the treated lesions. Pt may apply Vaseline to crusted or scabbing areas.
Render Post-Care Instructions In Note?: no
Show Aperture Variable?: Yes
Duration Of Freeze Thaw-Cycle (Seconds): 10

## 2024-06-13 ENCOUNTER — APPOINTMENT (RX ONLY)
Dept: URBAN - METROPOLITAN AREA CLINIC 35 | Facility: CLINIC | Age: 84
Setting detail: DERMATOLOGY
End: 2024-06-13

## 2024-06-13 PROBLEM — C44.41 BASAL CELL CARCINOMA OF SKIN OF SCALP AND NECK: Status: ACTIVE | Noted: 2024-06-13

## 2024-06-13 PROCEDURE — ? POST-OP WOUND CHECK

## 2024-06-13 NOTE — PROCEDURE: POST-OP WOUND CHECK
Detail Level: Detailed
Add 04358 Cpt? (Important Note: In 2017 The Use Of 26509 Is Being Tracked By Cms To Determine Future Global Period Reimbursement For Global Periods): no
Wound Evaluated By: Dr. Carlotta Rizzo

## 2024-06-16 DIAGNOSIS — Z95.820 STATUS POST ANGIOPLASTY WITH STENT: ICD-10-CM

## 2024-06-16 DIAGNOSIS — I25.10 CORONARY ARTERY DISEASE DUE TO CALCIFIED CORONARY LESION: ICD-10-CM

## 2024-06-16 DIAGNOSIS — I25.84 CORONARY ARTERY DISEASE DUE TO CALCIFIED CORONARY LESION: ICD-10-CM

## 2024-06-17 ENCOUNTER — TELEPHONE (OUTPATIENT)
Dept: CARDIOLOGY | Facility: MEDICAL CENTER | Age: 84
End: 2024-06-17
Payer: MEDICARE

## 2024-06-17 RX ORDER — CLOPIDOGREL BISULFATE 75 MG/1
75 TABLET ORAL DAILY
Qty: 100 TABLET | Refills: 3 | Status: SHIPPED | OUTPATIENT
Start: 2024-06-17

## 2024-06-17 NOTE — TELEPHONE ENCOUNTER
Ben EOS to SC's nurse, Jovita, on 6/17/2024  Preliminary findings:  1 episode VT,  and an avg rate of 139 bpm  4% AFL,  and an avg rate of 81 bpm  15 episodes of SVT,  and an avg rate of 107 bpm  Sinus rhythm,  and an avg rate of 75 bpm  2.7% isolated VE burden  10 patient events:  AFL 75-94  SR 69-94  SVE(s)  VE(s)

## 2024-06-18 ENCOUNTER — HOSPITAL ENCOUNTER (OUTPATIENT)
Dept: CARDIOLOGY | Facility: MEDICAL CENTER | Age: 84
End: 2024-06-18
Attending: INTERNAL MEDICINE
Payer: MEDICARE

## 2024-06-18 ENCOUNTER — PATIENT MESSAGE (OUTPATIENT)
Dept: CARDIOLOGY | Facility: MEDICAL CENTER | Age: 84
End: 2024-06-18

## 2024-06-18 ENCOUNTER — TELEPHONE (OUTPATIENT)
Dept: CARDIOLOGY | Facility: MEDICAL CENTER | Age: 84
End: 2024-06-18

## 2024-06-18 DIAGNOSIS — I35.0 SEVERE AORTIC STENOSIS: ICD-10-CM

## 2024-06-18 LAB
LV EJECT FRACT  99904: 81
LV EJECT FRACT MOD 2C 99903: 77.22
LV EJECT FRACT MOD 4C 99902: 82.4
LV EJECT FRACT MOD BP 99901: 80.63

## 2024-06-18 PROCEDURE — 93306 TTE W/DOPPLER COMPLETE: CPT | Mod: 26 | Performed by: INTERNAL MEDICINE

## 2024-06-18 PROCEDURE — 93306 TTE W/DOPPLER COMPLETE: CPT

## 2024-06-18 NOTE — TELEPHONE ENCOUNTER
Pt came to drop off BP reading to Jovita SUTHERLAND but she is not in office today.   BP readings will be given to senior LORRAINE Gabriel

## 2024-06-19 ENCOUNTER — PATIENT MESSAGE (OUTPATIENT)
Dept: CARDIOLOGY | Facility: MEDICAL CENTER | Age: 84
End: 2024-06-19

## 2024-06-19 ENCOUNTER — HOSPITAL ENCOUNTER (OUTPATIENT)
Facility: MEDICAL CENTER | Age: 84
End: 2024-06-19
Attending: NURSE PRACTITIONER
Payer: MEDICARE

## 2024-06-19 ENCOUNTER — OFFICE VISIT (OUTPATIENT)
Dept: INTERNAL MEDICINE | Facility: IMAGING CENTER | Age: 84
End: 2024-06-19
Payer: MEDICARE

## 2024-06-19 VITALS
OXYGEN SATURATION: 95 % | TEMPERATURE: 99 F | SYSTOLIC BLOOD PRESSURE: 138 MMHG | RESPIRATION RATE: 12 BRPM | HEART RATE: 70 BPM | DIASTOLIC BLOOD PRESSURE: 70 MMHG

## 2024-06-19 DIAGNOSIS — I10 ESSENTIAL HYPERTENSION: ICD-10-CM

## 2024-06-19 DIAGNOSIS — I47.20 VENTRICULAR TACHYCARDIA (HCC): ICD-10-CM

## 2024-06-19 DIAGNOSIS — I48.3 TYPICAL ATRIAL FLUTTER (HCC): ICD-10-CM

## 2024-06-19 DIAGNOSIS — H01.024 SQUAMOUS BLEPHARITIS OF UPPER EYELIDS OF BOTH EYES: ICD-10-CM

## 2024-06-19 DIAGNOSIS — H01.021 SQUAMOUS BLEPHARITIS OF UPPER EYELIDS OF BOTH EYES: ICD-10-CM

## 2024-06-19 PROCEDURE — 80048 BASIC METABOLIC PNL TOTAL CA: CPT

## 2024-06-19 PROCEDURE — 3075F SYST BP GE 130 - 139MM HG: CPT | Performed by: INTERNAL MEDICINE

## 2024-06-19 PROCEDURE — 99214 OFFICE O/P EST MOD 30 MIN: CPT | Performed by: INTERNAL MEDICINE

## 2024-06-19 PROCEDURE — 3078F DIAST BP <80 MM HG: CPT | Performed by: INTERNAL MEDICINE

## 2024-06-19 RX ORDER — OFLOXACIN 3 MG/ML
1 SOLUTION/ DROPS OPHTHALMIC 2 TIMES DAILY
Qty: 5 ML | Refills: 0 | Status: SHIPPED | OUTPATIENT
Start: 2024-06-19 | End: 2024-06-24

## 2024-06-19 NOTE — PROGRESS NOTES
Chief Complaint   Patient presents with    Eye Problem       HISTORY OF THE PRESENT ILLNESS: Patient is a 84 y.o. male.     Patient comes in complaining of itchy right eye and crusty discharge.  He is now beginning to experience some symptoms on the left.  Visual distortion.    We also discussed his recent TAVR.  No complications.  He has not appreciated significant change in health.    We also discussed his hypertension.  His blood pressure has been running higher after procedure.  He was switched to losartan and hydrochlorothiazide.  His blood pressure has been reasonable.  He feels that he has been experiencing more lower extremity edema since the change in medications.    We also reviewed his recent Zio patch.  He had episodes of atrial flutter as well is a short run of ventricular tachycardia.  He remains on anticoagulation including Eliquis and Plavix.    Allergies: Morphine    Current Outpatient Medications Ordered in Epic   Medication Sig Dispense Refill    ofloxacin (OCUFLOX) 0.3 % Solution Administer 1 Drop into both eyes 2 times a day for 5 days. 5 mL 0    clopidogrel (PLAVIX) 75 MG Tab TAKE 1 TABLET BY MOUTH EVERY  Tablet 3    hydroCHLOROthiazide 25 MG Tab Take 1 Tablet by mouth every day. 90 Tablet 1    losartan (COZAAR) 50 MG Tab Take 1 Tablet by mouth 2 times a day. 180 Tablet 3    atorvastatin (LIPITOR) 40 MG Tab Take 1 Tablet by mouth every evening. 90 Tablet 3    esomeprazole (NEXIUM 24HR) 20 MG capsule Take 20 mg by mouth every morning before breakfast.      ELIQUIS 5 MG Tab TAKE 1 TABLET BY MOUTH TWICE A DAY (Patient taking differently: Take 5 mg by mouth 2 times a day.) 180 Tablet 2    allopurinol (ZYLOPRIM) 100 MG Tab TAKE 1 TABLET BY MOUTH TWICE A DAY (Patient taking differently: Take 100 mg by mouth 2 times a day.) 180 Tablet 3     No current Epic-ordered facility-administered medications on file.       Past medical history, social history and family history were reviewed from chart  today    Review of systems: Per HPI.    All others negative.     Exam: /70 (BP Location: Left arm, Patient Position: Sitting, BP Cuff Size: Adult)   Pulse 70   Temp 37.2 °C (99 °F) (Temporal)   Resp 12   SpO2 95%   General: Well-appearing. Well-developed. No signs of distress.  HEENT: Grossly normal. Oral cavity is pink and moist.   Neck: Supple without JVD or bruit.  Pulmonary: Clear with good breath sounds. Normal effort.  Cardiovascular: Regular. Carotid and radial pulses are intact.  Abdomen: Soft, nontender, nondistended. Spleen and liver are not enlarged.  Neurologic: Cranial nerves II through XII are grossly normal, alert and oriented x3      Diagnosis:  1. Squamous blepharitis of upper eyelids of both eyes  ofloxacin (OCUFLOX) 0.3 % Solution      2. Essential hypertension        3. Typical atrial flutter (HCC)        4. Ventricular tachycardia (HCC)          Ofloxacin and baby shampoo for blepharitis.  Continue current treatment for blood pressure.  If swelling persist consider resuming Maxide.  He will need follow-up on potassium due to potassium sparing diuretic in combination with ARB.  Currently on 2 medications for anticoagulation.  Discussed importance of avoiding sharp tools, instruments, etc. to avoid cutting himself.  If he has significant cut he should go to ER.  Defer to cardiology regarding atrial flutter and ventricular tachycardia.  Probably related to recent TAVR.  Asymptomatic.    My total time spent caring for the patient on the day of the encounter was  greater than 30 minutes.   This includes obtaining history, reviewing chart, physical exam, patient education, reviewing outside records, placing orders, interpreting tests and coordinating care.    Portions of this note were completed using voice recognition software (Dragon Naturally speaking software) . Occasional transcription errors may have escaped proof reading. I have made every reasonable attempt to correct obvious errors,  but I expect that there are errors of grammar and possibly content that I did not discover before finalizing the note.

## 2024-06-20 LAB
ANION GAP SERPL CALC-SCNC: 10 MMOL/L (ref 7–16)
BUN SERPL-MCNC: 19 MG/DL (ref 8–22)
CALCIUM SERPL-MCNC: 9.5 MG/DL (ref 8.5–10.5)
CHLORIDE SERPL-SCNC: 102 MMOL/L (ref 96–112)
CO2 SERPL-SCNC: 26 MMOL/L (ref 20–33)
CREAT SERPL-MCNC: 0.85 MG/DL (ref 0.5–1.4)
GFR SERPLBLD CREATININE-BSD FMLA CKD-EPI: 85 ML/MIN/1.73 M 2
GLUCOSE SERPL-MCNC: 92 MG/DL (ref 65–99)
POTASSIUM SERPL-SCNC: 4.1 MMOL/L (ref 3.6–5.5)
SODIUM SERPL-SCNC: 138 MMOL/L (ref 135–145)

## 2024-06-26 ENCOUNTER — OFFICE VISIT (OUTPATIENT)
Dept: CARDIOLOGY | Facility: MEDICAL CENTER | Age: 84
End: 2024-06-26
Attending: NURSE PRACTITIONER
Payer: MEDICARE

## 2024-06-26 ENCOUNTER — DOCUMENTATION (OUTPATIENT)
Dept: CARDIOLOGY | Facility: MEDICAL CENTER | Age: 84
End: 2024-06-26
Payer: MEDICARE

## 2024-06-26 VITALS
OXYGEN SATURATION: 94 % | HEIGHT: 72 IN | BODY MASS INDEX: 27.07 KG/M2 | HEART RATE: 72 BPM | DIASTOLIC BLOOD PRESSURE: 68 MMHG | WEIGHT: 199.9 LBS | RESPIRATION RATE: 16 BRPM | SYSTOLIC BLOOD PRESSURE: 122 MMHG

## 2024-06-26 DIAGNOSIS — I48.0 PAF (PAROXYSMAL ATRIAL FIBRILLATION) (HCC): ICD-10-CM

## 2024-06-26 DIAGNOSIS — G47.33 OBSTRUCTIVE SLEEP APNEA SYNDROME: ICD-10-CM

## 2024-06-26 DIAGNOSIS — R42 DIZZINESS: ICD-10-CM

## 2024-06-26 DIAGNOSIS — D68.69 SECONDARY HYPERCOAGULABLE STATE (HCC): ICD-10-CM

## 2024-06-26 DIAGNOSIS — I77.810 THORACIC AORTIC ECTASIA (HCC): ICD-10-CM

## 2024-06-26 DIAGNOSIS — E78.5 HYPERLIPIDEMIA LDL GOAL <70: ICD-10-CM

## 2024-06-26 DIAGNOSIS — E78.5 DYSLIPIDEMIA: ICD-10-CM

## 2024-06-26 DIAGNOSIS — I25.10 CORONARY ARTERY DISEASE INVOLVING NATIVE CORONARY ARTERY OF NATIVE HEART WITHOUT ANGINA PECTORIS: ICD-10-CM

## 2024-06-26 DIAGNOSIS — Z95.2 S/P TAVR (TRANSCATHETER AORTIC VALVE REPLACEMENT): ICD-10-CM

## 2024-06-26 DIAGNOSIS — I10 ESSENTIAL HYPERTENSION: ICD-10-CM

## 2024-06-26 DIAGNOSIS — Z86.73 HISTORY OF ARTERIAL ISCHEMIC STROKE: ICD-10-CM

## 2024-06-26 DIAGNOSIS — Z79.01 ANTICOAGULATED: ICD-10-CM

## 2024-06-26 PROCEDURE — 99212 OFFICE O/P EST SF 10 MIN: CPT | Performed by: NURSE PRACTITIONER

## 2024-06-26 PROCEDURE — 99214 OFFICE O/P EST MOD 30 MIN: CPT | Performed by: NURSE PRACTITIONER

## 2024-06-26 ASSESSMENT — ENCOUNTER SYMPTOMS
FEVER: 0
PND: 0
COUGH: 0
CLAUDICATION: 0
ABDOMINAL PAIN: 0
ROS GI COMMENTS: DYSPHAGIA
SORE THROAT: 0
MYALGIAS: 0
SHORTNESS OF BREATH: 0
DIZZINESS: 0
ORTHOPNEA: 0
PALPITATIONS: 0

## 2024-06-26 ASSESSMENT — FIBROSIS 4 INDEX: FIB4 SCORE: 2.63

## 2024-06-26 NOTE — PROGRESS NOTES
"Chief Complaint   Patient presents with    Atrial Fibrillation     Subjective     Chuck Ortiz is a 84 y.o. male who presents today for 1 month post TAVR.    He is a patient of Dr. Gonzalez in our office.  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    He presents today with his wife Kezia.    Since the procedure, Chuck his doing better. He had one bout of transient memory loss but no recurrence.    He also had a groin pain one day but no recurrence.    His BP is under good control with medication regimen now.    His echo did show a slightly hyperdynamic LV, can discuss this with Dr. Gonzalez at next appointment and consider bb therapy and recommend hydration. He has trouble with drinking water daily.    He has chronic mild lower leg swelling with varicose veins.    He also has right sided facial weakness, pre-existing as well as right leg numbness/pain at times with his chronic back pain.    He has no shortness of breath or palpitations.    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    Hemorrhagic disorder (Formerly [facility])     on Plavix     High cholesterol     Hypertension     Kidney stones     mult uric acid kidney stones    MI (myocardial infarction) (Formerly [facility]) 04/24/2024    Seizure (Formerly [facility])     seizure x1 8/7/18    Sleep apnea     uses CPAP    Snoring     Stroke ([facility]) 08/07/2018 "    no residual problems    Zenker diverticula      Past Surgical History:   Procedure Laterality Date    TRANSCATHETER AORTIC VALVE REPLACEMENT Bilateral 5/20/2024    Procedure: TRANSCATHETER AORTIC VALVE REPLACEMENT;  Surgeon: Riley Rowland M.D.;  Location: SURGERY [facility];  Service: Cardiac    ECHOCARDIOGRAM, TRANSESOPHAGEAL, INTRAOPERATIVE N/A 5/20/2024    Procedure: ECHOCARDIOGRAM, TRANSESOPHAGEAL, INTRAOPERATIVE - ATTEMPTED;  Surgeon: Riley Rowland M.D.;  Location: SURGERY [facility];  Service: Cardiac    GOKUL N/A 5/20/2024    Procedure: ECHOCARDIOGRAM, TRANSTHORACIC;  Surgeon: Riley Rowland M.D.;  Location: SURGERY [facility];  Service: Cardiac    ZZZ CARDIAC CATH  09/28/2018    40% LAD other arteries no disease.    CATARACT PHACO WITH IOL  1/21/2014    Performed by Joni Duarte M.D. at SURGERY [facility]S    FINGER ARTHROPLASTY  12/17/2012    Performed by Adam Christopher M.D. at SURGERY SAME DAY [facility] ORS    INGUINAL HERNIA REPAIR  2/19/2009    Performed by ALEX ALATORRE at SURGERY SAME DAY [facility] ORS    COLONOSCOPY  2004    neg    LAMINOTOMY  1996    lumbar laminectomy, cyst x3    UVULOPHARYNGOPALATOPLASTY  1985     Family History   Problem Relation Age of Onset    Heart Disease Maternal Grandmother     Diabetes Paternal Grandfather     Stroke Sister     Lung Disease Father         black lung     Cancer Neg Hx      Social History     Socioeconomic History    Marital status:      Spouse name: Not on file    Number of children: Not on file    Years of education: Not on file    Highest education level: Not on file   Occupational History    Not on file   Tobacco Use    Smoking status: Never    Smokeless tobacco: Never   Vaping Use    Vaping status: Never Used   Substance and Sexual Activity    Alcohol use: Yes     Comment: 2 drinks per week     Drug use: No    Sexual activity: Not on file     Comment: , retired [employer]    Other Topics Concern     Not on file   Social History Narrative    Not on file     Social Determinants of Health     Financial Resource Strain: Not on file   Food Insecurity: Patient Declined (5/20/2024)    Hunger Vital Sign     Worried About Running Out of Food in the Last Year: Patient declined     Ran Out of Food in the Last Year: Patient declined   Transportation Needs: Patient Declined (5/20/2024)    PRAPARE - Transportation     Lack of Transportation (Medical): Patient declined     Lack of Transportation (Non-Medical): Patient declined   Physical Activity: Not on file   Stress: Not on file   Social Connections: Not on file   Intimate Partner Violence: Not At Risk (5/20/2024)    Humiliation, Afraid, Rape, and Kick questionnaire     Fear of Current or Ex-Partner: No     Emotionally Abused: No     Physically Abused: No     Sexually Abused: No   Housing Stability: Patient Declined (5/20/2024)    Housing Stability Vital Sign     Unable to Pay for Housing in the Last Year: Patient declined     Number of Places Lived in the Last Year: Not on file     Unstable Housing in the Last Year: Patient declined     Allergies   Allergen Reactions    Morphine      Bradycardia     Outpatient Encounter Medications as of 6/26/2024   Medication Sig Dispense Refill    clopidogrel (PLAVIX) 75 MG Tab TAKE 1 TABLET BY MOUTH EVERY  Tablet 3    hydroCHLOROthiazide 25 MG Tab Take 1 Tablet by mouth every day. 90 Tablet 1    losartan (COZAAR) 50 MG Tab Take 1 Tablet by mouth 2 times a day. 180 Tablet 3    atorvastatin (LIPITOR) 40 MG Tab Take 1 Tablet by mouth every evening. 90 Tablet 3    esomeprazole (NEXIUM 24HR) 20 MG capsule Take 20 mg by mouth every morning before breakfast.      ELIQUIS 5 MG Tab TAKE 1 TABLET BY MOUTH TWICE A DAY (Patient taking differently: Take 5 mg by mouth 2 times a day.) 180 Tablet 2    allopurinol (ZYLOPRIM) 100 MG Tab TAKE 1 TABLET BY MOUTH TWICE A DAY (Patient taking differently: Take 100 mg by mouth 2 times a day.) 180 Tablet  3     No facility-administered encounter medications on file as of 6/26/2024.     Review of Systems   Constitutional:  Negative for fever and malaise/fatigue.   HENT:  Negative for sore throat.    Respiratory:  Negative for cough and shortness of breath.    Cardiovascular:  Negative for chest pain, palpitations, orthopnea, claudication, leg swelling and PND.   Gastrointestinal:  Negative for abdominal pain.        Dysphagia   Musculoskeletal:  Negative for myalgias.   Neurological:  Negative for dizziness.              Objective     /68 (BP Location: Left arm, Patient Position: Sitting, BP Cuff Size: Adult)   Pulse 72   Resp 16   Ht 1.829 m (6')   Wt 90.7 kg (199 lb 14.4 oz)   SpO2 94%   BMI 27.11 kg/m²     Physical Exam  Vitals and nursing note reviewed.   Constitutional:       Appearance: Normal appearance. He is well-developed and normal weight.   HENT:      Head: Normocephalic and atraumatic.   Neck:      Vascular: No JVD.   Cardiovascular:      Rate and Rhythm: Normal rate and regular rhythm.      Pulses: Normal pulses.      Heart sounds: Normal heart sounds.   Pulmonary:      Effort: Pulmonary effort is normal.      Breath sounds: Normal breath sounds.   Musculoskeletal:         General: Normal range of motion.   Skin:     General: Skin is warm and dry.      Capillary Refill: Capillary refill takes less than 2 seconds.   Neurological:      General: No focal deficit present.      Mental Status: He is alert and oriented to person, place, and time. Mental status is at baseline.      Comments: Right sided facial palsy   Psychiatric:         Mood and Affect: Mood normal.         Behavior: Behavior normal.         Thought Content: Thought content normal.         Judgment: Judgment normal.                Assessment & Plan     1. S/P TAVR (transcatheter aortic valve replacement)        2. Anticoagulated        3. Coronary artery disease involving native coronary artery of native heart without angina  pectoris        4. Dizziness        5. Dyslipidemia        6. Essential hypertension        7. History of arterial ischemic stroke        8. Hyperlipidemia LDL goal <70        9. Obstructive sleep apnea syndrome        10. PAF (paroxysmal atrial fibrillation) (HCC)        11. Secondary hypercoagulable state (HCC)        12. Thoracic aortic ectasia (HCC)          Medical Decision Making: Today's Assessment/Status/Plan:      1. S/P TAVR, NYHA II  -cont eliquis only, no aspirin  -SBE prophylaxis understands lifelong  -echo 1 month reviewed, see below, repeat echo annually  -pending cardiac rehab    2. PAF on chronic anticoagulation  -rate controlled, asymptomatic  -recent change back into paroxysmal afib post procedure, asymptomatic  -consider ablation v. AA therapy at next appointment with Dr. Gonzalez, 4% burden on heart monitor  -cont eliquis, tolerating well    3. HLD with prior CVA, CAD  -no angina or PAL  -cont statin  -LDL goal <70 with CAD/CVA  -no aspirin with eliquis  -R sided deficits with stroke in '18, facial palsy R side    4. HTN  -good control now  -consider bb in future with hyperdynamic LV  -cont losartan 50 mg BID and HCTZ 25 mg QD  -BP goal <130/80    5. LOVE on CPAP  -tolerating and compliant with CPAP    Patient is to follow up with Keyla MALONE in 1 year with echo; Dr. Gonzalez as planned to discuss afib and BP readings.

## 2024-06-26 NOTE — PATIENT INSTRUCTIONS
Follow up with Dr. Gonzalez as planned in December.    Continue to monitor your BP readings but not as often.    Echocardiogram in 1 year with follow up.    Make sure to stay hydrated.

## 2024-06-26 NOTE — PROGRESS NOTES
Valve Program Functional Assessment:     KCCQ12   1a) Showering/bathin  1b) Walking 1 block on ground: 5  1c) Hurrying or joggin  2) Swellin  3) Fatigue: 5  4) Shortness of breath: 7  5) Sleep sitting up: 5  6) Limited enjoyment of life: 4  7) Spend the rest of your life with HF: 4  8a) Hobbies, recreational activities:6  8b) Working or doing household chores:5  8c) Visiting family or friends: 6

## 2024-07-02 ENCOUNTER — PATIENT MESSAGE (OUTPATIENT)
Dept: CARDIOLOGY | Facility: MEDICAL CENTER | Age: 84
End: 2024-07-02
Payer: MEDICARE

## 2024-07-15 ENCOUNTER — NON-PROVIDER VISIT (OUTPATIENT)
Dept: CARDIOLOGY | Facility: MEDICAL CENTER | Age: 84
End: 2024-07-15
Payer: MEDICARE

## 2024-07-15 ENCOUNTER — TELEPHONE (OUTPATIENT)
Dept: CARDIOLOGY | Facility: MEDICAL CENTER | Age: 84
End: 2024-07-15

## 2024-07-15 DIAGNOSIS — Z95.2 HEART VALVE REPLACED BY TRANSPLANT: Primary | ICD-10-CM

## 2024-07-15 PROCEDURE — G0422 INTENS CARDIAC REHAB W/EXERC: HCPCS | Performed by: FAMILY MEDICINE

## 2024-07-15 PROCEDURE — G0423 INTENS CARDIAC REHAB NO EXER: HCPCS | Mod: 59 | Performed by: FAMILY MEDICINE

## 2024-07-16 ENCOUNTER — NON-PROVIDER VISIT (OUTPATIENT)
Dept: CARDIOLOGY | Facility: MEDICAL CENTER | Age: 84
End: 2024-07-16
Payer: MEDICARE

## 2024-07-16 DIAGNOSIS — Z95.2 HEART VALVE REPLACED BY TRANSPLANT: ICD-10-CM

## 2024-07-16 PROCEDURE — G0422 INTENS CARDIAC REHAB W/EXERC: HCPCS | Performed by: FAMILY MEDICINE

## 2024-07-16 PROCEDURE — G0423 INTENS CARDIAC REHAB NO EXER: HCPCS | Mod: 59 | Performed by: FAMILY MEDICINE

## 2024-07-17 ENCOUNTER — NON-PROVIDER VISIT (OUTPATIENT)
Dept: CARDIOLOGY | Facility: MEDICAL CENTER | Age: 84
End: 2024-07-17
Payer: MEDICARE

## 2024-07-17 DIAGNOSIS — Z95.2 HEART VALVE REPLACED BY TRANSPLANT: ICD-10-CM

## 2024-07-17 PROCEDURE — G0423 INTENS CARDIAC REHAB NO EXER: HCPCS | Mod: 59 | Performed by: INTERNAL MEDICINE

## 2024-07-17 PROCEDURE — G0422 INTENS CARDIAC REHAB W/EXERC: HCPCS | Performed by: INTERNAL MEDICINE

## 2024-07-18 ENCOUNTER — NON-PROVIDER VISIT (OUTPATIENT)
Dept: CARDIOLOGY | Facility: MEDICAL CENTER | Age: 84
End: 2024-07-18
Payer: MEDICARE

## 2024-07-18 DIAGNOSIS — Z95.2 HEART VALVE REPLACED BY TRANSPLANT: ICD-10-CM

## 2024-07-18 PROCEDURE — G0423 INTENS CARDIAC REHAB NO EXER: HCPCS | Mod: 59 | Performed by: INTERNAL MEDICINE

## 2024-07-18 PROCEDURE — G0422 INTENS CARDIAC REHAB W/EXERC: HCPCS | Performed by: INTERNAL MEDICINE

## 2024-07-23 ENCOUNTER — NON-PROVIDER VISIT (OUTPATIENT)
Dept: CARDIOLOGY | Facility: MEDICAL CENTER | Age: 84
End: 2024-07-23
Payer: MEDICARE

## 2024-07-23 DIAGNOSIS — Z95.2 HEART VALVE REPLACED BY TRANSPLANT: ICD-10-CM

## 2024-07-23 PROCEDURE — G0423 INTENS CARDIAC REHAB NO EXER: HCPCS | Mod: 59 | Performed by: INTERNAL MEDICINE

## 2024-07-23 PROCEDURE — G0422 INTENS CARDIAC REHAB W/EXERC: HCPCS | Performed by: INTERNAL MEDICINE

## 2024-07-24 ENCOUNTER — NON-PROVIDER VISIT (OUTPATIENT)
Dept: CARDIOLOGY | Facility: MEDICAL CENTER | Age: 84
End: 2024-07-24
Payer: MEDICARE

## 2024-07-24 DIAGNOSIS — Z95.2 HEART VALVE REPLACED BY TRANSPLANT: ICD-10-CM

## 2024-07-24 PROCEDURE — G0422 INTENS CARDIAC REHAB W/EXERC: HCPCS | Performed by: INTERNAL MEDICINE

## 2024-07-25 ENCOUNTER — NON-PROVIDER VISIT (OUTPATIENT)
Dept: CARDIOLOGY | Facility: MEDICAL CENTER | Age: 84
End: 2024-07-25
Payer: MEDICARE

## 2024-07-25 DIAGNOSIS — Z95.2 HEART VALVE REPLACED BY TRANSPLANT: ICD-10-CM

## 2024-07-25 PROCEDURE — G0423 INTENS CARDIAC REHAB NO EXER: HCPCS | Mod: 59 | Performed by: INTERNAL MEDICINE

## 2024-07-25 PROCEDURE — G0422 INTENS CARDIAC REHAB W/EXERC: HCPCS | Performed by: INTERNAL MEDICINE

## 2024-07-29 RX ORDER — ALLOPURINOL 100 MG/1
TABLET ORAL
Qty: 180 TABLET | Refills: 3 | Status: SHIPPED | OUTPATIENT
Start: 2024-07-29

## 2024-07-30 ENCOUNTER — NON-PROVIDER VISIT (OUTPATIENT)
Dept: CARDIOLOGY | Facility: MEDICAL CENTER | Age: 84
End: 2024-07-30
Payer: MEDICARE

## 2024-07-30 DIAGNOSIS — Z95.2 HEART VALVE REPLACED BY TRANSPLANT: ICD-10-CM

## 2024-07-31 ENCOUNTER — NON-PROVIDER VISIT (OUTPATIENT)
Dept: CARDIOLOGY | Facility: MEDICAL CENTER | Age: 84
End: 2024-07-31
Payer: MEDICARE

## 2024-07-31 DIAGNOSIS — Z95.2 HEART VALVE REPLACED BY TRANSPLANT: ICD-10-CM

## 2024-08-01 ENCOUNTER — NON-PROVIDER VISIT (OUTPATIENT)
Dept: CARDIOLOGY | Facility: MEDICAL CENTER | Age: 84
End: 2024-08-01
Payer: MEDICARE

## 2024-08-01 DIAGNOSIS — Z95.2 HEART VALVE REPLACED BY TRANSPLANT: ICD-10-CM

## 2024-08-01 PROCEDURE — G0423 INTENS CARDIAC REHAB NO EXER: HCPCS | Mod: 59 | Performed by: INTERNAL MEDICINE

## 2024-08-01 PROCEDURE — G0422 INTENS CARDIAC REHAB W/EXERC: HCPCS | Performed by: INTERNAL MEDICINE

## 2024-08-01 NOTE — PROGRESS NOTES
Chcuk Bassam Ortiz attended Intensive Cardiac Rehab today from 0800 to 1000. During his time he exercised and attended class.   His education today was a lecture titled: Managing Heart Disease. Patient received handouts and class discussion pertaining to the topic.

## 2024-08-06 ENCOUNTER — NON-PROVIDER VISIT (OUTPATIENT)
Dept: CARDIOLOGY | Facility: MEDICAL CENTER | Age: 84
End: 2024-08-06
Payer: MEDICARE

## 2024-08-06 DIAGNOSIS — Z95.2 HEART VALVE REPLACED BY TRANSPLANT: ICD-10-CM

## 2024-08-06 PROCEDURE — G0423 INTENS CARDIAC REHAB NO EXER: HCPCS | Mod: 59 | Performed by: INTERNAL MEDICINE

## 2024-08-06 PROCEDURE — G0422 INTENS CARDIAC REHAB W/EXERC: HCPCS | Performed by: INTERNAL MEDICINE

## 2024-08-06 NOTE — PROGRESS NOTES
Chuck Ortiz attended Intensive Cardiac Rehab today from 0800 to 1000. During his time he exercised and attended class.   His education today was a video titled: Label Reading. Patient received handouts and class discussion pertaining to the topic.

## 2024-08-07 ENCOUNTER — NON-PROVIDER VISIT (OUTPATIENT)
Dept: CARDIOLOGY | Facility: MEDICAL CENTER | Age: 84
End: 2024-08-07
Payer: MEDICARE

## 2024-08-07 DIAGNOSIS — Z95.2 HEART VALVE REPLACED BY TRANSPLANT: ICD-10-CM

## 2024-08-07 PROCEDURE — G0422 INTENS CARDIAC REHAB W/EXERC: HCPCS | Performed by: INTERNAL MEDICINE

## 2024-08-07 PROCEDURE — G0423 INTENS CARDIAC REHAB NO EXER: HCPCS | Mod: 59 | Performed by: INTERNAL MEDICINE

## 2024-08-07 NOTE — PROGRESS NOTES
Chuck Ortiz attended Intensive Cardiac Rehab today from 0800 to 1000. During his time he exercised and attended class.   His education today was a cooking school titled: [employer]. Patient received handouts and class discussion pertaining to the topic.  ,

## 2024-08-08 ENCOUNTER — NON-PROVIDER VISIT (OUTPATIENT)
Dept: CARDIOLOGY | Facility: MEDICAL CENTER | Age: 84
End: 2024-08-08
Payer: MEDICARE

## 2024-08-08 DIAGNOSIS — Z95.2 HEART VALVE REPLACED BY TRANSPLANT: ICD-10-CM

## 2024-08-08 PROCEDURE — G0423 INTENS CARDIAC REHAB NO EXER: HCPCS | Mod: 59 | Performed by: INTERNAL MEDICINE

## 2024-08-08 PROCEDURE — G0422 INTENS CARDIAC REHAB W/EXERC: HCPCS | Performed by: INTERNAL MEDICINE

## 2024-08-08 NOTE — PROGRESS NOTES
Chuck Bassam Ortiz attended Intensive Cardiac Rehab today from 0800 to 1000. During his time he exercised and attended class.   His education today was a WORKSHOP titled: LABEL READING. Patient received handouts and class discussion pertaining to the topic.

## 2024-08-13 ENCOUNTER — NON-PROVIDER VISIT (OUTPATIENT)
Dept: INTERNAL MEDICINE | Facility: IMAGING CENTER | Age: 84
End: 2024-08-13
Payer: MEDICARE

## 2024-08-13 ENCOUNTER — OFFICE VISIT (OUTPATIENT)
Dept: CARDIOLOGY | Facility: MEDICAL CENTER | Age: 84
End: 2024-08-13
Attending: INTERNAL MEDICINE
Payer: MEDICARE

## 2024-08-13 VITALS
HEART RATE: 64 BPM | HEIGHT: 72 IN | WEIGHT: 201 LBS | OXYGEN SATURATION: 98 % | DIASTOLIC BLOOD PRESSURE: 56 MMHG | BODY MASS INDEX: 27.22 KG/M2 | SYSTOLIC BLOOD PRESSURE: 110 MMHG

## 2024-08-13 DIAGNOSIS — I10 ESSENTIAL HYPERTENSION: ICD-10-CM

## 2024-08-13 DIAGNOSIS — I25.10 CORONARY ARTERY DISEASE INVOLVING NATIVE CORONARY ARTERY OF NATIVE HEART WITHOUT ANGINA PECTORIS: ICD-10-CM

## 2024-08-13 DIAGNOSIS — I49.3 VENTRICULAR ECTOPY: ICD-10-CM

## 2024-08-13 DIAGNOSIS — E78.5 DYSLIPIDEMIA: ICD-10-CM

## 2024-08-13 DIAGNOSIS — J06.9 UPPER RESPIRATORY TRACT INFECTION, UNSPECIFIED TYPE: ICD-10-CM

## 2024-08-13 DIAGNOSIS — Z95.2 S/P TAVR (TRANSCATHETER AORTIC VALVE REPLACEMENT): ICD-10-CM

## 2024-08-13 DIAGNOSIS — Z95.5 S/P DRUG ELUTING CORONARY STENT PLACEMENT: ICD-10-CM

## 2024-08-13 DIAGNOSIS — Z20.822 EXPOSURE TO COVID-19 VIRUS: ICD-10-CM

## 2024-08-13 DIAGNOSIS — I34.0 MITRAL VALVE INSUFFICIENCY, UNSPECIFIED ETIOLOGY: ICD-10-CM

## 2024-08-13 LAB
FLUAV RNA SPEC QL NAA+PROBE: NEGATIVE
FLUBV RNA SPEC QL NAA+PROBE: NEGATIVE
RSV RNA SPEC QL NAA+PROBE: NEGATIVE
SARS-COV-2 RNA RESP QL NAA+PROBE: NEGATIVE

## 2024-08-13 PROCEDURE — 3078F DIAST BP <80 MM HG: CPT | Performed by: INTERNAL MEDICINE

## 2024-08-13 PROCEDURE — 3074F SYST BP LT 130 MM HG: CPT | Performed by: INTERNAL MEDICINE

## 2024-08-13 PROCEDURE — 0241U POCT CEPHEID COV-2, FLU A/B, RSV - PCR: CPT | Performed by: INTERNAL MEDICINE

## 2024-08-13 PROCEDURE — 99214 OFFICE O/P EST MOD 30 MIN: CPT | Performed by: INTERNAL MEDICINE

## 2024-08-13 PROCEDURE — 99212 OFFICE O/P EST SF 10 MIN: CPT | Performed by: INTERNAL MEDICINE

## 2024-08-13 RX ORDER — PREDNISOLONE ACETATE 10 MG/ML
SUSPENSION/ DROPS OPHTHALMIC
COMMUNITY
Start: 2024-08-01

## 2024-08-13 RX ORDER — TROMETHAMINE IN STERILE WATER 1.8 G/50ML
SYRINGE (ML) INTRAVENOUS
COMMUNITY
Start: 2024-08-03 | End: 2024-08-13

## 2024-08-13 RX ORDER — KETOROLAC TROMETHAMINE 5 MG/ML
1 SOLUTION OPHTHALMIC 4 TIMES DAILY
COMMUNITY

## 2024-08-13 ASSESSMENT — ENCOUNTER SYMPTOMS
LOSS OF CONSCIOUSNESS: 0
COUGH: 0
BRUISES/BLEEDS EASILY: 0
DIZZINESS: 0
SHORTNESS OF BREATH: 0
PALPITATIONS: 0
MYALGIAS: 0

## 2024-08-13 ASSESSMENT — FIBROSIS 4 INDEX: FIB4 SCORE: 2.63

## 2024-08-13 NOTE — PROGRESS NOTES
"Chief Complaint   Patient presents with    Coronary Artery Disease    Hypertension    Dizziness       Subjective     Chuck Ortiz is a 84 y.o. male who presents today for follow-up cardiac care.    The patient has TAVR, CAD, PCI mid LAD (3.0 x 30 mm AHMET) 4/23/2024 PAF, OAC on apixaban, hypertension, dyslipidemia, cryptogenic stroke presumed embolic based on poststroke ILR, PAF, LOVE, Zenker's diverticulum    Since 5/29/2024 appointment the patient has been doing well.  Exercising regularly with no cardiac symptoms of chest pain or shortness of breath.  More recently shoveling large amounts of mud of his yard after monsoon rains and again having no cardiac symptoms.  Specifically no palpitations.     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    Hemorrhagic disorder (HCC)     on Plavix     High cholesterol     Hypertension     Kidney stones     mult uric acid kidney stones    MI (myocardial infarction) ([facility]) 04/24/2024    Seizure ([facility])     seizure x1 8/7/18    Sleep apnea     uses CPAP    Snoring     Stroke ([facility]) 08/07/2018    no residual problems    Zenker diverticula      Past Surgical History:   Procedure Laterality Date    TRANSCATHETER AORTIC VALVE REPLACEMENT Bilateral 5/20/2024    Procedure: TRANSCATHETER " AORTIC VALVE REPLACEMENT;  Surgeon: Riley Rowland M.D.;  Location: SURGERY [facility];  Service: Cardiac    ECHOCARDIOGRAM, TRANSESOPHAGEAL, INTRAOPERATIVE N/A 5/20/2024    Procedure: ECHOCARDIOGRAM, TRANSESOPHAGEAL, INTRAOPERATIVE - ATTEMPTED;  Surgeon: Riley Rowland M.D.;  Location: SURGERY [facility];  Service: Cardiac    GOKUL N/A 5/20/2024    Procedure: ECHOCARDIOGRAM, TRANSTHORACIC;  Surgeon: Riley Rowland M.D.;  Location: SURGERY [facility];  Service: Cardiac    ZZZ CARDIAC CATH  09/28/2018    40% LAD other arteries no disease.    CATARACT PHACO WITH IOL  1/21/2014    Performed by Joni Duarte M.D. at [facility]    FINGER ARTHROPLASTY  12/17/2012    Performed by Adam Christopher M.D. at SURGERY SAME DAY [facility]    INGUINAL HERNIA REPAIR  2/19/2009    Performed by ALEX ALATORRE at SURGERY SAME DAY [facility]    COLONOSCOPY  2004    neg    LAMINOTOMY  1996    lumbar laminectomy, cyst x3    UVULOPHARYNGOPALATOPLASTY  1985     Family History   Problem Relation Age of Onset    Heart Disease Maternal Grandmother     Diabetes Paternal Grandfather     Stroke Sister     Lung Disease Father         black lung     Cancer Neg Hx      Social History     Socioeconomic History    Marital status:      Spouse name: Not on file    Number of children: Not on file    Years of education: Not on file    Highest education level: Not on file   Occupational History    Not on file   Tobacco Use    Smoking status: Never    Smokeless tobacco: Never   Vaping Use    Vaping status: Never Used   Substance and Sexual Activity    Alcohol use: Yes     Comment: 2 drinks per week     Drug use: No    Sexual activity: Not on file     Comment: , retired [employer]    Other Topics Concern    Not on file   Social History Narrative    Not on file     Social Determinants of Health     Financial Resource Strain: Not on file   Food Insecurity: Patient Declined (5/20/2024)    Hunger  Vital Sign     Worried About Running Out of Food in the Last Year: Patient declined     Ran Out of Food in the Last Year: Patient declined   Transportation Needs: Patient Declined (5/20/2024)    PRAPARE - Transportation     Lack of Transportation (Medical): Patient declined     Lack of Transportation (Non-Medical): Patient declined   Physical Activity: Not on file   Stress: Not on file   Social Connections: Not on file   Intimate Partner Violence: Not At Risk (5/20/2024)    Humiliation, Afraid, Rape, and Kick questionnaire     Fear of Current or Ex-Partner: No     Emotionally Abused: No     Physically Abused: No     Sexually Abused: No   Housing Stability: Patient Declined (5/20/2024)    Housing Stability Vital Sign     Unable to Pay for Housing in the Last Year: Patient declined     Number of Places Lived in the Last Year: Not on file     Unstable Housing in the Last Year: Patient declined     Allergies   Allergen Reactions    Morphine      Bradycardia     Outpatient Encounter Medications as of 8/13/2024   Medication Sig Dispense Refill    PRED FORTE 1 % Suspension Administer  into the right eye. Pt uses 4 times every day on right eye      tromethamine (MAURI) 30 MEQ/100ML Solution       ketorolac (ACULAR) 0.5 % Solution Administer 1 Drop into the right eye 4 times a day. Tromethamine, Pt uses 4 times a day in right eye      allopurinol (ZYLOPRIM) 100 MG Tab TAKE 1 TABLET BY MOUTH TWICE A  Tablet 3    clopidogrel (PLAVIX) 75 MG Tab TAKE 1 TABLET BY MOUTH EVERY  Tablet 3    hydroCHLOROthiazide 25 MG Tab Take 1 Tablet by mouth every day. 90 Tablet 1    losartan (COZAAR) 50 MG Tab Take 1 Tablet by mouth 2 times a day. 180 Tablet 3    atorvastatin (LIPITOR) 40 MG Tab Take 1 Tablet by mouth every evening. 90 Tablet 3    ELIQUIS 5 MG Tab TAKE 1 TABLET BY MOUTH TWICE A DAY (Patient taking differently: Take 5 mg by mouth 2 times a day.) 180 Tablet 2    [DISCONTINUED] esomeprazole (NEXIUM 24HR) 20 MG capsule  Take 20 mg by mouth every morning before breakfast.       No facility-administered encounter medications on file as of 8/13/2024.     Review of Systems   Respiratory:  Negative for cough and shortness of breath.    Cardiovascular:  Negative for chest pain and palpitations.   Musculoskeletal:  Negative for myalgias.   Neurological:  Negative for dizziness and loss of consciousness.   Endo/Heme/Allergies:  Does not bruise/bleed easily.              Objective     /56 (BP Location: Left arm, Patient Position: Sitting, BP Cuff Size: Adult)   Pulse 64   Ht 1.829 m (6')   Wt 91.2 kg (201 lb)   SpO2 98%   BMI 27.26 kg/m²     Physical Exam  Constitutional:       Appearance: He is well-developed.   Eyes:      Conjunctiva/sclera: Conjunctivae normal.      Pupils: Pupils are equal, round, and reactive to light.   Neck:      Vascular: No JVD.   Cardiovascular:      Rate and Rhythm: Normal rate. Rhythm regularly irregular.      Heart sounds: No murmur heard.     Comments: Diminished A2  Pulmonary:      Effort: Pulmonary effort is normal. No accessory muscle usage or respiratory distress.      Breath sounds: Normal breath sounds. No wheezing or rales.   Musculoskeletal:      Right lower leg: No edema.      Left lower leg: No edema.   Skin:     General: Skin is warm and dry.      Findings: No rash.      Nails: There is no clubbing.   Neurological:      Mental Status: He is alert and oriented to person, place, and time.   Psychiatric:         Behavior: Behavior normal.                 08/14/2018 BRAIN MRI  1.  Acute to subacute small sized infarcts in the left posterior frontal lobe.  2.  Mild diffuse cerebral substance loss.  3.  Mild microangiopathic ischemic change versus demyelination or gliosis.  4.  Right maxillary sinus mucous retention cyst or polyp.     ECHOCARDIOGRAM 09/06/2018.  Normal left ventricular systolic function.  Left ventricular ejection fraction is visually estimated to be 60%.  Mild aortic  stenosis.  Mild posterior mitral valve leaflet prolapse.  Mild mitral regurgitation.  Mitral annular calcification.  Unable to estimate pulmonary artery pressure due to an inadequate   tricuspid regurgitant jet.  No prior study is available for comparison.    ECHOCARDIOGRAM 5/5/2022  Moderate aortic valve stenosis. Transvalvular gradients are - Peak: 51   mmHg, Mean: 33 mmHg. Vmax is 3.6 m/s.  Normal left ventricular systolic function.  The left ventricular ejection fraction is visually estimated to be 70%.  Mild concentric left ventricular hypertrophy and also Sigmoid septum.  Grade I diastolic dysfunction.  Normal right ventricular size and systolic function.  Mildly dilated left atrium.  Mildly elevated estimated right atrial pressure.   Normal pulmonary artery pressure.    ECHOCARDIOGRAM 3/30/2023  Normal left ventricular systolic function.  The left ventricular ejection fraction is visually estimated to be 65%.  Moderate aortic valve stenosis: Transvalvular gradients are - Peak: 36   mmHg, Mean: 22 mmHg. Vmax is 2.1 m/s. Aortic valve is 1.2 cm2.  Mild mitral regurgitation.  The right ventricle is normal in size and systolic function.  Unable to estimate right ventricular systolic pressure due to an   inadequate tricuspid regurgitant jet.  Compared to the prior study on 5/5/2022, there has been no significant   change; aortic stenosis remains moderate.      ECHOCARDIOGRAM 5/20/2024  Normal LV systolic function  Normally functioning prosthetic aortic valve - mean gradient of 2 mmHg,   no AI  Mild MR    ECHOCARDIOGRAM 6/18/2024  Hyperdynamic left ventricular systolic function.  Known TAVR aortic valve that is functioning normally with normal   transvalvular gradients.  Moderate mitral regurgitation.  Basal septal hypertrophy measuring 1.6 cm without dynamic LVOT obstruction.     MPI 09/06/2018.  There is a predominently fixed basal to mid inferior septal perfusion defect.   There is a distal inferior wall defect,  predominantly reversible, moderate    size, mild intensity.   Stress Image LV EF: 71     %     CARDIAC CATHETERIZATION  09/28/2018  A. Left heart catheterization.  B. Left ventriculography.  C. Selective coronary angiography.  D. Right radial artery approach.  PREPROCEDURE DIAGNOSES:  1.  Abnormal myocardial perfusion scan.  2.  Mild aortic stenosis.  3.  Ventricular ectopy.  4.  Cryptogenic stroke.  5.  Hyperlipidemia.   POSTPROCEDURE DIAGNOSES:  1.  Coronary artery disease, moderate predominantly involving the mid left anterior descending artery.  2.  Left ventricular ejection fraction 77% post PVC with basal inferior wall akinesis.    CARDIAC CATHETERIZATION 4/23/2024  Hemodynamics:   Aorta: 106/63 mmHg  LVEDP: 20 mmHg  Aortic valve peak to peak gradient: 70 mmHg  Coronary Anatomy              Left Main: Normal              LAD:  Mid 70% stenosis              Ramus: Proximal 50% stenosis              LCx: Minimal luminal irregularities              RCA: Dominant, Minimal luminal irregularities  PCI: Mid LAD 3.0 x 38 mm AHMET    ILR recording Atrial fibrillation 5/11/2020 and 7/1/2020  Start eliquis 5 MG bid  Discontinue plavix    ZIO AT REPORT (05/29/24)   Zio study showing predominately sinus rhythm with maximum rate of 174 bpm, minimum rate of 53 bpm, average of 75 bpm.   Atrial fibrillation/atrial flutter: Atrial flutter occurred (4% burden), ranging from  bpm (average of 81 bpm), the longest lasting 6 hours, 5 minutes.   Supraventricular tachycardia: 15 episodes of supraventricular tachycardia/atrial tachycardia with fastest interval lasting 6 beats with a max rate of 133 bpm with longest lasting 12.3 seconds with average rate of 108 bpm noted.    Pauses: None.   Heart block: First degree AV block noted.   Ventricular tachycardia: 1 episode of ventricular tachycardia lasting 4 beats with rate of 174 bpm noted.    Rare <1% burden of premature atrial contractions, rare couplets and triplets.   Occasional  2.7% burden of premature ventricular contractions, rare couplets and triplets.    Ventricular bigeminy (longest episode lasting 22 seconds) and trigeminy (longest episode lasting 40.8 seconds) were noted.   10 Patient events correlated with atrial flutter, sinus rhythm.    Assessment & Plan     1. Coronary artery disease involving native coronary artery of native heart without angina pectoris        2. S/P drug eluting coronary stent placement        3. S/P TAVR (transcatheter aortic valve replacement)        4. Essential hypertension        5. Dyslipidemia        6. Ventricular ectopy        7. Mitral valve insufficiency, unspecified etiology              Medical Decision Making: Today's Assessment/Status/Plan:   CAD  PCI mid LAD (3.0 x 38 mm AHMET) 4/23/2024  TAVR 5/20/2024  PAF (5/11/2020, 7/1/2020 on ILR)  OAC on apixaban  Regurgitation  Hypertension   Hyperlipidemia   Ventricular ectopy, chronic.  CVA, left posterior frontal.  08/14/2018  Implantable loop recorder 8/28/2018, removed 7/20/2020.  LOVE.  S/P uvulectomy.  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  S/P back surgery.  Zenker's diverticulum.     Recommendation Discussion  CAD, PCI asymptomatic, continue atorvastatin, apixaban, plavix  PAF: Relatively low burden 4%, asymptomatic, will continue to monitor for recurrent or increasing symptomatic episodes of atrial fibrillation, will not start any additional therapy i.e. beta-blockers, heart rate 64 BP well-controlled.  TAVR: functioning normal  Hypertension: BP normal, at goal, continue losartan, HCTZ, recent lab work BMP normal.  Dyslipidemia: LDL 52, at goal, continue atorvastatin.  Ventricular ectopy: <1% on cardiac monitoring, asymptomatic, continue monitoring.  RTC 6 months

## 2024-08-13 NOTE — PROGRESS NOTES
Chuck Ortiz is a 84 y.o. male here for a non-provider visit for COVID testing.    Clinic collect COVID order in system?: Yes    Patient tolerated specimen collection and no adverse effects were observed or reported: Yes

## 2024-08-14 ENCOUNTER — NON-PROVIDER VISIT (OUTPATIENT)
Dept: CARDIOLOGY | Facility: MEDICAL CENTER | Age: 84
End: 2024-08-14
Payer: MEDICARE

## 2024-08-14 DIAGNOSIS — Z95.2 HEART VALVE REPLACED BY TRANSPLANT: ICD-10-CM

## 2024-08-14 PROCEDURE — G0423 INTENS CARDIAC REHAB NO EXER: HCPCS | Mod: 59 | Performed by: INTERNAL MEDICINE

## 2024-08-14 PROCEDURE — G0422 INTENS CARDIAC REHAB W/EXERC: HCPCS | Performed by: INTERNAL MEDICINE

## 2024-08-14 NOTE — PROGRESS NOTES
Chuck Ortiz attended Intensive Cardiac Rehab today from 0800 to 1000. During his time he exercised and attended class.   His education today was a COOKING WORKSHOP titled: FAST EVENING MEALS. Patient received handouts and class discussion pertaining to the topic.

## 2024-08-15 ENCOUNTER — NON-PROVIDER VISIT (OUTPATIENT)
Dept: CARDIOLOGY | Facility: MEDICAL CENTER | Age: 84
End: 2024-08-15
Payer: MEDICARE

## 2024-08-15 DIAGNOSIS — Z95.2 HEART VALVE REPLACED BY TRANSPLANT: ICD-10-CM

## 2024-08-15 PROCEDURE — G0422 INTENS CARDIAC REHAB W/EXERC: HCPCS | Performed by: INTERNAL MEDICINE

## 2024-08-15 PROCEDURE — G0423 INTENS CARDIAC REHAB NO EXER: HCPCS | Mod: 59 | Performed by: INTERNAL MEDICINE

## 2024-08-15 NOTE — PROGRESS NOTES
Chuck Bassam Ortiz attended Intensive Cardiac Rehab today from 0800 to 1000. During his time he exercised and attended class.   His education today was a workshop titled: menus and dining out. Patient received handouts and class discussion pertaining to the topic.

## 2024-08-18 DIAGNOSIS — Z95.5 S/P DRUG ELUTING CORONARY STENT PLACEMENT: ICD-10-CM

## 2024-08-18 DIAGNOSIS — I48.91 ATRIAL FIBRILLATION, UNSPECIFIED TYPE (HCC): ICD-10-CM

## 2024-08-20 ENCOUNTER — NON-PROVIDER VISIT (OUTPATIENT)
Dept: CARDIOLOGY | Facility: MEDICAL CENTER | Age: 84
End: 2024-08-20
Payer: MEDICARE

## 2024-08-20 DIAGNOSIS — Z95.2 HEART VALVE REPLACED BY TRANSPLANT: ICD-10-CM

## 2024-08-20 PROCEDURE — G0422 INTENS CARDIAC REHAB W/EXERC: HCPCS | Performed by: INTERNAL MEDICINE

## 2024-08-20 PROCEDURE — G0423 INTENS CARDIAC REHAB NO EXER: HCPCS | Mod: 59 | Performed by: INTERNAL MEDICINE

## 2024-08-20 RX ORDER — APIXABAN 5 MG/1
TABLET, FILM COATED ORAL
Qty: 180 TABLET | Refills: 3 | Status: SHIPPED | OUTPATIENT
Start: 2024-08-20

## 2024-08-20 NOTE — PROGRESS NOTES
Chuck Ortiz attended Intensive Cardiac Rehab today from 0800 to 1000. During his time he exercised and attended class.   His education today was a video titled: Dining Out. Patient received handouts and class discussion pertaining to the topic.

## 2024-08-21 ENCOUNTER — NON-PROVIDER VISIT (OUTPATIENT)
Dept: CARDIOLOGY | Facility: MEDICAL CENTER | Age: 84
End: 2024-08-21
Payer: MEDICARE

## 2024-08-21 DIAGNOSIS — Z95.2 HEART VALVE REPLACED BY TRANSPLANT: ICD-10-CM

## 2024-08-21 PROCEDURE — G0423 INTENS CARDIAC REHAB NO EXER: HCPCS | Mod: 59 | Performed by: INTERNAL MEDICINE

## 2024-08-21 PROCEDURE — G0422 INTENS CARDIAC REHAB W/EXERC: HCPCS | Performed by: INTERNAL MEDICINE

## 2024-08-21 NOTE — PROGRESS NOTES
Chuck Ortiz attended Intensive Cardiac Rehab today from 0800 to 1000. During his time he exercised and attended class.   His education today was a cooking school titled: [employer]. Patient received handouts and class discussion pertaining to the topic.

## 2024-08-22 ENCOUNTER — APPOINTMENT (OUTPATIENT)
Dept: CARDIOLOGY | Facility: MEDICAL CENTER | Age: 84
End: 2024-08-22
Payer: MEDICARE

## 2024-08-22 DIAGNOSIS — Z95.2 HEART VALVE REPLACED BY TRANSPLANT: ICD-10-CM

## 2024-08-27 ENCOUNTER — NON-PROVIDER VISIT (OUTPATIENT)
Dept: CARDIOLOGY | Facility: MEDICAL CENTER | Age: 84
End: 2024-08-27
Payer: MEDICARE

## 2024-08-27 DIAGNOSIS — Z95.2 HEART VALVE REPLACED BY TRANSPLANT: ICD-10-CM

## 2024-08-27 NOTE — PROGRESS NOTES
Chuck Bassam Ortiz attended Intensive Cardiac Rehab today from 0800 to 1000. During his time he exercised and attended class.   His education today was a WORKSHOP titled: SLEEP. Patient received handouts and class discussion pertaining to the topic.

## 2024-08-28 ENCOUNTER — NON-PROVIDER VISIT (OUTPATIENT)
Dept: CARDIOLOGY | Facility: MEDICAL CENTER | Age: 84
End: 2024-08-28
Payer: MEDICARE

## 2024-08-28 DIAGNOSIS — Z95.2 HEART VALVE REPLACED BY TRANSPLANT: ICD-10-CM

## 2024-08-28 NOTE — PROGRESS NOTES
Chuck Ortiz attended Intensive Cardiac Rehab today from 0800 to 1000. During his time he exercised and attended class.   His education today was a cooking school titled: Adding Flavor. Patient received handouts and class discussion pertaining to the topic.

## 2024-08-29 ENCOUNTER — NON-PROVIDER VISIT (OUTPATIENT)
Dept: CARDIOLOGY | Facility: MEDICAL CENTER | Age: 84
End: 2024-08-29
Payer: MEDICARE

## 2024-08-29 DIAGNOSIS — Z95.2 HEART VALVE REPLACED BY TRANSPLANT: ICD-10-CM

## 2024-08-29 NOTE — PROGRESS NOTES
Chuck Ortiz attended Intensive Cardiac Rehab today from 0800 to 1000. During his time he exercised and attended class.   His education today was a VIDEO titled: DECODING LABS. Patient received handouts and class discussion pertaining to the topic.

## 2024-09-03 ENCOUNTER — NON-PROVIDER VISIT (OUTPATIENT)
Dept: CARDIOLOGY | Facility: MEDICAL CENTER | Age: 84
End: 2024-09-03
Payer: MEDICARE

## 2024-09-03 DIAGNOSIS — I25.10 CORONARY ARTERY DISEASE, NON-OCCLUSIVE: ICD-10-CM

## 2024-09-03 DIAGNOSIS — I10 ESSENTIAL HYPERTENSION: ICD-10-CM

## 2024-09-03 DIAGNOSIS — Z95.2 HEART VALVE REPLACED BY TRANSPLANT: ICD-10-CM

## 2024-09-03 DIAGNOSIS — E78.5 DYSLIPIDEMIA: ICD-10-CM

## 2024-09-03 PROCEDURE — G0423 INTENS CARDIAC REHAB NO EXER: HCPCS | Mod: 59 | Performed by: INTERNAL MEDICINE

## 2024-09-03 PROCEDURE — G0422 INTENS CARDIAC REHAB W/EXERC: HCPCS | Performed by: INTERNAL MEDICINE

## 2024-09-03 RX ORDER — ATORVASTATIN CALCIUM 40 MG/1
40 TABLET, FILM COATED ORAL NIGHTLY
Qty: 100 TABLET | Refills: 1 | Status: SHIPPED | OUTPATIENT
Start: 2024-09-03

## 2024-09-03 RX ORDER — LOSARTAN POTASSIUM 50 MG/1
50 TABLET ORAL 2 TIMES DAILY
Qty: 200 TABLET | Refills: 1 | Status: SHIPPED | OUTPATIENT
Start: 2024-09-03

## 2024-09-03 NOTE — PROGRESS NOTES
Chuck Bassam Ortiz attended Intensive Cardiac Rehab today from 8000 to 1000. During his time he exercised and attended class.   His education today was a workshop titled: New Thoughts, New Behaviors. Patient received handouts and class discussion pertaining to the topic.

## 2024-09-03 NOTE — TELEPHONE ENCOUNTER
Pt has had OV within the 12 month protocol and lipid panel is current. 6 month supply sent to pharmacy.   Lab Results   Component Value Date/Time    CHOLSTRLTOT 106 04/23/2024 01:45 AM    LDL 52 04/23/2024 01:45 AM    HDL 43 04/23/2024 01:45 AM    TRIGLYCERIDE 53 04/23/2024 01:45 AM       Lab Results   Component Value Date/Time    SODIUM 138 06/19/2024 11:00 AM    POTASSIUM 4.1 06/19/2024 11:00 AM    CHLORIDE 102 06/19/2024 11:00 AM    CO2 26 06/19/2024 11:00 AM    GLUCOSE 92 06/19/2024 11:00 AM    BUN 19 06/19/2024 11:00 AM    CREATININE 0.85 06/19/2024 11:00 AM    CREATININE 1.10 03/03/2011 11:20 AM    BUNCREATRAT 23 (H) 03/03/2011 11:20 AM    GLOMRATE >59 03/03/2011 11:20 AM     Lab Results   Component Value Date/Time    ALKPHOSPHAT 105 (H) 05/21/2024 02:01 AM    ASTSGOT 18 05/21/2024 02:01 AM    ALTSGPT 17 05/21/2024 02:01 AM    TBILIRUBIN 0.6 05/21/2024 02:01 AM        Refill X 6 months, sent to pharmacy.Pt. Seen in the last 6 months per protocol.   Lab Results   Component Value Date/Time    SODIUM 138 06/19/2024 11:00 AM    POTASSIUM 4.1 06/19/2024 11:00 AM    CHLORIDE 102 06/19/2024 11:00 AM    CO2 26 06/19/2024 11:00 AM    GLUCOSE 92 06/19/2024 11:00 AM    BUN 19 06/19/2024 11:00 AM    CREATININE 0.85 06/19/2024 11:00 AM    CREATININE 1.10 03/03/2011 11:20 AM    BUNCREATRAT 23 (H) 03/03/2011 11:20 AM    GLOMRATE >59 03/03/2011 11:20 AM       Waleska Ross, Clinical Pharmacist, CDE, CACP  Managed Care Pharmacist for [facility]

## 2024-09-04 ENCOUNTER — NON-PROVIDER VISIT (OUTPATIENT)
Dept: CARDIOLOGY | Facility: MEDICAL CENTER | Age: 84
End: 2024-09-04
Payer: MEDICARE

## 2024-09-04 ENCOUNTER — PATIENT MESSAGE (OUTPATIENT)
Dept: HEALTH INFORMATION MANAGEMENT | Facility: OTHER | Age: 84
End: 2024-09-04

## 2024-09-04 DIAGNOSIS — Z95.2 HEART VALVE REPLACED BY TRANSPLANT: ICD-10-CM

## 2024-09-04 PROCEDURE — G0422 INTENS CARDIAC REHAB W/EXERC: HCPCS | Performed by: INTERNAL MEDICINE

## 2024-09-04 PROCEDURE — G0423 INTENS CARDIAC REHAB NO EXER: HCPCS | Mod: 59 | Performed by: INTERNAL MEDICINE

## 2024-09-04 NOTE — PROGRESS NOTES
Chuck Ortiz attended Intensive Cardiac Rehab today from 0800 to 1000. During his time he exercised and attended class.   His education today was a cooking class titled: FAST BREAKFASTS. Patient received handouts and class discussion pertaining to the topic.

## 2024-09-05 ENCOUNTER — NON-PROVIDER VISIT (OUTPATIENT)
Dept: CARDIOLOGY | Facility: MEDICAL CENTER | Age: 84
End: 2024-09-05
Payer: MEDICARE

## 2024-09-05 DIAGNOSIS — Z95.2 HEART VALVE REPLACED BY TRANSPLANT: ICD-10-CM

## 2024-09-05 PROCEDURE — G0423 INTENS CARDIAC REHAB NO EXER: HCPCS | Mod: 59 | Performed by: INTERNAL MEDICINE

## 2024-09-05 PROCEDURE — G0422 INTENS CARDIAC REHAB W/EXERC: HCPCS | Performed by: INTERNAL MEDICINE

## 2024-09-05 NOTE — PROGRESS NOTES
Chuck Bassam Ortiz attended Intensive Cardiac Rehab today from 0800 to 1000. During his time he exercised and attended class.   His education today was a WORKSHOP titled: FUELING A HEALTHY BODY. Patient received handouts and class discussion pertaining to the topic.

## 2024-09-10 ENCOUNTER — NON-PROVIDER VISIT (OUTPATIENT)
Dept: CARDIOLOGY | Facility: MEDICAL CENTER | Age: 84
End: 2024-09-10
Payer: MEDICARE

## 2024-09-10 DIAGNOSIS — Z95.2 HEART VALVE REPLACED BY TRANSPLANT: ICD-10-CM

## 2024-09-10 PROCEDURE — G0423 INTENS CARDIAC REHAB NO EXER: HCPCS | Mod: 59 | Performed by: INTERNAL MEDICINE

## 2024-09-10 PROCEDURE — G0422 INTENS CARDIAC REHAB W/EXERC: HCPCS | Performed by: INTERNAL MEDICINE

## 2024-09-10 NOTE — PROGRESS NOTES
Chuck Ortiz attended Intensive Cardiac Rehab today from 0800 to 1000. During his time he exercised and attended class.   His education today was a VIDEO titled: IMPROVED PERFORMANCE. Patient received handouts and class discussion pertaining to the topic.

## 2024-09-11 ENCOUNTER — NON-PROVIDER VISIT (OUTPATIENT)
Dept: CARDIOLOGY | Facility: MEDICAL CENTER | Age: 84
End: 2024-09-11
Payer: MEDICARE

## 2024-09-11 DIAGNOSIS — Z95.2 HEART VALVE REPLACED BY TRANSPLANT: ICD-10-CM

## 2024-09-11 PROCEDURE — G0423 INTENS CARDIAC REHAB NO EXER: HCPCS | Mod: 59 | Performed by: INTERNAL MEDICINE

## 2024-09-11 PROCEDURE — G0422 INTENS CARDIAC REHAB W/EXERC: HCPCS | Performed by: INTERNAL MEDICINE

## 2024-09-11 NOTE — PROGRESS NOTES
Chuck Ortiz attended Intensive Cardiac Rehab today from 0800 to 1000. During his time he exercised and attended class.   His education today was a COOKING CLASS titled: [employer] PROTEINS. Patient received handouts and class discussion pertaining to the topic.

## 2024-09-16 ENCOUNTER — APPOINTMENT (RX ONLY)
Dept: URBAN - METROPOLITAN AREA CLINIC 36 | Facility: CLINIC | Age: 84
Setting detail: DERMATOLOGY
End: 2024-09-16

## 2024-09-16 PROBLEM — C44.91 BASAL CELL CARCINOMA OF SKIN, UNSPECIFIED: Status: ACTIVE | Noted: 2024-09-16

## 2024-09-16 PROCEDURE — ? INJECTION

## 2024-09-16 PROCEDURE — 11900 INJECT SKIN LESIONS </W 7: CPT

## 2024-09-16 NOTE — PROCEDURE: INJECTION
Route: IL
Detail Level: None
Dose Administered (Numbers Only - Mg, G, Mcg, Units, Cc): 0
Bill J-Code: yes
Medication (1) And Associated J-Code Units: Bleomycin, 15 units
Type Of Vial Used?: Single Dose
Post-Care Instructions: I reviewed with the patient in detail post-care instructions. Patient understands to keep the injection sites clean and call the clinic if there is any redness, swelling or pain.
Additional Comments: Pt will return in three weeks for a follow up and possible 2nd injection
Procedure Information: Please note that the numeric value listed in the Medication (1) and associated J-code units and Medication (2) and associated J-code units variables are j-code amounts and do not represent either the concentration or the total amount of the medications injected.  I strongly recommend selecting no to the Render J-code information in note question. This will allow your note to be more clear. If you are billing j-codes with your injection codes you need to document the total amount of the medication injected. This amount should match the j-code units. For example, if you are injecting Triamcinolone 40mg as an intramuscular injection you would select 40 for the dose field.. This would allow you to document  with 4 units (40mg = 10mg x 4). The total volume is not used to calculate j-codes only the amount of the medication administered.
Dose Administered (Numbers Only - Mg, G, Mcg, Units, Cc): 0.2
Treatment Number: 1
Bill For Wasted Drug?: no
Total Volume Injected In Cc (Will Not Affected Billing): .2
Consent: The risks of the medication was reviewed with the patient.
Type Of Vial Used?: Multi-Dose

## 2024-09-17 ENCOUNTER — NON-PROVIDER VISIT (OUTPATIENT)
Dept: CARDIOLOGY | Facility: MEDICAL CENTER | Age: 84
End: 2024-09-17
Payer: MEDICARE

## 2024-09-17 ENCOUNTER — TELEPHONE (OUTPATIENT)
Dept: HEALTH INFORMATION MANAGEMENT | Facility: OTHER | Age: 84
End: 2024-09-17

## 2024-09-17 DIAGNOSIS — Z95.2 HEART VALVE REPLACED BY TRANSPLANT: ICD-10-CM

## 2024-09-17 PROCEDURE — G0423 INTENS CARDIAC REHAB NO EXER: HCPCS | Mod: 59 | Performed by: FAMILY MEDICINE

## 2024-09-17 PROCEDURE — G0422 INTENS CARDIAC REHAB W/EXERC: HCPCS | Performed by: FAMILY MEDICINE

## 2024-09-17 NOTE — PROGRESS NOTES
Chuck Ortiz attended Intensive Cardiac Rehab today from 0800 to 1000. During his time he exercised and attended class.   His education today was a VIDEO titled: METABOLIC SYNDROME. Patient received handouts and class discussion pertaining to the topic.

## 2024-09-18 ENCOUNTER — NON-PROVIDER VISIT (OUTPATIENT)
Dept: CARDIOLOGY | Facility: MEDICAL CENTER | Age: 84
End: 2024-09-18
Payer: MEDICARE

## 2024-09-18 DIAGNOSIS — Z95.2 HEART VALVE REPLACED BY TRANSPLANT: ICD-10-CM

## 2024-09-18 PROCEDURE — G0423 INTENS CARDIAC REHAB NO EXER: HCPCS | Mod: 59 | Performed by: FAMILY MEDICINE

## 2024-09-18 PROCEDURE — G0422 INTENS CARDIAC REHAB W/EXERC: HCPCS | Performed by: FAMILY MEDICINE

## 2024-09-18 NOTE — PROGRESS NOTES
Chuck Ortiz attended Intensive Cardiac Rehab today from 0800 to 1000. During his time he exercised and attended class.   His education today was a cooking school titled: [employer]. Patient received handouts and class discussion pertaining to the topic.

## 2024-09-19 ENCOUNTER — NON-PROVIDER VISIT (OUTPATIENT)
Dept: CARDIOLOGY | Facility: MEDICAL CENTER | Age: 84
End: 2024-09-19
Payer: MEDICARE

## 2024-09-19 DIAGNOSIS — Z95.2 HEART VALVE REPLACED BY TRANSPLANT: ICD-10-CM

## 2024-09-19 PROCEDURE — G0423 INTENS CARDIAC REHAB NO EXER: HCPCS | Mod: 59 | Performed by: FAMILY MEDICINE

## 2024-09-19 PROCEDURE — G0422 INTENS CARDIAC REHAB W/EXERC: HCPCS | Performed by: FAMILY MEDICINE

## 2024-09-19 NOTE — PROGRESS NOTES
Chuck Ortiz attended Intensive Cardiac Rehab today from 0800 to 1000. During his time he exercised and attended class.   His education today was a WORKSHOP titled: DAISY. Patient received handouts and class discussion pertaining to the topic.

## 2024-09-24 ENCOUNTER — NON-PROVIDER VISIT (OUTPATIENT)
Dept: CARDIOLOGY | Facility: MEDICAL CENTER | Age: 84
End: 2024-09-24
Payer: MEDICARE

## 2024-09-24 DIAGNOSIS — Z95.2 HEART VALVE REPLACED BY TRANSPLANT: ICD-10-CM

## 2024-09-24 NOTE — PROGRESS NOTES
Chuck Bassam Ortiz attended Intensive Cardiac Rehab today from 0800 to 1000. During his time he exercised and attended class.   His education today was a WORKSHOP titled: REDUCING STRESS. Patient received handouts and class discussion pertaining to the topic.

## 2024-09-25 ENCOUNTER — NON-PROVIDER VISIT (OUTPATIENT)
Dept: CARDIOLOGY | Facility: MEDICAL CENTER | Age: 84
End: 2024-09-25
Payer: MEDICARE

## 2024-09-25 DIAGNOSIS — Z95.2 HEART VALVE REPLACED BY TRANSPLANT: ICD-10-CM

## 2024-09-25 NOTE — PROGRESS NOTES
Chuck Ortiz attended Intensive Cardiac Rehab today from 0800 to 1000. During his time he exercised and attended class.   His education today was a cooking school titled: Simple Sides. Patient received handouts and class discussion pertaining to the topic.

## 2024-09-26 ENCOUNTER — NON-PROVIDER VISIT (OUTPATIENT)
Dept: CARDIOLOGY | Facility: MEDICAL CENTER | Age: 84
End: 2024-09-26
Payer: MEDICARE

## 2024-09-26 DIAGNOSIS — Z95.2 HEART VALVE REPLACED BY TRANSPLANT: ICD-10-CM

## 2024-09-26 NOTE — PROGRESS NOTES
Chuck Bassam Ortiz attended Intensive Cardiac Rehab today from 0800 to 1000. During his time he exercised and attended class.   His education today was a video titled: HOW OUR THOUGHTS HEAL OUR HEARTS. Patient received handouts and class discussion pertaining to the topic.

## 2024-10-01 ENCOUNTER — NON-PROVIDER VISIT (OUTPATIENT)
Dept: CARDIOLOGY | Facility: MEDICAL CENTER | Age: 84
End: 2024-10-01
Payer: MEDICARE

## 2024-10-01 DIAGNOSIS — Z95.2 HEART VALVE REPLACED BY TRANSPLANT: ICD-10-CM

## 2024-10-01 PROCEDURE — G0422 INTENS CARDIAC REHAB W/EXERC: HCPCS | Performed by: INTERNAL MEDICINE

## 2024-10-01 PROCEDURE — G0423 INTENS CARDIAC REHAB NO EXER: HCPCS | Mod: 59 | Performed by: INTERNAL MEDICINE

## 2024-10-02 PROCEDURE — G0423 INTENS CARDIAC REHAB NO EXER: HCPCS | Mod: 59 | Performed by: INTERNAL MEDICINE

## 2024-10-02 PROCEDURE — G0422 INTENS CARDIAC REHAB W/EXERC: HCPCS | Performed by: INTERNAL MEDICINE

## 2024-10-03 ENCOUNTER — NON-PROVIDER VISIT (OUTPATIENT)
Dept: CARDIOLOGY | Facility: MEDICAL CENTER | Age: 84
End: 2024-10-03
Payer: MEDICARE

## 2024-10-03 DIAGNOSIS — Z95.2 HEART VALVE REPLACED BY TRANSPLANT: ICD-10-CM

## 2024-10-03 PROCEDURE — G0423 INTENS CARDIAC REHAB NO EXER: HCPCS | Mod: 59 | Performed by: INTERNAL MEDICINE

## 2024-10-03 PROCEDURE — G0422 INTENS CARDIAC REHAB W/EXERC: HCPCS | Performed by: INTERNAL MEDICINE

## 2024-10-07 ENCOUNTER — APPOINTMENT (RX ONLY)
Dept: URBAN - METROPOLITAN AREA CLINIC 36 | Facility: CLINIC | Age: 84
Setting detail: DERMATOLOGY
End: 2024-10-07

## 2024-10-07 PROBLEM — C44.91 BASAL CELL CARCINOMA OF SKIN, UNSPECIFIED: Status: ACTIVE | Noted: 2024-10-07

## 2024-10-07 PROCEDURE — 11900 INJECT SKIN LESIONS </W 7: CPT

## 2024-10-07 PROCEDURE — ? INJECTION

## 2024-10-07 NOTE — PROCEDURE: INJECTION
Bill For Wasted Drug?: no
Consent: The risks of the medication was reviewed with the patient.
Type Of Vial Used?: Multi-Dose
Render J-Code Information In Note?: yes
Procedure Information: Please note that the numeric value listed in the Medication (1) and associated J-code units and Medication (2) and associated J-code units variables are j-code amounts and do not represent either the concentration or the total amount of the medications injected.  I strongly recommend selecting no to the Render J-code information in note question. This will allow your note to be more clear. If you are billing j-codes with your injection codes you need to document the total amount of the medication injected. This amount should match the j-code units. For example, if you are injecting Triamcinolone 40mg as an intramuscular injection you would select 40 for the dose field.. This would allow you to document  with 4 units (40mg = 10mg x 4). The total volume is not used to calculate j-codes only the amount of the medication administered.
Dose Administered (Numbers Only - Mg, G, Mcg, Units, Cc): 0.2
Treatment Number: 2
Medication (1) And Associated J-Code Units: Bleomycin, 15 units
Type Of Vial Used?: Single Dose
Post-Care Instructions: I reviewed with the patient in detail post-care instructions. Patient understands to keep the injection sites clean and call the clinic if there is any redness, swelling or pain.
Route: IL
Dose Administered (Numbers Only - Mg, G, Mcg, Units, Cc): 0
Detail Level: None

## 2024-10-08 ENCOUNTER — APPOINTMENT (OUTPATIENT)
Dept: CARDIOLOGY | Facility: MEDICAL CENTER | Age: 84
End: 2024-10-08
Payer: MEDICARE

## 2024-10-08 DIAGNOSIS — Z95.2 HEART VALVE REPLACED BY TRANSPLANT: ICD-10-CM

## 2024-10-09 ENCOUNTER — APPOINTMENT (OUTPATIENT)
Dept: CARDIOLOGY | Facility: MEDICAL CENTER | Age: 84
End: 2024-10-09
Payer: MEDICARE

## 2024-10-10 ENCOUNTER — APPOINTMENT (OUTPATIENT)
Dept: CARDIOLOGY | Facility: MEDICAL CENTER | Age: 84
End: 2024-10-10
Payer: MEDICARE

## 2024-10-10 DIAGNOSIS — Z95.2 HEART VALVE REPLACED BY TRANSPLANT: ICD-10-CM

## 2024-10-15 ENCOUNTER — NON-PROVIDER VISIT (OUTPATIENT)
Dept: CARDIOLOGY | Facility: MEDICAL CENTER | Age: 84
End: 2024-10-15
Payer: MEDICARE

## 2024-10-15 ENCOUNTER — APPOINTMENT (OUTPATIENT)
Dept: CARDIOLOGY | Facility: MEDICAL CENTER | Age: 84
End: 2024-10-15
Payer: MEDICARE

## 2024-10-15 DIAGNOSIS — Z95.2 HEART VALVE REPLACED BY TRANSPLANT: ICD-10-CM

## 2024-10-15 PROCEDURE — G0423 INTENS CARDIAC REHAB NO EXER: HCPCS | Mod: 59 | Performed by: INTERNAL MEDICINE

## 2024-10-15 PROCEDURE — G0422 INTENS CARDIAC REHAB W/EXERC: HCPCS | Performed by: INTERNAL MEDICINE

## 2024-10-16 ENCOUNTER — APPOINTMENT (OUTPATIENT)
Dept: CARDIOLOGY | Facility: MEDICAL CENTER | Age: 84
End: 2024-10-16
Payer: MEDICARE

## 2024-10-16 ENCOUNTER — OFFICE VISIT (OUTPATIENT)
Dept: INTERNAL MEDICINE | Facility: IMAGING CENTER | Age: 84
End: 2024-10-16
Payer: MEDICARE

## 2024-10-16 VITALS
TEMPERATURE: 98.9 F | DIASTOLIC BLOOD PRESSURE: 60 MMHG | WEIGHT: 197 LBS | OXYGEN SATURATION: 96 % | SYSTOLIC BLOOD PRESSURE: 120 MMHG | RESPIRATION RATE: 14 BRPM | BODY MASS INDEX: 26.72 KG/M2 | HEART RATE: 66 BPM

## 2024-10-16 DIAGNOSIS — D68.69 SECONDARY HYPERCOAGULABLE STATE (HCC): ICD-10-CM

## 2024-10-16 DIAGNOSIS — I10 ESSENTIAL HYPERTENSION: ICD-10-CM

## 2024-10-16 DIAGNOSIS — Z23 NEED FOR VACCINATION: ICD-10-CM

## 2024-10-16 DIAGNOSIS — E78.5 DYSLIPIDEMIA: ICD-10-CM

## 2024-10-16 DIAGNOSIS — Z95.5 S/P DRUG ELUTING CORONARY STENT PLACEMENT: ICD-10-CM

## 2024-10-16 DIAGNOSIS — M62.830 BACK SPASM: ICD-10-CM

## 2024-10-16 DIAGNOSIS — I25.10 CORONARY ARTERY DISEASE DUE TO CALCIFIED CORONARY LESION: ICD-10-CM

## 2024-10-16 DIAGNOSIS — K22.5 ZENKER DIVERTICULUM: ICD-10-CM

## 2024-10-16 DIAGNOSIS — I25.84 CORONARY ARTERY DISEASE DUE TO CALCIFIED CORONARY LESION: ICD-10-CM

## 2024-10-16 DIAGNOSIS — I48.3 TYPICAL ATRIAL FLUTTER (HCC): ICD-10-CM

## 2024-10-16 PROCEDURE — G0008 ADMIN INFLUENZA VIRUS VAC: HCPCS | Performed by: INTERNAL MEDICINE

## 2024-10-16 PROCEDURE — 90662 IIV NO PRSV INCREASED AG IM: CPT | Performed by: INTERNAL MEDICINE

## 2024-10-16 PROCEDURE — 3078F DIAST BP <80 MM HG: CPT | Performed by: INTERNAL MEDICINE

## 2024-10-16 PROCEDURE — 99214 OFFICE O/P EST MOD 30 MIN: CPT | Mod: 25 | Performed by: INTERNAL MEDICINE

## 2024-10-16 PROCEDURE — 3074F SYST BP LT 130 MM HG: CPT | Performed by: INTERNAL MEDICINE

## 2024-10-16 RX ORDER — CYCLOBENZAPRINE HCL 10 MG
10 TABLET ORAL 3 TIMES DAILY PRN
Qty: 30 TABLET | Refills: 0 | Status: SHIPPED | OUTPATIENT
Start: 2024-10-16

## 2024-10-16 ASSESSMENT — FIBROSIS 4 INDEX: FIB4 SCORE: 2.63

## 2024-10-24 ENCOUNTER — TELEPHONE (OUTPATIENT)
Dept: CARDIOLOGY | Facility: MEDICAL CENTER | Age: 84
End: 2024-10-24
Payer: MEDICARE

## 2024-12-03 DIAGNOSIS — I10 ESSENTIAL HYPERTENSION: ICD-10-CM

## 2024-12-03 RX ORDER — HYDROCHLOROTHIAZIDE 25 MG/1
25 TABLET ORAL DAILY
Qty: 90 TABLET | Refills: 2 | Status: SHIPPED | OUTPATIENT
Start: 2024-12-03

## 2024-12-04 ENCOUNTER — OFFICE VISIT (OUTPATIENT)
Dept: CARDIOLOGY | Facility: MEDICAL CENTER | Age: 84
End: 2024-12-04
Attending: INTERNAL MEDICINE
Payer: MEDICARE

## 2024-12-04 VITALS
BODY MASS INDEX: 27.39 KG/M2 | OXYGEN SATURATION: 95 % | HEIGHT: 72 IN | SYSTOLIC BLOOD PRESSURE: 120 MMHG | RESPIRATION RATE: 14 BRPM | HEART RATE: 70 BPM | WEIGHT: 202.2 LBS | DIASTOLIC BLOOD PRESSURE: 62 MMHG

## 2024-12-04 DIAGNOSIS — E78.5 DYSLIPIDEMIA: ICD-10-CM

## 2024-12-04 DIAGNOSIS — I49.3 VENTRICULAR ECTOPY: ICD-10-CM

## 2024-12-04 DIAGNOSIS — Z79.01 ANTICOAGULATED: ICD-10-CM

## 2024-12-04 DIAGNOSIS — D68.69 SECONDARY HYPERCOAGULABLE STATE (HCC): ICD-10-CM

## 2024-12-04 DIAGNOSIS — I10 ESSENTIAL HYPERTENSION: ICD-10-CM

## 2024-12-04 DIAGNOSIS — Z95.5 S/P DRUG ELUTING CORONARY STENT PLACEMENT: ICD-10-CM

## 2024-12-04 DIAGNOSIS — I25.10 CORONARY ARTERY DISEASE INVOLVING NATIVE CORONARY ARTERY OF NATIVE HEART WITHOUT ANGINA PECTORIS: ICD-10-CM

## 2024-12-04 DIAGNOSIS — I48.0 PAF (PAROXYSMAL ATRIAL FIBRILLATION) (HCC): ICD-10-CM

## 2024-12-04 DIAGNOSIS — I34.0 MITRAL VALVE INSUFFICIENCY, UNSPECIFIED ETIOLOGY: ICD-10-CM

## 2024-12-04 PROCEDURE — 3078F DIAST BP <80 MM HG: CPT | Performed by: INTERNAL MEDICINE

## 2024-12-04 PROCEDURE — 99212 OFFICE O/P EST SF 10 MIN: CPT | Performed by: INTERNAL MEDICINE

## 2024-12-04 PROCEDURE — 99214 OFFICE O/P EST MOD 30 MIN: CPT | Performed by: INTERNAL MEDICINE

## 2024-12-04 PROCEDURE — 3074F SYST BP LT 130 MM HG: CPT | Performed by: INTERNAL MEDICINE

## 2024-12-04 RX ORDER — AMOXICILLIN 500 MG/1
CAPSULE ORAL
COMMUNITY
Start: 2024-10-24

## 2024-12-04 ASSESSMENT — ENCOUNTER SYMPTOMS
SHORTNESS OF BREATH: 0
COUGH: 0
LOSS OF CONSCIOUSNESS: 0
DIZZINESS: 0
BRUISES/BLEEDS EASILY: 0
MYALGIAS: 0
PALPITATIONS: 0

## 2024-12-04 ASSESSMENT — FIBROSIS 4 INDEX: FIB4 SCORE: 2.63

## 2024-12-04 NOTE — PROGRESS NOTES
"Chief Complaint   Patient presents with    Coronary Artery Disease     F/V Dx: Coronary artery disease involving native coronary artery of native heart without angina pectoris    Atrial Fibrillation     F/V Dx: PAF (paroxysmal atrial fibrillation) ([facility])       Subjective     Chuck Ortiz is a 84 y.o. male who presents today for follow-up cardiac care.    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  Since last appointment the patient has had no cardiac symptoms including chest pain, palpitations, shortness of breath.  Completed cardiac rehab.  Walking 2 miles a day on the treadmill.       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    Hemorrhagic disorder ([facility])     on Plavix     High cholesterol     Hypertension     Kidney stones     mult uric acid kidney stones    MI (myocardial infarction) ([facility]) 04/24/2024    Seizure ([facility])     seizure x1 8/7/18    Sleep apnea     uses CPAP    Snoring     Stroke ([facility]) 08/07/2018    no residual problems    Zenker diverticula      Past Surgical History:   Procedure Laterality Date    TRANSCATHETER AORTIC VALVE REPLACEMENT Bilateral 5/20/2024    Procedure: TRANSCATHETER AORTIC VALVE REPLACEMENT;  Surgeon: Riley Rowland M.D.;  Location: [facility];  Service: Cardiac    ECHOCARDIOGRAM, TRANSESOPHAGEAL, INTRAOPERATIVE N/A 5/20/2024    Procedure: ECHOCARDIOGRAM, TRANSESOPHAGEAL, INTRAOPERATIVE - ATTEMPTED;  Surgeon: Riley Rowland M.D.;  Location: " SURGERY [facility];  Service: Cardiac    GOKUL N/A 5/20/2024    Procedure: ECHOCARDIOGRAM, TRANSTHORACIC;  Surgeon: Riley Rowland M.D.;  Location: SURGERY [facility];  Service: Cardiac    ZZZ CARDIAC CATH  09/28/2018    40% LAD other arteries no disease.    CATARACT PHACO WITH IOL  1/21/2014    Performed by Joni Duarte M.D. at [facility]    FINGER ARTHROPLASTY  12/17/2012    Performed by Adam Christopher M.D. at SURGERY [facility]    INGUINAL HERNIA REPAIR  2/19/2009    Performed by ALEX ALATORRE at SURGERY SAME DAY [facility] ORS    COLONOSCOPY  2004    neg    LAMINOTOMY  1996    lumbar laminectomy, cyst x3    UVULOPHARYNGOPALATOPLASTY  1985     Family History   Problem Relation Age of Onset    Heart Disease Maternal Grandmother     Diabetes Paternal Grandfather     Stroke Sister     Lung Disease Father         black lung     Cancer Neg Hx      Social History     Socioeconomic History    Marital status:      Spouse name: Not on file    Number of children: Not on file    Years of education: Not on file    Highest education level: Not on file   Occupational History    Not on file   Tobacco Use    Smoking status: Never    Smokeless tobacco: Never   Vaping Use    Vaping status: Never Used   Substance and Sexual Activity    Alcohol use: Not Currently     Comment: 2 drinks per week     Drug use: No    Sexual activity: Not on file     Comment: , retired [employer]    Other Topics Concern    Not on file   Social History Narrative    Not on file     Social Drivers of Health     Financial Resource Strain: Not on file   Food Insecurity: Patient Declined (5/20/2024)    Hunger Vital Sign     Worried About Running Out of Food in the Last Year: Patient declined     Ran Out of Food in the Last Year: Patient declined   Transportation Needs: Patient Declined (5/20/2024)    PRAPARE - Transportation     Lack of Transportation (Medical): Patient declined     Lack of  Transportation (Non-Medical): Patient declined   Physical Activity: Not on file   Stress: Not on file   Social Connections: Not on file   Intimate Partner Violence: Not At Risk (5/20/2024)    Humiliation, Afraid, Rape, and Kick questionnaire     Fear of Current or Ex-Partner: No     Emotionally Abused: No     Physically Abused: No     Sexually Abused: No   Housing Stability: Patient Declined (5/20/2024)    Housing Stability Vital Sign     Unable to Pay for Housing in the Last Year: Patient declined     Number of Places Lived in the Last Year: Not on file     Unstable Housing in the Last Year: Patient declined     Allergies   Allergen Reactions    Morphine      Bradycardia     Outpatient Encounter Medications as of 12/4/2024   Medication Sig Dispense Refill    amoxicillin (AMOXIL) 500 MG Cap TAKE 4 CAPSULES BY MOUTH 1 HOUR PRIOR TO DENTAL APPOINTMENT      hydroCHLOROthiazide 25 MG Tab TAKE 1 TABLET BY MOUTH EVERY DAY 90 Tablet 2    cyclobenzaprine (FLEXERIL) 10 mg Tab Take 1 Tablet by mouth 3 times a day as needed for Moderate Pain (back spasms). 30 Tablet 0    atorvastatin (LIPITOR) 40 MG Tab Take 1 Tablet by mouth every evening. 100 Tablet 1    losartan (COZAAR) 50 MG Tab Take 1 Tablet by mouth 2 times a day. 200 Tablet 1    ELIQUIS 5 MG Tab TAKE 1 TABLET BY MOUTH TWICE A  Tablet 3    PRED FORTE 1 % Suspension Administer  into the right eye. Pt uses 4 times every day on right eye      ketorolac (ACULAR) 0.5 % Solution Administer 1 Drop into the right eye 4 times a day. Tromethamine, Pt uses 4 times a day in right eye      allopurinol (ZYLOPRIM) 100 MG Tab TAKE 1 TABLET BY MOUTH TWICE A  Tablet 3    clopidogrel (PLAVIX) 75 MG Tab TAKE 1 TABLET BY MOUTH EVERY  Tablet 3     No facility-administered encounter medications on file as of 12/4/2024.     Review of Systems   Respiratory:  Negative for cough and shortness of breath.    Cardiovascular:  Negative for chest pain and palpitations.  "  Musculoskeletal:  Negative for myalgias.   Neurological:  Negative for dizziness and loss of consciousness.   Endo/Heme/Allergies:  Does not bruise/bleed easily.              Objective     /62 (BP Location: Left arm, Patient Position: Sitting, BP Cuff Size: Adult)   Pulse 70   Resp 14   Ht 1.829 m (6' 0.01\")   Wt 91.7 kg (202 lb 3.2 oz)   SpO2 95%   BMI 27.42 kg/m²     Physical Exam  Constitutional:       Appearance: He is well-developed.   Eyes:      Conjunctiva/sclera: Conjunctivae normal.      Pupils: Pupils are equal, round, and reactive to light.   Neck:      Vascular: No JVD.   Cardiovascular:      Rate and Rhythm: Normal rate and regular rhythm.      Heart sounds: Murmur heard.      Comments: Diminished A2  Pulmonary:      Effort: Pulmonary effort is normal. No accessory muscle usage or respiratory distress.      Breath sounds: Normal breath sounds. No wheezing or rales.   Musculoskeletal:      Right lower leg: No edema.      Left lower leg: No edema.   Skin:     General: Skin is warm and dry.      Findings: No rash.      Nails: There is no clubbing.   Neurological:      Mental Status: He is alert and oriented to person, place, and time.   Psychiatric:         Behavior: Behavior normal.                 BRAIN MRI 8/14/2018  1.  Acute to subacute small sized infarcts in the left posterior frontal lobe.  2.  Mild diffuse cerebral substance loss.  3.  Mild microangiopathic ischemic change versus demyelination or gliosis.  4.  Right maxillary sinus mucous retention cyst or polyp.     ECHOCARDIOGRAM 09/06/2018.  Normal left ventricular systolic function.  Left ventricular ejection fraction is visually estimated to be 60%.  Mild aortic stenosis.  Mild posterior mitral valve leaflet prolapse.  Mild mitral regurgitation.  Mitral annular calcification.  Unable to estimate pulmonary artery pressure due to an inadequate   tricuspid regurgitant jet.  No prior study is available for " comparison.    ECHOCARDIOGRAM 5/5/2022  Moderate aortic valve stenosis. Transvalvular gradients are - Peak: 51   mmHg, Mean: 33 mmHg. Vmax is 3.6 m/s.  Normal left ventricular systolic function.  The left ventricular ejection fraction is visually estimated to be 70%.  Mild concentric left ventricular hypertrophy and also Sigmoid septum.  Grade I diastolic dysfunction.  Normal right ventricular size and systolic function.  Mildly dilated left atrium.  Mildly elevated estimated right atrial pressure.   Normal pulmonary artery pressure.    ECHOCARDIOGRAM 3/30/2023  Normal left ventricular systolic function.  The left ventricular ejection fraction is visually estimated to be 65%.  Moderate aortic valve stenosis: Transvalvular gradients are - Peak: 36   mmHg, Mean: 22 mmHg. Vmax is 2.1 m/s. Aortic valve is 1.2 cm2.  Mild mitral regurgitation.  The right ventricle is normal in size and systolic function.  Unable to estimate right ventricular systolic pressure due to an   inadequate tricuspid regurgitant jet.  Compared to the prior study on 5/5/2022, there has been no significant   change; aortic stenosis remains moderate.      ECHOCARDIOGRAM 5/20/2024  Normal LV systolic function  Normally functioning prosthetic aortic valve - mean gradient of 2 mmHg,   no AI  Mild MR    ECHOCARDIOGRAM 6/18/2024  Hyperdynamic left ventricular systolic function.  Known TAVR aortic valve that is functioning normally with normal   transvalvular gradients.  Moderate mitral regurgitation.  Basal septal hypertrophy measuring 1.6 cm without dynamic LVOT obstruction.     MPI 09/06/2018.  There is a predominently fixed basal to mid inferior septal perfusion defect.   There is a distal inferior wall defect, predominantly reversible, moderate    size, mild intensity.   Stress Image LV EF: 71     %     CARDIAC CATHETERIZATION  09/28/2018  A. Left heart catheterization.  B. Left ventriculography.  C. Selective coronary angiography.  D. Right radial  artery approach.  PREPROCEDURE DIAGNOSES:  1.  Abnormal myocardial perfusion scan.  2.  Mild aortic stenosis.  3.  Ventricular ectopy.  4.  Cryptogenic stroke.  5.  Hyperlipidemia.   POSTPROCEDURE DIAGNOSES:  1.  Coronary artery disease, moderate predominantly involving the mid left anterior descending artery.  2.  Left ventricular ejection fraction 77% post PVC with basal inferior wall akinesis.    CARDIAC CATHETERIZATION 4/23/2024  Hemodynamics:   Aorta: 106/63 mmHg  LVEDP: 20 mmHg  Aortic valve peak to peak gradient: 70 mmHg  Coronary Anatomy              Left Main: Normal              LAD:  Mid 70% stenosis              Ramus: Proximal 50% stenosis              LCx: Minimal luminal irregularities              RCA: Dominant, Minimal luminal irregularities  PCI: Mid LAD 3.0 x 38 mm AHMET    ILR recording Atrial fibrillation 5/11/2020 and 7/1/2020  Start eliquis 5 MG bid  Discontinue plavix    ZIO AT REPORT (05/29/24)   Zio study showing predominately sinus rhythm with maximum rate of 174 bpm, minimum rate of 53 bpm, average of 75 bpm.   Atrial fibrillation/atrial flutter: Atrial flutter occurred (4% burden), ranging from  bpm (average of 81 bpm), the longest lasting 6 hours, 5 minutes.   Supraventricular tachycardia: 15 episodes of supraventricular tachycardia/atrial tachycardia with fastest interval lasting 6 beats with a max rate of 133 bpm with longest lasting 12.3 seconds with average rate of 108 bpm noted.    Pauses: None.   Heart block: First degree AV block noted.   Ventricular tachycardia: 1 episode of ventricular tachycardia lasting 4 beats with rate of 174 bpm noted.    Rare <1% burden of premature atrial contractions, rare couplets and triplets.   Occasional 2.7% burden of premature ventricular contractions, rare couplets and triplets.    Ventricular bigeminy (longest episode lasting 22 seconds) and trigeminy (longest episode lasting 40.8 seconds) were noted.   10 Patient events correlated with  atrial flutter, sinus rhythm.    EKG 5/29/2024 sinus rhythm rate 74, first-degree AV block, IVCD, personally interpreted normal    Assessment & Plan     1. Coronary artery disease involving native coronary artery of native heart without angina pectoris        2. S/P drug eluting coronary stent placement        3. PAF (paroxysmal atrial fibrillation) (HCC)        4. Secondary hypercoagulable state (HCC)        5. Ventricular ectopy        6. Anticoagulated        7. Mitral valve insufficiency, unspecified etiology        8. Essential hypertension        9. Dyslipidemia  LIPID PANEL    Comp Metabolic Panel              Medical Decision Making: Today's Assessment/Status/Plan:   CAD  PCI mid LAD (3.0 x 38 mm AHMET) 4/23/2024  TAVR 5/20/2024  PAF (5/11/2020, 7/1/2020 on ILR, Zio patch 5/29/2024)  OAC on apixaban  Mitral regurgitation  Hypertension   Hyperlipidemia   Ventricular ectopy, chronic.  CVA, left posterior frontal.  08/14/2018  Implantable loop recorder 8/28/2018, removed 7/20/2020.  LOVE.  S/P uvulectomy.  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  S/P back surgery.  Zenker's diverticulum.     Recommendation Discussion  CAD, PCI asymptomatic, no ischemic symptoms, continue atorvastatin, apixaban, plavix  PAF, asymptomatic, low burden on cardiac monitoring, continue to monitor, continue apixaban  TAVR, dyslipidemia, functioning normal  Mitral regurgitation, moderate, asymptomatic, echocardiogram pending 5/20/2025   Hypertension, BP normal, at goal, continue losartan, HCTZ.  Dyslipidemia: LDL 52, at goal, continue atorvastatin, recheck lipid panel prior to next appointment  Ventricular ectopy, <1% on cardiac monitoring, asymptomatic, continue monitoring.  RTC 6 months

## 2025-01-03 ENCOUNTER — OFFICE VISIT (OUTPATIENT)
Dept: INTERNAL MEDICINE | Facility: IMAGING CENTER | Age: 85
End: 2025-01-03
Payer: MEDICARE

## 2025-01-03 ENCOUNTER — HOSPITAL ENCOUNTER (OUTPATIENT)
Facility: MEDICAL CENTER | Age: 85
End: 2025-01-03
Attending: INTERNAL MEDICINE
Payer: MEDICARE

## 2025-01-03 VITALS
RESPIRATION RATE: 12 BRPM | HEART RATE: 71 BPM | DIASTOLIC BLOOD PRESSURE: 70 MMHG | TEMPERATURE: 97.3 F | WEIGHT: 203 LBS | BODY MASS INDEX: 27.53 KG/M2 | SYSTOLIC BLOOD PRESSURE: 124 MMHG | OXYGEN SATURATION: 96 %

## 2025-01-03 DIAGNOSIS — R94.31 ABNORMAL EKG: ICD-10-CM

## 2025-01-03 DIAGNOSIS — R53.83 OTHER FATIGUE: ICD-10-CM

## 2025-01-03 DIAGNOSIS — I48.0 PAROXYSMAL ATRIAL FIBRILLATION (HCC): ICD-10-CM

## 2025-01-03 DIAGNOSIS — G47.33 OBSTRUCTIVE SLEEP APNEA SYNDROME: ICD-10-CM

## 2025-01-03 DIAGNOSIS — R00.8 TRIGEMINY: ICD-10-CM

## 2025-01-03 LAB
ANION GAP SERPL CALC-SCNC: 7 MMOL/L (ref 7–16)
BUN SERPL-MCNC: 21 MG/DL (ref 8–22)
CALCIUM SERPL-MCNC: 9.2 MG/DL (ref 8.5–10.5)
CHLORIDE SERPL-SCNC: 101 MMOL/L (ref 96–112)
CO2 SERPL-SCNC: 31 MMOL/L (ref 20–33)
CREAT SERPL-MCNC: 0.83 MG/DL (ref 0.5–1.4)
GFR SERPLBLD CREATININE-BSD FMLA CKD-EPI: 85 ML/MIN/1.73 M 2
GLUCOSE SERPL-MCNC: 92 MG/DL (ref 65–99)
MAGNESIUM SERPL-MCNC: 1.9 MG/DL (ref 1.5–2.5)
POTASSIUM SERPL-SCNC: 4.1 MMOL/L (ref 3.6–5.5)
SODIUM SERPL-SCNC: 139 MMOL/L (ref 135–145)
TSH SERPL-ACNC: 3.06 UIU/ML (ref 0.35–5.5)

## 2025-01-03 PROCEDURE — 83735 ASSAY OF MAGNESIUM: CPT

## 2025-01-03 PROCEDURE — 3078F DIAST BP <80 MM HG: CPT | Performed by: INTERNAL MEDICINE

## 2025-01-03 PROCEDURE — 99214 OFFICE O/P EST MOD 30 MIN: CPT | Performed by: INTERNAL MEDICINE

## 2025-01-03 PROCEDURE — 80048 BASIC METABOLIC PNL TOTAL CA: CPT

## 2025-01-03 PROCEDURE — 84443 ASSAY THYROID STIM HORMONE: CPT

## 2025-01-03 PROCEDURE — 3074F SYST BP LT 130 MM HG: CPT | Performed by: INTERNAL MEDICINE

## 2025-01-03 ASSESSMENT — FIBROSIS 4 INDEX: FIB4 SCORE: 2.63

## 2025-01-03 NOTE — PROGRESS NOTES
Chief Complaint   Patient presents with    Fatigue    Heart Problem       HISTORY OF THE PRESENT ILLNESS: Patient is a 84 y.o. male.     History of Present Illness  The patient presents for evaluation of sleepiness, atrial fibrillation, and blood pressure management.    He reports experiencing excessive sleepiness, often falling asleep during activities such as watching television without prior drowsiness. These episodes of sleepiness have been occurring for approximately a month. He does not feel tired during the day. He uses a CPAP machine nightly, which provides him with 6 to 8 hours of sleep. He has not experienced any similar episodes while visiting his sister in [place] for 8 days, during which he used his CPAP machine, or during his flight. He occasionally wakes up in the middle of the night without a clear reason. He has not consulted a pulmonologist recently.    He maintains an active lifestyle, attending the gym 2 to 3 times per week, where he can walk 2 miles at a speed of 3 miles per hour without difficulty. However, he has noticed that the gym equipment sometimes fails to synchronize with his heart rate, which he describes as erratic. He also reports that his pulse rate varies when he is at rest, with periods of smooth, regular beats interspersed with irregular rhythms. He does not experience any symptoms of palpitations or shortness of breath during these episodes. His heart rate typically remains around 106 beats per minute during his gym sessions. He has a history of atrial fibrillation, but it is currently under control. He has been advised not to exceed a heart rate of 114 beats per minute. He has not experienced any episodes of syncope.    He is currently on medication for blood pressure management and reports that his blood pressure is well-controlled. He has a loop recorder implanted and has been advised to undergo blood work before May 2024.    Supplemental Information  He has been prescribed  cyclobenzaprine for muscle spasms, which he finds effective in managing his back pain and improving his sleep quality. He has only used one dose since his last visit.    MEDICATIONS  hydrochlorothiazide, Lipitor, losartan, Eliquis, allopurinol, Plavix, cyclobenzaprine          Allergies: Morphine    Current Outpatient Medications Ordered in Epic   Medication Sig Dispense Refill    hydroCHLOROthiazide 25 MG Tab TAKE 1 TABLET BY MOUTH EVERY DAY 90 Tablet 2    cyclobenzaprine (FLEXERIL) 10 mg Tab Take 1 Tablet by mouth 3 times a day as needed for Moderate Pain (back spasms). 30 Tablet 0    atorvastatin (LIPITOR) 40 MG Tab Take 1 Tablet by mouth every evening. 100 Tablet 1    losartan (COZAAR) 50 MG Tab Take 1 Tablet by mouth 2 times a day. 200 Tablet 1    ELIQUIS 5 MG Tab TAKE 1 TABLET BY MOUTH TWICE A  Tablet 3    allopurinol (ZYLOPRIM) 100 MG Tab TAKE 1 TABLET BY MOUTH TWICE A  Tablet 3    clopidogrel (PLAVIX) 75 MG Tab TAKE 1 TABLET BY MOUTH EVERY  Tablet 3    amoxicillin (AMOXIL) 500 MG Cap TAKE 4 CAPSULES BY MOUTH 1 HOUR PRIOR TO DENTAL APPOINTMENT      PRED FORTE 1 % Suspension Administer  into the right eye. Pt uses 4 times every day on right eye      ketorolac (ACULAR) 0.5 % Solution Administer 1 Drop into the right eye 4 times a day. Tromethamine, Pt uses 4 times a day in right eye       No current [facility]-ordered facility-administered medications on file.       Past medical history, social history and family history were reviewed from chart today    Review of systems: Per HPI.    All others negative.     Exam: /70 (BP Location: Left arm, Patient Position: Sitting, BP Cuff Size: Adult)   Pulse 71   Temp 36.3 °C (97.3 °F) (Temporal)   Resp 12   Wt 92.1 kg (203 lb)   SpO2 96%   General: Well-appearing. Well-developed. No signs of distress.  HEENT: Grossly normal. Oral cavity is pink and moist.   Neck: Supple without JVD or bruit.  Pulmonary: irregular  Cardiovascular: Regular.  Carotid and radial pulses are intact.  Abdomen: Soft, nontender, nondistended. Spleen and liver are not enlarged.  Neurologic: Cranial nerves II through XII are grossly normal, alert and oriented x3    EKG:  Sinus  Trigeminy with IVCD      Diagnosis:  1. Obstructive sleep apnea syndrome  Referral to Pulmonary and Sleep Medicine    Overnight Home Sleep Study    TSH      2. Paroxysmal atrial fibrillation (HCC)  TSH      3. Trigeminy  Basic Metabolic Panel    MAGNESIUM    TSH      4. Abnormal EKG  TSH      5. Other fatigue  MAGNESIUM        Assessment & Plan  1. Sleepiness.  The symptoms suggest a potential issue with his CPAP machine or its settings, leading to incomplete treatment of his sleep apnea. This could be causing his excessive sleepiness. A referral to the pulmonary sleep medicine team will be made for further evaluation. A home sleep study will be arranged to assess the functionality of his CPAP device and determine if any adjustments are necessary.    2. Atrial fibrillation.  His heart rate remains stable during physical activity, and he reports no symptoms of palpitations or shortness of breath. The EKG results do not indicate atrial fibrillation, but rather trigeminy, characterized by two normal heartbeats followed by a premature contraction. This condition is not a cause for concern unless it results in symptoms such as sluggishness or impending syncope. Communication with cardiology will be established to ensure they are informed of his current status. A reevaluation of his loop recorder will be requested. Electrolyte levels will be checked today to rule out any imbalances that could be contributing to his symptoms.    3. Blood pressure management.  His blood pressure is well-controlled with his current medications, including hydrochlorothiazide, losartan, and other medications. He is advised to continue his current medication regimen.    4. Trigeminy - on EKG with conversion back to NSR      PROCEDURE  The patient has a loop recorder implanted.    Plan:  Refer back to pulmonary medicine for evaluation of sleep apnea.  I think this is likely the cause of the majority of her symptoms.  Labs as above to assess electrolytes.  No change in medications.  Refer back to cardiology.  Patient will need to establish with new cardiologist    My total time spent caring for the patient on the day of the encounter was  greater than 35 minutes.   This includes obtaining history, reviewing chart, physical exam, patient education, reviewing outside records, placing orders, interpreting tests and coordinating care.    Portions of this note were completed using voice recognition software (Dragon Naturally speaking software) . Occasional transcription errors may have escaped proof reading. I have made every reasonable attempt to correct obvious errors, but I expect that there are errors of grammar and possibly content that I did not discover before finalizing the note.

## 2025-01-08 ENCOUNTER — APPOINTMENT (OUTPATIENT)
Dept: URBAN - METROPOLITAN AREA CLINIC 36 | Facility: CLINIC | Age: 85
Setting detail: DERMATOLOGY
End: 2025-01-08

## 2025-01-08 PROBLEM — C44.41 BASAL CELL CARCINOMA OF SKIN OF SCALP AND NECK: Status: ACTIVE | Noted: 2025-01-08

## 2025-01-08 PROCEDURE — ? OBSERVATION

## 2025-01-08 PROCEDURE — 99212 OFFICE O/P EST SF 10 MIN: CPT

## 2025-01-10 ENCOUNTER — PATIENT MESSAGE (OUTPATIENT)
Dept: HEALTH INFORMATION MANAGEMENT | Facility: OTHER | Age: 85
End: 2025-01-10

## 2025-01-10 ENCOUNTER — TELEPHONE (OUTPATIENT)
Dept: HEALTH INFORMATION MANAGEMENT | Facility: OTHER | Age: 85
End: 2025-01-10
Payer: MEDICARE

## 2025-01-16 ENCOUNTER — OFFICE VISIT (OUTPATIENT)
Dept: CARDIOLOGY | Facility: MEDICAL CENTER | Age: 85
End: 2025-01-16
Payer: MEDICARE

## 2025-01-16 VITALS
RESPIRATION RATE: 16 BRPM | WEIGHT: 200 LBS | HEART RATE: 76 BPM | DIASTOLIC BLOOD PRESSURE: 56 MMHG | HEIGHT: 72 IN | OXYGEN SATURATION: 95 % | SYSTOLIC BLOOD PRESSURE: 128 MMHG | BODY MASS INDEX: 27.09 KG/M2

## 2025-01-16 DIAGNOSIS — D68.69 SECONDARY HYPERCOAGULABLE STATE (HCC): ICD-10-CM

## 2025-01-16 DIAGNOSIS — I35.0 AORTIC VALVE STENOSIS, ETIOLOGY OF CARDIAC VALVE DISEASE UNSPECIFIED: ICD-10-CM

## 2025-01-16 DIAGNOSIS — R42 DIZZINESS: ICD-10-CM

## 2025-01-16 DIAGNOSIS — Z95.2 S/P TAVR (TRANSCATHETER AORTIC VALVE REPLACEMENT): ICD-10-CM

## 2025-01-16 DIAGNOSIS — Z95.5 S/P DRUG ELUTING CORONARY STENT PLACEMENT: ICD-10-CM

## 2025-01-16 DIAGNOSIS — I49.3 VENTRICULAR ECTOPY: ICD-10-CM

## 2025-01-16 DIAGNOSIS — I48.0 PAF (PAROXYSMAL ATRIAL FIBRILLATION) (HCC): ICD-10-CM

## 2025-01-16 DIAGNOSIS — I10 ESSENTIAL HYPERTENSION: ICD-10-CM

## 2025-01-16 DIAGNOSIS — I25.10 CORONARY ARTERY DISEASE INVOLVING NATIVE CORONARY ARTERY OF NATIVE HEART WITHOUT ANGINA PECTORIS: ICD-10-CM

## 2025-01-16 DIAGNOSIS — R00.2 PALPITATIONS: ICD-10-CM

## 2025-01-16 PROCEDURE — 99212 OFFICE O/P EST SF 10 MIN: CPT

## 2025-01-16 PROCEDURE — 3074F SYST BP LT 130 MM HG: CPT

## 2025-01-16 PROCEDURE — 99214 OFFICE O/P EST MOD 30 MIN: CPT

## 2025-01-16 PROCEDURE — 3078F DIAST BP <80 MM HG: CPT

## 2025-01-16 RX ORDER — AMOXICILLIN 400 MG/5ML
POWDER, FOR SUSPENSION ORAL
COMMUNITY
Start: 2024-10-28

## 2025-01-16 ASSESSMENT — ENCOUNTER SYMPTOMS
SYNCOPE: 0
EXCESSIVE DAYTIME SLEEPINESS: 1
PND: 0
DYSPNEA ON EXERTION: 0
HEADACHES: 0
LIGHT-HEADEDNESS: 1
SHORTNESS OF BREATH: 0
NEAR-SYNCOPE: 0
ORTHOPNEA: 0
DIZZINESS: 1
PALPITATIONS: 1

## 2025-01-16 ASSESSMENT — FIBROSIS 4 INDEX: FIB4 SCORE: 2.63

## 2025-01-16 NOTE — PROGRESS NOTES
"Chief Complaint   Patient presents with    Coronary Artery Disease     Follow up          Subjective:   Chuck Ortiz is a 84 y.o. male who presents today for follow-up.     Patient of Dr. Gonzalez.  Current medical problems include TAVR 5/20/2024, CAD, PCI mid LAD (3.0 x 30 mm AHMET) 4/23/2024 PAF, OAC on apixaban, hypertension, dyslipidemia, cryptogenic stroke presumed embolic based on poststroke ILR, PAF, LOVE, Zenker's diverticulum. Their last clinic visit was 12/4/2024 with Dr. Gonzalez.    Today's visit:  Patient reports he is here for follow up as he recently had an appointment with his PCP and on EKG it was showing Trigeminy PVCs. Patient is also reporting dizziness and lightheadedness associated with the PVCs so his PCP wanted him to be seen back in cardiology. He denies chest pain, shortness of breath, orthopnea, PND, or syncope. He does have some lower extremity edema but he says that it is stable and he also wears his compression stockings which helps. He also has had increased daytime sleepiness. He says if he sits down he will fall asleep. He did have a follow up with pulmonology who recommend a repeat sleep study. He is compliant with his CPAP and wears it nightly. He takes all his medications as prescribed without any missed doses. He has completed cardiac rehab and continues to exercise and is able to do so without any limitations.     Cardiovascular Risk Factors:  1. Smoking status: Never  2. Type II Diabetes Mellitus: No No results found for: \"HBA1C\"  3. Hypertension: Yes  4. Dyslipidemia: Yes   Cholesterol,Tot   Date Value Ref Range Status   04/23/2024 106 100 - 199 mg/dL Final     LDL   Date Value Ref Range Status   04/23/2024 52 <100 mg/dL Final     HDL   Date Value Ref Range Status   04/23/2024 43 >=40 mg/dL Final     Triglycerides   Date Value Ref Range Status   04/23/2024 53 0 - 149 mg/dL Final     Past Medical History:   Diagnosis Date    Anesthesia     all medications need to be " "crushed    Arrhythmia     \"irregular\"     Arthritis     hands     Cardiac murmur     CATARACT     bilat IOL     Dental disorder     partial upper denture     ED (erectile dysfunction)     Essential hypertension 11/12/2015    Facial droop     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    Hemorrhagic disorder ([facility])     on Plavix     High cholesterol     Hypertension     Kidney stones     mult uric acid kidney stones    MI (myocardial infarction) ([facility]) 04/24/2024    Seizure ([facility])     seizure x1 8/7/18    Sleep apnea     uses CPAP    Snoring     Stroke ([facility]) 08/07/2018    no residual problems    Zenker diverticula          Family History   Problem Relation Age of Onset    Heart Disease Maternal Grandmother     Diabetes Paternal Grandfather     Stroke Sister     Lung Disease Father         black lung     Cancer Neg Hx          Social History     Tobacco Use    Smoking status: Never    Smokeless tobacco: Never   Vaping Use    Vaping status: Never Used   Substance Use Topics    Alcohol use: Not Currently    Drug use: No         Allergies   Allergen Reactions    Morphine      Bradycardia         Current Outpatient Medications   Medication Sig    amoxicillin (AMOXIL) 400 MG/5ML suspension TAKE 25ML BY MOUTH 1 HOUR PRIOR TO PROCEDURE    hydroCHLOROthiazide 25 MG Tab TAKE 1 TABLET BY MOUTH EVERY DAY    cyclobenzaprine (FLEXERIL) 10 mg Tab Take 1 Tablet by mouth 3 times a day as needed for Moderate Pain (back spasms).    atorvastatin (LIPITOR) 40 MG Tab Take 1 Tablet by mouth every evening.    losartan (COZAAR) 50 MG Tab Take 1 Tablet by mouth 2 times a day.    ELIQUIS 5 MG Tab TAKE 1 TABLET BY MOUTH TWICE A DAY    PRED FORTE 1 % Suspension Administer  into the right eye. Pt uses 4 times every day on right eye    ketorolac (ACULAR) 0.5 % Solution Administer 1 Drop into the right eye 4 times a day. Tromethamine, Pt uses 4 times a day in right eye    allopurinol (ZYLOPRIM) 100 MG Tab TAKE 1 TABLET BY MOUTH TWICE A DAY    " clopidogrel (PLAVIX) 75 MG Tab TAKE 1 TABLET BY MOUTH EVERY DAY         Review of Systems   Constitutional: Negative for malaise/fatigue.   Cardiovascular:  Positive for palpitations. Negative for chest pain, dyspnea on exertion, leg swelling, near-syncope, orthopnea, paroxysmal nocturnal dyspnea and syncope.   Respiratory:  Negative for shortness of breath.    Neurological:  Positive for excessive daytime sleepiness, dizziness and light-headedness. Negative for headaches.           Objective:   /56 (BP Location: Left arm, Patient Position: Sitting)   Pulse 76   Resp 16   Ht 1.829 m (6')   Wt 90.7 kg (200 lb)   SpO2 95%  Body mass index is 27.12 kg/m².         Physical Exam  Vitals reviewed.   Constitutional:       General: He is not in acute distress.     Appearance: Normal appearance.   HENT:      Head: Normocephalic and atraumatic.   Cardiovascular:      Rate and Rhythm: Normal rate. Rhythm irregular.      Pulses: Normal pulses.      Heart sounds: No murmur heard.  Pulmonary:      Effort: Pulmonary effort is normal. No respiratory distress.      Breath sounds: Normal breath sounds.   Musculoskeletal:      Right lower leg: Edema present.      Left lower leg: Edema present.   Neurological:      Mental Status: He is alert and oriented to person, place, and time.      Gait: Gait normal.   Psychiatric:         Behavior: Behavior normal.             Lab Results   Component Value Date/Time    SODIUM 139 01/03/2025 11:30 AM    POTASSIUM 4.1 01/03/2025 11:30 AM    CHLORIDE 101 01/03/2025 11:30 AM    CO2 31 01/03/2025 11:30 AM    GLUCOSE 92 01/03/2025 11:30 AM    BUN 21 01/03/2025 11:30 AM    CREATININE 0.83 01/03/2025 11:30 AM    CREATININE 1.10 03/03/2011 11:20 AM    BUNCREATRAT 23 (H) 03/03/2011 11:20 AM    GLOMRATE >59 03/03/2011 11:20 AM      Lab Results   Component Value Date/Time    WBC 12.8 (H) 05/21/2024 02:01 AM    RBC 4.85 05/21/2024 02:01 AM    HEMOGLOBIN 15.2 05/21/2024 02:01 AM    HEMATOCRIT 46.4  05/21/2024 02:01 AM    MCV 95.7 05/21/2024 02:01 AM    MCH 31.3 05/21/2024 02:01 AM    MCHC 32.8 05/21/2024 02:01 AM    MPV 11.6 05/21/2024 02:01 AM    NEUTSPOLYS 59.10 05/17/2024 10:19 AM    LYMPHOCYTES 23.70 05/17/2024 10:19 AM    MONOCYTES 12.70 05/17/2024 10:19 AM    EOSINOPHILS 3.40 05/17/2024 10:19 AM    BASOPHILS 0.60 05/17/2024 10:19 AM      Lab Results   Component Value Date/Time    CHOLSTRLTOT 106 04/23/2024 01:45 AM    LDL 52 04/23/2024 01:45 AM    HDL 43 04/23/2024 01:45 AM    TRIGLYCERIDE 53 04/23/2024 01:45 AM       Lab Results   Component Value Date/Time    TROPONINT 161 (H) 04/29/2024 1622     Lab Results   Component Value Date/Time    NTPROBNP 739 (H) 05/21/2024 0201     Assessment:   1. Palpitations  - Cardiac Event Monitor; Future    2. Ventricular ectopy  - Cardiac Event Monitor; Future    3. PAF (paroxysmal atrial fibrillation) (HCC)    4. Secondary hypercoagulable state (HCC)    5. Dizziness    6. Aortic valve stenosis, etiology of cardiac valve disease unspecified    7. S/P TAVR (transcatheter aortic valve replacement)    8. Essential hypertension    9. Coronary artery disease involving native coronary artery of native heart without angina pectoris    10. S/P drug eluting coronary stent placement    Other orders  - amoxicillin (AMOXIL) 400 MG/5ML suspension; TAKE 25ML BY MOUTH 1 HOUR PRIOR TO PROCEDURE        Medical Decision Making:  Today's Assessment / Plan:   Palpitations  PVCs  Dizziness  -Patient reports that he previously was asymptomatic with ectopy. Recently he feels when he is having early beats and gets lightheadedness but denies any syncope or near syncope.   -Reviewed most recent cardiac event monitor that did show patient having bigem and trigem PVCs with a 2.7% burden.   -Repeat cardiac event monitor ordered to evaluate if there has been an increase in burden due to new symptoms and pending results may require medication to help suppress PVCs  -continue to follow with pulmonary  and be compliant with CPAP  -Educated patient to move cautiously from sitting to standing  -Echocardiogram already ordered and scheduled for TAVR follow up, pending cardiac event monitor results can order anther echocardiogram sooner    Atrial fibrillation  Hypercoagulable state due to atrial fibrillation  -Continue eliquis 5 mg twice a day for stroke prevention  -Switchback low on recent cardiac event monitor.  -Repeat monitor ordered   -Patient currently rate controlled    Aortic regurgitation s/p TAVR 5/20/2024  -Patient reports normal functional level and no worsening symptoms  -Annual echocardiogram already scheduled 5/20/2025    Moderate mitral regurgitation  -Surveillance echocardiogram ordered and scheduled     Hypertension  - Good control  - continue hydrochlorothiazide and losartan daily   - goal < 130/80  -Recent CMP ordered by PCP and electrolytes are normal    CAD s/p PCI AHMET  -Continue plavix and eliquis  -Continue atorvastatin daily  -Completed cardiac rehab  -Continue exercise as tolerated    Hyperlipidemia  -Most recent LDL 52  -Continue atorvastatin daily  -Goal of less than 70  -Check lipid panel in 12 months      Return in about 5 months (around 6/16/2025) for Dr. Solo.  Sooner if problems.    JONA Hollis

## 2025-01-22 ENCOUNTER — APPOINTMENT (OUTPATIENT)
Dept: URBAN - METROPOLITAN AREA CLINIC 36 | Facility: CLINIC | Age: 85
Setting detail: DERMATOLOGY
End: 2025-01-22

## 2025-01-22 PROBLEM — C44.41 BASAL CELL CARCINOMA OF SKIN OF SCALP AND NECK: Status: ACTIVE | Noted: 2025-01-22

## 2025-01-22 PROCEDURE — ? OBSERVATION

## 2025-01-22 PROCEDURE — 99212 OFFICE O/P EST SF 10 MIN: CPT

## 2025-01-22 PROCEDURE — ? ADDITIONAL NOTES

## 2025-01-22 NOTE — PROCEDURE: ADDITIONAL NOTES
Additional Notes: No need for further treatment continue skin checks with dr. Rizzo
Detail Level: Simple
Render Risk Assessment In Note?: no

## 2025-01-28 ENCOUNTER — NON-PROVIDER VISIT (OUTPATIENT)
Dept: CARDIOLOGY | Facility: MEDICAL CENTER | Age: 85
End: 2025-01-28
Attending: INTERNAL MEDICINE
Payer: MEDICARE

## 2025-01-28 DIAGNOSIS — I49.3 VENTRICULAR ECTOPIC BEATS: ICD-10-CM

## 2025-01-28 DIAGNOSIS — R00.2 PALPITATIONS: ICD-10-CM

## 2025-01-28 DIAGNOSIS — I49.3 VENTRICULAR ECTOPY: ICD-10-CM

## 2025-01-28 PROCEDURE — 93246 EXT ECG>7D<15D RECORDING: CPT

## 2025-01-28 NOTE — PROGRESS NOTES
Patient enrolled in the 14 day o Holter monitoring program per Yair Gonzalez MD.  >Office hook-up, serial # [id number].  >Currently pending EOS.

## 2025-02-14 ENCOUNTER — RESULTS FOLLOW-UP (OUTPATIENT)
Dept: CARDIOLOGY | Facility: MEDICAL CENTER | Age: 85
End: 2025-02-14

## 2025-02-14 ENCOUNTER — TELEPHONE (OUTPATIENT)
Dept: CARDIOLOGY | Facility: MEDICAL CENTER | Age: 85
End: 2025-02-14
Payer: MEDICARE

## 2025-02-14 NOTE — TELEPHONE ENCOUNTER
MANUEL EOS to 's nurse, Lance, on 2/14/2025    Preliminary findings:    2 episodes of -218 with an avg rate of 181 bpm    Sinus Rhythm  with an avg rate of 66 bpm  First Degree AV Block    Rare Supraventricular ectopics    Isolated VE(s) were frequent (10.4%), VE couplets and triplets were rare    Ventricular Bigeminy and Trigeminy were present    22 patient events:  SR   Ventricular Bigeminy  Ventricular Trigeminy  SVE(s)  VE(s)

## 2025-02-17 ENCOUNTER — TELEPHONE (OUTPATIENT)
Dept: CARDIOLOGY | Facility: MEDICAL CENTER | Age: 85
End: 2025-02-17
Payer: MEDICARE

## 2025-02-17 DIAGNOSIS — I49.3 VENTRICULAR ECTOPY: ICD-10-CM

## 2025-02-17 DIAGNOSIS — I47.29 NONSUSTAINED VENTRICULAR TACHYCARDIA (HCC): ICD-10-CM

## 2025-02-17 RX ORDER — METOPROLOL SUCCINATE 25 MG/1
12.5 TABLET, EXTENDED RELEASE ORAL DAILY
Qty: 90 TABLET | Refills: 3 | Status: SHIPPED | OUTPATIENT
Start: 2025-02-17

## 2025-02-17 NOTE — RESULT ENCOUNTER NOTE
Called the patient and reviewed the results of  cardia monitor, see below  He states that he saw his PCP in 1/2025 because he was tired, not sleeping well and falling asleep easily during the day. He had also noted that his heart rate on his oximeter and exercise machine did not sync up until his heart rate reached 90 and above. At the appt an EKG was done showing PVCs resulting in a cardiology appt and a cardiac monitor. In the interim his sleep issue resolved with the use of nasal strips. He states that the vast majority reported events on the monitor report were inadvertant. He is walking 2 miles a day, working outside, and trimming his fruit trees having no symptoms. After further discussion it was decided to add metoprolol in view of underlying CAD. Instructed patient to monitor daily BP to watch for low BP and keep us informed on how he is doing    CARDIAC RHYTHM MONITOR: [employer]: 14 days, ending 2/11/2025.   1.  Predominant rhythm is sinus rhythm; average heart rate 66 bpm, range  bpm.   2.  Rare premature atrial complexes (PACs), < 1% occurrence.   3.  Frequent premature ventricular complexes (PVCs), predominantly monomorphic 10.4% occurrence.   4.  2 episodes of nonsustained ventricular tachycardia; each 3 beats up to 218 bpm   5.  No atrial fibrillation, significant pauses, heart block or sustained arrhythmias.   6. 22 symptomatic and/or patient triggered events related to PVCs.

## 2025-03-07 NOTE — HPI: SKIN LESION
Is This A New Presentation, Or A Follow-Up?: Skin Lesion
What Type Of Note Output Would You Prefer (Optional)?: Standard Output
yes

## 2025-03-16 DIAGNOSIS — I10 ESSENTIAL HYPERTENSION: ICD-10-CM

## 2025-03-17 ENCOUNTER — TELEPHONE (OUTPATIENT)
Dept: CARDIOLOGY | Facility: MEDICAL CENTER | Age: 85
End: 2025-03-17
Payer: MEDICARE

## 2025-03-17 RX ORDER — LOSARTAN POTASSIUM 50 MG/1
50 TABLET ORAL 2 TIMES DAILY
Qty: 200 TABLET | Refills: 1 | Status: SHIPPED | OUTPATIENT
Start: 2025-03-17

## 2025-04-14 DIAGNOSIS — E78.5 DYSLIPIDEMIA: ICD-10-CM

## 2025-04-14 DIAGNOSIS — I25.10 CORONARY ARTERY DISEASE, NON-OCCLUSIVE: ICD-10-CM

## 2025-04-15 RX ORDER — ATORVASTATIN CALCIUM 40 MG/1
40 TABLET, FILM COATED ORAL EVERY EVENING
Qty: 100 TABLET | Refills: 3 | Status: SHIPPED | OUTPATIENT
Start: 2025-04-15

## 2025-04-15 NOTE — TELEPHONE ENCOUNTER
Is the patient due for a refill? Yes    Was the patient seen the last 15 months? Yes    Date of last office visit: 1/16/2025    Does the patient have an upcoming appointment?  Yes   If yes, When? 6/3/2025    Provider to refill:[facility]    Does the patient have nursing home Plus and need 100-day supply? (This applies to ALL medications) Yes, quantity updated to 100 days

## 2025-04-18 ENCOUNTER — HOSPITAL ENCOUNTER (OUTPATIENT)
Facility: MEDICAL CENTER | Age: 85
End: 2025-04-18
Attending: INTERNAL MEDICINE
Payer: MEDICARE

## 2025-04-18 ENCOUNTER — NON-PROVIDER VISIT (OUTPATIENT)
Dept: INTERNAL MEDICINE | Facility: IMAGING CENTER | Age: 85
End: 2025-04-18
Payer: MEDICARE

## 2025-04-18 DIAGNOSIS — I10 ESSENTIAL HYPERTENSION: ICD-10-CM

## 2025-04-18 DIAGNOSIS — E78.5 DYSLIPIDEMIA: ICD-10-CM

## 2025-04-18 DIAGNOSIS — G47.33 OBSTRUCTIVE SLEEP APNEA SYNDROME: ICD-10-CM

## 2025-04-18 DIAGNOSIS — Z12.5 SCREENING FOR PROSTATE CANCER: ICD-10-CM

## 2025-04-18 DIAGNOSIS — E79.0 HYPERURICEMIA: ICD-10-CM

## 2025-04-18 LAB
ALBUMIN SERPL BCP-MCNC: 4.1 G/DL (ref 3.2–4.9)
ALBUMIN/GLOB SERPL: 1.6 G/DL
ALP SERPL-CCNC: 118 U/L (ref 30–99)
ALT SERPL-CCNC: 26 U/L (ref 2–50)
ANION GAP SERPL CALC-SCNC: 10 MMOL/L (ref 7–16)
AST SERPL-CCNC: 28 U/L (ref 12–45)
BASOPHILS # BLD AUTO: 0.6 % (ref 0–1.8)
BASOPHILS # BLD: 0.05 K/UL (ref 0–0.12)
BILIRUB SERPL-MCNC: 1 MG/DL (ref 0.1–1.5)
BUN SERPL-MCNC: 27 MG/DL (ref 8–22)
CALCIUM ALBUM COR SERPL-MCNC: 9.6 MG/DL (ref 8.5–10.5)
CALCIUM SERPL-MCNC: 9.7 MG/DL (ref 8.5–10.5)
CHLORIDE SERPL-SCNC: 102 MMOL/L (ref 96–112)
CHOLEST SERPL-MCNC: 111 MG/DL (ref 100–199)
CO2 SERPL-SCNC: 28 MMOL/L (ref 20–33)
CREAT SERPL-MCNC: 1.03 MG/DL (ref 0.5–1.4)
EOSINOPHIL # BLD AUTO: 0.17 K/UL (ref 0–0.51)
EOSINOPHIL NFR BLD: 2 % (ref 0–6.9)
ERYTHROCYTE [DISTWIDTH] IN BLOOD BY AUTOMATED COUNT: 51.4 FL (ref 35.9–50)
GFR SERPLBLD CREATININE-BSD FMLA CKD-EPI: 71 ML/MIN/1.73 M 2
GLOBULIN SER CALC-MCNC: 2.5 G/DL (ref 1.9–3.5)
GLUCOSE SERPL-MCNC: 94 MG/DL (ref 65–99)
HCT VFR BLD AUTO: 52.3 % (ref 42–52)
HDLC SERPL-MCNC: 42 MG/DL
HGB BLD-MCNC: 16.9 G/DL (ref 14–18)
IMM GRANULOCYTES # BLD AUTO: 0.02 K/UL (ref 0–0.11)
IMM GRANULOCYTES NFR BLD AUTO: 0.2 % (ref 0–0.9)
LDLC SERPL CALC-MCNC: 54 MG/DL
LYMPHOCYTES # BLD AUTO: 2.2 K/UL (ref 1–4.8)
LYMPHOCYTES NFR BLD: 25.6 % (ref 22–41)
MCH RBC QN AUTO: 31.8 PG (ref 27–33)
MCHC RBC AUTO-ENTMCNC: 32.3 G/DL (ref 32.3–36.5)
MCV RBC AUTO: 98.5 FL (ref 81.4–97.8)
MONOCYTES # BLD AUTO: 0.9 K/UL (ref 0–0.85)
MONOCYTES NFR BLD AUTO: 10.5 % (ref 0–13.4)
NEUTROPHILS # BLD AUTO: 5.24 K/UL (ref 1.82–7.42)
NEUTROPHILS NFR BLD: 61.1 % (ref 44–72)
NRBC # BLD AUTO: 0 K/UL
NRBC BLD-RTO: 0 /100 WBC (ref 0–0.2)
PLATELET # BLD AUTO: 143 K/UL (ref 164–446)
PMV BLD AUTO: 12.3 FL (ref 9–12.9)
POTASSIUM SERPL-SCNC: 4.2 MMOL/L (ref 3.6–5.5)
PROT SERPL-MCNC: 6.6 G/DL (ref 6–8.2)
PSA SERPL DL<=0.01 NG/ML-MCNC: 1.58 NG/ML (ref 0–4)
RBC # BLD AUTO: 5.31 M/UL (ref 4.7–6.1)
SODIUM SERPL-SCNC: 140 MMOL/L (ref 135–145)
TRIGL SERPL-MCNC: 75 MG/DL (ref 0–149)
URATE SERPL-MCNC: 4.7 MG/DL (ref 2.5–8.3)
WBC # BLD AUTO: 8.6 K/UL (ref 4.8–10.8)

## 2025-04-18 PROCEDURE — 84153 ASSAY OF PSA TOTAL: CPT

## 2025-04-18 PROCEDURE — 80053 COMPREHEN METABOLIC PANEL: CPT

## 2025-04-18 PROCEDURE — 80061 LIPID PANEL: CPT

## 2025-04-18 PROCEDURE — 85025 COMPLETE CBC W/AUTO DIFF WBC: CPT

## 2025-04-18 PROCEDURE — 99999 PR NO CHARGE: CPT

## 2025-04-18 PROCEDURE — 84550 ASSAY OF BLOOD/URIC ACID: CPT

## 2025-04-18 NOTE — PROGRESS NOTES
Chuck Ortiz is a 85 y.o. male here for a non-provider visit for a lab draw on 4/18/2025 at 8:53 AM.    Procedure performed:  Venipuncture     Anatomical site:  Left Antecubital Area    Equipment used:  21 g vacutainer     Labs drawn:  annual labs, cardio labs    Ordering provider:  Reilly Jc MD, Cardiology    Lab draw completed by:  Ana Stearns R.N.

## 2025-04-21 ENCOUNTER — RESULTS FOLLOW-UP (OUTPATIENT)
Dept: CARDIOLOGY | Facility: MEDICAL CENTER | Age: 85
End: 2025-04-21
Payer: MEDICARE

## 2025-04-24 ENCOUNTER — OFFICE VISIT (OUTPATIENT)
Dept: INTERNAL MEDICINE | Facility: IMAGING CENTER | Age: 85
End: 2025-04-24
Payer: MEDICARE

## 2025-04-24 VITALS
HEART RATE: 60 BPM | WEIGHT: 196 LBS | RESPIRATION RATE: 16 BRPM | OXYGEN SATURATION: 96 % | BODY MASS INDEX: 26.58 KG/M2 | DIASTOLIC BLOOD PRESSURE: 70 MMHG | TEMPERATURE: 98.1 F | SYSTOLIC BLOOD PRESSURE: 120 MMHG

## 2025-04-24 DIAGNOSIS — M25.562 CHRONIC PAIN OF LEFT KNEE: ICD-10-CM

## 2025-04-24 DIAGNOSIS — Z95.2 S/P TAVR (TRANSCATHETER AORTIC VALVE REPLACEMENT): ICD-10-CM

## 2025-04-24 DIAGNOSIS — G89.29 CHRONIC PAIN OF LEFT KNEE: ICD-10-CM

## 2025-04-24 DIAGNOSIS — R63.4 WEIGHT LOSS: Primary | ICD-10-CM

## 2025-04-24 DIAGNOSIS — K21.9 GASTROESOPHAGEAL REFLUX DISEASE WITHOUT ESOPHAGITIS: ICD-10-CM

## 2025-04-24 PROCEDURE — 99214 OFFICE O/P EST MOD 30 MIN: CPT | Performed by: INTERNAL MEDICINE

## 2025-04-24 PROCEDURE — 3074F SYST BP LT 130 MM HG: CPT | Performed by: INTERNAL MEDICINE

## 2025-04-24 PROCEDURE — 3078F DIAST BP <80 MM HG: CPT | Performed by: INTERNAL MEDICINE

## 2025-04-24 ASSESSMENT — FIBROSIS 4 INDEX: FIB4 SCORE: 3.3

## 2025-04-24 NOTE — PROGRESS NOTES
Patient comes in with concern regarding weight loss.  Over the last year his weight is down approximately 12 pounds.  He was recently seen by cardiology at my request due to dizziness and increased frequency of bigeminy and trigeminy.  He has a history of atrial fibrillation and cryptogenic stroke.  He remains on chronic anticoagulation.  His heart rate has been controlled with metoprolol and he remains on losartan for blood pressure.  He also has a history of gout and is on chronic allopurinol.    His last labs were on April 18.  CBC showed persistent, chronic mild thrombocytopenia.  No anemia.  Differential is normal.  His chemistry profile showed elevation in BUN.  Alkaline phosphatase is also mildly elevated and overall is trending higher.  Lipids are at goal with LDL 54.  His PSA was 1.58 which compares to 1.7 in 2023.    Patient also has a history of sleep apnea.  He was referred to sleep medicine on her last visit.  He is compliant with CPAP.   murmur carotid and radial pulses are intact.  Abdomen: Soft, nontender, nondistended. Spleen and liver are not enlarged.  Neurologic: Cranial nerves II through XII are grossly normal, alert and oriented x3      Diagnosis:  1. Weight loss        2. Gastroesophageal reflux disease without esophagitis        3. S/P TAVR (transcatheter aortic valve replacement)        4. Chronic pain of left knee  DX-KNEE 3 VIEWS Atraumatic Pain/Swelling/Deformity            Patient comes in with complaint of weight loss.  It appears in records that he initially lost approximately 10 pounds after TAVR.  He has been has been essentially stable since that time.    No worrisome symptoms including night sweats, dysphagia, dyspepsia, abdominal pain, constipation, diarrhea, fever or other.    We reviewed his labs from April which were unremarkable.  No obvious cause for weight loss.    Cardiac issues have been stable.  TAVR appears to be functioning normally.  He is followed closely by cardiology.    We discussed his left knee.  I recommended repeating imaging and follow-up with Dr. Basilio     My my total time spent caring for the patient on the day of the encounter was  greater than 31 minutes.   This includes obtaining history, reviewing chart, physical exam, patient education, reviewing outside records, placing orders, interpreting tests and coordinating care.    Portions of this note were completed using voice recognition software (Dragon Naturally speaking software) . Occasional transcription errors may have escaped proof reading. I have made every reasonable attempt to correct obvious errors, but I expect that there are errors of grammar and possibly content that I did not discover before finalizing the note.          [1]   Current Outpatient Medications Ordered in Epic   Medication Sig Dispense Refill    amoxicillin-clavulanate (AUGMENTIN) 600-42.9 MG/5ML Recon Susp suspension Take 1 tsp (5 ml) by mouth three times daily x 7 days 110 mL  0    atorvastatin (LIPITOR) 40 MG Tab TAKE 1 TABLET BY MOUTH EVERY DAY IN THE EVENING 100 Tablet 3    losartan (COZAAR) 50 MG Tab TAKE 1 TABLET BY MOUTH TWICE A  Tablet 1    metoprolol SR (TOPROL XL) 25 MG TABLET SR 24 HR Take 0.5 Tablets by mouth every day. 90 Tablet 3    hydroCHLOROthiazide 25 MG Tab TAKE 1 TABLET BY MOUTH EVERY DAY 90 Tablet 2    cyclobenzaprine (FLEXERIL) 10 mg Tab Take 1 Tablet by mouth 3 times a day as needed for Moderate Pain (back spasms). 30 Tablet 0    ELIQUIS 5 MG Tab TAKE 1 TABLET BY MOUTH TWICE A  Tablet 3    PRED FORTE 1 % Suspension Administer  into the right eye. Pt uses 4 times every day on right eye      ketorolac (ACULAR) 0.5 % Solution Administer 1 Drop into the right eye 4 times a day. Tromethamine, Pt uses 4 times a day in right eye      allopurinol (ZYLOPRIM) 100 MG Tab TAKE 1 TABLET BY MOUTH TWICE A  Tablet 3    clopidogrel (PLAVIX) 75 MG Tab TAKE 1 TABLET BY MOUTH EVERY  Tablet 3     No current Epic-ordered facility-administered medications on file.

## 2025-05-01 ENCOUNTER — OFFICE VISIT (OUTPATIENT)
Dept: INTERNAL MEDICINE | Facility: IMAGING CENTER | Age: 85
End: 2025-05-01
Payer: MEDICARE

## 2025-05-01 VITALS
SYSTOLIC BLOOD PRESSURE: 110 MMHG | RESPIRATION RATE: 14 BRPM | TEMPERATURE: 99.5 F | OXYGEN SATURATION: 94 % | WEIGHT: 196 LBS | HEART RATE: 56 BPM | DIASTOLIC BLOOD PRESSURE: 60 MMHG | BODY MASS INDEX: 26.58 KG/M2

## 2025-05-01 DIAGNOSIS — I69.951 HEMIPLEGIA AND HEMIPARESIS FOLLOWING UNSPECIFIED CEREBROVASCULAR DISEASE AFFECTING RIGHT DOMINANT SIDE (HCC): ICD-10-CM

## 2025-05-01 DIAGNOSIS — L03.114 CELLULITIS OF LEFT HAND: ICD-10-CM

## 2025-05-01 PROCEDURE — 99213 OFFICE O/P EST LOW 20 MIN: CPT | Performed by: INTERNAL MEDICINE

## 2025-05-01 PROCEDURE — 3078F DIAST BP <80 MM HG: CPT | Performed by: INTERNAL MEDICINE

## 2025-05-01 PROCEDURE — 3074F SYST BP LT 130 MM HG: CPT | Performed by: INTERNAL MEDICINE

## 2025-05-01 RX ORDER — AMOXICILLIN AND CLAVULANATE POTASSIUM 600; 42.9 MG/5ML; MG/5ML
POWDER, FOR SUSPENSION ORAL
Qty: 110 ML | Refills: 0 | Status: SHIPPED | OUTPATIENT
Start: 2025-05-01 | End: 2025-05-28

## 2025-05-01 ASSESSMENT — FIBROSIS 4 INDEX: FIB4 SCORE: 3.3

## 2025-05-01 NOTE — PROGRESS NOTES
Chief Complaint   Patient presents with    Dog Bite     Left hand 7 days ago       HISTORY OF THE PRESENT ILLNESS: Patient is a 85 y.o. male.     Patient comes in with red, swollen hand.  He has a wound on the wrist.  He was scratched by his dog.  He cleaned the wound after extensive bleeding.  He used a chemical agent to stop the bleeding.  He is on chronic Eliquis and Plavix.    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  Following stroke from atrial fibrillation.  Continue with current defined treatment plan: Continue antiplatelet therapy.. Follow-up at least annually.  Last edited 05/01/25 12:21 PDT by Reilly Jc M.D.           Allergies: Morphine    Current Outpatient Medications Ordered in Epic   Medication Sig Dispense Refill    amoxicillin-clavulanate (AUGMENTIN) 600-42.9 MG/5ML Recon Susp suspension Take 1 tsp (5 ml) by mouth three times daily x 7 days 110 mL 0    atorvastatin (LIPITOR) 40 MG Tab TAKE 1 TABLET BY MOUTH EVERY DAY IN THE EVENING 100 Tablet 3    losartan (COZAAR) 50 MG Tab TAKE 1 TABLET BY MOUTH TWICE A  Tablet 1    metoprolol SR (TOPROL XL) 25 MG TABLET SR 24 HR Take 0.5 Tablets by mouth every day. 90 Tablet 3    hydroCHLOROthiazide 25 MG Tab TAKE 1 TABLET BY MOUTH EVERY DAY 90 Tablet 2    cyclobenzaprine (FLEXERIL) 10 mg Tab Take 1 Tablet by mouth 3 times a day as needed for Moderate Pain (back spasms). 30 Tablet 0    ELIQUIS 5 MG Tab TAKE 1 TABLET BY MOUTH TWICE A  Tablet 3    PRED FORTE 1 % Suspension Administer  into the right eye. Pt uses 4 times every day on right eye      ketorolac (ACULAR) 0.5 % Solution Administer 1 Drop into the right eye 4 times a day. Tromethamine, Pt uses 4 times a day in right eye      allopurinol (ZYLOPRIM) 100 MG Tab TAKE 1 TABLET BY MOUTH TWICE A  Tablet  3    clopidogrel (PLAVIX) 75 MG Tab TAKE 1 TABLET BY MOUTH EVERY  Tablet 3     No current Epic-ordered facility-administered medications on file.       Past medical history, social history and family history were reviewed from chart today    Review of systems: Per HPI.    All others negative.     Exam: /60 (BP Location: Left arm, Patient Position: Sitting, BP Cuff Size: Adult)   Pulse (!) 56   Temp 37.5 °C (99.5 °F) (Temporal)   Resp 14   Wt 88.9 kg (196 lb)   SpO2 94%   General: Well-appearing.  No distress.  HEENT: Grossly normal.   Pulmonary: Clear with normal effort  Cardiovascular: Regular.   Abdomen: Soft, nontender, nondistended.   Neurologic: Grossly normal  Left hand has a 0.5 x 2 cm abrasion.  It is closed with a scab.  No discharge.  The dorsal aspect of the hand is red, warm and swollen compared to the right.  There is also mild tenderness.    Diagnosis:  1. Cellulitis of left hand        2. 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  Discussed signs of worsening infection which would require evaluation in ER for IV antibiotics.    My total time spent caring for the patient on the day of the encounter was  greater than 20 minutes.   This includes obtaining history, reviewing chart, physical exam, patient education, reviewing outside records, placing orders, interpreting tests and coordinating care.    Portions of this note were completed using voice recognition software (Dragon Naturally speaking software) . Occasional transcription errors may have escaped proof reading. I have made every reasonable attempt to correct obvious errors, but I expect that there are errors of grammar and possibly content that I did not discover before finalizing the note.

## 2025-05-07 NOTE — Clinical Note
[facility]  [address] [place] [address]    [id number]    Chuck Ortiz  [address] [place] [address]    May 7, 2025    Member Name: Chuck Ortiz   Member Number: [id number]   Reference Number: [id number]   Approved Services: Echos and EKG   Approved Service Dates: 05/07/2025 - 09/04/2025   Requesting Provider: Riley Rowland   Requested Provider: [facility]     Dear Chuck Bassam Ortiz:    The following medical service(s) requested by Riley Rowland have been approved:    Procedure Code Procedure Code Name Requested Quantity Approved Quantity Status   79687 (CPT®) VT ECHO HEART XTHORACIC,COMPLETE W DOPPLER 1 1 Authorized       Approved Quantity means the number of visits approved for medication treatments and/or medical services.    The services should be provided by [facility] no later than 09/04/2025. Please contact the provider listed below with any questions.     Provider Information:  [facility]  [phone]    Your plan benefit may require a deductible, co-payment or coinsurance for these services. This authorization does not guarantee [facility] will pay the claim for services that you receive. Payment by [facility] for these services is subject to the terms of your Evidence of Coverage, your eligibility at the time of service, and confirmation of benefit coverage.    For any questions or additional information, please contact Customer Service:    [facility] Toll Free: [phone]  TTY users dial: [phone]   Call Center Hours:  Oct 1 - Mar 31, Mon - Fri 7 AM to 8 PM PST  Oct 1 - Mar 31, Sat - Sun 8 AM to 8 PM PST  Apr 1 - Sep 30, Mon - Fri 7 AM to 8 PM PST   Office Hours: Mon - Fri 8 AM to 5 PM PST   E-mail: [email or web address]   Website:  [email or web address]      This information is available for free in other languages. Please contact Customer Service at  the phone number above for more information. [facility] complies with applicable Federal civil rights laws and does not discriminate on the basis of race, color, national origin, age, disability or sex.    Sincerely,     Healthcare Utilization Management Department     Cc: [facility]   Riley Brandearlzach    Multi-Language Insert  Multi- Services  English: We have free  services to answer any questions you may have about our health or drug plan.  To get an , just call us at [phone].  Someone who speaks English/Language can help you.  This is a free service.  Romanian: Tenemos servicios de intérprete sin costo alguno  para responder cualquier pregunta que pueda tener sobre nuestro plan de camilo o medicamentos. Para hablar con un intérprete, por favor llame al [phone]. Alguien que hable español le podrá ayudar. Kayy es un servicio gratuito.  Chinese Mandarin: ?????????????????????????????? ???????????????? [phone]????????????????? ?????????  Chinese Cantonese: ?????????????????????????????? ????????????? [phone]???????????????????? ????????  Tagalog:  Rafi adam serbisyo sa borjasaarlin aguilaa jeffy jacksonngalem ward o panggamot.  shabnam Long  [phone]. Maaari kayong [place].  José Miguel caldwell.  Nigerien:  Nous proposons areli services gratuits d'interprétation pour répondre à toutes nikky questions relatives à notre régime de santé ou d'assurance-médicaments. Pour accéder au service d'interprétation, il vous suffit de nous appeler au [phone]. Un interlocuteur parUpland Hills Healthying Françs pourra vous aider. Ce service est gratuit.  Divehi:  Izabella wellingtoni có d?ch v? thông d?ch mi?n phí ð? tr? l?i các câu h?i v? chýõng s?c kh?e và chýõng trình thu?c men. N?u quí v? c?n thông  d?ch viên pop g?i [phone] s? có nhân viên nói ti?ng Vi?t giúp ð? quí v?. Ðây là d?ch v? mi?n phí .  Hebrew:  Unser kostenser Dolmetscherservice beantwortet Ihren Fragen zu unserem Gesundheits- und Arzneimittelplan. Unsere Dolmetscher erreichen Sie [phone]. Man wird Ihnen doug auf [facility]. Dieser Service ist gurinderPemiscot Memorial Health Systems.  Danish:  ??? ?? ?? ?? ?? ??? ?? ??? ?? ???? ?? ?? ???? ???? ????. ?? ???? ????? ?? [phone] ??? ??? ????.  ???? ?? ???? ?? ?? ????. ? ???? ??? ?????.   Grenadian: Åñëè ó âàñ âîçíèêíóò âîïðîñû îòíîñèòåëüíî ñòðàõîâîãî èëè ìåäèêàìåíòíîãî ïëàíà, âû ìîæåòå âîñïîëüçîâàòüñÿ íàøèìè áåñïëàòíûìè óñëóãàìè ïåðåâîä÷èêîâ. ×òîáû âîñïîëüçîâàòüñÿ óñëóãàìè ïåðåâîä÷èêà, ïîçâîíèòå íàì ïî òåëåôîíó [phone]. Âàì îêàæåò ïîìîùü ñîòðóäíèê, êîòîðûé ãîâîðèò ïî-póññêè. Äàííàÿ óñëóãà áåñïëàòíàÿ.  Georgian: ÅääÇ äÞÏã ÎÏãÇÊ ÇáãÊÑÌã ÇáÝæÑí ÇáãÌÇäíÉ ááÅÌÇÈÉ Úä Ãí ÃÓÆáÉ ÊÊÚáÞ ÈÇáÕÍÉ Ãæ ÌÏæá ÇáÃÏæíÉ áÏíäÇ. ááÍÕæá Úáì ãÊÑÌã ÝæÑí¡ áíÓ Úáíß Óæì ÇáÇÊÕÇá ÈäÇ Úáì [phone] . ÓíÞæã ÔÎÕ ãÇ íÊÍÏË ÇáÚÑÈíÉ ÈãÓÇÚÏÊß. åÐå ÎÏãÉ ãÌÇäíÉ.  Dl: ????? ????????? ?? ??? ?? ????? ?? ???? ??? ???? ???? ?? ?????? ?? ???? ???? ?? ??? ????? ??? ????? ???????? ?????? ?????? ???. ?? ???????? ??????? ???? ?? ???, ?? ???? [phone] ?? ??? ????. ??? ??????? ?? ?????? ????? ?? ???? ??? ?? ???? ??. ?? ?? ????? ???? ??.   Hungarian:  È disponibile un servizio di interpretariato gratuito per rispondere a eventuali domande sul nostro piano sanitario e farmaceutico. Per un interprete, contattare il sandy [phone]. Un nostro incaricato torrey parla Italianovi fornirà l'assistenza necessaria. È un servizio gratuito.  Portugués:  Dispomos de serviços de interpretação gratuitos para responder a qualquer questão que tenha acerca do nosso plano de saúde ou de medicação. Para obter um intérprete, contacte-nos através do número [phone]. Irá encontrar alguém que fale o idioma  Português para o ajudar. Kayy serviço é  gratuito.  Welsh Creole:  Nou genyen sèvis entèprèt gratis rosas reponn tout kesyon ou ta genyen konsènan plan medikal oswa dwòg nou an.  Rosas jwenn yon entèprèt, jis rele nou nan [phone]. Yon moun ki pale Kreyòl kapab jennifer w.  Sa a se yon sèvis ki gratis.  Polish:  Umo¿liwiamy bezp³atne skorzystanie z us³ug t³umacza ustnego, który pomo¿e w uzyskaniu odpowiedzi na temat planu zdrowotnego lub dawkowania kaleighw. Deborah skorzystaæ z pomocy t³umacza znaj¹cego keanu desir¿y zadzwoniæ pod numer [phone]. Ta us³uga jest bezp³atna.  Chinese: ????? ??????? ????????????????????? ??????????????????????????????????[phone] ???????????????? ? ????????????????? ?????

## 2025-05-12 ENCOUNTER — APPOINTMENT (OUTPATIENT)
Dept: RADIOLOGY | Facility: MEDICAL CENTER | Age: 85
End: 2025-05-12
Attending: INTERNAL MEDICINE
Payer: MEDICARE

## 2025-05-12 DIAGNOSIS — G89.29 CHRONIC PAIN OF LEFT KNEE: ICD-10-CM

## 2025-05-12 DIAGNOSIS — M25.562 CHRONIC PAIN OF LEFT KNEE: ICD-10-CM

## 2025-05-12 PROCEDURE — 73562 X-RAY EXAM OF KNEE 3: CPT | Mod: LT

## 2025-05-13 ENCOUNTER — RESULTS FOLLOW-UP (OUTPATIENT)
Dept: INTERNAL MEDICINE | Facility: IMAGING CENTER | Age: 85
End: 2025-05-13

## 2025-05-20 ENCOUNTER — HOSPITAL ENCOUNTER (OUTPATIENT)
Dept: CARDIOLOGY | Facility: MEDICAL CENTER | Age: 85
End: 2025-05-20
Attending: INTERNAL MEDICINE
Payer: MEDICARE

## 2025-05-20 DIAGNOSIS — M25.561 CHRONIC PAIN OF BOTH KNEES: Primary | ICD-10-CM

## 2025-05-20 DIAGNOSIS — G89.29 CHRONIC PAIN OF BOTH KNEES: Primary | ICD-10-CM

## 2025-05-20 DIAGNOSIS — M25.562 CHRONIC PAIN OF BOTH KNEES: Primary | ICD-10-CM

## 2025-05-20 DIAGNOSIS — I35.0 SEVERE AORTIC STENOSIS: ICD-10-CM

## 2025-05-20 PROCEDURE — 93306 TTE W/DOPPLER COMPLETE: CPT

## 2025-05-21 LAB
LV EJECT FRACT  99904: 55
LV EJECT FRACT MOD 2C 99903: 47.73
LV EJECT FRACT MOD 4C 99902: 53.74
LV EJECT FRACT MOD BP 99901: 51.87

## 2025-05-21 PROCEDURE — 93306 TTE W/DOPPLER COMPLETE: CPT | Mod: 26 | Performed by: INTERNAL MEDICINE

## 2025-05-28 ENCOUNTER — RESULTS FOLLOW-UP (OUTPATIENT)
Dept: CARDIOLOGY | Facility: MEDICAL CENTER | Age: 85
End: 2025-05-28
Payer: MEDICARE

## 2025-06-02 ENCOUNTER — APPOINTMENT (OUTPATIENT)
Dept: INTERNAL MEDICINE | Facility: IMAGING CENTER | Age: 85
End: 2025-06-02
Payer: MEDICARE

## 2025-06-02 VITALS
SYSTOLIC BLOOD PRESSURE: 116 MMHG | BODY MASS INDEX: 26.14 KG/M2 | DIASTOLIC BLOOD PRESSURE: 60 MMHG | HEIGHT: 72 IN | OXYGEN SATURATION: 94 % | WEIGHT: 193 LBS | TEMPERATURE: 98.5 F | RESPIRATION RATE: 14 BRPM | HEART RATE: 58 BPM

## 2025-06-02 DIAGNOSIS — Z95.2 S/P TAVR (TRANSCATHETER AORTIC VALVE REPLACEMENT): ICD-10-CM

## 2025-06-02 DIAGNOSIS — G47.33 OBSTRUCTIVE SLEEP APNEA SYNDROME: ICD-10-CM

## 2025-06-02 DIAGNOSIS — I49.1 PAC (PREMATURE ATRIAL CONTRACTION): ICD-10-CM

## 2025-06-02 DIAGNOSIS — I10 ESSENTIAL HYPERTENSION: ICD-10-CM

## 2025-06-02 DIAGNOSIS — Z95.5 S/P DRUG ELUTING CORONARY STENT PLACEMENT: ICD-10-CM

## 2025-06-02 DIAGNOSIS — Z00.00 MEDICARE ANNUAL WELLNESS VISIT, SUBSEQUENT: Primary | ICD-10-CM

## 2025-06-02 DIAGNOSIS — K22.5 ZENKER DIVERTICULUM: ICD-10-CM

## 2025-06-02 DIAGNOSIS — I25.10 CORONARY ARTERY DISEASE INVOLVING NATIVE CORONARY ARTERY OF NATIVE HEART WITHOUT ANGINA PECTORIS: ICD-10-CM

## 2025-06-02 DIAGNOSIS — M10.9 GOUTY ARTHROPATHY: ICD-10-CM

## 2025-06-02 DIAGNOSIS — I48.0 PAF (PAROXYSMAL ATRIAL FIBRILLATION) (HCC): ICD-10-CM

## 2025-06-02 DIAGNOSIS — D69.6 THROMBOCYTOPENIA (HCC): ICD-10-CM

## 2025-06-02 DIAGNOSIS — E78.5 DYSLIPIDEMIA: ICD-10-CM

## 2025-06-02 DIAGNOSIS — I77.810 THORACIC AORTIC ECTASIA (HCC): ICD-10-CM

## 2025-06-02 ASSESSMENT — PATIENT HEALTH QUESTIONNAIRE - PHQ9: CLINICAL INTERPRETATION OF PHQ2 SCORE: 0

## 2025-06-02 ASSESSMENT — ENCOUNTER SYMPTOMS: GENERAL WELL-BEING: EXCELLENT

## 2025-06-02 ASSESSMENT — FIBROSIS 4 INDEX: FIB4 SCORE: 3.3

## 2025-06-02 ASSESSMENT — ACTIVITIES OF DAILY LIVING (ADL): BATHING_REQUIRES_ASSISTANCE: 0

## 2025-06-02 NOTE — PROGRESS NOTES
85 y.o. male presents for the following:    History of Present Illness  The patient is an 85-year-old male who comes in for an annual health risk assessment, physical examination, and review of laboratory results.    General Health  - Considers himself in good health  - No depression, balance issues, or cognitive problems  - Regular bowel movements  - No presence of blood in stool or urine  - No nosebleeds    Basal Cell Carcinoma  - History of basal cell carcinoma treated with injections  - Follows up with dermatology annually    Atrial Fibrillation  - Atrial fibrillation resolved on 04/06/2025  - Heart rate stable since starting metoprolol  - Experienced transient episode of lightheadedness and undetectable heart rate two weeks ago, resolved after medication  - Managing condition with Eliquis and Plavix    Zenker's Diverticulum  - Occasionally causes food to become lodged in throat, necessitating coughing to dislodge  - Issue noticeable when lying down at night  - Attempts to manage by eating earlier and consuming smaller meals  - No reflux or heartburn    Sleep Apnea  - Uses CPAP machine, sleeps approximately 7 hours without interruption  - Consulted with sleep specialists and attended 2 or 3 sleep clinics  - Underwent uvuloplasty in the 1980s for sleep apnea management    Gout  - History of gout  - No recent episodes  - Currently on allopurinol twice daily    Heart Health  - Improvement in overall health following TAVR procedure  - Heart function improved since 2013    Arthritis  - Arthritis in hands, occasionally requires assistance with tying knots    Left Knee Pain  - Improved unless sleeping in certain position  - No problems in the last 2 weeks    Skin Concern  - Spot on leg that needs to be looked at    Supplemental information: PAST SURGICAL HISTORY:  - Uvuloplasty in the 1980s for sleep apnea management  - Procedure to widen nostrils due to frequent nosebleeds  - TAVR procedure        Annual Wellness  Visit/Health Risk Assessment:    Past medical:  Past Medical History[1]    Past surgical:  Past Surgical History[2]    Family history: relating to possible risk factors for your patient  Family History   Problem Relation Age of Onset    Heart Disease Maternal Grandmother     Diabetes Paternal Grandfather     Stroke Sister     Lung Disease Father         black lung     Cancer Neg Hx        Current Providers (including home care/DME’s):   No Patient Care Coordination Note on file.      Patient Care Team:  Reilly Jc M.D. as PCP - General (Internal Medicine)  Josefa Keyes M.D. as PCP - [facility]  Ana Stearns R.N. as Registered Nurse      Medications: Current Medications and Prescriptions Ordered in Epic[3]    Supplements (calcium/vitamins): if not lisited in medications    Chief Complaint   Patient presents with    Annual Exam         HPI:  Chuck Ortiz is a 85 y.o. 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 (HCC) 05/01/2025    Mitral regurgitation 08/13/2024    S/P drug eluting coronary stent placement 05/29/2024    Dizziness 05/29/2024    S/P TAVR (transcatheter aortic valve replacement) 05/20/2024    Secondary hypercoagulable state (HCC) 09/05/2023    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    Osteoarthritis of left wrist 01/30/2023    Thoracic aortic ectasia (HCC) 01/11/2023    Dyslipidemia 10/21/2021    PAF (paroxysmal atrial fibrillation) ([facility]) 11/09/2020    Anticoagulated 11/09/2020    Lumbar radiculopathy 03/26/2019    Zenker diverticulum 11/15/2018    Hyperlipidemia LDL goal <70 11/15/2018    Coronary artery disease involving native coronary artery of native heart without angina pectoris 11/15/2018    History of arterial ischemic stroke 08/23/2018    Ventricular ectopy 08/23/2018    Obstructive sleep apnea syndrome 07/25/2017     Essential hypertension 11/12/2015    Renal cysts, acquired, bilateral 09/19/2014    Diverticulitis 09/19/2014    Osteoarthrosis, hand 12/17/2012    ED (erectile dysfunction)        Current Medications[4]         Current supplements as per medication list.       Allergies: Morphine    Current social contact/activities:  Social with friends and family.     He  reports that he has never smoked. He has never used smokeless tobacco. He reports that he does not currently use alcohol. He reports that he does not use drugs.  Counseling given: Not Answered        DPA/Advanced Directive:  Completed       ROS:    Gait: Uses :None  Ostomy: No  Other tubes: no   Amputations: no   Chronic oxygen use: no   Last eye exam: < 1 year   : denies and incontinence.       Screening:  No Patient Care Coordination Note on file.      Depression Screening  Little interest or pleasure in doing things?  0 - not at all  Feeling down, depressed , or hopeless? 0 - not at all  Patient Health Questionnaire Score: 0     If depressive symptoms identified deferred to follow up visit unless specifically addressed in assessment and plan.    Interpretation of PHQ-9 Total Score   Score Severity   1-4 No Depression   5-9 Mild Depression   10-14 Moderate Depression   15-19 Moderately Severe Depression   20-27 Severe Depression    Screening for Cognitive Impairment  Do you or any of your friends or family members have any concern about your memory? No  Three Minute Recall (Village, Kitchen, Baby) 3/3    Wil clock face with all 12 numbers and set the hands to show 10 minutes past 11.  Yes    Cognitive concerns identified deferred for follow up unless specifically addressed in assessment and plan.    Fall Risk Assessment  Has the patient had two or more falls in the last year or any fall with injury in the last year?  No    Safety Assessment  Do you always wear your seatbelt?  Yes  Any changes to home needed to function safely? No  Difficulty hearing.   Yes  Patient counseled about all safety risks that were identified.    Functional Assessment ADLs  Are there any barriers preventing you from cooking for yourself or meeting nutritional needs?  No.    Are there any barriers preventing you from driving safely or obtaining transportation?  No.    Are there any barriers preventing you from using a telephone or calling for help?  No    Are there any barriers preventing you from shopping?  No.    Are there any barriers preventing you from taking care of your own finances?  No    Are there any barriers preventing you from managing your medications?  No    Are there any barriers preventing you from showering, bathing or dressing yourself? No    Are there any barriers preventing you from doing housework or laundry? No  Are there any barriers preventing you from using the toilet?No  Are you currently engaging in any exercise or physical activity?  Yes.      Self-Assessment of Health  What is your perception of your health? Excellent    Do you sleep more than six hours a night? Yes    In the past 7 days, how much did pain keep you from doing your normal work? None    Do you spend quality time with family or friends (virtually or in person)? Yes    Do you usually eat a heart healthy diet that constists of a variety of fruits, vegetables, whole grains and fiber? Yes    Do you eat foods high in fat and/or Fast Food more than three times per week? No    How concerned are you that your medical conditions are not being well managed? Not at all    Are you worried that in the next 2 months, you may not have stable housing that you own, rent, or stay in as part of a household? No      Advance Care Planning  Do you have an Advance Directive, Living Will, Durable Power of , or POLST? Yes                 Health Maintenance Summary            Current Care Gaps       COVID-19 Vaccine (7 - 2024-25 season) Overdue since 5/20/2025 11/20/2024  Imm Admin: COVID-19, mRNA, LNP-S,  PF, vladimir-sucrose, 30 mcg/0.3 mL    11/07/2023  Imm Admin: COVID-19, mRNA, LNP-S, PF, vladimir-sucrose, 30 mcg/0.3 mL    11/08/2022  Imm Admin: PFIZER BIVALENT SARS-COV-2 VACCINE (12+)    10/26/2021  Imm Admin: PFIZER PURPLE CAP SARS-COV-2 VACCINATION (12+)    02/04/2021  Imm Admin: PFIZER PURPLE CAP SARS-COV-2 VACCINATION (12+)     Only the first 5 history entries have been loaded, but more history exists.                    Upcoming       Annual Wellness Visit (Yearly) Next due on 6/2/2026 06/02/2025  Visit Dx: Medicare annual wellness visit, subsequent    12/21/2023  Visit Dx: Medicare annual wellness visit, subsequent    12/19/2022  Visit Dx: Medicare annual wellness visit, subsequent    11/09/2020  Visit Dx: Medicare annual wellness visit, subsequent    09/17/2019  Subsequent Annual Wellness Visit - Includes PPPS ()      Only the first 5 history entries have been loaded, but more history exists.              IMM DTaP/Tdap/Td Vaccine (2 - Td or Tdap) Next due on 9/17/2029 09/17/2019  Imm Admin: Tdap Vaccine                      Completed or No Longer Recommended       Influenza Vaccine (Series Information) Completed      10/16/2024  Imm Admin: Influenza high-dose trivalent (PF)    09/21/2023  Imm Admin: Influenza Vaccine Adult HD    10/18/2022  Imm Admin: Influenza Vaccine Adult HD    01/06/2022  Imm Admin: Influenza Vaccine Adult HD    10/12/2020  Imm Admin: Influenza Vaccine Adult HD      Only the first 5 history entries have been loaded, but more history exists.              Pneumococcal Vaccine: 50+ Years (Series Information) Completed      11/12/2014  Imm Admin: Pneumococcal Conjugate Vaccine (Prevnar/PCV-13)    12/02/2009  Imm Admin: Pneumococcal polysaccharide vaccine (PPSV-23)              Hepatitis A Vaccine (Hep A) (Series Information) Aged Out      No completion history exists for this topic.              HPV Vaccines (Series Information) Aged Out     No completion history exists for this  topic.              Polio Vaccine (Inactivated Polio) (Series Information) Aged Out     No completion history exists for this topic.              Meningococcal Immunization (Series Information) Aged Out     No completion history exists for this topic.              Meningococcal B Vaccine (Series Information) Aged Out     No completion history exists for this topic.              Colorectal Cancer Screening  Discontinued        Frequency changed to Never automatically (Topic No Longer Applies)    03/11/2024  COLONOSCOPY RESULTS    01/04/2024  COLOGUARD COLON CANCER SCREENING    12/20/2022  OCCULT BLOOD FECES IMMUNOASSAY    11/12/2020  OCCULT BLOOD FECES IMMUNOASSAY     Only the first 5 history entries have been loaded, but more history exists.            Hepatitis B Vaccine (Hep B)  Discontinued      No completion history exists for this topic.              Zoster (Shingles) Vaccines  Discontinued     No completion history exists for this topic.                            Patient Care Team:  Reilly Jc M.D. as PCP - General (Internal Medicine)  Josefa Keyes M.D. as PCP - [facility] Paneled  Ana Stearns R.N. as Registered Nurse        Social History[5]  Family History   Problem Relation Age of Onset    Heart Disease Maternal Grandmother     Diabetes Paternal Grandfather     Stroke Sister     Lung Disease Father         black lung     Cancer Neg Hx      He  has a past medical history of Anesthesia, Arrhythmia, Arthritis, Cardiac murmur, CATARACT, Dental disorder, ED (erectile dysfunction), Essential hypertension (11/12/2015), Facial droop, Hemorrhagic disorder (HCC), High cholesterol, Hypertension, Kidney stones, MI (myocardial infarction) (HCC) (04/24/2024), Seizure (HCC), Sleep apnea, Snoring, Stroke (HCC) (08/07/2018), and Zenker diverticula.   Past Surgical History[6]    Exam:     /60 (BP Location: Left arm, Patient Position: Sitting, BP Cuff Size: Adult)   Pulse (!) 58   Temp 36.9 °C (98.5 °F)  (Temporal)   Resp 14   Ht 1.829 m (6')   Wt 87.5 kg (193 lb)   SpO2 94%  Body mass index is 26.18 kg/m².    Hearing fair.    Dentition good  Alert, oriented in no acute distress.  Eye contact is good, speech goal directed, affect calm  General: Mildly overweight.  No distress.  Normal appearing.  HEENT: Pupils are equal.  Conjunctiva is normal.  Head is normal appearing.  Ears, canals and tympanic membranes are normal.  Oral cavity is pink and moist without lesion.  Neck: Supple without JVD or bruit.  Thyroid is not enlarged.  Pulmonary: Clear with good breath sounds.  Cardiovascular regular rate and rhythm.  No murmur auscultated.  Carotid, radial and pedal pulses are intact.  Abdomen: Soft, nontender, nondistended.  Normal bowel sounds.  Organs are not enlarged.  Neurologic: Cranial nerves intact.  Strength and sensation are normal.  Normal patellar reflex.  Skin: No obvious lesions.  Multiple nevi but no suspicious lesions.  The 2 areas behind the left knee seem most consistent with seborrheic keratosis.  Lymph: No cervical, supraclavicular, axillary, abdominal or inguinal adenopathy noted.      Assessment and Plan. The following treatment and monitoring plan is recommended:    1. Medicare annual wellness visit, subsequent        2. Essential hypertension        3. Obstructive sleep apnea syndrome        4. Coronary artery disease involving native coronary artery of native heart without angina pectoris        5. Dyslipidemia        6. PAF (paroxysmal atrial fibrillation) (HCC)        7. S/P TAVR (transcatheter aortic valve replacement)        8. Thrombocytopenia (HCC)        9. PAC (premature atrial contraction)        10. Zenker diverticulum        11. Thoracic aortic ectasia (HCC)        12. S/P drug eluting coronary stent placement        13. Gouty arthropathy            Assessment & Plan  1. Health maintenance.  - Laboratory results are within normal limits.  - PSA levels are commendable, being the lowest  they have been in 3 years.  - Current with colon cancer screening and immunizations.  - Shingles vaccine was discussed, but given his age and lack of interest, it was decided not to pursue it. Prostate will continue to be monitored.    2. Atrial fibrillation.  - Atrial fibrillation appears to be well-managed, predominantly exhibiting a normal sinus rhythm.  - Heart valve is functioning optimally.  - Currently on Eliquis and Plavix. The possibility of discontinuing Plavix was discussed due to the high amount of blood thinner he is on.  - Will continue with metoprolol and losartan/hydrochlorothiazide for heart rate and blood pressure management.    3. Zenker's diverticulum.  - Experiences difficulty swallowing and occasional lightheadedness, likely due to Zenker's diverticulum.  - Advised to consume heaviest meal during breakfast or lunch and opt for a lighter dinner to mitigate these symptoms.  - No significant reflux or heartburn reported.  - CPAP therapy helps keep the airway open.    4. Sleep apnea.  - Compliant with CPAP therapy and reports good results.  - A home sleep study will be ordered later this year to ensure the device's efficacy and meet Medicare requirements.    5. Gout.  - Gout is under control with allopurinol 300 mg twice a day.  - Uric acid levels are within the normal range.  - No recent gout problems reported.    6. Hyperlipidemia.  - Cholesterol levels are excellent, with an LDL of 54.  - Will continue taking atorvastatin.    7. Left knee pain.  - Left knee pain has improved significantly over the past 2 weeks.  - No current issues reported unless sleeping in a specific position.    8. Seborrheic keratosis.  - Has 2 seborrheic keratosis lesions on the leg, which are benign and do not pose any cancer risk.  - No concerning findings on examination.    9. Arthritis.  - Mild arthritis in the hands but manages daily activities well.  - No significant impact on daily functioning.    10.  Aortic  "ectasia  -Yearly echocardiogram    Follow-up  The patient will follow up in 6 months.        Services suggested: No services required at this time  Health Care Screening: Age-appropriate preventive services Medicare covers discussed today and ordered if indicated.  Referrals offered: Community-based lifestyle interventions to reduce health risks and promote self-management and wellness, fall prevention, nutrition, physical activity, tobacco-use cessation, weight loss, and mental health services as per orders if indicated.    Discussion today about general wellness and lifestyle habits:    Prevent falls and reduce trip hazards; Cautioned about securing or removing rugs.  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    Hemorrhagic disorder (HCC)     on Plavix     High cholesterol     Hypertension     Kidney stones     mult uric acid kidney stones    MI (myocardial infarction) ([facility]) 04/24/2024    Seizure (HCC)     seizure x1 8/7/18    Sleep apnea     uses CPAP    Snoring     Stroke ([facility]) 08/07/2018    no residual problems    Zenker diverticula    [2]   Past Surgical History:  Procedure Laterality Date    TRANSCATHETER AORTIC VALVE REPLACEMENT Bilateral 5/20/2024    Procedure: TRANSCATHETER AORTIC VALVE REPLACEMENT;  Surgeon: Riley Rowland M.D.;  Location: SURGERY [facility];  Service: Cardiac    ECHOCARDIOGRAM, TRANSESOPHAGEAL, INTRAOPERATIVE N/A 5/20/2024    Procedure: ECHOCARDIOGRAM, TRANSESOPHAGEAL, INTRAOPERATIVE - " ATTEMPTED;  Surgeon: Riley Rowland M.D.;  Location: SURGERY [facility];  Service: Cardiac    GOKUL N/A 5/20/2024    Procedure: ECHOCARDIOGRAM, TRANSTHORACIC;  Surgeon: Riley Rowland M.D.;  Location: SURGERY [facility];  Service: Cardiac    ZZZ CARDIAC CATH  09/28/2018    40% LAD other arteries no disease.    CATARACT PHACO WITH IOL  1/21/2014    Performed by Joni Duarte M.D. at SURGERY SURGICAL [facility] ORS    FINGER ARTHROPLASTY  12/17/2012    Performed by Adam Christopher M.D. at SURGERY SAME DAY [facility] ORS    INGUINAL HERNIA REPAIR  2/19/2009    Performed by ALEX ALATORRE at SURGERY SAME DAY [facility] ORS    COLONOSCOPY  2004    neg    LAMINOTOMY  1996    lumbar laminectomy, cyst x3    UVULOPHARYNGOPALATOPLASTY  1985   [3]   Current Outpatient Medications Ordered in Epic   Medication Sig Dispense Refill    atorvastatin (LIPITOR) 40 MG Tab TAKE 1 TABLET BY MOUTH EVERY DAY IN THE EVENING 100 Tablet 3    losartan (COZAAR) 50 MG Tab TAKE 1 TABLET BY MOUTH TWICE A  Tablet 1    metoprolol SR (TOPROL XL) 25 MG TABLET SR 24 HR Take 0.5 Tablets by mouth every day. 90 Tablet 3    hydroCHLOROthiazide 25 MG Tab TAKE 1 TABLET BY MOUTH EVERY DAY 90 Tablet 2    cyclobenzaprine (FLEXERIL) 10 mg Tab Take 1 Tablet by mouth 3 times a day as needed for Moderate Pain (back spasms). 30 Tablet 0    ELIQUIS 5 MG Tab TAKE 1 TABLET BY MOUTH TWICE A  Tablet 3    ketorolac (ACULAR) 0.5 % Solution Administer 1 Drop into the right eye 4 times a day. Tromethamine, Pt uses 4 times a day in right eye      allopurinol (ZYLOPRIM) 100 MG Tab TAKE 1 TABLET BY MOUTH TWICE A  Tablet 3    clopidogrel (PLAVIX) 75 MG Tab TAKE 1 TABLET BY MOUTH EVERY  Tablet 3     No current Epic-ordered facility-administered medications on file.   [4]   Current Outpatient Medications   Medication Sig Dispense Refill    atorvastatin (LIPITOR) 40 MG Tab TAKE 1 TABLET BY MOUTH EVERY DAY IN THE EVENING 100 Tablet 3     losartan (COZAAR) 50 MG Tab TAKE 1 TABLET BY MOUTH TWICE A  Tablet 1    metoprolol SR (TOPROL XL) 25 MG TABLET SR 24 HR Take 0.5 Tablets by mouth every day. 90 Tablet 3    hydroCHLOROthiazide 25 MG Tab TAKE 1 TABLET BY MOUTH EVERY DAY 90 Tablet 2    cyclobenzaprine (FLEXERIL) 10 mg Tab Take 1 Tablet by mouth 3 times a day as needed for Moderate Pain (back spasms). 30 Tablet 0    ELIQUIS 5 MG Tab TAKE 1 TABLET BY MOUTH TWICE A  Tablet 3    ketorolac (ACULAR) 0.5 % Solution Administer 1 Drop into the right eye 4 times a day. Tromethamine, Pt uses 4 times a day in right eye      allopurinol (ZYLOPRIM) 100 MG Tab TAKE 1 TABLET BY MOUTH TWICE A  Tablet 3    clopidogrel (PLAVIX) 75 MG Tab TAKE 1 TABLET BY MOUTH EVERY  Tablet 3     No current facility-administered medications for this visit.   [5]   Social History  Tobacco Use    Smoking status: Never    Smokeless tobacco: Never   Vaping Use    Vaping status: Never Used   Substance Use Topics    Alcohol use: Not Currently    Drug use: No   [6]   Past Surgical History:  Procedure Laterality Date    TRANSCATHETER AORTIC VALVE REPLACEMENT Bilateral 5/20/2024    Procedure: TRANSCATHETER AORTIC VALVE REPLACEMENT;  Surgeon: Riley Rowland M.D.;  Location: [facility];  Service: Cardiac    ECHOCARDIOGRAM, TRANSESOPHAGEAL, INTRAOPERATIVE N/A 5/20/2024    Procedure: ECHOCARDIOGRAM, TRANSESOPHAGEAL, INTRAOPERATIVE - ATTEMPTED;  Surgeon: Riley Rowland M.D.;  Location: SURGERY [facility];  Service: Cardiac    GOKUL N/A 5/20/2024    Procedure: ECHOCARDIOGRAM, TRANSTHORACIC;  Surgeon: Riley Rowland M.D.;  Location: SURGERY [facility];  Service: Cardiac    ZZZ CARDIAC CATH  09/28/2018    40% LAD other arteries no disease.    CATARACT PHACO WITH IOL  1/21/2014    Performed by Joni Duarte M.D. at SURGERY [facility]    FINGER ARTHROPLASTY  12/17/2012    Performed by Adam Christopher M.D. at SURGERY SAME DAY [facility]     INGUINAL HERNIA REPAIR  2/19/2009    Performed by ALEX ALATORRE at SURGERY SAME DAY [facility] ORS    COLONOSCOPY  2004    neg    LAMINOTOMY  1996    lumbar laminectomy, cyst x3    UVULOPHARYNGOPALATOPLASTY  1985

## 2025-06-03 ENCOUNTER — OFFICE VISIT (OUTPATIENT)
Dept: CARDIOLOGY | Facility: MEDICAL CENTER | Age: 85
End: 2025-06-03
Attending: INTERNAL MEDICINE
Payer: MEDICARE

## 2025-06-03 VITALS
WEIGHT: 193 LBS | DIASTOLIC BLOOD PRESSURE: 56 MMHG | BODY MASS INDEX: 26.14 KG/M2 | RESPIRATION RATE: 16 BRPM | SYSTOLIC BLOOD PRESSURE: 118 MMHG | HEART RATE: 58 BPM | OXYGEN SATURATION: 96 % | HEIGHT: 72 IN

## 2025-06-03 DIAGNOSIS — I25.10 CORONARY ARTERY DISEASE, NON-OCCLUSIVE: ICD-10-CM

## 2025-06-03 DIAGNOSIS — Z95.5 S/P DRUG ELUTING CORONARY STENT PLACEMENT: ICD-10-CM

## 2025-06-03 DIAGNOSIS — Z79.01 ON CONTINUOUS ORAL ANTICOAGULATION: ICD-10-CM

## 2025-06-03 DIAGNOSIS — Z95.2 S/P TAVR (TRANSCATHETER AORTIC VALVE REPLACEMENT): ICD-10-CM

## 2025-06-03 DIAGNOSIS — I48.0 PAROXYSMAL ATRIAL FIBRILLATION (HCC): Primary | ICD-10-CM

## 2025-06-03 DIAGNOSIS — R00.8 TRIGEMINY: ICD-10-CM

## 2025-06-03 DIAGNOSIS — E78.5 DYSLIPIDEMIA: ICD-10-CM

## 2025-06-03 PROCEDURE — 3078F DIAST BP <80 MM HG: CPT | Performed by: INTERNAL MEDICINE

## 2025-06-03 PROCEDURE — 99212 OFFICE O/P EST SF 10 MIN: CPT | Performed by: INTERNAL MEDICINE

## 2025-06-03 PROCEDURE — 99214 OFFICE O/P EST MOD 30 MIN: CPT | Performed by: INTERNAL MEDICINE

## 2025-06-03 PROCEDURE — 3074F SYST BP LT 130 MM HG: CPT | Performed by: INTERNAL MEDICINE

## 2025-06-03 RX ORDER — AMOXICILLIN 400 MG/5ML
POWDER, FOR SUSPENSION ORAL
COMMUNITY
Start: 2025-05-08 | End: 2025-06-30

## 2025-06-03 ASSESSMENT — FIBROSIS 4 INDEX: FIB4 SCORE: 3.3

## 2025-06-10 NOTE — PROGRESS NOTES
Chief Complaint   Patient presents with    Coronary Artery Disease     Follow up        Subjective     Chuck Ortiz is a 85 y.o. male who presents today to our first meeting for follow-up of coronary disease characterized by PCI mid LAD 4/2024, aortic stenosis status post TAVR 5/2024, PAF on OAC with apixaban, hypertension hyperlipidemia and LOVE.  Visits after residential of his longitudinal cardiologist Dr. Gonzalez.    Indicates since last visit he has been doing well with no symptoms tolerating oral anticoagulation and exerting himself regularly.    Past Medical History[1]  Past Surgical History[2]  Family History   Problem Relation Age of Onset    Heart Disease Maternal Grandmother     Diabetes Paternal Grandfather     Stroke Sister     Lung Disease Father         black lung     Cancer Neg Hx      Social History     Socioeconomic History    Marital status:      Spouse name: Not on file    Number of children: Not on file    Years of education: Not on file    Highest education level: Not on file   Occupational History    Not on file   Tobacco Use    Smoking status: Never    Smokeless tobacco: Never   Vaping Use    Vaping status: Never Used   Substance and Sexual Activity    Alcohol use: Not Currently    Drug use: No    Sexual activity: Not on file     Comment: , retired [employer]    Other Topics Concern    Not on file   Social History Narrative    Not on file     Social Drivers of Health     Financial Resource Strain: Not on file   Food Insecurity: Patient Declined (5/20/2024)    Hunger Vital Sign     Worried About Running Out of Food in the Last Year: Patient declined     Ran Out of Food in the Last Year: Patient declined   Transportation Needs: Patient Declined (5/20/2024)    PRAPARE - Transportation     Lack of Transportation (Medical): Patient declined     Lack of Transportation (Non-Medical): Patient declined   Physical Activity: Not on file   Stress: Not on file   Social Connections:  Not on file   Intimate Partner Violence: Not At Risk (5/20/2024)    Humiliation, Afraid, Rape, and Kick questionnaire     Fear of Current or Ex-Partner: No     Emotionally Abused: No     Physically Abused: No     Sexually Abused: No   Housing Stability: Patient Declined (5/20/2024)    Housing Stability Vital Sign     Unable to Pay for Housing in the Last Year: Patient declined     Number of Places Lived in the Last Year: Not on file     Unstable Housing in the Last Year: Patient declined     Allergies[3]  Encounter Medications[4]  Review of Systems   All other systems reviewed and are negative.             Objective     /56 (BP Location: Left arm, Patient Position: Sitting)   Pulse (!) 58   Resp 16   Ht 1.829 m (6')   Wt 87.5 kg (193 lb)   SpO2 96%   BMI 26.18 kg/m²     Physical Exam  Vitals and nursing note reviewed.   Constitutional:       General: He is not in acute distress.     Appearance: Normal appearance.   HENT:      Head: Normocephalic and atraumatic.      Right Ear: External ear normal.      Left Ear: External ear normal.      Nose: Nose normal.   Eyes:      Conjunctiva/sclera: Conjunctivae normal.   Cardiovascular:      Rate and Rhythm: Normal rate and regular rhythm.      Pulses: Normal pulses.      Heart sounds: No murmur heard.  Pulmonary:      Effort: Pulmonary effort is normal. No respiratory distress.      Breath sounds: Normal breath sounds.   Abdominal:      General: There is no distension.      Palpations: Abdomen is soft.   Musculoskeletal:      Cervical back: No rigidity or tenderness.      Right lower leg: No edema.      Left lower leg: No edema.   Skin:     General: Skin is warm and dry.      Capillary Refill: Capillary refill takes 2 to 3 seconds.   Neurological:      General: No focal deficit present.      Mental Status: He is alert and oriented to person, place, and time.   Psychiatric:         Mood and Affect: Mood normal.         Behavior: Behavior normal.         Thought  "Content: Thought content normal.     LABS:  Lab Results   Component Value Date/Time    CHOLSTRLTOT 111 04/18/2025 08:45 AM    LDL 54 04/18/2025 08:45 AM    HDL 42 04/18/2025 08:45 AM    TRIGLYCERIDE 75 04/18/2025 08:45 AM       Lab Results   Component Value Date/Time    WBC 8.6 04/18/2025 08:45 AM    RBC 5.31 04/18/2025 08:45 AM    HEMOGLOBIN 16.9 04/18/2025 08:45 AM    HEMATOCRIT 52.3 (H) 04/18/2025 08:45 AM    MCV 98.5 (H) 04/18/2025 08:45 AM    NEUTSPOLYS 61.10 04/18/2025 08:45 AM    LYMPHOCYTES 25.60 04/18/2025 08:45 AM    MONOCYTES 10.50 04/18/2025 08:45 AM    EOSINOPHILS 2.00 04/18/2025 08:45 AM    BASOPHILS 0.60 04/18/2025 08:45 AM     Lab Results   Component Value Date/Time    SODIUM 140 04/18/2025 08:45 AM    POTASSIUM 4.2 04/18/2025 08:45 AM    CHLORIDE 102 04/18/2025 08:45 AM    CO2 28 04/18/2025 08:45 AM    GLUCOSE 94 04/18/2025 08:45 AM    BUN 27 (H) 04/18/2025 08:45 AM    CREATININE 1.03 04/18/2025 08:45 AM    CREATININE 1.10 03/03/2011 11:20 AM    BUNCREATRAT 23 (H) 03/03/2011 11:20 AM    GLOMRATE >59 03/03/2011 11:20 AM         Lab Results   Component Value Date/Time    ALKPHOSPHAT 118 (H) 04/18/2025 08:45 AM    ASTSGOT 28 04/18/2025 08:45 AM    ALTSGPT 26 04/18/2025 08:45 AM    TBILIRUBIN 1.0 04/18/2025 08:45 AM      No results found for: \"BNPBTYPENAT\"   No results found for: \"TSH\"  Lab Results   Component Value Date/Time    PROTHROMBTM 15.5 (H) 05/17/2024 10:19 AM    INR 1.22 (H) 05/17/2024 10:19 AM        Imaging reviewed           Assessment & Plan     1. Paroxysmal atrial fibrillation (HCC)        2. Trigeminy        3. On continuous oral anticoagulation        4. Dyslipidemia        5. S/P TAVR (transcatheter aortic valve replacement)        6. S/P drug eluting coronary stent placement        7. Coronary artery disease, non-occlusive            Medical Decision Making: Today's Assessment/Status/Plan:          Overall doing well.  Tolerating oral anticoagulation.  Continues on chronic therapy " "for asymptomatic CAD.  At last check TAVR functioning normally.  Blood pressure controlled.  Recommend continuing current therapy.  With regard to his Plavix (clopidogrel) he can consider discontinuing this as he is on OAC and we will rediscuss at next visit.  Follow-up routinely.                   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    Hemorrhagic disorder ([facility])     on Plavix     High cholesterol     Hypertension     Kidney stones     mult uric acid kidney stones    MI (myocardial infarction) ([facility]) 04/24/2024    Seizure ([facility])     seizure x1 8/7/18    Sleep apnea     uses CPAP    Snoring     Stroke ([facility]) 08/07/2018    no residual problems    Zenker diverticula    [2]   Past Surgical History:  Procedure Laterality Date    TRANSCATHETER AORTIC VALVE REPLACEMENT Bilateral 5/20/2024    Procedure: TRANSCATHETER AORTIC VALVE REPLACEMENT;  Surgeon: Riley Rowland M.D.;  Location: SURGERY [facility];  Service: Cardiac    ECHOCARDIOGRAM, TRANSESOPHAGEAL, INTRAOPERATIVE N/A 5/20/2024    Procedure: ECHOCARDIOGRAM, TRANSESOPHAGEAL, INTRAOPERATIVE - ATTEMPTED;  Surgeon: Riley Rowland M.D.;  Location: [facility];  Service: Cardiac    GOKUL N/A 5/20/2024    Procedure: ECHOCARDIOGRAM, TRANSTHORACIC;  Surgeon: Riley Rowland M.D.;  Location: [facility];  Service: Cardiac    ZZZ CARDIAC CATH  09/28/2018    40% LAD other arteries no disease.    CATARACT PHACO WITH IOL  1/21/2014    Performed by Joni Duarte M.D. at SURGERY [facility] ORS    FINGER ARTHROPLASTY  12/17/2012    Performed by Adam Christopher M.D. at SURGERY SAME DAY [facility]    " INGUINAL HERNIA REPAIR  2/19/2009    Performed by ALEX ALATORRE at SURGERY SAME DAY [facility] ORS    COLONOSCOPY  2004    neg    LAMINOTOMY  1996    lumbar laminectomy, cyst x3    UVULOPHARYNGOPALATOPLASTY  1985   [3]   Allergies  Allergen Reactions    Morphine      Bradycardia   [4]   Outpatient Encounter Medications as of 6/3/2025   Medication Sig Dispense Refill    atorvastatin (LIPITOR) 40 MG Tab TAKE 1 TABLET BY MOUTH EVERY DAY IN THE EVENING 100 Tablet 3    losartan (COZAAR) 50 MG Tab TAKE 1 TABLET BY MOUTH TWICE A  Tablet 1    metoprolol SR (TOPROL XL) 25 MG TABLET SR 24 HR Take 0.5 Tablets by mouth every day. 90 Tablet 3    hydroCHLOROthiazide 25 MG Tab TAKE 1 TABLET BY MOUTH EVERY DAY 90 Tablet 2    cyclobenzaprine (FLEXERIL) 10 mg Tab Take 1 Tablet by mouth 3 times a day as needed for Moderate Pain (back spasms). 30 Tablet 0    ELIQUIS 5 MG Tab TAKE 1 TABLET BY MOUTH TWICE A  Tablet 3    allopurinol (ZYLOPRIM) 100 MG Tab TAKE 1 TABLET BY MOUTH TWICE A  Tablet 3    clopidogrel (PLAVIX) 75 MG Tab TAKE 1 TABLET BY MOUTH EVERY  Tablet 3    amoxicillin (AMOXIL) 400 mg/5 mL suspension TAKE 25ML BY MOUTH 1 HOUR PRIOR TO PROCEDURE (Patient not taking: Reported on 6/3/2025)      [DISCONTINUED] amoxicillin-clavulanate (AUGMENTIN) 600-42.9 MG/5ML Recon Susp suspension Take 1 tsp (5 ml) by mouth three times daily x 7 days 110 mL 0    ketorolac (ACULAR) 0.5 % Solution Administer 1 Drop into the right eye 4 times a day. Tromethamine, Pt uses 4 times a day in right eye (Patient not taking: Reported on 6/3/2025)      [DISCONTINUED] PRED FORTE 1 % Suspension Administer  into the right eye. Pt uses 4 times every day on right eye       No facility-administered encounter medications on file as of 6/3/2025.

## 2025-06-22 DIAGNOSIS — Z95.820 STATUS POST ANGIOPLASTY WITH STENT: ICD-10-CM

## 2025-06-22 DIAGNOSIS — I25.10 CORONARY ARTERY DISEASE DUE TO CALCIFIED CORONARY LESION: ICD-10-CM

## 2025-06-22 DIAGNOSIS — I25.84 CORONARY ARTERY DISEASE DUE TO CALCIFIED CORONARY LESION: ICD-10-CM

## 2025-06-23 RX ORDER — CLOPIDOGREL BISULFATE 75 MG/1
75 TABLET ORAL DAILY
Qty: 100 TABLET | Refills: 3 | Status: SHIPPED | OUTPATIENT
Start: 2025-06-23 | End: 2025-06-30

## 2025-06-25 DIAGNOSIS — I10 ESSENTIAL HYPERTENSION: ICD-10-CM

## 2025-06-25 RX ORDER — HYDROCHLOROTHIAZIDE 25 MG/1
25 TABLET ORAL DAILY
Qty: 90 TABLET | Refills: 0 | Status: SHIPPED | OUTPATIENT
Start: 2025-06-25 | End: 2025-06-30 | Stop reason: SDUPTHER

## 2025-06-30 ENCOUNTER — OFFICE VISIT (OUTPATIENT)
Dept: CARDIOLOGY | Facility: MEDICAL CENTER | Age: 85
End: 2025-06-30
Attending: NURSE PRACTITIONER
Payer: MEDICARE

## 2025-06-30 VITALS
WEIGHT: 194 LBS | HEART RATE: 50 BPM | DIASTOLIC BLOOD PRESSURE: 54 MMHG | RESPIRATION RATE: 14 BRPM | OXYGEN SATURATION: 97 % | HEIGHT: 72 IN | SYSTOLIC BLOOD PRESSURE: 112 MMHG | BODY MASS INDEX: 26.28 KG/M2

## 2025-06-30 DIAGNOSIS — I69.951 HEMIPLEGIA AND HEMIPARESIS FOLLOWING UNSPECIFIED CEREBROVASCULAR DISEASE AFFECTING RIGHT DOMINANT SIDE (HCC): ICD-10-CM

## 2025-06-30 DIAGNOSIS — Z95.5 S/P DRUG ELUTING CORONARY STENT PLACEMENT: ICD-10-CM

## 2025-06-30 DIAGNOSIS — D68.69 SECONDARY HYPERCOAGULABLE STATE (HCC): ICD-10-CM

## 2025-06-30 DIAGNOSIS — Z79.01 ANTICOAGULATED: ICD-10-CM

## 2025-06-30 DIAGNOSIS — G47.33 OBSTRUCTIVE SLEEP APNEA SYNDROME: ICD-10-CM

## 2025-06-30 DIAGNOSIS — I34.0 MITRAL VALVE INSUFFICIENCY, UNSPECIFIED ETIOLOGY: ICD-10-CM

## 2025-06-30 DIAGNOSIS — I25.10 CORONARY ARTERY DISEASE INVOLVING NATIVE CORONARY ARTERY OF NATIVE HEART WITHOUT ANGINA PECTORIS: ICD-10-CM

## 2025-06-30 DIAGNOSIS — I48.0 PAF (PAROXYSMAL ATRIAL FIBRILLATION) (HCC): ICD-10-CM

## 2025-06-30 DIAGNOSIS — E78.5 HYPERLIPIDEMIA LDL GOAL <70: ICD-10-CM

## 2025-06-30 DIAGNOSIS — R42 DIZZINESS: ICD-10-CM

## 2025-06-30 DIAGNOSIS — I77.810 THORACIC AORTIC ECTASIA (HCC): ICD-10-CM

## 2025-06-30 DIAGNOSIS — E78.5 DYSLIPIDEMIA: ICD-10-CM

## 2025-06-30 DIAGNOSIS — I49.3 VENTRICULAR ECTOPY: ICD-10-CM

## 2025-06-30 DIAGNOSIS — Z95.2 S/P TAVR (TRANSCATHETER AORTIC VALVE REPLACEMENT): Primary | ICD-10-CM

## 2025-06-30 DIAGNOSIS — Z86.73 HISTORY OF ARTERIAL ISCHEMIC STROKE: ICD-10-CM

## 2025-06-30 DIAGNOSIS — I10 ESSENTIAL HYPERTENSION: ICD-10-CM

## 2025-06-30 PROCEDURE — 99212 OFFICE O/P EST SF 10 MIN: CPT | Performed by: NURSE PRACTITIONER

## 2025-06-30 RX ORDER — HYDROCHLOROTHIAZIDE 25 MG/1
25 TABLET ORAL DAILY
Qty: 100 TABLET | Refills: 3 | Status: SHIPPED | OUTPATIENT
Start: 2025-06-30

## 2025-06-30 ASSESSMENT — FIBROSIS 4 INDEX: FIB4 SCORE: 3.3

## 2025-06-30 ASSESSMENT — ENCOUNTER SYMPTOMS
ABDOMINAL PAIN: 0
DIZZINESS: 0
ORTHOPNEA: 0
ROS GI COMMENTS: DYSPHAGIA
SORE THROAT: 0
MYALGIAS: 0
BACK PAIN: 1
PALPITATIONS: 0
COUGH: 0
PND: 0
CLAUDICATION: 0
FEVER: 0
SHORTNESS OF BREATH: 0

## 2025-06-30 NOTE — PROGRESS NOTES
"Chief Complaint   Patient presents with    Follow-Up     F/V Dx: S/P TAVR (transcatheter aortic valve replacement)     Subjective     Chuck Ortiz is a 84 y.o. male who presents today for 1 year post TAVR.    He is a patient of Dr. Solo in our office.  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    He presents today with his wife Kezia.     Since the procedure, Chuck his doing better. Just saw Dr. Solo.    His BP is under good control with medication regimen now.    He has chronic mild lower leg swelling with varicose veins but stable.    He also has right sided facial weakness, pre-existing as well as right leg numbness/pain at times with his chronic back pain.     He has no shortness of breath or palpitations.    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    Hemorrhagic disorder (HCC)     on Plavix     High cholesterol     Hypertension     Kidney stones     mult uric acid kidney stones    MI (myocardial infarction) (HCC) 04/24/2024    Seizure ([facility])     seizure x1 8/7/18    Sleep apnea     uses CPAP    Snoring     Stroke (formerly [facility]) 08/07/2018    no residual problems    Zenker diverticula      Past Surgical History:   Procedure Laterality Date    TRANSCATHETER AORTIC VALVE REPLACEMENT Bilateral 5/20/2024    Procedure: TRANSCATHETER AORTIC VALVE REPLACEMENT;  " Surgeon: Riley Rowland M.D.;  Location: SURGERY [facility];  Service: Cardiac    ECHOCARDIOGRAM, TRANSESOPHAGEAL, INTRAOPERATIVE N/A 5/20/2024    Procedure: ECHOCARDIOGRAM, TRANSESOPHAGEAL, INTRAOPERATIVE - ATTEMPTED;  Surgeon: Riley Rowland M.D.;  Location: SURGERY [facility];  Service: Cardiac    GOKUL N/A 5/20/2024    Procedure: ECHOCARDIOGRAM, TRANSTHORACIC;  Surgeon: Riley Rowland M.D.;  Location: SURGERY [facility];  Service: Cardiac    ZZZ CARDIAC CATH  09/28/2018    40% LAD other arteries no disease.    CATARACT PHACO WITH IOL  1/21/2014    Performed by Joni Duarte M.D. at SURGERY [facility]S    FINGER ARTHROPLASTY  12/17/2012    Performed by Adam Christopher M.D. at SURGERY SAME DAY [facility] ORS    INGUINAL HERNIA REPAIR  2/19/2009    Performed by ALEX ALATORRE at SURGERY SAME DAY [facility] ORS    COLONOSCOPY  2004    neg    LAMINOTOMY  1996    lumbar laminectomy, cyst x3    UVULOPHARYNGOPALATOPLASTY  1985     Family History   Problem Relation Age of Onset    Heart Disease Maternal Grandmother     Diabetes Paternal Grandfather     Stroke Sister     Lung Disease Father         black lung     Cancer Neg Hx      Social History     Socioeconomic History    Marital status:      Spouse name: Not on file    Number of children: Not on file    Years of education: Not on file    Highest education level: Not on file   Occupational History    Not on file   Tobacco Use    Smoking status: Never    Smokeless tobacco: Never   Vaping Use    Vaping status: Never Used   Substance and Sexual Activity    Alcohol use: Not Currently    Drug use: No    Sexual activity: Not on file     Comment: , retired [employer]    Other Topics Concern    Not on file   Social History Narrative    Not on file     Social Drivers of Health     Financial Resource Strain: Not on file   Food Insecurity: Patient Declined (5/20/2024)    Hunger Vital Sign     Worried About Running Out of Food in the  Last Year: Patient declined     Ran Out of Food in the Last Year: Patient declined   Transportation Needs: Patient Declined (5/20/2024)    PRAPARE - Transportation     Lack of Transportation (Medical): Patient declined     Lack of Transportation (Non-Medical): Patient declined   Physical Activity: Not on file   Stress: Not on file   Social Connections: Not on file   Intimate Partner Violence: Not At Risk (5/20/2024)    Humiliation, Afraid, Rape, and Kick questionnaire     Fear of Current or Ex-Partner: No     Emotionally Abused: No     Physically Abused: No     Sexually Abused: No   Housing Stability: Patient Declined (5/20/2024)    Housing Stability Vital Sign     Unable to Pay for Housing in the Last Year: Patient declined     Number of Places Lived in the Last Year: Not on file     Unstable Housing in the Last Year: Patient declined     Allergies   Allergen Reactions    Morphine      Bradycardia     Outpatient Encounter Medications as of 6/30/2025   Medication Sig Dispense Refill    hydroCHLOROthiazide 25 MG Tab TAKE 1 TABLET BY MOUTH EVERY DAY 90 Tablet 0    clopidogrel (PLAVIX) 75 MG Tab TAKE 1 TABLET BY MOUTH EVERY  Tablet 3    amoxicillin (AMOXIL) 400 mg/5 mL suspension TAKE 25ML BY MOUTH 1 HOUR PRIOR TO PROCEDURE (Patient not taking: Reported on 6/3/2025)      atorvastatin (LIPITOR) 40 MG Tab TAKE 1 TABLET BY MOUTH EVERY DAY IN THE EVENING 100 Tablet 3    losartan (COZAAR) 50 MG Tab TAKE 1 TABLET BY MOUTH TWICE A  Tablet 1    metoprolol SR (TOPROL XL) 25 MG TABLET SR 24 HR Take 0.5 Tablets by mouth every day. 90 Tablet 3    cyclobenzaprine (FLEXERIL) 10 mg Tab Take 1 Tablet by mouth 3 times a day as needed for Moderate Pain (back spasms). 30 Tablet 0    ELIQUIS 5 MG Tab TAKE 1 TABLET BY MOUTH TWICE A  Tablet 3    ketorolac (ACULAR) 0.5 % Solution Administer 1 Drop into the right eye 4 times a day. Tromethamine, Pt uses 4 times a day in right eye (Patient not taking: Reported on 6/3/2025)       allopurinol (ZYLOPRIM) 100 MG Tab TAKE 1 TABLET BY MOUTH TWICE A  Tablet 3     No facility-administered encounter medications on file as of 6/30/2025.     Review of Systems   Constitutional:  Negative for fever and malaise/fatigue.   HENT:  Negative for sore throat.    Respiratory:  Negative for cough and shortness of breath.    Cardiovascular:  Positive for leg swelling. Negative for chest pain, palpitations, orthopnea, claudication and PND.   Gastrointestinal:  Negative for abdominal pain.        Dysphagia   Musculoskeletal:  Positive for back pain. Negative for myalgias.   Neurological:  Negative for dizziness.              Objective     BP (!) 0/0 (BP Location: Left arm, Patient Position: Sitting, BP Cuff Size: Adult)   Ht 1.829 m (6')   Wt 88 kg (194 lb)   BMI 26.31 kg/m²     Physical Exam  Vitals and nursing note reviewed.   Constitutional:       Appearance: Normal appearance. He is well-developed and normal weight.   HENT:      Head: Normocephalic and atraumatic.   Neck:      Vascular: No JVD.   Cardiovascular:      Rate and Rhythm: Normal rate and regular rhythm.      Pulses: Normal pulses.      Heart sounds: Normal heart sounds.   Pulmonary:      Effort: Pulmonary effort is normal.      Breath sounds: Normal breath sounds.   Musculoskeletal:         General: Normal range of motion.      Comments: Mild LE edema 1+ pitting, chronic   Skin:     General: Skin is warm and dry.      Capillary Refill: Capillary refill takes less than 2 seconds.   Neurological:      General: No focal deficit present.      Mental Status: He is alert and oriented to person, place, and time. Mental status is at baseline.      Comments: Right sided facial palsy   Psychiatric:         Mood and Affect: Mood normal.         Behavior: Behavior normal.         Thought Content: Thought content normal.         Judgment: Judgment normal.                Assessment & Plan     1. S/P TAVR (transcatheter aortic valve replacement)         2. Anticoagulated        3. Coronary artery disease involving native coronary artery of native heart without angina pectoris        4. Dizziness        5. Dyslipidemia        6. Essential hypertension        7. History of arterial ischemic stroke        8. Hyperlipidemia LDL goal <70        9. Obstructive sleep apnea syndrome        10. PAF (paroxysmal atrial fibrillation) (HCC)        11. Secondary hypercoagulable state (HCC)        12. Thoracic aortic ectasia (HCC)        13. Mitral valve insufficiency, unspecified etiology        14. Ventricular ectopy        15. S/P drug eluting coronary stent placement        16. 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 S/P TAVR, NYHA II  -cont eliquis only, no aspirin  -SBE prophylaxis understands lifelong  -echo 1 year reviewed,no concerns  -completed cardiac rehab     2. PAF on chronic anticoagulation  -rate controlled, asymptomatic  -cont toprol  -cont eliquis, tolerating well    3. HLD with prior CVA, CAD  -no angina or PAL  -cont statin  -LDL goal <70 with CAD/CVA  -no aspirin with eliquis  -R sided deficits with stroke in '18, facial palsy R side    4. HTN  -good control now  -cont losartan 50 mg BID, metoprolol 12.5 mg QPM,  and HCTZ 25 mg QD  -BP goal <130/80    5. LOVE on CPAP  -tolerating and compliant with CPAP    Patient is to follow up with Dr. Solo in 1 year with annual labs.

## 2025-07-09 ENCOUNTER — PATIENT MESSAGE (OUTPATIENT)
Dept: CARDIOLOGY | Facility: MEDICAL CENTER | Age: 85
End: 2025-07-09
Payer: MEDICARE

## 2025-07-17 DIAGNOSIS — Z95.5 S/P DRUG ELUTING CORONARY STENT PLACEMENT: ICD-10-CM

## 2025-07-17 DIAGNOSIS — I48.0 PAF (PAROXYSMAL ATRIAL FIBRILLATION) (HCC): Primary | ICD-10-CM

## 2025-07-17 RX ORDER — APIXABAN 5 MG/1
5 TABLET, FILM COATED ORAL 2 TIMES DAILY
Qty: 180 TABLET | Refills: 3 | Status: SHIPPED | OUTPATIENT
Start: 2025-07-17

## 2025-07-22 ENCOUNTER — APPOINTMENT (OUTPATIENT)
Dept: URBAN - METROPOLITAN AREA CLINIC 35 | Facility: CLINIC | Age: 85
Setting detail: DERMATOLOGY
End: 2025-07-22

## 2025-07-22 DIAGNOSIS — D485 NEOPLASM OF UNCERTAIN BEHAVIOR OF SKIN: ICD-10-CM

## 2025-07-22 PROBLEM — D48.5 NEOPLASM OF UNCERTAIN BEHAVIOR OF SKIN: Status: ACTIVE | Noted: 2025-07-22

## 2025-07-22 PROCEDURE — ? BIOPSY BY PUNCH METHOD

## 2025-07-22 PROCEDURE — ? COUNSELING

## 2025-07-22 ASSESSMENT — LOCATION ZONE DERM: LOCATION ZONE: NECK

## 2025-07-22 ASSESSMENT — LOCATION DETAILED DESCRIPTION DERM: LOCATION DETAILED: LEFT SUPERIOR LATERAL NECK

## 2025-07-22 ASSESSMENT — LOCATION SIMPLE DESCRIPTION DERM: LOCATION SIMPLE: NECK

## 2025-07-23 ENCOUNTER — OFFICE VISIT (OUTPATIENT)
Dept: INTERNAL MEDICINE | Facility: IMAGING CENTER | Age: 85
End: 2025-07-23
Payer: MEDICARE

## 2025-07-23 VITALS
HEART RATE: 60 BPM | BODY MASS INDEX: 26.72 KG/M2 | TEMPERATURE: 98.9 F | DIASTOLIC BLOOD PRESSURE: 60 MMHG | OXYGEN SATURATION: 96 % | WEIGHT: 197 LBS | RESPIRATION RATE: 14 BRPM | SYSTOLIC BLOOD PRESSURE: 116 MMHG

## 2025-07-23 DIAGNOSIS — H69.93 DYSFUNCTION OF BOTH EUSTACHIAN TUBES: ICD-10-CM

## 2025-07-23 DIAGNOSIS — Z95.820 STATUS POST ANGIOPLASTY WITH STENT: ICD-10-CM

## 2025-07-23 DIAGNOSIS — Z95.2 S/P TAVR (TRANSCATHETER AORTIC VALVE REPLACEMENT): ICD-10-CM

## 2025-07-23 DIAGNOSIS — H91.92 CHANGE IN HEARING OF LEFT EAR: ICD-10-CM

## 2025-07-23 DIAGNOSIS — R42 LIGHTHEADEDNESS: Primary | ICD-10-CM

## 2025-07-23 DIAGNOSIS — I25.10 CORONARY ARTERY DISEASE DUE TO CALCIFIED CORONARY LESION: ICD-10-CM

## 2025-07-23 DIAGNOSIS — I25.84 CORONARY ARTERY DISEASE DUE TO CALCIFIED CORONARY LESION: ICD-10-CM

## 2025-07-23 PROCEDURE — 3078F DIAST BP <80 MM HG: CPT | Performed by: INTERNAL MEDICINE

## 2025-07-23 PROCEDURE — 99214 OFFICE O/P EST MOD 30 MIN: CPT | Performed by: INTERNAL MEDICINE

## 2025-07-23 PROCEDURE — 3074F SYST BP LT 130 MM HG: CPT | Performed by: INTERNAL MEDICINE

## 2025-07-23 ASSESSMENT — FIBROSIS 4 INDEX: FIB4 SCORE: 3.3

## 2025-07-23 NOTE — PROGRESS NOTES
Chief Complaint   Patient presents with    Lightheadedness       HISTORY OF THE PRESENT ILLNESS: Patient is a 85 y.o. male.       History of Present Illness  The patient presents for evaluation of lightheadedness and hearing issues.    Lightheadedness  - The patient has been experiencing lightheadedness for the past 3 weeks, which he suspects may be related to his discontinuation of Plavix on 06/30/2025.  - The episodes are sudden, lasting about 10 seconds, and often accompanied by a sensation of falling to the left.  - These episodes occur both when sitting and standing.  - He reports no vertigo, dizziness, or nausea during these episodes.  - He does not experience palpitations or chest pain.  - He has been monitoring his heart rate, which he describes as slow, in the high 50s or low 60s, with occasional skipped beats.  - His cardio readings show a normal sinus rhythm.  - He is currently on Eliquis and losartan, but discontinued Plavix on 06/30/2025.  - He also takes metoprolol 12.5 mg once daily, allopurinol, and hydrochlorothiazide, which he has been on for a long time.  - He was started on metoprolol in February 2025 and noticed an improvement in his heart rhythms by 04/06/2025.    Hearing Issues  - The patient reports a sensation of his ears being blocked, particularly the right ear, which has been causing discomfort for the past week.  - He is unsure if this is related to his lightheadedness.  - He has not experienced any allergy symptoms such as itchy eyes, scratchy throat, or runny nose.  - He uses a saline solution to clean his nose twice a week, which he started a couple of weeks ago.    Additional Information  - The patient has a history of food regurgitation through his nose following throat surgery.  - He occasionally feels like there is food stuck in his throat after eating, which resolves after walking around for a while.  - He has a muscle relaxant for back pain.    PAST SURGICAL HISTORY:  - Throat  surgery  - TAVR for aortic stenosis       Results      Allergies: Morphine    Current Medications and Prescriptions Ordered in Epic[1]    Past medical history, social history and family history were reviewed from chart today    Review of systems: Per HPI.    All others negative.     Exam: /60 (BP Location: Left arm, Patient Position: Sitting, BP Cuff Size: Adult)   Pulse 60   Temp 37.2 °C (98.9 °F) (Temporal)   Resp 14   Wt 89.4 kg (197 lb)   SpO2 96%   General: Well-appearing. Well-developed. No signs of distress.  HEENT: Grossly normal. Oral cavity is pink and moist.  Left ear canal is normal in appearance.  Tympanic membrane is normal.  Right canal is most obstructed with cerumen but TM is visualized.  Pulmonary: Clear with good breath sounds. Normal effort.  Cardiovascular: Regular. Carotid and radial pulses are intact.  Abdomen: Soft, nontender, nondistended. Spleen and liver are not enlarged.  Neurologic: Cranial nerves II through XII are grossly normal, alert and oriented x3      Diagnosis:  1. Lightheadedness        2. Change in hearing of left ear        3. Dysfunction of both eustachian tubes        4. Coronary artery disease due to calcified coronary lesion        5. Status post angioplasty with stent        6. S/P TAVR (transcatheter aortic valve replacement)            Patient with lightheadedness and hearing issues.  I do not believe they are related.  Assessment & Plan  1. Lightheadedness  - Started about 3 weeks ago, coinciding with the discontinuation of Plavix  - Blood pressure within normal range today, but heart rate slightly slow, possibly due to metoprolol  - Consider resuming clopidogrel if symptoms persist or worsen.  Although this may increase his risk of bleeding his symptoms seem to correlate with discontinuing clear clopidogrel  - Continue current medication regimen and monitor symptoms  - If symptoms persist or worsen, resume clopidogrel for a week to assess its impact on  lightheadedness    2. Hearing issues  - Right ear approximately 80% blocked, left ear completely open and normal  - Advised to allow water to enter the right ear during showers to facilitate wax removal  - Instructed to clean nasal passage with saline daily to help open up the eustachian tube  - If symptoms do not improve or worsen, return for further evaluation    My total time spent caring for the patient on the day of the encounter was  greater than 30 minutes.   This includes obtaining history, reviewing chart, physical exam, patient education, reviewing outside records, placing orders, interpreting tests and coordinating care.    Portions of this note were completed using voice recognition software (Dragon Naturally speaking software) . Occasional transcription errors may have escaped proof reading. I have made every reasonable attempt to correct obvious errors, but I expect that there are errors of grammar and possibly content that I did not discover before finalizing the note.          [1]   Current Outpatient Medications Ordered in Epic   Medication Sig Dispense Refill    ELIQUIS 5 MG Tab TAKE 1 TABLET BY MOUTH TWICE A  Tablet 3    hydroCHLOROthiazide 25 MG Tab Take 1 Tablet by mouth every day. 100 Tablet 3    atorvastatin (LIPITOR) 40 MG Tab TAKE 1 TABLET BY MOUTH EVERY DAY IN THE EVENING 100 Tablet 3    losartan (COZAAR) 50 MG Tab TAKE 1 TABLET BY MOUTH TWICE A  Tablet 1    metoprolol SR (TOPROL XL) 25 MG TABLET SR 24 HR Take 0.5 Tablets by mouth every day. 90 Tablet 3    cyclobenzaprine (FLEXERIL) 10 mg Tab Take 1 Tablet by mouth 3 times a day as needed for Moderate Pain (back spasms). 30 Tablet 0    allopurinol (ZYLOPRIM) 100 MG Tab TAKE 1 TABLET BY MOUTH TWICE A  Tablet 3     No current Epic-ordered facility-administered medications on file.

## 2025-07-28 RX ORDER — ALLOPURINOL 100 MG/1
100 TABLET ORAL 2 TIMES DAILY
Qty: 180 TABLET | Refills: 3 | Status: SHIPPED | OUTPATIENT
Start: 2025-07-28

## 2025-07-30 ENCOUNTER — APPOINTMENT (OUTPATIENT)
Dept: URBAN - METROPOLITAN AREA CLINIC 36 | Facility: CLINIC | Age: 85
Setting detail: DERMATOLOGY
End: 2025-07-30

## 2025-07-30 PROBLEM — C44.41 BASAL CELL CARCINOMA OF SKIN OF SCALP AND NECK: Status: ACTIVE | Noted: 2025-07-30

## 2025-07-30 PROCEDURE — ? MOHS SURGERY

## 2025-08-01 ENCOUNTER — PATIENT MESSAGE (OUTPATIENT)
Dept: CARDIOLOGY | Facility: MEDICAL CENTER | Age: 85
End: 2025-08-01
Payer: MEDICARE

## 2025-08-05 ENCOUNTER — PATIENT MESSAGE (OUTPATIENT)
Dept: CARDIOLOGY | Facility: MEDICAL CENTER | Age: 85
End: 2025-08-05
Payer: MEDICARE

## 2025-08-05 DIAGNOSIS — I47.29 NONSUSTAINED VENTRICULAR TACHYCARDIA (HCC): ICD-10-CM

## 2025-08-05 DIAGNOSIS — I49.3 VENTRICULAR ECTOPY: ICD-10-CM

## 2025-08-05 DIAGNOSIS — I10 ESSENTIAL HYPERTENSION: ICD-10-CM

## 2025-08-06 RX ORDER — METOPROLOL SUCCINATE 25 MG/1
12.5 TABLET, EXTENDED RELEASE ORAL EVERY EVENING
Qty: 100 TABLET | Refills: 0
Start: 2025-08-06

## 2025-08-06 RX ORDER — HYDROCHLOROTHIAZIDE 25 MG/1
12.5 TABLET ORAL DAILY
Qty: 100 TABLET | Refills: 0
Start: 2025-08-06

## 2025-08-11 ENCOUNTER — APPOINTMENT (OUTPATIENT)
Dept: URBAN - METROPOLITAN AREA CLINIC 36 | Facility: CLINIC | Age: 85
Setting detail: DERMATOLOGY
End: 2025-08-11

## 2025-08-11 DIAGNOSIS — Z48.02 ENCOUNTER FOR REMOVAL OF SUTURES: ICD-10-CM

## 2025-08-11 PROCEDURE — ? SUTURE REMOVAL (GLOBAL PERIOD)

## 2025-08-11 ASSESSMENT — LOCATION ZONE DERM: LOCATION ZONE: NECK

## 2025-08-11 ASSESSMENT — LOCATION DETAILED DESCRIPTION DERM: LOCATION DETAILED: LEFT SUPERIOR LATERAL NECK

## 2025-08-11 ASSESSMENT — LOCATION SIMPLE DESCRIPTION DERM: LOCATION SIMPLE: LEFT ANTERIOR NECK

## 2025-08-14 ENCOUNTER — OFFICE VISIT (OUTPATIENT)
Facility: MEDICAL CENTER | Age: 85
End: 2025-08-14
Attending: INTERNAL MEDICINE
Payer: MEDICARE

## 2025-08-14 VITALS
WEIGHT: 194 LBS | DIASTOLIC BLOOD PRESSURE: 64 MMHG | HEART RATE: 50 BPM | OXYGEN SATURATION: 95 % | SYSTOLIC BLOOD PRESSURE: 124 MMHG | BODY MASS INDEX: 26.28 KG/M2 | HEIGHT: 72 IN | RESPIRATION RATE: 16 BRPM

## 2025-08-14 DIAGNOSIS — Z95.2 S/P TAVR (TRANSCATHETER AORTIC VALVE REPLACEMENT): ICD-10-CM

## 2025-08-14 DIAGNOSIS — Z79.01 ON CONTINUOUS ORAL ANTICOAGULATION: ICD-10-CM

## 2025-08-14 DIAGNOSIS — I10 ESSENTIAL HYPERTENSION: Primary | ICD-10-CM

## 2025-08-14 DIAGNOSIS — Z95.5 S/P DRUG ELUTING CORONARY STENT PLACEMENT: ICD-10-CM

## 2025-08-14 DIAGNOSIS — R42 DIZZY SPELLS: ICD-10-CM

## 2025-08-14 DIAGNOSIS — I48.0 PAF (PAROXYSMAL ATRIAL FIBRILLATION) (HCC): ICD-10-CM

## 2025-08-14 DIAGNOSIS — I34.0 MITRAL VALVE INSUFFICIENCY, UNSPECIFIED ETIOLOGY: ICD-10-CM

## 2025-08-14 PROCEDURE — 3078F DIAST BP <80 MM HG: CPT | Performed by: INTERNAL MEDICINE

## 2025-08-14 PROCEDURE — 3074F SYST BP LT 130 MM HG: CPT | Performed by: INTERNAL MEDICINE

## 2025-08-14 PROCEDURE — 99214 OFFICE O/P EST MOD 30 MIN: CPT | Performed by: INTERNAL MEDICINE

## 2025-08-14 PROCEDURE — 99213 OFFICE O/P EST LOW 20 MIN: CPT | Performed by: INTERNAL MEDICINE

## 2025-08-14 ASSESSMENT — FIBROSIS 4 INDEX: FIB4 SCORE: 3.3

## 2025-08-29 ENCOUNTER — TELEPHONE (OUTPATIENT)
Dept: HEALTH INFORMATION MANAGEMENT | Facility: OTHER | Age: 85
End: 2025-08-29
Payer: MEDICARE

## (undated) DEVICE — LACTATED RINGERS INJ 1000 ML - (14EA/CA 60CA/PF)

## (undated) DEVICE — COVER LIGHT HANDLE FLEXIBLE - SOFT (2EA/PK 80PK/CA)

## (undated) DEVICE — SYR ANGIO CNRST INJ HI-PRS 3W 65 - (10EA/CA)"

## (undated) DEVICE — SET EXTENSION WITH 2 PORTS (48EA/CA) ***PART #2C8610 IS A SUBSTITUTE*****

## (undated) DEVICE — CATHETER 6FR AL1 100CM (5/BX)

## (undated) DEVICE — CATHETER PIGTAIL 6FR 145 (5EA/BX)

## (undated) DEVICE — SUCTION INSTRUMENT YANKAUER BULBOUS TIP W/O VENT (50EA/CA)

## (undated) DEVICE — WIRE GUIDE LUNDQST.035X180 - TSMG-35-180-4-LES ORDER BY BOX (5EA/BX)

## (undated) DEVICE — DEVICE INFLATION ATRION NOVALFEX TRANSFEMORAL SYSTEM (1EA)

## (undated) DEVICE — COVER FOOT UNIVERSAL DISP. - (25EA/CA)

## (undated) DEVICE — GLIDESHEATH SLENDER NITINOL KIT .021 GW 6FR 10CM SINGLE WALL

## (undated) DEVICE — DRAPE CLEAR W/ELASTIC BAND RAD CARM 40 X40" (20EA/CA)"

## (undated) DEVICE — GLOVESZ 8.5 BIOGEL PI MICRO - PF LF (50PR/BX)

## (undated) DEVICE — TOWELS CLOTH SURGICAL - (4/PK 20PK/CA)

## (undated) DEVICE — BLADE SURGICAL #11 - (50/BX)

## (undated) DEVICE — IV TUBING HI-FLO RATE W/CLAMP (50/CA)

## (undated) DEVICE — ELECTRODE DUAL RETURN W/ CORD - (50/PK)

## (undated) DEVICE — SHEATH RO 6F 25CM (10EA/BX)

## (undated) DEVICE — TUBING CLEARLINK DUO-VENT - C-FLO (48EA/CA)

## (undated) DEVICE — CHLORAPREP 26 ML APPLICATOR - ORANGE TINT(25/CA)

## (undated) DEVICE — Device

## (undated) DEVICE — SUTURE  0 ETHIBOND CT-1 30 IN (36PK/BX)

## (undated) DEVICE — SYSTEM DELIVERY COMMANDER TAVR KIT 26MM COMPONENT (1EA)

## (undated) DEVICE — COVER LIGHT HANDLE ALC PLUS DISP (18EA/BX)

## (undated) DEVICE — WIRE GUIDE AES .035 260CM WITH 3MM J TIP"

## (undated) DEVICE — STOPCOCK IV 400 PSI 3W ROT (50EA/BX)

## (undated) DEVICE — CANISTER SUCTION 3000ML MECHANICAL FILTER AUTO SHUTOFF MEDI-VAC NONSTERILE LF DISP  (40EA/CA)

## (undated) DEVICE — ELECTRODE RADIOLUCNT SOLID GEL DEFIB PADS (12EA/CA)

## (undated) DEVICE — ARM BAND RADIAL TR BAND (5EA/BX)

## (undated) DEVICE — SENSOR OXIMETER ADULT SPO2 RD SET (20EA/BX)

## (undated) DEVICE — KIT RETROFIT PROBE COVERS (24EA/EA)

## (undated) DEVICE — PACK TAVR (3EA/CA)

## (undated) DEVICE — INTRODUCER SHEATH 6FR 2.5CM - DILATOR PROTRUDING (10/BX)

## (undated) DEVICE — CABLE TEMPORARY PACING

## (undated) DEVICE — DECANTER FLD BLS - (50/CA)

## (undated) DEVICE — GUIDEWIRE STARTER STRAIGHT FIXED CORE .035 150CM 4 STRAIGHT PTFE/HEPARIN COATED (10/BX)

## (undated) DEVICE — DRAPE MAYO STAND - (30/CA)

## (undated) DEVICE — INTRODUCER CATHETER  DILATOR PROTRUDING 8FR 2.5CM (10EA/BX)

## (undated) DEVICE — SUTURE DEVICE CLOSURE REPAIR SYSTEM PERCLOSE PROSTYLE (10EA/PK)

## (undated) DEVICE — CRIMPER CATHETER EDWARDS DISPOSABLE (1EA)